# Patient Record
Sex: FEMALE | Race: BLACK OR AFRICAN AMERICAN | NOT HISPANIC OR LATINO | Employment: OTHER | URBAN - METROPOLITAN AREA
[De-identification: names, ages, dates, MRNs, and addresses within clinical notes are randomized per-mention and may not be internally consistent; named-entity substitution may affect disease eponyms.]

---

## 2017-01-27 ENCOUNTER — OFFICE VISIT (OUTPATIENT)
Dept: PODIATRY | Facility: CLINIC | Age: 82
End: 2017-01-27
Payer: MEDICARE

## 2017-01-27 VITALS
WEIGHT: 160 LBS | DIASTOLIC BLOOD PRESSURE: 58 MMHG | SYSTOLIC BLOOD PRESSURE: 108 MMHG | HEIGHT: 65 IN | BODY MASS INDEX: 26.66 KG/M2 | HEART RATE: 61 BPM

## 2017-01-27 DIAGNOSIS — I73.9 PAD (PERIPHERAL ARTERY DISEASE): ICD-10-CM

## 2017-01-27 DIAGNOSIS — E11.9 TYPE II OR UNSPECIFIED TYPE DIABETES MELLITUS WITHOUT MENTION OF COMPLICATION, NOT STATED AS UNCONTROLLED: Primary | ICD-10-CM

## 2017-01-27 DIAGNOSIS — B35.1 ONYCHOMYCOSIS: ICD-10-CM

## 2017-01-27 PROCEDURE — 99499 UNLISTED E&M SERVICE: CPT | Mod: S$PBB,,, | Performed by: PODIATRIST

## 2017-01-27 PROCEDURE — 99499 UNLISTED E&M SERVICE: CPT | Mod: S$GLB,,, | Performed by: PODIATRIST

## 2017-01-27 PROCEDURE — 99999 PR PBB SHADOW E&M-EST. PATIENT-LVL II: CPT | Mod: PBBFAC,,, | Performed by: PODIATRIST

## 2017-01-27 PROCEDURE — 11721 DEBRIDE NAIL 6 OR MORE: CPT | Mod: Q8,S$GLB,, | Performed by: PODIATRIST

## 2017-01-27 NOTE — MR AVS SNAPSHOT
[facility]   [address] [place]  [place] [address]  Phone: [phone]                  Melody Armstrong   2017 9:45 AM   Office Visit    Description:  Female : 1934   Provider:  Doug Laguerre DPM   Department:  [facility]           Diagnoses this Visit        Comments    Type II or unspecified type diabetes mellitus without mention of complication, not stated as uncontrolled    -  Primary     PAD (peripheral artery disease)         Onychomycosis                To Do List           Future Appointments        Provider Department Dept Phone    2/3/2017 7:45 AM LAB, [facility] [place] - Laboratory [phone]    2017 10:40 AM MD [facility] [place] - Internal Medicine [phone]    2017 9:30 AM Doug Laguerre DPM [facility] [phone]      Goals (5 Years of Data)     None      Follow-Up and Disposition     Return in about 3 months (around 2017).      [facility]     [facility] -  Assistance  Registered nurses in the [facility] provide clinical advisement, health education, appointment booking, and other advisory services.  Call for this free service at [phone].             Medications           Message regarding Medications     Verify the changes and/or additions to your medication regime listed below are the same as discussed with your clinician today.  If any of these changes or additions are incorrect, please notify your healthcare provider.             Verify that the below list of medications is an accurate representation of the medications you are currently taking.  If none reported, the list may be blank. If incorrect, please contact your healthcare provider. Carry this list with you in case of emergency.           Current Medications     aspirin 325 MG tablet Take 1 tablet by mouth.    atorvastatin (LIPITOR) 20 MG tablet TAKE 1 TABLET BY MOUTH ONCE DAILY FOR CHOLESTEROL.     "azelastine (ASTELIN) 137 mcg (0.1 %) nasal spray 1 spray (137 mcg total) by Nasal route 2 (two) times daily.    blood sugar diagnostic (GLUCOSE BLOOD) Strp Check blood glucose level once daily.    cyanocobalamin (VITAMIN B-12) 1000 MCG tablet Take 1 tablet by mouth.    diclofenac sodium (PENNSAID) 20 mg/gram /actuation(2 %) sopm Apply 40 mg topically 2 (two) times daily.    diclofenac sodium (VOLTAREN) 1 % Gel Apply 2 g topically 4 (four) times daily.    esomeprazole (NEXIUM) 40 MG capsule Take 1 capsule (40 mg total) by mouth before breakfast.    gabapentin (NEURONTIN) 100 MG capsule TAKE ONE CAPSULE BY MOUTH 3 TIMES A DAY.    hydrochlorothiazide (HYDRODIURIL) 25 MG tablet TAKE ONE TABLET BY MOUTH DAILY FOR FLUID.    levocetirizine (XYZAL) 5 MG tablet Take 1 tablet (5 mg total) by mouth every evening.    losartan (COZAAR) 50 MG tablet TAKE (1) TABLET BY MOUTH DAILY HIGH BLOOD PRESSURE.    meclizine (ANTIVERT) 12.5 mg tablet Take 1 tablet (12.5 mg total) by mouth 3 (three) times daily as needed for Dizziness.    metoprolol succinate (TOPROL-XL) 25 MG 24 hr tablet TAKE (1) TABLET BY MOUTH DAILY HIGH BLOOD PRESSURE.    potassium chloride (KLOR-CON 10) 10 MEQ TbSR Take 1 tablet (10 mEq total) by mouth once daily.    tramadol (ULTRAM) 50 mg tablet Take 1 tablet (50 mg total) by mouth 2 (two) times daily as needed for Pain.           Clinical Reference Information           Vital Signs - Last Recorded  Most recent update: 1/27/2017  9:50 AM by Cecilia Fitzpatrick MA    BP Pulse Ht Wt BMI    (!) 108/58 61 5' 5" (1.651 m) 72.6 kg (160 lb) 26.63 kg/m2      Blood Pressure          Most Recent Value    BP  (!)  108/58      Allergies as of 1/27/2017     No Known Allergies      Immunizations Administered on Date of Encounter - 1/27/2017     None      [facility] Sign-Up     Activating your [facility] account is as easy as 1-2-3!     1) Visit [email or web address], select Sign Up Now, enter this activation code and your date of birth, then " select Next.  [email or web address]  Expires: 3/13/2017 10:26 AM      2) Create a username and password to use when you visit [facility] in the future and select a security question in case you lose your password and select Next.    3) Enter your e-mail address and click Sign Up!    Additional Information  If you have questions, please e-mail [email or web address] or call [phone] to talk to our [facility] staff. Remember, [facility] is NOT to be used for urgent needs. For medical emergencies, dial 911.

## 2017-01-30 NOTE — PROGRESS NOTES
Subjective:      Patient ID: Melody Armstrong is a 82 y.o. female.    Chief Complaint: No chief complaint on file.    Melody is a 82 y.o. female who presents to the clinic for evaluation and treatment of diabetic feet. Melody has a past medical history of Anemia; Asymptomatic cholelithiasis; Coronary artery disease; Diabetes mellitus type II; GERD (gastroesophageal reflux disease); Hyperlipidemia; and Hypertension. Patient relates no major problem with feet. Complains of thickened toenails that she is unable to cut herself.    PCP: Zac Andres MD    Date Last Seen by PCP: 10/14/16    Current shoe gear: Slip-on shoes    Hemoglobin A1C   Date Value Ref Range Status   10/07/2016 5.6 4.5 - 6.2 % Final     Comment:     According to ADA guidelines, hemoglobin A1C <7.0% represents  optimal control in non-pregnant diabetic patients.  Different  metrics may apply to specific populations.   Standards of Medical Care in Diabetes - 2016.  For the purpose of screening for the presence of diabetes:  <5.7%     Consistent with the absence of diabetes  5.7-6.4%  Consistent with increasing risk for diabetes   (prediabetes)  >or=6.5%  Consistent with diabetes  Currently no consensus exists for use of hemoglobin A1C  for diagnosis of diabetes for children.     06/10/2016 5.1 4.5 - 6.2 % Final   02/12/2016 5.3 4.5 - 6.2 % Final     Vitals:    01/27/17 0941   BP: (!) 108/58   Pulse: 61     Past Medical History   Diagnosis Date    Anemia     Asymptomatic cholelithiasis     Coronary artery disease     Diabetes mellitus type II     GERD (gastroesophageal reflux disease)     Hyperlipidemia     Hypertension        Past Surgical History   Procedure Laterality Date    Joint replacement       right    Coronary artery bypass graft      Shoulder arthroscopy w/ rotator cuff repair       right shoulder    Knee surgery         Family History   Problem Relation Age of Onset    Diabetes Mother     Heart disease Mother     Diabetes  Maternal Grandmother        Social History     Social History    Marital status:      Spouse name: N/A    Number of children: N/A    Years of education: N/A     Social History Main Topics    Smoking status: Never Smoker    Smokeless tobacco: Never Used    Alcohol use No    Drug use: No    Sexual activity: Not Asked     Other Topics Concern    None     Social History Narrative       Current Outpatient Prescriptions   Medication Sig Dispense Refill    aspirin 325 MG tablet Take 1 tablet by mouth.      atorvastatin (LIPITOR) 20 MG tablet TAKE 1 TABLET BY MOUTH ONCE DAILY FOR CHOLESTEROL. 90 tablet 3    azelastine (ASTELIN) 137 mcg (0.1 %) nasal spray 1 spray (137 mcg total) by Nasal route 2 (two) times daily. 30 mL 1    blood sugar diagnostic (GLUCOSE BLOOD) Strp Check blood glucose level once daily. 1 Bottle 8    cyanocobalamin (VITAMIN B-12) 1000 MCG tablet Take 1 tablet by mouth.      diclofenac sodium (PENNSAID) 20 mg/gram /actuation(2 %) sopm Apply 40 mg topically 2 (two) times daily. 1 Bottle 2    diclofenac sodium (VOLTAREN) 1 % Gel Apply 2 g topically 4 (four) times daily. 1 Tube 2    esomeprazole (NEXIUM) 40 MG capsule Take 1 capsule (40 mg total) by mouth before breakfast. 90 capsule 3    gabapentin (NEURONTIN) 100 MG capsule TAKE ONE CAPSULE BY MOUTH 3 TIMES A DAY. 270 capsule 3    hydrochlorothiazide (HYDRODIURIL) 25 MG tablet TAKE ONE TABLET BY MOUTH DAILY FOR FLUID. 90 tablet 3    levocetirizine (XYZAL) 5 MG tablet Take 1 tablet (5 mg total) by mouth every evening. 30 tablet 1    losartan (COZAAR) 50 MG tablet TAKE (1) TABLET BY MOUTH DAILY HIGH BLOOD PRESSURE. 90 tablet 3    meclizine (ANTIVERT) 12.5 mg tablet Take 1 tablet (12.5 mg total) by mouth 3 (three) times daily as needed for Dizziness. 40 tablet 2    metoprolol succinate (TOPROL-XL) 25 MG 24 hr tablet TAKE (1) TABLET BY MOUTH DAILY HIGH BLOOD PRESSURE. 90 tablet 3    potassium chloride (KLOR-CON 10) 10 MEQ TbSR  Take 1 tablet (10 mEq total) by mouth once daily. 90 tablet 3    tramadol (ULTRAM) 50 mg tablet Take 1 tablet (50 mg total) by mouth 2 (two) times daily as needed for Pain. 60 tablet 3     No current facility-administered medications for this visit.        Review of patient's allergies indicates:  No Known Allergies        Review of Systems   Constitution: Negative for chills, fever, weakness and malaise/fatigue.   Cardiovascular: Negative for claudication.   Skin: Positive for nail changes. Negative for color change, dry skin, flushing, itching and poor wound healing.   Musculoskeletal: Positive for arthritis. Negative for back pain, falls, gout, joint pain and joint swelling.   Neurological: Negative for numbness and paresthesias.   Psychiatric/Behavioral: Negative for altered mental status.           Objective:      Physical Exam   Constitutional: She is oriented to person, place, and time. She appears well-nourished. No distress.   Cardiovascular:   Pulses:       Dorsalis pedis pulses are 2+ on the right side, and 2+ on the left side.        Posterior tibial pulses are 2+ on the right side, and 2+ on the left side.   CFT< 3 secs all toes bilateral foot, skin temp warm bilateral foot, diminished digital hair growth bilateral foot, no lower extremity edema bilateral. + varicosities bilateral.       Musculoskeletal:   Muscle strength 5/5 in all muscle groups bilateral.  No tenderness nor crepitation with ROM of foot/ankle joints bilateral.  No tenderness with palpation of bilateral foot and ankle.  Bilateral pes planus foot type.  Bilateral gastrocnemius equinus.  Bilateral hallux abducto valgus.  Bilateral semi-reducible contracture of toes 2-5 with adductovarus rotation of the 5th.     Neurological: She is alert and oriented to person, place, and time. She has normal strength. No sensory deficit.   Light touch intact bilateral.  Protective sensation intact bilateral per [place] monofilament.  Vibratory  sensation intact bilateral.   Skin: Skin is warm, dry and intact. No rash noted. She is not diaphoretic. No cyanosis or erythema. No pallor. Nails show no clubbing.   Skin turgor, temperature, and texture appear age appropriate bilateral.      Nails 1-5 bilateral are thickened, discolored, and elongated with subungual debris.  Superior growth and dystrophy of right second and fifth toenails bilateral.     No open wounds, interdigital maceration, or hyperkeratoses noted bilateral.             Assessment:       Encounter Diagnoses   Name Primary?    Type II or unspecified type diabetes mellitus without mention of complication, not stated as uncontrolled Yes    PAD (peripheral artery disease)     Onychomycosis          Plan:       Diagnoses and all orders for this visit:    Type II or unspecified type diabetes mellitus without mention of complication, not stated as uncontrolled    PAD (peripheral artery disease)    Onychomycosis      I counseled the patient on her conditions, their implications and medical management.    Shoe inspection. Diabetic Foot Education. Patient reminded of the importance of good nutrition and blood sugar control to help prevent podiatric complications of diabetes. Patient instructed on proper foot hygeine. We discussed wearing proper shoe gear, daily foot inspections, never walking without protective shoe gear, never putting sharp instruments to feet, routine podiatric nail visits every 2-3 months.      With patient's permission, nails were aggressively reduced and debrided x 10 to their soft tissue attachment mechanically and with electric , removing all offending nail and debris. Patient relates relief following the procedure. She will continue to monitor the areas daily, inspect her feet, wear protective shoe gear when ambulatory, moisturizer to maintain skin integrity and follow in this office in approximately 2-3 months, sooner p.r.n.

## 2017-02-01 DIAGNOSIS — J31.0 CHRONIC RHINITIS: ICD-10-CM

## 2017-02-01 NOTE — TELEPHONE ENCOUNTER
----- Message from Carito Lamar sent at 2/1/2017 11:25 AM CST -----  Contact: Ailin Daughter [phone]  Pt daughter would like to speak with Earline regarding the pt has a Sinus Cold and she would like to know what can the patient take,Please call

## 2017-02-01 NOTE — TELEPHONE ENCOUNTER
Spoke with daughter, she was encouraged to have mom take her xyzal as ordered refill was called into pharmacy.

## 2017-02-02 RX ORDER — LEVOCETIRIZINE DIHYDROCHLORIDE 5 MG/1
5 TABLET, FILM COATED ORAL NIGHTLY
Qty: 30 TABLET | Refills: 1 | Status: SHIPPED | OUTPATIENT
Start: 2017-02-02 | End: 2017-02-09 | Stop reason: SDUPTHER

## 2017-02-03 ENCOUNTER — LAB VISIT (OUTPATIENT)
Dept: LAB | Facility: HOSPITAL | Age: 82
End: 2017-02-03
Attending: INTERNAL MEDICINE
Payer: MEDICARE

## 2017-02-03 DIAGNOSIS — E78.5 HYPERLIPIDEMIA, UNSPECIFIED HYPERLIPIDEMIA TYPE: ICD-10-CM

## 2017-02-03 DIAGNOSIS — E11.22 TYPE 2 DIABETES MELLITUS WITH CHRONIC KIDNEY DISEASE, WITHOUT LONG-TERM CURRENT USE OF INSULIN, UNSPECIFIED CKD STAGE: Chronic | ICD-10-CM

## 2017-02-03 DIAGNOSIS — I10 ESSENTIAL HYPERTENSION: Chronic | ICD-10-CM

## 2017-02-03 LAB
ALBUMIN SERPL BCP-MCNC: 3.8 G/DL
ALP SERPL-CCNC: 91 U/L
ALT SERPL W/O P-5'-P-CCNC: 15 U/L
ANION GAP SERPL CALC-SCNC: 6 MMOL/L
AST SERPL-CCNC: 22 U/L
BASOPHILS # BLD AUTO: 0.03 K/UL
BASOPHILS NFR BLD: 0.4 %
BILIRUB SERPL-MCNC: 0.5 MG/DL
BUN SERPL-MCNC: 28 MG/DL
CALCIUM SERPL-MCNC: 9.8 MG/DL
CHLORIDE SERPL-SCNC: 106 MMOL/L
CO2 SERPL-SCNC: 27 MMOL/L
CREAT SERPL-MCNC: 1.8 MG/DL
DIFFERENTIAL METHOD: ABNORMAL
EOSINOPHIL # BLD AUTO: 1 K/UL
EOSINOPHIL NFR BLD: 14.2 %
ERYTHROCYTE [DISTWIDTH] IN BLOOD BY AUTOMATED COUNT: 13.1 %
EST. GFR  (AFRICAN AMERICAN): 29.8 ML/MIN/1.73 M^2
EST. GFR  (NON AFRICAN AMERICAN): 25.8 ML/MIN/1.73 M^2
GLUCOSE SERPL-MCNC: 89 MG/DL
HCT VFR BLD AUTO: 29.9 %
HGB BLD-MCNC: 9.6 G/DL
LYMPHOCYTES # BLD AUTO: 2.1 K/UL
LYMPHOCYTES NFR BLD: 28.5 %
MCH RBC QN AUTO: 32.2 PG
MCHC RBC AUTO-ENTMCNC: 32.1 %
MCV RBC AUTO: 100 FL
MONOCYTES # BLD AUTO: 0.6 K/UL
MONOCYTES NFR BLD: 8.3 %
NEUTROPHILS # BLD AUTO: 3.6 K/UL
NEUTROPHILS NFR BLD: 48.5 %
PLATELET # BLD AUTO: 282 K/UL
PMV BLD AUTO: 10.2 FL
POTASSIUM SERPL-SCNC: 4.7 MMOL/L
PROT SERPL-MCNC: 7.7 G/DL
RBC # BLD AUTO: 2.98 M/UL
SODIUM SERPL-SCNC: 139 MMOL/L
WBC # BLD AUTO: 7.34 K/UL

## 2017-02-03 PROCEDURE — 36415 COLL VENOUS BLD VENIPUNCTURE: CPT | Mod: PO

## 2017-02-03 PROCEDURE — 83036 HEMOGLOBIN GLYCOSYLATED A1C: CPT

## 2017-02-03 PROCEDURE — 80053 COMPREHEN METABOLIC PANEL: CPT

## 2017-02-03 PROCEDURE — 85025 COMPLETE CBC W/AUTO DIFF WBC: CPT

## 2017-02-04 LAB
ESTIMATED AVG GLUCOSE: 111 MG/DL
HBA1C MFR BLD HPLC: 5.5 %

## 2017-02-09 ENCOUNTER — OFFICE VISIT (OUTPATIENT)
Dept: INTERNAL MEDICINE | Facility: CLINIC | Age: 82
End: 2017-02-09
Payer: MEDICARE

## 2017-02-09 VITALS
BODY MASS INDEX: 26.6 KG/M2 | HEART RATE: 87 BPM | TEMPERATURE: 99 F | WEIGHT: 159.63 LBS | HEIGHT: 65 IN | DIASTOLIC BLOOD PRESSURE: 55 MMHG | SYSTOLIC BLOOD PRESSURE: 115 MMHG

## 2017-02-09 DIAGNOSIS — D63.1 ANEMIA IN CHRONIC KIDNEY DISEASE(285.21): ICD-10-CM

## 2017-02-09 DIAGNOSIS — E78.5 HYPERLIPIDEMIA, UNSPECIFIED HYPERLIPIDEMIA TYPE: ICD-10-CM

## 2017-02-09 DIAGNOSIS — I10 ESSENTIAL HYPERTENSION: Chronic | ICD-10-CM

## 2017-02-09 DIAGNOSIS — N18.9 ANEMIA IN CHRONIC KIDNEY DISEASE(285.21): ICD-10-CM

## 2017-02-09 DIAGNOSIS — J31.0 CHRONIC RHINITIS: ICD-10-CM

## 2017-02-09 DIAGNOSIS — G89.29 CHRONIC PAIN OF LEFT KNEE: ICD-10-CM

## 2017-02-09 DIAGNOSIS — E11.22 TYPE 2 DIABETES MELLITUS WITH CHRONIC KIDNEY DISEASE, WITHOUT LONG-TERM CURRENT USE OF INSULIN, UNSPECIFIED CKD STAGE: Primary | Chronic | ICD-10-CM

## 2017-02-09 DIAGNOSIS — N18.30 CKD (CHRONIC KIDNEY DISEASE), STAGE III: Chronic | ICD-10-CM

## 2017-02-09 DIAGNOSIS — M25.562 CHRONIC PAIN OF LEFT KNEE: ICD-10-CM

## 2017-02-09 DIAGNOSIS — M17.10 PRIMARY LOCALIZED OSTEOARTHROSIS, LOWER LEG, UNSPECIFIED LATERALITY: ICD-10-CM

## 2017-02-09 DIAGNOSIS — M17.12 ARTHRITIS OF LEFT KNEE: ICD-10-CM

## 2017-02-09 PROCEDURE — 99214 OFFICE O/P EST MOD 30 MIN: CPT | Mod: S$GLB,,, | Performed by: INTERNAL MEDICINE

## 2017-02-09 PROCEDURE — 99999 PR PBB SHADOW E&M-EST. PATIENT-LVL III: CPT | Mod: PBBFAC,,, | Performed by: INTERNAL MEDICINE

## 2017-02-09 PROCEDURE — 90732 PPSV23 VACC 2 YRS+ SUBQ/IM: CPT | Mod: S$GLB,,, | Performed by: INTERNAL MEDICINE

## 2017-02-09 PROCEDURE — 1160F RVW MEDS BY RX/DR IN RCRD: CPT | Mod: S$GLB,,, | Performed by: INTERNAL MEDICINE

## 2017-02-09 PROCEDURE — 99499 UNLISTED E&M SERVICE: CPT | Mod: S$PBB,,, | Performed by: INTERNAL MEDICINE

## 2017-02-09 PROCEDURE — 1159F MED LIST DOCD IN RCRD: CPT | Mod: S$GLB,,, | Performed by: INTERNAL MEDICINE

## 2017-02-09 PROCEDURE — G0009 ADMIN PNEUMOCOCCAL VACCINE: HCPCS | Mod: S$GLB,,, | Performed by: INTERNAL MEDICINE

## 2017-02-09 PROCEDURE — 3078F DIAST BP <80 MM HG: CPT | Mod: S$GLB,,, | Performed by: INTERNAL MEDICINE

## 2017-02-09 PROCEDURE — 3074F SYST BP LT 130 MM HG: CPT | Mod: S$GLB,,, | Performed by: INTERNAL MEDICINE

## 2017-02-09 PROCEDURE — 1126F AMNT PAIN NOTED NONE PRSNT: CPT | Mod: S$GLB,,, | Performed by: INTERNAL MEDICINE

## 2017-02-09 PROCEDURE — 1157F ADVNC CARE PLAN IN RCRD: CPT | Mod: S$GLB,,, | Performed by: INTERNAL MEDICINE

## 2017-02-09 RX ORDER — HYDROCHLOROTHIAZIDE 25 MG/1
TABLET ORAL
Qty: 90 TABLET | Refills: 3 | Status: SHIPPED | OUTPATIENT
Start: 2017-02-09 | End: 2018-09-04 | Stop reason: SDUPTHER

## 2017-02-09 RX ORDER — TRAMADOL HYDROCHLORIDE 50 MG/1
100 TABLET ORAL EVERY 8 HOURS PRN
Qty: 180 TABLET | Refills: 1 | Status: SHIPPED | OUTPATIENT
Start: 2017-02-09 | End: 2017-08-23 | Stop reason: SDUPTHER

## 2017-02-09 RX ORDER — POTASSIUM CHLORIDE 750 MG/1
10 TABLET, EXTENDED RELEASE ORAL DAILY
Qty: 90 TABLET | Refills: 3 | Status: SHIPPED | OUTPATIENT
Start: 2017-02-09 | End: 2017-07-06 | Stop reason: SDUPTHER

## 2017-02-09 RX ORDER — LEVOCETIRIZINE DIHYDROCHLORIDE 5 MG/1
5 TABLET, FILM COATED ORAL NIGHTLY
Qty: 30 TABLET | Refills: 5 | Status: SHIPPED | OUTPATIENT
Start: 2017-02-09 | End: 2018-05-02

## 2017-02-09 RX ORDER — GABAPENTIN 100 MG/1
CAPSULE ORAL
Qty: 270 CAPSULE | Refills: 3 | Status: SHIPPED | OUTPATIENT
Start: 2017-02-09 | End: 2018-04-09 | Stop reason: SDUPTHER

## 2017-02-09 RX ORDER — METOPROLOL SUCCINATE 25 MG/1
TABLET, EXTENDED RELEASE ORAL
Qty: 90 TABLET | Refills: 3 | Status: SHIPPED | OUTPATIENT
Start: 2017-02-09 | End: 2017-07-06 | Stop reason: SDUPTHER

## 2017-02-09 RX ORDER — ESOMEPRAZOLE MAGNESIUM 40 MG/1
40 CAPSULE, DELAYED RELEASE ORAL
Qty: 90 CAPSULE | Refills: 3 | Status: SHIPPED | OUTPATIENT
Start: 2017-02-09 | End: 2017-06-15

## 2017-02-09 RX ORDER — LOSARTAN POTASSIUM 50 MG/1
TABLET ORAL
Qty: 90 TABLET | Refills: 3 | Status: SHIPPED | OUTPATIENT
Start: 2017-02-09 | End: 2017-11-17 | Stop reason: SDUPTHER

## 2017-02-09 RX ORDER — ATORVASTATIN CALCIUM 20 MG/1
TABLET, FILM COATED ORAL
Qty: 90 TABLET | Refills: 3 | Status: SHIPPED | OUTPATIENT
Start: 2017-02-09 | End: 2018-02-01 | Stop reason: SDUPTHER

## 2017-02-13 ENCOUNTER — TELEPHONE (OUTPATIENT)
Dept: PAIN MEDICINE | Facility: CLINIC | Age: 82
End: 2017-02-13

## 2017-02-13 NOTE — TELEPHONE ENCOUNTER
----- Message from Yue Sands sent at 2/13/2017  9:36 AM CST -----  Contact: siria(daughter)/[phone]  Patient would like to schedule an appt for an injection in her knee.  Please advise

## 2017-02-13 NOTE — TELEPHONE ENCOUNTER
Contacted and spoke to pt's daughter regarding message to schedule left knee injection.     The pt's daughter was informed by staff that the pt need to discuss knee treatment plan with Mushtaq Crow . She was then offered an appointment on 2/14/2017 . Pt's daughter agreed , and pt was scheduled.     Pt's daughter verbalized understanding

## 2017-02-15 NOTE — PROGRESS NOTES
Subjective:       Patient ID: Melody Armstrong is a 82 y.o. female.    Chief Complaint: Follow-up    HPI   The patient presents for follow-up of diabetes and other medical conditions.  She is accompanied today by her daughter.  Blood glucose levels have been good.  Her vision is good.  She denies having any lower extremity numbness or tingling.  She has hypertension and has been tolerating her blood pressure medication well.  She has not experienced any hypoglycemic symptoms.  She has osteoarthritis primarily involving the knee joints.  She has been using Tylenol and occasionally tramadol.  We discussed use of opioid containing medications however the patient is not interested in this.  We discussed obtaining consultation with pain management regarding her left knee pain.  Injection therapy had been discussed with her previously.  Review of Systems   Constitutional: Negative for activity change, appetite change and unexpected weight change.   Eyes: Negative for visual disturbance.   Respiratory: Negative for shortness of breath.    Cardiovascular: Negative for chest pain, palpitations and leg swelling.   Gastrointestinal: Negative for abdominal pain, blood in stool and diarrhea.   Genitourinary: Negative for dysuria, frequency, hematuria and urgency.        She does experience 2-3 episodes of nocturia daily.   Musculoskeletal: Positive for arthralgias.   Neurological: Negative for weakness, numbness and headaches.   Psychiatric/Behavioral: Negative for sleep disturbance.       Objective:      Physical Exam   Constitutional: She is oriented to person, place, and time. She appears well-developed and well-nourished. No distress.   HENT:   Head: Normocephalic and atraumatic.   Eyes: Conjunctivae and EOM are normal. Pupils are equal, round, and reactive to light. No scleral icterus.   Neck: Normal range of motion. Neck supple. No JVD present. No thyromegaly present.   Cardiovascular: Normal rate, regular rhythm, normal  heart sounds and intact distal pulses.  Exam reveals no gallop and no friction rub.    No murmur heard.  Pulmonary/Chest: Effort normal and breath sounds normal. No respiratory distress. She has no wheezes. She has no rales.   Abdominal: Soft. Bowel sounds are normal. She exhibits no mass. There is no tenderness.   Musculoskeletal:   Right knee range of motion is intact.  Left knee exhibits decreased flexion and extension.  Tenderness is present on range of motion testing.  An effusion was not appreciated.   Lymphadenopathy:     She has no cervical adenopathy.   Neurological: She is alert and oriented to person, place, and time. No cranial nerve deficit.   Sensory exam is intact in both feet on monofilament and vibration testing.   Skin: Skin is warm and dry. No rash noted.   No foot lesions are present.   Psychiatric: She has a normal mood and affect. Her behavior is normal.   Nursing note and vitals reviewed.      Results for orders placed or performed in visit on 02/03/17   CBC auto differential   Result Value Ref Range    WBC 7.34 3.90 - 12.70 K/uL    RBC 2.98 (L) 4.00 - 5.40 M/uL    Hemoglobin 9.6 (L) 12.0 - 16.0 g/dL    Hematocrit 29.9 (L) 37.0 - 48.5 %     (H) 82 - 98 fL    MCH 32.2 (H) 27.0 - 31.0 pg    MCHC 32.1 32.0 - 36.0 %    RDW 13.1 11.5 - 14.5 %    Platelets 282 150 - 350 K/uL    MPV 10.2 9.2 - 12.9 fL    Gran # 3.6 1.8 - 7.7 K/uL    Lymph # 2.1 1.0 - 4.8 K/uL    Mono # 0.6 0.3 - 1.0 K/uL    Eos # 1.0 (H) 0.0 - 0.5 K/uL    Baso # 0.03 0.00 - 0.20 K/uL    Gran% 48.5 38.0 - 73.0 %    Lymph% 28.5 18.0 - 48.0 %    Mono% 8.3 4.0 - 15.0 %    Eosinophil% 14.2 (H) 0.0 - 8.0 %    Basophil% 0.4 0.0 - 1.9 %    Differential Method Automated    Comprehensive metabolic panel   Result Value Ref Range    Sodium 139 136 - 145 mmol/L    Potassium 4.7 3.5 - 5.1 mmol/L    Chloride 106 95 - 110 mmol/L    CO2 27 23 - 29 mmol/L    Glucose 89 70 - 110 mg/dL    BUN, Bld 28 (H) 8 - 23 mg/dL    Creatinine 1.8 (H) 0.5 -  1.4 mg/dL    Calcium 9.8 8.7 - 10.5 mg/dL    Total Protein 7.7 6.0 - 8.4 g/dL    Albumin 3.8 3.5 - 5.2 g/dL    Total Bilirubin 0.5 0.1 - 1.0 mg/dL    Alkaline Phosphatase 91 55 - 135 U/L    AST 22 10 - 40 U/L    ALT 15 10 - 44 U/L    Anion Gap 6 (L) 8 - 16 mmol/L    eGFR if African American 29.8 (A) >60 mL/min/1.73 m^2    eGFR if non  25.8 (A) >60 mL/min/1.73 m^2   Hemoglobin A1c   Result Value Ref Range    Hemoglobin A1C 5.5 4.5 - 6.2 %    Estimated Avg Glucose 111 68 - 131 mg/dL       Assessment:       1. Type 2 diabetes mellitus with chronic kidney disease, without long-term current use of insulin, unspecified CKD stage    2. Essential hypertension    3. CKD (chronic kidney disease), stage III    4. Hyperlipidemia, unspecified hyperlipidemia type    5. Anemia in chronic kidney disease    6. Arthritis of left knee    7. Primary localized osteoarthrosis, lower leg, unspecified laterality    8. Chronic pain of left knee    9. Chronic rhinitis        Plan:           Melody was seen today for follow-up.  Pain management will be consulted regarding chronic left knee pain.  Current therapy will be continued.  Tramadol will be renewed for pain control.  Pneumovax will be administered today.  Blood tests and a follow-up visit will be obtained in 4 months.    Diagnoses and all orders for this visit:    Type 2 diabetes mellitus with chronic kidney disease, without long-term current use of insulin, unspecified CKD stage  -     Hemoglobin A1c; Future    Essential hypertension  -     CBC auto differential; Future  -     TSH; Future    CKD (chronic kidney disease), stage III    Hyperlipidemia, unspecified hyperlipidemia type  -     Comprehensive metabolic panel; Future  -     Lipid panel; Future    Anemia in chronic kidney disease    Arthritis of left knee    Primary localized osteoarthrosis, lower leg, unspecified laterality  -     Ambulatory consult to Pain Clinic    Chronic pain of left knee  -      Ambulatory consult to Pain Clinic    Chronic rhinitis  -     levocetirizine (XYZAL) 5 MG tablet; Take 1 tablet (5 mg total) by mouth every evening. For sinus allergy.    Other orders  -     Pneumococcal Polysaccharide Vaccine (23 Valent) (SQ/IM)  -     atorvastatin (LIPITOR) 20 MG tablet; TAKE 1 TABLET BY MOUTH ONCE DAILY FOR CHOLESTEROL.  -     esomeprazole (NEXIUM) 40 MG capsule; Take 1 capsule (40 mg total) by mouth before breakfast.  -     gabapentin (NEURONTIN) 100 MG capsule; TAKE ONE CAPSULE BY MOUTH 3 TIMES A DAY.  -     hydrochlorothiazide (HYDRODIURIL) 25 MG tablet; TAKE ONE TABLET BY MOUTH DAILY FOR FLUID.  -     losartan (COZAAR) 50 MG tablet; TAKE (1) TABLET BY MOUTH DAILY HIGH BLOOD PRESSURE.  -     metoprolol succinate (TOPROL-XL) 25 MG 24 hr tablet; TAKE (1) TABLET BY MOUTH DAILY HIGH BLOOD PRESSURE.  -     potassium chloride (KLOR-CON 10) 10 MEQ TbSR; Take 1 tablet (10 mEq total) by mouth once daily.  -     tramadol (ULTRAM) 50 mg tablet; Take 2 tablets (100 mg total) by mouth every 8 (eight) hours as needed for Pain.

## 2017-03-21 ENCOUNTER — TELEPHONE (OUTPATIENT)
Dept: PAIN MEDICINE | Facility: CLINIC | Age: 82
End: 2017-03-21

## 2017-03-21 NOTE — TELEPHONE ENCOUNTER
Contacted and spoke with Ms. Gabriel regarding appointment on 3/23. Ms Harvey was informed that the appointment was to discuss treatment plan. Dr. Mercer will discuss treatment plan after evaluating the pt's condition.    Pt's daughter verbalized understanding

## 2017-03-21 NOTE — TELEPHONE ENCOUNTER
----- Message from Coty Hoang sent at 3/21/2017  2:37 PM CDT -----  Contact: pt daughter  Pt daughter would like a call back in regards to pt upcoming appt. Pt daughter would like to know if pt will receive her injection tomorrow     Pt daughter (Ms Gabriel) can be contacted at [phone]

## 2017-03-22 ENCOUNTER — OFFICE VISIT (OUTPATIENT)
Dept: PAIN MEDICINE | Facility: CLINIC | Age: 82
End: 2017-03-22
Payer: MEDICARE

## 2017-03-22 VITALS
HEART RATE: 70 BPM | WEIGHT: 158 LBS | DIASTOLIC BLOOD PRESSURE: 62 MMHG | BODY MASS INDEX: 26.29 KG/M2 | SYSTOLIC BLOOD PRESSURE: 110 MMHG

## 2017-03-22 DIAGNOSIS — M25.562 CHRONIC PAIN OF LEFT KNEE: Primary | ICD-10-CM

## 2017-03-22 DIAGNOSIS — G89.29 CHRONIC KNEE PAIN, UNSPECIFIED LATERALITY: Primary | ICD-10-CM

## 2017-03-22 DIAGNOSIS — G89.29 CHRONIC PAIN OF LEFT KNEE: Primary | ICD-10-CM

## 2017-03-22 DIAGNOSIS — M25.569 CHRONIC KNEE PAIN, UNSPECIFIED LATERALITY: Primary | ICD-10-CM

## 2017-03-22 DIAGNOSIS — M17.12 PRIMARY OSTEOARTHRITIS OF LEFT KNEE: ICD-10-CM

## 2017-03-22 PROCEDURE — 1125F AMNT PAIN NOTED PAIN PRSNT: CPT | Mod: S$GLB,,, | Performed by: ANESTHESIOLOGY

## 2017-03-22 PROCEDURE — 1159F MED LIST DOCD IN RCRD: CPT | Mod: S$GLB,,, | Performed by: ANESTHESIOLOGY

## 2017-03-22 PROCEDURE — 99214 OFFICE O/P EST MOD 30 MIN: CPT | Mod: S$GLB,,, | Performed by: ANESTHESIOLOGY

## 2017-03-22 PROCEDURE — 99999 PR PBB SHADOW E&M-EST. PATIENT-LVL II: CPT | Mod: PBBFAC,,, | Performed by: ANESTHESIOLOGY

## 2017-03-22 PROCEDURE — 1160F RVW MEDS BY RX/DR IN RCRD: CPT | Mod: S$GLB,,, | Performed by: ANESTHESIOLOGY

## 2017-03-22 PROCEDURE — 3074F SYST BP LT 130 MM HG: CPT | Mod: S$GLB,,, | Performed by: ANESTHESIOLOGY

## 2017-03-22 PROCEDURE — 1157F ADVNC CARE PLAN IN RCRD: CPT | Mod: S$GLB,,, | Performed by: ANESTHESIOLOGY

## 2017-03-22 PROCEDURE — 3078F DIAST BP <80 MM HG: CPT | Mod: S$GLB,,, | Performed by: ANESTHESIOLOGY

## 2017-03-22 NOTE — LETTER
March 24, 2017      Zac Andres MD  [address] [address]           [place] [facility]  [address]  [place] [facility] [place] [facility] [place] [address]  Phone: [phone]          Patient: Melody Armstrong   MR Number: [id number]   YOB: 1934   Date of Visit: 3/22/2017       Dear Dr. Zac Andres:    Thank you for referring Melody Armstrong to me for evaluation. Attached you will find relevant portions of my assessment and plan of care.    If you have questions, please do not hesitate to call me. I look forward to following Melody Armstrong along with you.    Sincerely,    Jez Mercer MD    Enclosure  CC:  No Recipients    If you would like to receive this communication electronically, please contact [email or web address] or [phone] to request more information on [facility] access.    For providers and/or their staff who would like to refer a patient to [facility], please contact us through our one-stop-shop provider referral line, [facility] , at [phone].    If you feel you have received this communication in error or would no longer like to receive these types of communications, please e-mail [email or web address]

## 2017-03-22 NOTE — PROGRESS NOTES
Chronic patient Established Note (Follow up visit)      SUBJECTIVE:    Melody Armstrong presents to the clinic for a follow-up appointment for left knee pain with osteoarthritis. Since the last visit, Melody Armstrong states the pain has been worsening. Current pain intensity is 10/10.  States since her bilateral sacroiliac joint her back pain has disappeared she is here today complaining of her left knee.  Pain Disability Index Review:  Last 3 PDI Scores 3/22/2017 12/8/2016 10/26/2016   Pain Disability Index (PDI) 21 0 54       Pain Medications:  - Opioids: Ultram (Tramadol HCL)  - Adjuvant Medications: Neurontin (Gabapentin)  - Anti-Coagulants: Aspirin  - Others: see medication list    Opioid Contract: no     report:  Reviewed and consistent with medication use as prescribed.    Pain Procedures: 11/1/16 bilateral SACROILIAC JOINT INJECTION  right knee replacement x 5yrs ago    Physical Therapy/Home Exercise: no    Imaging: X-Ray Sacrum And Coccyx 10/14/16  Narrative   2 views: There is DJD.  DJD of the lumbar spine, SI joints, hips, and symphysis pubis.  No fracture dislocation bone destruction or erosion seen.      Electronically signed by: MARY SORIA  Date: 10/14/16  Time: 12:11            Allergies: Review of patient's allergies indicates:  No Known Allergies    Current Medications:   Current Outpatient Prescriptions   Medication Sig Dispense Refill    aspirin 325 MG tablet Take 1 tablet by mouth.      atorvastatin (LIPITOR) 20 MG tablet TAKE 1 TABLET BY MOUTH ONCE DAILY FOR CHOLESTEROL. 90 tablet 3    azelastine (ASTELIN) 137 mcg (0.1 %) nasal spray 1 spray (137 mcg total) by Nasal route 2 (two) times daily. 30 mL 1    blood sugar diagnostic (GLUCOSE BLOOD) Strp Check blood glucose level once daily. 1 Bottle 8    cyanocobalamin (VITAMIN B-12) 1000 MCG tablet Take 1 tablet by mouth.      diclofenac sodium (PENNSAID) 20 mg/gram /actuation(2 %) sopm Apply 40 mg topically 2 (two) times daily. 1  Bottle 2    diclofenac sodium (VOLTAREN) 1 % Gel Apply 2 g topically 4 (four) times daily. 1 Tube 2    esomeprazole (NEXIUM) 40 MG capsule Take 1 capsule (40 mg total) by mouth before breakfast. 90 capsule 3    gabapentin (NEURONTIN) 100 MG capsule TAKE ONE CAPSULE BY MOUTH 3 TIMES A DAY. 270 capsule 3    hydrochlorothiazide (HYDRODIURIL) 25 MG tablet TAKE ONE TABLET BY MOUTH DAILY FOR FLUID. 90 tablet 3    levocetirizine (XYZAL) 5 MG tablet Take 1 tablet (5 mg total) by mouth every evening. For sinus allergy. 30 tablet 5    losartan (COZAAR) 50 MG tablet TAKE (1) TABLET BY MOUTH DAILY HIGH BLOOD PRESSURE. 90 tablet 3    meclizine (ANTIVERT) 12.5 mg tablet Take 1 tablet (12.5 mg total) by mouth 3 (three) times daily as needed for Dizziness. 40 tablet 2    metoprolol succinate (TOPROL-XL) 25 MG 24 hr tablet TAKE (1) TABLET BY MOUTH DAILY HIGH BLOOD PRESSURE. 90 tablet 3    potassium chloride (KLOR-CON 10) 10 MEQ TbSR Take 1 tablet (10 mEq total) by mouth once daily. 90 tablet 3    tramadol (ULTRAM) 50 mg tablet Take 2 tablets (100 mg total) by mouth every 8 (eight) hours as needed for Pain. 180 tablet 1     No current facility-administered medications for this visit.        REVIEW OF SYSTEMS:    GENERAL:  No weight loss, malaise or fevers.  HEENT:  Negative for frequent or significant headaches.  NECK:  Negative for lumps, goiter, pain and significant neck swelling.  RESPIRATORY:  Negative for cough, wheezing or shortness of breath.  CARDIOVASCULAR:  Negative for chest pain, leg swelling or palpitations.  GI:  Negative for abdominal discomfort, blood in stools or black stools or change in bowel habits.  MUSCULOSKELETAL:  See HPI.  SKIN:  Negative for lesions, rash, and itching.  PSYCH:  Positive for sleep disturbance, mood disorder and recent psychosocial stressors.  HEMATOLOGY/LYMPHOLOGY:  Negative for prolonged bleeding, bruising easily or swollen nodes.  NEURO:   No history of headaches, syncope,  paralysis, seizures or tremors.  All other reviewed and negative other than HPI.    Past Medical History:  Past Medical History:   Diagnosis Date    Anemia     Asymptomatic cholelithiasis     Coronary artery disease     Diabetes mellitus type II     GERD (gastroesophageal reflux disease)     Hyperlipidemia     Hypertension        Past Surgical History:  Past Surgical History:   Procedure Laterality Date    CORONARY ARTERY BYPASS GRAFT      JOINT REPLACEMENT      right    KNEE SURGERY      SHOULDER ARTHROSCOPY W/ ROTATOR CUFF REPAIR      right shoulder       Family History:  Family History   Problem Relation Age of Onset    Diabetes Mother     Heart disease Mother     Diabetes Maternal Grandmother        Social History:  Social History     Social History    Marital status:      Spouse name: N/A    Number of children: N/A    Years of education: N/A     Social History Main Topics    Smoking status: Never Smoker    Smokeless tobacco: Never Used    Alcohol use No    Drug use: No    Sexual activity: Not on file     Other Topics Concern    Not on file     Social History Narrative       OBJECTIVE:    /62  Pulse 70  Wt 71.7 kg (158 lb)  BMI 26.29 kg/m2    PHYSICAL EXAMINATION:    General appearance: Well appearing, in no acute distress, alert and oriented x3.  Psych:  Mood and affect appropriate.  Skin: Skin color, texture, turgor normal, no rashes or lesions, in both upper and lower body.  Head/face:  Atraumatic, normocephalic. No palpable lymph nodes  Neck: No pain to palpation over the cervical paraspinous muscles. Spurling Negative. No pain with neck flexion, extension, or lateral flexion. .  Cor: RRR  Pulm: CTA  GI: Abdomen soft and non-tender.  Back: Straight leg raising in the sitting and supine positions is negative to radicular pain.  Mild to moderate pain to palpation over the spine or costovertebral angles. Normal range of motion without pain reproduction.  Extremities:  positive for left knee limited ROM with tenderness on palpation and crepitus on movement, negative ant and post drawer sign. No deformities, edema, or skin discoloration. Good capillary refill.  Musculoskeletal: Shoulder, hip  provocative maneuvers are negative. Bilateral upper and lower extremity strength is normal and symmetric.  No atrophy or tone abnormalities are noted.  Neuro: Bilateral upper and lower extremity coordination and muscle stretch reflexes are physiologic and symmetric.  Plantar response are downgoing. No loss of sensation is noted.  Gait: Antalgic uses a walker    ASSESSMENT: 82 y.o. year old female with left knee pain, consistent with      1. Chronic knee pain, unspecified laterality     2. Primary osteoarthritis of left knee           PLAN:     - I have stressed the importance of physical activity and a home exercise plan to help with pain and improve health.  - Patient can continue with medications for now since they are providing benefits, using them appropriately, and without side effects.  - Schedule for a Left knee genicular nerves injection to help with his pain and progress with a home exercise plan.  - RTC 4 weeks  - Counseled patient regarding the importance of activity modification, constant sleeping habits and physical therapy.    The above plan and management options were discussed at length with patient. Patient is in agreement with the above and verbalized understanding.    Jez Mercer MD  03/22/2017

## 2017-03-24 ENCOUNTER — TELEPHONE (OUTPATIENT)
Dept: PAIN MEDICINE | Facility: CLINIC | Age: 82
End: 2017-03-24

## 2017-03-24 NOTE — TELEPHONE ENCOUNTER
Spoke with patient's daughter regarding time of arrival for procedure that is scheduled on 3/28/17. Patient's daughter verbalized instructions and confirmed procedure date and time of arrival on 3/28/17 at 730 AM.

## 2017-03-27 NOTE — DISCHARGE INSTRUCTIONS
Home Care Instructions Pain Management:    1.  DIET:    You may resume your normal diet today.    2.  BATHING:    You may shower with luke warm water.    3.  DRESSING:    You may remove your bandage today.    4.  ACTIVITY LEVEL:      You may resume your normal activities 24 hours after your procedure.    5.  MEDICATIONS:    You may resume your normal medications today.    6.  SPECIAL INSTRUCTIONS:    No heat to the injection site for 24 hours including bath or shower, heating pad, moist heat or hot tubs.    Use an ice pack to the injection site for any pain or discomfort.  Apply ice packs for 20 minute intervals as needed.    If you have received any sedatives by mouth today, you can not drive for 12 hours.    If you have received sedation through an IV, you can not drive for 24 hours.    PLEASE CALL YOUR DOCTOR FOR THE FOLLOWIN.  Redness or swelling around the injection site.  2.  Fever of 101 degrees.  3.  Drainage (pus) from the injection site.  4.  For any continuous bleeding (some dried blood over the incision is normal.)    FOR EMERGENCIES:    If any unusual problems or difficulties occur during clinic hours, call [phone] or dial [phone].    Follow up with with your physician in 2-3 weeks.

## 2017-03-28 ENCOUNTER — SURGERY (OUTPATIENT)
Age: 82
End: 2017-03-28

## 2017-03-28 ENCOUNTER — HOSPITAL ENCOUNTER (OUTPATIENT)
Facility: HOSPITAL | Age: 82
Discharge: HOME | End: 2017-03-28
Attending: ANESTHESIOLOGY | Admitting: ANESTHESIOLOGY
Payer: MEDICARE

## 2017-03-28 VITALS
OXYGEN SATURATION: 99 % | HEART RATE: 84 BPM | DIASTOLIC BLOOD PRESSURE: 76 MMHG | RESPIRATION RATE: 18 BRPM | SYSTOLIC BLOOD PRESSURE: 163 MMHG | HEIGHT: 65 IN | WEIGHT: 156 LBS | BODY MASS INDEX: 25.99 KG/M2 | TEMPERATURE: 98 F

## 2017-03-28 DIAGNOSIS — G89.29 CHRONIC PAIN: ICD-10-CM

## 2017-03-28 DIAGNOSIS — M17.9 OSTEOARTHRITIS OF KNEE, UNSPECIFIED LATERALITY, UNSPECIFIED OSTEOARTHRITIS TYPE: ICD-10-CM

## 2017-03-28 DIAGNOSIS — G89.29 CHRONIC KNEE PAIN, UNSPECIFIED LATERALITY: Primary | ICD-10-CM

## 2017-03-28 DIAGNOSIS — M25.569 CHRONIC KNEE PAIN, UNSPECIFIED LATERALITY: Primary | ICD-10-CM

## 2017-03-28 LAB — POCT GLUCOSE: 73 MG/DL (ref 70–110)

## 2017-03-28 PROCEDURE — 99152 MOD SED SAME PHYS/QHP 5/>YRS: CPT | Mod: ,,, | Performed by: ANESTHESIOLOGY

## 2017-03-28 PROCEDURE — 25000003 PHARM REV CODE 250: Performed by: ANESTHESIOLOGY

## 2017-03-28 PROCEDURE — 64450 NJX AA&/STRD OTHER PN/BRANCH: CPT | Mod: LT,,, | Performed by: ANESTHESIOLOGY

## 2017-03-28 PROCEDURE — 64450 NJX AA&/STRD OTHER PN/BRANCH: CPT | Performed by: ANESTHESIOLOGY

## 2017-03-28 PROCEDURE — 63600175 PHARM REV CODE 636 W HCPCS: Performed by: ANESTHESIOLOGY

## 2017-03-28 RX ORDER — SODIUM CHLORIDE 9 MG/ML
500 INJECTION, SOLUTION INTRAVENOUS CONTINUOUS
Status: DISCONTINUED | OUTPATIENT
Start: 2017-03-28 | End: 2017-03-28 | Stop reason: HOSPADM

## 2017-03-28 RX ORDER — TRIAMCINOLONE ACETONIDE 40 MG/ML
INJECTION, SUSPENSION INTRA-ARTICULAR; INTRAMUSCULAR
Status: DISCONTINUED | OUTPATIENT
Start: 2017-03-28 | End: 2017-03-28 | Stop reason: HOSPADM

## 2017-03-28 RX ORDER — MIDAZOLAM HYDROCHLORIDE 1 MG/ML
INJECTION, SOLUTION INTRAMUSCULAR; INTRAVENOUS
Status: DISCONTINUED | OUTPATIENT
Start: 2017-03-28 | End: 2017-03-28 | Stop reason: HOSPADM

## 2017-03-28 RX ORDER — LIDOCAINE HYDROCHLORIDE 10 MG/ML
INJECTION INFILTRATION; PERINEURAL
Status: DISCONTINUED | OUTPATIENT
Start: 2017-03-28 | End: 2017-03-28 | Stop reason: HOSPADM

## 2017-03-28 RX ORDER — BUPIVACAINE HYDROCHLORIDE 2.5 MG/ML
INJECTION, SOLUTION EPIDURAL; INFILTRATION; INTRACAUDAL
Status: DISCONTINUED | OUTPATIENT
Start: 2017-03-28 | End: 2017-03-28 | Stop reason: HOSPADM

## 2017-03-28 RX ADMIN — MIDAZOLAM 2 MG: 1 INJECTION INTRAMUSCULAR; INTRAVENOUS at 08:03

## 2017-03-28 RX ADMIN — LIDOCAINE HYDROCHLORIDE 5 ML: 10 INJECTION, SOLUTION INFILTRATION; PERINEURAL at 08:03

## 2017-03-28 RX ADMIN — TRIAMCINOLONE ACETONIDE 40 MG: 40 INJECTION, SUSPENSION INTRA-ARTICULAR; INTRAMUSCULAR at 08:03

## 2017-03-28 RX ADMIN — BUPIVACAINE HYDROCHLORIDE 5 ML: 2.5 INJECTION, SOLUTION EPIDURAL; INFILTRATION; INTRACAUDAL; PERINEURAL at 08:03

## 2017-03-28 NOTE — OP NOTE
Patient Name: Melody Armstrong  MRN: [id number]    INFORMED CONSENT: The procedure, risks, benefits and options were discussed with patient. There are no contraindications to the procedure. The patient expressed understanding and agreed to proceed. The personnel performing the procedure was discussed. I verify that I personally obtained Melody's consent prior to the start of the procedure and the signed consent can be found on the patient's chart.    Procedure Date: 03/28/2017    Anesthesia: Topical    Pre Procedure diagnosis:   1. Chronic knee pain, unspecified laterality    2. Osteoarthritis of knee, unspecified laterality, unspecified osteoarthritis type    3. Chronic pain      Post-Procedure diagnosis: same      Moderate Sedation: Yes - Midazolam 2 mg single dose      PROCEDURE: left GENICULAR NERVE BLOCK    DESCRIPTION OF PROCEDURE: The patient was brought to the fluoroscopy suite. IV access was obtained prior to the procedure. The patient was positioned supine on the fluoroscopy table. Continuous hemodynamic monitoring was initiated including blood pressure, EKG, and pulse oximetry. The area of the around the left knee joint was prepped with chlorhexidine three times and draped into a sterile field. Skin anesthesia was achieved using Lidocaine 1% over the respective injection sites. A 22 gauge 3.5 inch spinal needle was slowly inserted and advanced to the junction of the lateral femoral and the epicondyle while contacting periosteum to block the superior lateral genicular nerve using the AP and lateral fluoroscopic imaging. Same process was repeated at the junction of the medial femoral shaft and the epicondyle to block the superior medial genicular nerve. The same process was repeated again using a 3rd needle which was advanced at the junction of the medial tibial plateau and the epicondyle to block the inferior medial genicular nerve. Using AP fluoroscopy the position of all 3 needles was confirmed.  Afterwards lateral fluoroscopic images were obtained and the needles were adjusted to lay in the middle of the diaphysis.Negative aspiration for blood. . A combination of 3 cc of Bupivacaine 0.25% and 40 mg of kenalog was injected at all needle location to block all targeted nerves.. There was no pain on injection. The needle was removed and bleeding was nil. A sterile dressing was applied. No specimens collected. PATIENT was taken to the Post-block Recovery Area for further observation.       Blood Loss: Nill  Specimen: None        Discharge Diagnosis: Same as Pre and Post Procedure  Condition on Discharge: Stable.  Diet on Discharge: Same as before.  Activity: as per instruction sheet.  Discharge to: Home with a responsible adult.  Follow up: as per Discharge instructions    Jez Mercer MD

## 2017-03-28 NOTE — IP AVS SNAPSHOT
[facility]  [address]  [place] [address]  Phone: [phone]           Patient Discharge Instructions     Our goal is to set you up for success. This packet includes information on your condition, medications, and your home care. It will help you to care for yourself so you don't get sicker and need to go back to the hospital.     Please ask your nurse if you have any questions.        There are many details to remember when preparing to leave the hospital. Here is what you will need to do:    1. Take your medicine. If you are prescribed medications, review your Medication List in the following pages. You may have new medications to  at the pharmacy and others that you'll need to stop taking. Review the instructions for how and when to take your medications. Talk with your doctor or nurses if you are unsure of what to do.     2. Go to your follow-up appointments. Specific follow-up information is listed in the following pages. Your may be contacted by a transition nurse or clinical provider about future appointments. Be sure we have all of the phone numbers to reach you, if needed. Please contact your provider's office if you are unable to make an appointment.     3. Watch for warning signs. Your doctor or nurse will give you detailed warning signs to watch for and when to call for assistance. These instructions may also include educational information about your condition. If you experience any of warning signs to your health, call your doctor.               [facility]  Unless otherwise directed by your provider, please contact [facility], our nurse care line that is available for 24/7 assistance.     [phone] (toll-free)    Registered nurses in the [facility] provide clinical advisement, health education, appointment booking, and other advisory services.                    ** Verify the list of medication(s) below is accurate and up to date. Carry this  with you in case of emergency. If your medications have changed, please notify your healthcare provider.             Medication List      Notice     Cannot display discharge medications because the patient has not yet been admitted.               Please bring to all follow up appointments:    1. A copy of your discharge instructions.  2. All medicines you are currently taking in their original bottles.  3. Identification and insurance card.    Please arrive 15 minutes ahead of scheduled appointment time.    Please call 24 hours in advance if you must reschedule your appointment and/or time.        Your Scheduled Appointments     Apr 25, 2017 10:15 AM CDT   Established Patient Visit with J CARLOS Milian   [facility] ([place]    [address]  [facility] [id number] [phone]            Apr 27, 2017  9:30 AM CDT   Established Patient Visit with Doug Laguerre DPM   [place] [facility] ([facility]    [address] [place] [id number]   [phone]            Jun 02, 2017  8:00 AM CDT   Fasting Lab with LAB, [place]   [place] [facility] [place]    [address]  [place] [id number] [phone]            Jun 08, 2017 10:40 AM CDT   Established Patient Visit with MD [facility] ([place])    [address]  [place] [id number] [phone]              Your Future Surgeries/Procedures     Mar 28, 2017   Surgery with Jez Mercer MD   [facility] ([facility])    [address] [place] [id number] [phone]                  Discharge Instructions       Home Care Instructions Pain Management:    1.  DIET:    You may resume your normal diet today.    2.  BATHING:    You may shower with luke warm water.    3.  DRESSING:    You may remove your bandage today.    4.  ACTIVITY LEVEL:      You may resume your normal activities 24 hours after your procedure.    5.   MEDICATIONS:    You may resume your normal medications today.    6.  SPECIAL INSTRUCTIONS:    No heat to the injection site for 24 hours including bath or shower, heating pad, moist heat or hot tubs.    Use an ice pack to the injection site for any pain or discomfort.  Apply ice packs for 20 minute intervals as needed.    If you have received any sedatives by mouth today, you can not drive for 12 hours.    If you have received sedation through an IV, you can not drive for 24 hours.    PLEASE CALL YOUR DOCTOR FOR THE FOLLOWIN.  Redness or swelling around the injection site.  2.  Fever of 101 degrees.  3.  Drainage (pus) from the injection site.  4.  For any continuous bleeding (some dried blood over the incision is normal.)    FOR EMERGENCIES:    If any unusual problems or difficulties occur during clinic hours, call [phone] or dial [phone].    Follow up with with your physician in 2-3 weeks.         Admission Information     Date & Time Provider Department [facility]    3/28/2017  8:30 AM Jez Mercer MD [facility] [id number]      Care Providers     Provider Role Specialty Primary office phone    Jez Mercer MD Attending Provider Pain Medicine [phone]      Recent Lab Values        2014 2014 2015 10/23/2015 2016 6/10/2016 10/7/2016 2/3/2017     10:05 AM  9:54 AM 10:08 AM 10:02 AM  9:58 AM  9:55 AM  9:40 AM  8:52 AM    A1C 5.6 5.5 5.5 5.5 5.3 5.1 5.6 5.5    Comment for A1C at  9:40 AM on 10/7/2016:  According to ADA guidelines, hemoglobin A1C <7.0% represents  optimal control in non-pregnant diabetic patients.  Different  metrics may apply to specific populations.   Standards of Medical Care in Diabetes - 2016.  For the purpose of screening for the presence of diabetes:  <5.7%     Consistent with the absence of diabetes  5.7-6.4%  Consistent with increasing risk for diabetes   (prediabetes)  >or=6.5%  Consistent with diabetes  Currently no consensus exists for  use of hemoglobin A1C  for diagnosis of diabetes for children.      Comment for A1C at  8:52 AM on 2/3/2017:  According to ADA guidelines, hemoglobin A1C <7.0% represents  optimal control in non-pregnant diabetic patients.  Different  metrics may apply to specific populations.   Standards of Medical Care in Diabetes - 2016.  For the purpose of screening for the presence of diabetes:  <5.7%     Consistent with the absence of diabetes  5.7-6.4%  Consistent with increasing risk for diabetes   (prediabetes)  >or=6.5%  Consistent with diabetes  Currently no consensus exists for use of hemoglobin A1C  for diagnosis of diabetes for children.        Allergies as of 3/27/2017     No Known Allergies      Advance Directives     An advance directive is a document which, in the event you are no longer able to make decisions for yourself, tells your healthcare team what kind of treatment you do or do not want to receive, or who you would like to make those decisions for you.  If you do not currently have an advance directive, [facility] encourages you to create one.  For more information call:  [phone]), [phone] ([phone]),  or log on to [email or web address].        Language Assistance Services     ATTENTION: Language assistance services are available, free of charge. Please call [phone].      ATENCIÓN: Si habla español, tiene a bernal disposición servicios gratuitos de asistencia lingüística. Llame al [phone].     CHÚ Ý: N?u b?n nói Ti?ng Vi?t, có các d?ch v? h? tr? ngôn ng? mi?n phí dành cho b?n. G?i s? [phone].        Chronic Kindey Disease Education             Diabetes Discharge Instructions                                   [facility] Sign-Up     Activating your [facility] account is as easy as 1-2-3!     1) Visit [email or web address], select Sign Up Now, enter this activation code and your date of birth, then select Next.  [email or web address]  Expires: 5/11/2017  1:57 PM      2) Create a  username and password to use when you visit [facility] in the future and select a security question in case you lose your password and select Next.    3) Enter your e-mail address and click Sign Up!    Additional Information  If you have questions, please e-mail [email or web address] or call [phone] to talk to our [facility] staff. Remember, [facility] is NOT to be used for urgent needs. For medical emergencies, dial 911.          [facility] complies with applicable Federal civil rights laws and does not discriminate on the basis of race, color, national origin, age, disability, or sex.

## 2017-03-29 ENCOUNTER — TELEPHONE (OUTPATIENT)
Dept: PAIN MEDICINE | Facility: CLINIC | Age: 82
End: 2017-03-29

## 2017-03-29 NOTE — TELEPHONE ENCOUNTER
----- Message from Paty Stevens sent at 3/29/2017  8:49 AM CDT -----  X_  1st Request  _  2nd Request  _  3rd Request        Who: Ailin Gabriel(Pt's daughter)    Why: Pt's daughter stating her mother wore a brace prior to her procedure with Dr. Mercer. She wants to know if she needs to put the brace back on post procedure or leave it off. Please call to discuss.    What Number to Call Back: [phone]    When to Expect a call back: (Before the end of the day)   -- if the call is after 12:00, the call back will be tomorrow.

## 2017-03-29 NOTE — TELEPHONE ENCOUNTER
Contacted and spoke with Pt's daughter Harvey regarding knee brace. Ms. Gabriel was informed the brace is PRN ,so she can wear brace as needed as she does not need it now.    Pt verbalized understanding

## 2017-03-31 ENCOUNTER — TELEPHONE (OUTPATIENT)
Dept: PAIN MEDICINE | Facility: HOSPITAL | Age: 82
End: 2017-03-31

## 2017-04-26 ENCOUNTER — HOSPITAL ENCOUNTER (EMERGENCY)
Facility: HOSPITAL | Age: 82
Discharge: HOME | End: 2017-04-26
Attending: EMERGENCY MEDICINE
Payer: MEDICARE

## 2017-04-26 ENCOUNTER — TELEPHONE (OUTPATIENT)
Dept: INTERNAL MEDICINE | Facility: CLINIC | Age: 82
End: 2017-04-26

## 2017-04-26 VITALS
WEIGHT: 156 LBS | DIASTOLIC BLOOD PRESSURE: 66 MMHG | BODY MASS INDEX: 25.99 KG/M2 | RESPIRATION RATE: 18 BRPM | HEIGHT: 65 IN | OXYGEN SATURATION: 99 % | SYSTOLIC BLOOD PRESSURE: 138 MMHG | TEMPERATURE: 98 F | HEART RATE: 88 BPM

## 2017-04-26 DIAGNOSIS — N30.01 ACUTE CYSTITIS WITH HEMATURIA: Primary | ICD-10-CM

## 2017-04-26 DIAGNOSIS — N30.90 CYSTITIS: Primary | ICD-10-CM

## 2017-04-26 LAB
ALBUMIN SERPL BCP-MCNC: 3.9 G/DL
ALP SERPL-CCNC: 86 U/L
ALT SERPL W/O P-5'-P-CCNC: 35 U/L
ANION GAP SERPL CALC-SCNC: 12 MMOL/L
AST SERPL-CCNC: 32 U/L
BACTERIA #/AREA URNS HPF: ABNORMAL /HPF
BASOPHILS # BLD AUTO: 0.01 K/UL
BASOPHILS NFR BLD: 0.1 %
BILIRUB SERPL-MCNC: 0.6 MG/DL
BILIRUB UR QL STRIP: NEGATIVE
BUN SERPL-MCNC: 25 MG/DL
CALCIUM SERPL-MCNC: 9.6 MG/DL
CHLORIDE SERPL-SCNC: 106 MMOL/L
CLARITY UR: ABNORMAL
CO2 SERPL-SCNC: 20 MMOL/L
COLOR UR: ABNORMAL
CREAT SERPL-MCNC: 1.6 MG/DL
DIFFERENTIAL METHOD: ABNORMAL
EOSINOPHIL # BLD AUTO: 0.4 K/UL
EOSINOPHIL NFR BLD: 5.7 %
ERYTHROCYTE [DISTWIDTH] IN BLOOD BY AUTOMATED COUNT: 13.7 %
EST. GFR  (AFRICAN AMERICAN): 34 ML/MIN/1.73 M^2
EST. GFR  (NON AFRICAN AMERICAN): 30 ML/MIN/1.73 M^2
GLUCOSE SERPL-MCNC: 81 MG/DL
GLUCOSE UR QL STRIP: ABNORMAL
HCT VFR BLD AUTO: 29.5 %
HGB BLD-MCNC: 9.8 G/DL
HGB UR QL STRIP: ABNORMAL
HYALINE CASTS #/AREA URNS LPF: ABNORMAL /LPF
KETONES UR QL STRIP: NEGATIVE
LEUKOCYTE ESTERASE UR QL STRIP: NEGATIVE
LYMPHOCYTES # BLD AUTO: 2.3 K/UL
LYMPHOCYTES NFR BLD: 30.5 %
MCH RBC QN AUTO: 32.5 PG
MCHC RBC AUTO-ENTMCNC: 33.2 %
MCV RBC AUTO: 98 FL
MICROSCOPIC COMMENT: ABNORMAL
MONOCYTES # BLD AUTO: 0.7 K/UL
MONOCYTES NFR BLD: 8.9 %
NEUTROPHILS # BLD AUTO: 4.1 K/UL
NEUTROPHILS NFR BLD: 54.5 %
NITRITE UR QL STRIP: POSITIVE
PH UR STRIP: 7 [PH] (ref 5–8)
PLATELET # BLD AUTO: 250 K/UL
PMV BLD AUTO: 9.5 FL
POTASSIUM SERPL-SCNC: 4.9 MMOL/L
PROT SERPL-MCNC: 8.2 G/DL
PROT UR QL STRIP: ABNORMAL
RBC # BLD AUTO: 3.02 M/UL
RBC #/AREA URNS HPF: >100 /HPF (ref 0–4)
SODIUM SERPL-SCNC: 138 MMOL/L
SP GR UR STRIP: 1.01 (ref 1–1.03)
URN SPEC COLLECT METH UR: ABNORMAL
UROBILINOGEN UR STRIP-ACNC: NEGATIVE EU/DL
WBC # BLD AUTO: 7.53 K/UL
WBC #/AREA URNS HPF: ABNORMAL /HPF (ref 0–5)

## 2017-04-26 PROCEDURE — 99284 EMERGENCY DEPT VISIT MOD MDM: CPT

## 2017-04-26 PROCEDURE — 80053 COMPREHEN METABOLIC PANEL: CPT

## 2017-04-26 PROCEDURE — 81000 URINALYSIS NONAUTO W/SCOPE: CPT

## 2017-04-26 PROCEDURE — 85025 COMPLETE CBC W/AUTO DIFF WBC: CPT

## 2017-04-26 RX ORDER — SULFAMETHOXAZOLE AND TRIMETHOPRIM 200; 40 MG/5ML; MG/5ML
8 SUSPENSION ORAL EVERY 12 HOURS
Qty: 354 ML | Refills: 0 | Status: SHIPPED | OUTPATIENT
Start: 2017-04-26 | End: 2017-05-01

## 2017-04-26 RX ORDER — SODIUM CHLORIDE 9 MG/ML
125 INJECTION, SOLUTION INTRAVENOUS CONTINUOUS
Status: DISCONTINUED | OUTPATIENT
Start: 2017-04-26 | End: 2017-04-26

## 2017-04-26 NOTE — ED PROVIDER NOTES
Encounter Date: 4/26/2017       History     Chief Complaint   Patient presents with    Vaginal Bleeding     c/o vaginal bleeding since this morning.     Review of patient's allergies indicates:  No Known Allergies  HPI Comments: 82 -year-old female with a history of diabetes, hypertension, hyperlipidemia takes an aspirin daily comes into the emergency department after noticing there is blood on the toilet paper after urinating.  She denies any pain.  She has no nausea vomiting she has no back pain no abdominal pain no diarrhea.  She does not feel lightheaded or shortness of breath.  She has had urinary tract infections a long time ago.  She denies dysuria or painful urination.  She denies weight loss or night sweats, or appetite change.    Patient is a 82 y.o. female presenting with the following complaint: general illness. The history is provided by the patient and a caregiver.   General Illness    The current episode started today. The problem occurs continuously. The problem has been unchanged. The pain is at a severity of 0/10. Nothing relieves the symptoms. Nothing aggravates the symptoms. Pertinent negatives include no abdominal pain, no diarrhea, no nausea and no vomiting.     Past Medical History:   Diagnosis Date    Anemia     Asymptomatic cholelithiasis     Coronary artery disease     Diabetes mellitus type II     GERD (gastroesophageal reflux disease)     Hyperlipidemia     Hypertension      Past Surgical History:   Procedure Laterality Date    CORONARY ARTERY BYPASS GRAFT      JOINT REPLACEMENT      right    KNEE SURGERY      PARTIAL HYSTERECTOMY      SHOULDER ARTHROSCOPY W/ ROTATOR CUFF REPAIR      right shoulder     Family History   Problem Relation Age of Onset    Diabetes Mother     Heart disease Mother     Diabetes Maternal Grandmother      Social History   Substance Use Topics    Smoking status: Never Smoker    Smokeless tobacco: Never Used    Alcohol use No     Review of Systems    HENT: Negative.    Eyes: Negative.    Respiratory: Negative.    Gastrointestinal: Negative for abdominal pain, diarrhea, nausea and vomiting.   All other systems reviewed and are negative.      Physical Exam   Initial Vitals   BP Pulse Resp Temp SpO2   04/26/17 0848 04/26/17 0848 04/26/17 0848 04/26/17 0848 04/26/17 0848   181/72 83 18 98.4 °F (36.9 °C) 99 %     Physical Exam    Nursing note and vitals reviewed.  Constitutional: She appears well-developed and well-nourished. No distress.   HENT:   Head: Normocephalic and atraumatic.   Eyes: EOM are normal. Pupils are equal, round, and reactive to light.   Neck: Normal range of motion. Neck supple.   Cardiovascular: Normal rate and normal heart sounds.   Pulmonary/Chest: Breath sounds normal.   Abdominal: Soft.   He has no suprapubic tenderness no CVA tenderness   Musculoskeletal: Normal range of motion.   Neurological: She is alert and oriented to person, place, and time. She has normal strength. No cranial nerve deficit.   Skin: Skin is warm and dry.   Psychiatric: She has a normal mood and affect. Her behavior is normal. Thought content normal.         ED Course   Procedures  Labs Reviewed   CBC W/ AUTO DIFFERENTIAL - Abnormal; Notable for the following:        Result Value    RBC 3.02 (*)     Hemoglobin 9.8 (*)     Hematocrit 29.5 (*)     MCH 32.5 (*)     All other components within normal limits   COMPREHENSIVE METABOLIC PANEL - Abnormal; Notable for the following:     CO2 20 (*)     BUN, Bld 25 (*)     Creatinine 1.6 (*)     eGFR if  34 (*)     eGFR if non  30 (*)     All other components within normal limits   URINALYSIS - Abnormal; Notable for the following:     Color, UA Red (*)     Appearance, UA Cloudy (*)     Protein, UA 3+ (*)     Glucose, UA Trace (*)     Occult Blood UA 3+ (*)     Nitrite, UA Positive (*)     All other components within normal limits   URINALYSIS MICROSCOPIC - Abnormal; Notable for the following:      RBC, UA >100 (*)     Hyaline Casts, UA SEE COMMENT (*)     All other components within normal limits             Medical Decision Making:   Initial Assessment:   82-year-old female presents with blood on her toilet paper without any pain or complaints  Differential Diagnosis:   Most likely cystitis (infectious versus malignant)   I discussed with Melody and her daughter treatment for presumption infectious eitiology- if not improved on antibiotics- needs urology follow up for scope  Clinical Tests:   Lab Tests: Ordered and Reviewed  ED Management:  Patient was given TMP/SMX for UTI (renal insufficiency precludes macrobid)      Given recommendations to f/u with PMD if hematuria persists beyong course of antibiotics                   ED Course     Clinical Impression:   The encounter diagnosis was Cystitis.          Marilyn Abrams MD  04/26/17 9953

## 2017-04-26 NOTE — ED AVS SNAPSHOT
[facility] [address]  [place] [address]               Melody Armstrong   2017  8:50 AM   ED    Description:  Female : 1934   Department:  [facility]           Your Care was Coordinated By:     Provider Role From To    Marilyn Abrams MD Attending Provider 17 0853 --      Reason for Visit     Vaginal Bleeding           Diagnoses this Visit        Comments    Cystitis    -  Primary       ED Disposition     None           To Do List           Follow-up Information     Follow up with Zac Andres MD. Schedule an appointment as soon as possible for a visit in 3 days.    Specialty:  Internal Medicine    Contact information:     [facility] [place] [address]  [phone]         These Medications        Disp Refills Start End    sulfamethoxazole-trimethoprim 200-40 mg/5 ml (BACTRIM,SEPTRA) 200-40 mg/5 mL Susp 354 mL 0 2017    Take 35.4 mLs by mouth every 12 (twelve) hours. - Oral    Pharmacy: [place] [address] Ph #: [phone]         [facility]     [facility] Nurse Care Line -  Assistance  Unless otherwise directed by your provider, please contact [facility], our nurse care line that is available for  assistance.     Registered nurses in the [facility] provide: appointment scheduling, clinical advisement, health education, and other advisory services.  Call: [phone] (toll free)               Medications           Message regarding Medications     Verify the changes and/or additions to your medication regime listed below are the same as discussed with your clinician today.  If any of these changes or additions are incorrect, please notify your healthcare provider.        START taking these NEW medications        Refills    sulfamethoxazole-trimethoprim 200-40 mg/5 ml (BACTRIM,SEPTRA) 200-40 mg/5 mL Susp 0    Sig:  Take 35.4 mLs by mouth every 12 (twelve) hours.    Class: Print    Route: Oral           Verify that the below list of medications is an accurate representation of the medications you are currently taking.  If none reported, the list may be blank. If incorrect, please contact your healthcare provider. Carry this list with you in case of emergency.           Current Medications     aspirin 325 MG tablet Take 1 tablet by mouth.    atorvastatin (LIPITOR) 20 MG tablet TAKE 1 TABLET BY MOUTH ONCE DAILY FOR CHOLESTEROL.    azelastine (ASTELIN) 137 mcg (0.1 %) nasal spray 1 spray (137 mcg total) by Nasal route 2 (two) times daily.    blood sugar diagnostic (GLUCOSE BLOOD) Strp Check blood glucose level once daily.    cyanocobalamin (VITAMIN B-12) 1000 MCG tablet Take 1 tablet by mouth.    diclofenac sodium (PENNSAID) 20 mg/gram /actuation(2 %) sopm Apply 40 mg topically 2 (two) times daily.    diclofenac sodium (VOLTAREN) 1 % Gel Apply 2 g topically 4 (four) times daily.    esomeprazole (NEXIUM) 40 MG capsule Take 1 capsule (40 mg total) by mouth before breakfast.    gabapentin (NEURONTIN) 100 MG capsule TAKE ONE CAPSULE BY MOUTH 3 TIMES A DAY.    hydrochlorothiazide (HYDRODIURIL) 25 MG tablet TAKE ONE TABLET BY MOUTH DAILY FOR FLUID.    levocetirizine (XYZAL) 5 MG tablet Take 1 tablet (5 mg total) by mouth every evening. For sinus allergy.    losartan (COZAAR) 50 MG tablet TAKE (1) TABLET BY MOUTH DAILY HIGH BLOOD PRESSURE.    meclizine (ANTIVERT) 12.5 mg tablet Take 1 tablet (12.5 mg total) by mouth 3 (three) times daily as needed for Dizziness.    metoprolol succinate (TOPROL-XL) 25 MG 24 hr tablet TAKE (1) TABLET BY MOUTH DAILY HIGH BLOOD PRESSURE.    potassium chloride (KLOR-CON 10) 10 MEQ TbSR Take 1 tablet (10 mEq total) by mouth once daily.    sulfamethoxazole-trimethoprim 200-40 mg/5 ml (BACTRIM,SEPTRA) 200-40 mg/5 mL Susp Take 35.4 mLs by mouth every 12 (twelve) hours.    tramadol (ULTRAM) 50 mg tablet Take 2  "tablets (100 mg total) by mouth every 8 (eight) hours as needed for Pain.           Clinical Reference Information           Your Vitals Were     BP Pulse Temp Resp Height Weight    129/60 86 98.4 °F (36.9 °C) (Oral) 18 5' 5" (1.651 m) 70.8 kg (156 lb)    SpO2 BMI             100% 25.96 kg/m2         Allergies as of 4/26/2017     No Known Allergies      Immunizations Administered on Date of Encounter - 4/26/2017     None      ED Micro, Lab, POCT     Start Ordered       Status Ordering Provider    04/26/17 0901 04/26/17 0900  Urinalysis  STAT      Final result     04/26/17 0900 04/26/17 0900  CBC W/ AUTO DIFFERENTIAL  STAT      Final result     04/26/17 0900 04/26/17 0900  Comp. Metabolic Panel  STAT      Final result     04/26/17 0900 04/26/17 0900    STAT,   Status:  Canceled      Canceled     04/26/17 0900 04/26/17 0900    Once,   Status:  Canceled      Canceled     04/26/17 0900 04/26/17 0900  Urinalysis Microscopic  Once      Final result       ED Imaging Orders     None        Discharge Instructions         1) PLEASE FOLLOW UP WITH YOUR PRIMARY CARE PROVIDER IN 3 DAYS IF YOU ARE NOT SIGNIFICANTLY IMPROVED  2) PLEASE TAKE YOUR MEDICATIONS AS PRESCRIBED  3) RETURN TO THE ED FOR WORSENING SYMPTOMS    Understanding Urinary Tract Infections (UTIs)  Most UTIs are caused by bacteria, although they may also be caused by viruses or fungi. Bacteria from the bowel are the most common source of infection. The infection may begin because of any of the following:  · Sexual activity. During sex, germs can travel from the penis, vagina, or rectum into the urethra.   · Germs on the skin outside the rectum may travel into the urethra. This is more common in women since the rectum and urethra are closer to each other than in men. Wiping from front to back after using the toilet and keeping the area clean can help prevent germs from getting to the urethra.  · Blockage of urine flow through the urinary tract. If urine sits too " long, germs may begin to grow out of control.      Parts of the urinary tract  The infection can occur in any part of the urinary tract.  · The kidneys collect and store urine.  · The ureters carry urine from the kidneys to the bladder.  · The bladder holds urine until you are ready to let it out.  · The urethra carries urine from the bladder out of the body. It is shorter in women, so bacteria can move through it more easily. The urethra is longer in men, so a UTI is less likely to reach the bladder or kidneys in men.  Date Last Reviewed: 9/8/2014  © 8066-9511 [employer]. [address] [place] [address]. All rights reserved. This information is not intended as a substitute for professional medical care. Always follow your healthcare professional's instructions.          Your Scheduled Appointments     Apr 27, 2017  9:30 AM CDT   Established Patient Visit with Doug Laguerre DPM   [facility] ([facility])    [address] [place] [address]   [phone]            Jun 02, 2017  8:00 AM CDT   Fasting Lab with LAB, [facility] [place] [facility] ([facility] [place])    [address] [facility] [place] [address]   [phone]            Jun 08, 2017 10:40 AM CDT   Established Patient Visit with MD [facility] ([facility] [place])    [address] [facility]  [place] [address]   [phone]              [facility] Sign-Up     Activating your [facility] account is as easy as 1-2-3!     1) Visit [email or web address], select Sign Up Now, enter this activation code and your date of birth, then select Next.  [email or web address]  Expires: 5/11/2017  1:57 PM      2) Create a username and password to use when you visit [facility] in the future and select a security question in case you lose your password and select Next.    3) Enter your e-mail address and click Sign Up!    Additional Information  If you have questions, please e-mail  [email or web address] or call [phone] to talk to our [facility] staff. Remember, [facility] is NOT to be used for urgent needs. For medical emergencies, dial 911.          [facility] complies with applicable Federal civil rights laws and does not discriminate on the basis of race, color, national origin, age, disability, or sex.        Language Assistance Services     ATTENTION: Language assistance services are available, free of charge. Please call [phone].      ATENCIÓN: Si habla español, tiene a bernal disposición servicios gratuitos de asistencia lingüística. Llame al [phone].     CHÚ Ý: N?u b?n nói Ti?ng Vi?t, có các d?ch v? h? tr? ngôn ng? mi?n phí dành cho b?n. G?i s? [phone].

## 2017-04-26 NOTE — DISCHARGE INSTRUCTIONS
1) PLEASE FOLLOW UP WITH YOUR PRIMARY CARE PROVIDER IN 3 DAYS IF YOU ARE NOT SIGNIFICANTLY IMPROVED  2) PLEASE TAKE YOUR MEDICATIONS AS PRESCRIBED  3) RETURN TO THE ED FOR WORSENING SYMPTOMS    Understanding Urinary Tract Infections (UTIs)  Most UTIs are caused by bacteria, although they may also be caused by viruses or fungi. Bacteria from the bowel are the most common source of infection. The infection may begin because of any of the following:  · Sexual activity. During sex, germs can travel from the penis, vagina, or rectum into the urethra.   · Germs on the skin outside the rectum may travel into the urethra. This is more common in women since the rectum and urethra are closer to each other than in men. Wiping from front to back after using the toilet and keeping the area clean can help prevent germs from getting to the urethra.  · Blockage of urine flow through the urinary tract. If urine sits too long, germs may begin to grow out of control.      Parts of the urinary tract  The infection can occur in any part of the urinary tract.  · The kidneys collect and store urine.  · The ureters carry urine from the kidneys to the bladder.  · The bladder holds urine until you are ready to let it out.  · The urethra carries urine from the bladder out of the body. It is shorter in women, so bacteria can move through it more easily. The urethra is longer in men, so a UTI is less likely to reach the bladder or kidneys in men.  Date Last Reviewed: 9/8/2014  © 8576-7215 [employer]. [address] [place] [address], [place] [address]. All rights reserved. This information is not intended as a substitute for professional medical care. Always follow your healthcare professional's instructions.

## 2017-04-26 NOTE — ED NOTES
Patient has verified the spelling of their name and  on armband  LOC: The patient is awake, alert, and aware of environment with an appropriate affect, the patient is oriented x 3 and speaking appropriately.   APPEARANCE: Patient resting comfortably and in no acute distress, patient is clean and well groomed, patient's clothing is properly fastened.   SKIN: The skin is warm and dry, color consistent with ethnicity, patient has normal skin turgor and moist mucus membranes, skin intact, no breakdown or bruising noted.   :   Voids without difficulty, hematuria  MUSCULOSKELETAL: Patient moving all extremities spontaneously, no obvious swelling or deformities noted.   RESPIRATORY: Airway is open and patent, respirations are spontaneous, patient has a normal effort and rate, no accessory muscle use noted, bilateral breath sounds clear, denies SOB   ABDOMEN: Soft and non tender to palpation, no distention noted, normoactive bowel sounds present in all four quadrants.   CARDIAC:  Normal rate and rhythm, no peripheral edema noted, less then 3 second capillary refill, denies chest pain  COMPLAINT:  Vaginal bleeding since this morning with urine given for UA to have teresa hematuria, patient states no blood on under wear before she joleen to urinate then as she wiped with toilet paper after urinating saw blood on the toiled paper, denies pain at this time 0 out of 10

## 2017-04-26 NOTE — TELEPHONE ENCOUNTER
----- Message from Mallorie Dixon sent at 4/26/2017 11:16 AM CDT -----  Contact: pt daughter @[phone]  The pt went to the ER this morning she has a UTI,she will like a call from the nurse.

## 2017-05-02 ENCOUNTER — LAB VISIT (OUTPATIENT)
Dept: LAB | Facility: HOSPITAL | Age: 82
End: 2017-05-02
Attending: INTERNAL MEDICINE
Payer: MEDICARE

## 2017-05-02 DIAGNOSIS — N30.01 ACUTE CYSTITIS WITH HEMATURIA: ICD-10-CM

## 2017-05-02 LAB
BACTERIA #/AREA URNS AUTO: NORMAL /HPF
BILIRUB UR QL STRIP: NEGATIVE
CLARITY UR REFRACT.AUTO: CLEAR
COLOR UR AUTO: YELLOW
GLUCOSE UR QL STRIP: NEGATIVE
HGB UR QL STRIP: NEGATIVE
KETONES UR QL STRIP: NEGATIVE
LEUKOCYTE ESTERASE UR QL STRIP: ABNORMAL
MICROSCOPIC COMMENT: NORMAL
NITRITE UR QL STRIP: NEGATIVE
PH UR STRIP: 6 [PH] (ref 5–8)
PROT UR QL STRIP: NEGATIVE
RBC #/AREA URNS AUTO: 1 /HPF (ref 0–4)
SP GR UR STRIP: 1.01 (ref 1–1.03)
SQUAMOUS #/AREA URNS AUTO: 1 /HPF
URN SPEC COLLECT METH UR: ABNORMAL
UROBILINOGEN UR STRIP-ACNC: NEGATIVE EU/DL
WBC #/AREA URNS AUTO: 1 /HPF (ref 0–5)

## 2017-05-02 PROCEDURE — 81001 URINALYSIS AUTO W/SCOPE: CPT

## 2017-05-02 PROCEDURE — 87086 URINE CULTURE/COLONY COUNT: CPT

## 2017-05-03 LAB — BACTERIA UR CULT: NORMAL

## 2017-05-09 ENCOUNTER — TELEPHONE (OUTPATIENT)
Dept: INTERNAL MEDICINE | Facility: CLINIC | Age: 82
End: 2017-05-09

## 2017-06-02 ENCOUNTER — LAB VISIT (OUTPATIENT)
Dept: LAB | Facility: HOSPITAL | Age: 82
End: 2017-06-02
Attending: INTERNAL MEDICINE
Payer: MEDICARE

## 2017-06-02 DIAGNOSIS — I10 ESSENTIAL HYPERTENSION: Chronic | ICD-10-CM

## 2017-06-02 DIAGNOSIS — E78.5 HYPERLIPIDEMIA, UNSPECIFIED HYPERLIPIDEMIA TYPE: ICD-10-CM

## 2017-06-02 DIAGNOSIS — E11.22 TYPE 2 DIABETES MELLITUS WITH CHRONIC KIDNEY DISEASE, WITHOUT LONG-TERM CURRENT USE OF INSULIN, UNSPECIFIED CKD STAGE: Chronic | ICD-10-CM

## 2017-06-02 LAB
ALBUMIN SERPL BCP-MCNC: 4 G/DL
ALP SERPL-CCNC: 89 U/L
ALT SERPL W/O P-5'-P-CCNC: 24 U/L
ANION GAP SERPL CALC-SCNC: 10 MMOL/L
AST SERPL-CCNC: 24 U/L
BASOPHILS # BLD AUTO: 0.01 K/UL
BASOPHILS NFR BLD: 0.1 %
BILIRUB SERPL-MCNC: 0.5 MG/DL
BUN SERPL-MCNC: 32 MG/DL
CALCIUM SERPL-MCNC: 10 MG/DL
CHLORIDE SERPL-SCNC: 104 MMOL/L
CHOLEST/HDLC SERPL: 2.4 {RATIO}
CO2 SERPL-SCNC: 25 MMOL/L
CREAT SERPL-MCNC: 1.6 MG/DL
DIFFERENTIAL METHOD: ABNORMAL
EOSINOPHIL # BLD AUTO: 0.4 K/UL
EOSINOPHIL NFR BLD: 5.6 %
ERYTHROCYTE [DISTWIDTH] IN BLOOD BY AUTOMATED COUNT: 13.8 %
EST. GFR  (AFRICAN AMERICAN): 34.3 ML/MIN/1.73 M^2
EST. GFR  (NON AFRICAN AMERICAN): 29.8 ML/MIN/1.73 M^2
GLUCOSE SERPL-MCNC: 89 MG/DL
HCT VFR BLD AUTO: 30.9 %
HDL/CHOLESTEROL RATIO: 41.9 %
HDLC SERPL-MCNC: 155 MG/DL
HDLC SERPL-MCNC: 65 MG/DL
HGB BLD-MCNC: 10.2 G/DL
LDLC SERPL CALC-MCNC: 67.6 MG/DL
LYMPHOCYTES # BLD AUTO: 2.4 K/UL
LYMPHOCYTES NFR BLD: 34.1 %
MCH RBC QN AUTO: 33.1 PG
MCHC RBC AUTO-ENTMCNC: 33 %
MCV RBC AUTO: 100 FL
MONOCYTES # BLD AUTO: 0.8 K/UL
MONOCYTES NFR BLD: 11.7 %
NEUTROPHILS # BLD AUTO: 3.4 K/UL
NEUTROPHILS NFR BLD: 48.2 %
NONHDLC SERPL-MCNC: 90 MG/DL
PLATELET # BLD AUTO: 290 K/UL
PMV BLD AUTO: 9.7 FL
POTASSIUM SERPL-SCNC: 5.1 MMOL/L
PROT SERPL-MCNC: 8.3 G/DL
RBC # BLD AUTO: 3.08 M/UL
SODIUM SERPL-SCNC: 139 MMOL/L
TRIGL SERPL-MCNC: 112 MG/DL
TSH SERPL DL<=0.005 MIU/L-ACNC: 1.05 UIU/ML
WBC # BLD AUTO: 7.1 K/UL

## 2017-06-02 PROCEDURE — 36415 COLL VENOUS BLD VENIPUNCTURE: CPT | Mod: PO

## 2017-06-02 PROCEDURE — 80053 COMPREHEN METABOLIC PANEL: CPT

## 2017-06-02 PROCEDURE — 83036 HEMOGLOBIN GLYCOSYLATED A1C: CPT

## 2017-06-02 PROCEDURE — 84443 ASSAY THYROID STIM HORMONE: CPT

## 2017-06-02 PROCEDURE — 85025 COMPLETE CBC W/AUTO DIFF WBC: CPT

## 2017-06-02 PROCEDURE — 80061 LIPID PANEL: CPT

## 2017-06-03 LAB
ESTIMATED AVG GLUCOSE: 100 MG/DL
HBA1C MFR BLD HPLC: 5.1 %

## 2017-06-15 ENCOUNTER — OFFICE VISIT (OUTPATIENT)
Dept: INTERNAL MEDICINE | Facility: CLINIC | Age: 82
End: 2017-06-15
Payer: MEDICARE

## 2017-06-15 VITALS
RESPIRATION RATE: 16 BRPM | BODY MASS INDEX: 27.18 KG/M2 | SYSTOLIC BLOOD PRESSURE: 124 MMHG | DIASTOLIC BLOOD PRESSURE: 70 MMHG | HEART RATE: 74 BPM | HEIGHT: 65 IN | TEMPERATURE: 98 F | WEIGHT: 163.13 LBS

## 2017-06-15 DIAGNOSIS — N18.9 ANEMIA IN CHRONIC KIDNEY DISEASE(285.21): ICD-10-CM

## 2017-06-15 DIAGNOSIS — D63.1 ANEMIA IN CHRONIC KIDNEY DISEASE(285.21): ICD-10-CM

## 2017-06-15 DIAGNOSIS — M15.9 PRIMARY OSTEOARTHRITIS INVOLVING MULTIPLE JOINTS: Chronic | ICD-10-CM

## 2017-06-15 DIAGNOSIS — E11.22 TYPE 2 DIABETES MELLITUS WITH CHRONIC KIDNEY DISEASE, WITHOUT LONG-TERM CURRENT USE OF INSULIN, UNSPECIFIED CKD STAGE: Primary | Chronic | ICD-10-CM

## 2017-06-15 DIAGNOSIS — E78.5 HYPERLIPIDEMIA, UNSPECIFIED HYPERLIPIDEMIA TYPE: ICD-10-CM

## 2017-06-15 DIAGNOSIS — I10 ESSENTIAL HYPERTENSION: Chronic | ICD-10-CM

## 2017-06-15 DIAGNOSIS — N18.30 CKD (CHRONIC KIDNEY DISEASE), STAGE III: Chronic | ICD-10-CM

## 2017-06-15 PROCEDURE — 99214 OFFICE O/P EST MOD 30 MIN: CPT | Mod: S$GLB,,, | Performed by: INTERNAL MEDICINE

## 2017-06-15 PROCEDURE — 1126F AMNT PAIN NOTED NONE PRSNT: CPT | Mod: S$GLB,,, | Performed by: INTERNAL MEDICINE

## 2017-06-15 PROCEDURE — 99999 PR PBB SHADOW E&M-EST. PATIENT-LVL III: CPT | Mod: PBBFAC,,, | Performed by: INTERNAL MEDICINE

## 2017-06-15 PROCEDURE — 99499 UNLISTED E&M SERVICE: CPT | Mod: S$GLB,,, | Performed by: INTERNAL MEDICINE

## 2017-06-15 PROCEDURE — 1159F MED LIST DOCD IN RCRD: CPT | Mod: S$GLB,,, | Performed by: INTERNAL MEDICINE

## 2017-06-19 NOTE — PROGRESS NOTES
Subjective:       Patient ID: Melody Armstrong is a 82 y.o. female.    Chief Complaint: Follow-up (4 month follow up)    HPI   The patient presents for follow-up of medical conditions.  She has type 2 diabetes mellitus, hypertension, hyperlipidemia, osteoarthritis, coronary artery disease, and chronic kidney disease.  She has generally been feeling well.  She uses tramadol in a dosage of 1-2 tablets per day for treatment of her knee pain symptoms.  She still tries to walk for exercise 4-5 days a week.  Her vision is stable.  No lower extremity paresthesias are noted.  She denies having any exertional chest pain or dyspnea.  Review of Systems   Constitutional: Negative for activity change, appetite change and unexpected weight change.   Eyes: Negative for visual disturbance.   Respiratory: Negative for shortness of breath.    Cardiovascular: Negative for chest pain, palpitations and leg swelling.   Gastrointestinal: Negative for abdominal pain, blood in stool and diarrhea.   Genitourinary: Negative for dysuria, frequency, hematuria and urgency.   Neurological: Negative for weakness, numbness and headaches.   Psychiatric/Behavioral: Negative for sleep disturbance.       Objective:      Physical Exam   Constitutional: She is oriented to person, place, and time. She appears well-developed and well-nourished. No distress.   The patient has gained 4 pounds since 2/9/17.   HENT:   Head: Normocephalic and atraumatic.   Eyes: Conjunctivae and EOM are normal. Pupils are equal, round, and reactive to light. No scleral icterus.   Neck: Normal range of motion. Neck supple. No JVD present. No thyromegaly present.   Cardiovascular: Normal rate, regular rhythm, normal heart sounds and intact distal pulses.  Exam reveals no gallop and no friction rub.    No murmur heard.  Pulmonary/Chest: Effort normal and breath sounds normal. No respiratory distress. She has no wheezes. She has no rales.   Abdominal: Soft. Bowel sounds are  normal. She exhibits no mass. There is no tenderness.   Musculoskeletal: Normal range of motion. She exhibits no edema or tenderness.   Bilateral bunion deformities are noted of the feet.  Lateral deviation of the toes is present.   Lymphadenopathy:     She has no cervical adenopathy.   Neurological: She is alert and oriented to person, place, and time. No cranial nerve deficit.   Sensory exam is intact in both feet on monofilament and vibration testing.   Skin: Skin is warm and dry. No rash noted.   No foot lesions are present.   Psychiatric: She has a normal mood and affect. Her behavior is normal.   Nursing note and vitals reviewed.      Results for orders placed or performed in visit on 06/02/17   CBC auto differential   Result Value Ref Range    WBC 7.10 3.90 - 12.70 K/uL    RBC 3.08 (L) 4.00 - 5.40 M/uL    Hemoglobin 10.2 (L) 12.0 - 16.0 g/dL    Hematocrit 30.9 (L) 37.0 - 48.5 %     (H) 82 - 98 fL    MCH 33.1 (H) 27.0 - 31.0 pg    MCHC 33.0 32.0 - 36.0 %    RDW 13.8 11.5 - 14.5 %    Platelets 290 150 - 350 K/uL    MPV 9.7 9.2 - 12.9 fL    Gran # 3.4 1.8 - 7.7 K/uL    Lymph # 2.4 1.0 - 4.8 K/uL    Mono # 0.8 0.3 - 1.0 K/uL    Eos # 0.4 0.0 - 0.5 K/uL    Baso # 0.01 0.00 - 0.20 K/uL    Gran% 48.2 38.0 - 73.0 %    Lymph% 34.1 18.0 - 48.0 %    Mono% 11.7 4.0 - 15.0 %    Eosinophil% 5.6 0.0 - 8.0 %    Basophil% 0.1 0.0 - 1.9 %    Differential Method Automated    Comprehensive metabolic panel   Result Value Ref Range    Sodium 139 136 - 145 mmol/L    Potassium 5.1 3.5 - 5.1 mmol/L    Chloride 104 95 - 110 mmol/L    CO2 25 23 - 29 mmol/L    Glucose 89 70 - 110 mg/dL    BUN, Bld 32 (H) 8 - 23 mg/dL    Creatinine 1.6 (H) 0.5 - 1.4 mg/dL    Calcium 10.0 8.7 - 10.5 mg/dL    Total Protein 8.3 6.0 - 8.4 g/dL    Albumin 4.0 3.5 - 5.2 g/dL    Total Bilirubin 0.5 0.1 - 1.0 mg/dL    Alkaline Phosphatase 89 55 - 135 U/L    AST 24 10 - 40 U/L    ALT 24 10 - 44 U/L    Anion Gap 10 8 - 16 mmol/L    eGFR if African American  34.3 (A) >60 mL/min/1.73 m^2    eGFR if non  29.8 (A) >60 mL/min/1.73 m^2   Lipid panel   Result Value Ref Range    Cholesterol 155 120 - 199 mg/dL    Triglycerides 112 30 - 150 mg/dL    HDL 65 40 - 75 mg/dL    LDL Cholesterol 67.6 63.0 - 159.0 mg/dL    HDL/Chol Ratio 41.9 20.0 - 50.0 %    Total Cholesterol/HDL Ratio 2.4 2.0 - 5.0    Non-HDL Cholesterol 90 mg/dL   TSH   Result Value Ref Range    TSH 1.048 0.400 - 4.000 uIU/mL   Hemoglobin A1c   Result Value Ref Range    Hemoglobin A1C 5.1 4.5 - 6.2 %    Estimated Avg Glucose 100 68 - 131 mg/dL       Assessment:       1. Type 2 diabetes mellitus with chronic kidney disease, without long-term current use of insulin, unspecified CKD stage    2. Essential hypertension    3. Primary osteoarthritis involving multiple joints    4. CKD (chronic kidney disease), stage III    5. Hyperlipidemia, unspecified hyperlipidemia type    6. Anemia in chronic kidney disease        Plan:           Melody was seen today for follow-up.  Current therapy will be continued.  The patient will follow-up with podiatry for diabetic foot care.  Blood tests and a follow-up visit in 4 months will be obtained.    Diagnoses and all orders for this visit:    Type 2 diabetes mellitus with chronic kidney disease, without long-term current use of insulin, unspecified CKD stage  -     CBC auto differential; Future  -     Comprehensive metabolic panel; Future  -     Lipid panel; Future  -     Hemoglobin A1c; Future    Essential hypertension  -     CBC auto differential; Future  -     Comprehensive metabolic panel; Future  -     Lipid panel; Future  -     Hemoglobin A1c; Future    Primary osteoarthritis involving multiple joints    CKD (chronic kidney disease), stage III  -     CBC auto differential; Future  -     Comprehensive metabolic panel; Future  -     Lipid panel; Future  -     Hemoglobin A1c; Future    Hyperlipidemia, unspecified hyperlipidemia type    Anemia in chronic kidney  disease

## 2017-07-06 RX ORDER — METOPROLOL SUCCINATE 25 MG/1
TABLET, EXTENDED RELEASE ORAL
Qty: 90 TABLET | Refills: 3 | Status: SHIPPED | OUTPATIENT
Start: 2017-07-06 | End: 2018-02-20 | Stop reason: SDUPTHER

## 2017-07-06 RX ORDER — POTASSIUM CHLORIDE 750 MG/1
TABLET, FILM COATED, EXTENDED RELEASE ORAL
Qty: 90 TABLET | Refills: 3 | Status: SHIPPED | OUTPATIENT
Start: 2017-07-06 | End: 2018-04-09 | Stop reason: SDUPTHER

## 2017-08-24 RX ORDER — TRAMADOL HYDROCHLORIDE 50 MG/1
100 TABLET ORAL EVERY 8 HOURS PRN
Qty: 180 TABLET | Refills: 1 | Status: SHIPPED | OUTPATIENT
Start: 2017-08-24 | End: 2017-11-17 | Stop reason: SDUPTHER

## 2017-09-08 ENCOUNTER — OFFICE VISIT (OUTPATIENT)
Dept: PODIATRY | Facility: CLINIC | Age: 82
End: 2017-09-08
Payer: MEDICARE

## 2017-09-08 VITALS
HEIGHT: 65 IN | BODY MASS INDEX: 27.16 KG/M2 | WEIGHT: 163 LBS | DIASTOLIC BLOOD PRESSURE: 74 MMHG | HEART RATE: 99 BPM | SYSTOLIC BLOOD PRESSURE: 130 MMHG

## 2017-09-08 DIAGNOSIS — B35.1 ONYCHOMYCOSIS: ICD-10-CM

## 2017-09-08 DIAGNOSIS — E11.51 TYPE 2 DIABETES MELLITUS WITH PERIPHERAL CIRCULATORY DISORDER: Primary | ICD-10-CM

## 2017-09-08 PROCEDURE — 3075F SYST BP GE 130 - 139MM HG: CPT | Mod: S$GLB,,, | Performed by: PODIATRIST

## 2017-09-08 PROCEDURE — 99999 PR PBB SHADOW E&M-EST. PATIENT-LVL III: CPT | Mod: PBBFAC,,, | Performed by: PODIATRIST

## 2017-09-08 PROCEDURE — 1159F MED LIST DOCD IN RCRD: CPT | Mod: S$GLB,,, | Performed by: PODIATRIST

## 2017-09-08 PROCEDURE — 1126F AMNT PAIN NOTED NONE PRSNT: CPT | Mod: S$GLB,,, | Performed by: PODIATRIST

## 2017-09-08 PROCEDURE — 99212 OFFICE O/P EST SF 10 MIN: CPT | Mod: 25,S$GLB,, | Performed by: PODIATRIST

## 2017-09-08 PROCEDURE — 3008F BODY MASS INDEX DOCD: CPT | Mod: S$GLB,,, | Performed by: PODIATRIST

## 2017-09-08 PROCEDURE — 3078F DIAST BP <80 MM HG: CPT | Mod: S$GLB,,, | Performed by: PODIATRIST

## 2017-09-08 PROCEDURE — 11721 DEBRIDE NAIL 6 OR MORE: CPT | Mod: Q8,S$GLB,, | Performed by: PODIATRIST

## 2017-09-08 PROCEDURE — 99499 UNLISTED E&M SERVICE: CPT | Mod: S$PBB,,, | Performed by: PODIATRIST

## 2017-09-08 NOTE — PATIENT INSTRUCTIONS
Diabetic Foot Care  Diabetes can lead to a number of foot complications. Fortunately, you can prevent most of these with a little extra foot care. If diabetes is not well controlled, it can cause damage to blood vessels and result in poor circulation to the foot. When the skin does not get enough blood flow, it becomes prone to pressure sores and ulcers, which heal slowly.  Diabetes can also damage nerves, interfering with the ability to feel pain and pressure. When you cant feel your foot normally, it is easy to injure your skin, bones, and joints without knowing it. For these reasons diabetes increases the risk of fungal infections, bunions, and ulcers. An ulcer is a sore or break in the skin. With ulcers, often the skin seems to have worn away. Deep ulcers can lead to bone infection.  Gangrene is the most serious foot complication of diabetes. It usually occurs on the tips of the toes as blackened areas of skin. The black area is dead tissue. In severe cases, gangrene spreads to involve the entire toe, other toes, and the entire foot. Foot or toe amputation may be required. Good foot care and blood sugar control can prevent this.  Home care  · Wear comfortable, well-fitting shoes.  · Wash your feet daily with warm water and mild soap.  · After drying, apply a moisturizing cream or lotion to the top and bottom of your feet. Don't put lotion between toes.  · Check your feet daily for skin breaks, blisters, swelling, or redness. Look between your toes as well. If you cannot see the bottoms of your feet, ask someone to look or use a mirror.  · Wear cotton socks and change them every day.  · Trim toenails carefully, and do not cut your cuticles.  · Strive to keep your blood sugar under control with a combination of medicines, diet, and activity.  · If you smoke and have diabetes, it is very important that you stop. Smoking reduces blood flow to your foot.  · Schedule foot exams at least every year, or more often if  you have foot problems.  · Put your feet up when sitting, wiggle toes, and move ankles to help improve blood flow.  Avoid activities that increase your risk of foot injury:  · Do not walk barefoot.  · Do not use heating pads or hot water bottles on your feet.  · Do not put your foot in a hot tub without first checking the temperature with your hand.  Follow-up care  Follow up with your healthcare provider, or as advised. Be sure to take off your shoes and socks before your appointment starts so your healthcare provider will be sure to check your feet. Report any cut, puncture, scrape, blister, or other injury to your foot. Also report if you have a bunion, hammertoes, ingrown toenail, or ulcer on your foot.  When to seek medical advice  Call your healthcare provider right away if any of these occur:  · Black skin color anywhere on the foot  · Open ulcer with pus draining from the wound  · Increasing foot or leg pain  · New areas of redness or swelling or tender areas of the foot  · Fever of 100.4°F (38°C) or greater  Date Last Reviewed: 5/25/2016  © 6562-9302 [employer]. [address] [place] [address], [place] [address]. All rights reserved. This information is not intended as a substitute for professional medical care. Always follow your healthcare professional's instructions.

## 2017-09-09 NOTE — PROGRESS NOTES
Subjective:      Patient ID: Melody Armstrong is a 83 y.o. female.    Chief Complaint: Follow-up    Melody is a 83 y.o. female who presents to the clinic for evaluation and treatment of diabetic feet. Melody has a past medical history of Anemia; Asymptomatic cholelithiasis; Coronary artery disease; Diabetes mellitus type II; GERD (gastroesophageal reflux disease); Hyperlipidemia; and Hypertension. Patient relates no major problem with feet. Complains of thickened toenails that she is unable to cut herself.    PCP: Zac Andres MD    Date Last Seen by PCP: 2/9/17    Current shoe gear: Slip-on shoes    Hemoglobin A1C   Date Value Ref Range Status   06/02/2017 5.1 4.5 - 6.2 % Final     Comment:     According to ADA guidelines, hemoglobin A1C <7.0% represents  optimal control in non-pregnant diabetic patients.  Different  metrics may apply to specific populations.   Standards of Medical Care in Diabetes - 2016.  For the purpose of screening for the presence of diabetes:  <5.7%     Consistent with the absence of diabetes  5.7-6.4%  Consistent with increasing risk for diabetes   (prediabetes)  >or=6.5%  Consistent with diabetes  Currently no consensus exists for use of hemoglobin A1C  for diagnosis of diabetes for children.     02/03/2017 5.5 4.5 - 6.2 % Final     Comment:     According to ADA guidelines, hemoglobin A1C <7.0% represents  optimal control in non-pregnant diabetic patients.  Different  metrics may apply to specific populations.   Standards of Medical Care in Diabetes - 2016.  For the purpose of screening for the presence of diabetes:  <5.7%     Consistent with the absence of diabetes  5.7-6.4%  Consistent with increasing risk for diabetes   (prediabetes)  >or=6.5%  Consistent with diabetes  Currently no consensus exists for use of hemoglobin A1C  for diagnosis of diabetes for children.     10/07/2016 5.6 4.5 - 6.2 % Final     Comment:     According to ADA guidelines, hemoglobin A1C <7.0%  represents  optimal control in non-pregnant diabetic patients.  Different  metrics may apply to specific populations.   Standards of Medical Care in Diabetes - 2016.  For the purpose of screening for the presence of diabetes:  <5.7%     Consistent with the absence of diabetes  5.7-6.4%  Consistent with increasing risk for diabetes   (prediabetes)  >or=6.5%  Consistent with diabetes  Currently no consensus exists for use of hemoglobin A1C  for diagnosis of diabetes for children.       Vitals:    09/08/17 1058   BP: 130/74   Pulse: 99     Past Medical History:   Diagnosis Date    Anemia     Asymptomatic cholelithiasis     Coronary artery disease     Diabetes mellitus type II     GERD (gastroesophageal reflux disease)     Hyperlipidemia     Hypertension        Past Surgical History:   Procedure Laterality Date    CORONARY ARTERY BYPASS GRAFT      JOINT REPLACEMENT      right    KNEE SURGERY      PARTIAL HYSTERECTOMY      SHOULDER ARTHROSCOPY W/ ROTATOR CUFF REPAIR      right shoulder       Family History   Problem Relation Age of Onset    Diabetes Mother     Heart disease Mother     Diabetes Maternal Grandmother        Social History     Social History    Marital status:      Spouse name: N/A    Number of children: N/A    Years of education: N/A     Social History Main Topics    Smoking status: Never Smoker    Smokeless tobacco: Never Used    Alcohol use No    Drug use: No    Sexual activity: Not Asked     Other Topics Concern    None     Social History Narrative    None       Current Outpatient Prescriptions   Medication Sig Dispense Refill    aspirin 325 MG tablet Take 1 tablet by mouth.      atorvastatin (LIPITOR) 20 MG tablet TAKE 1 TABLET BY MOUTH ONCE DAILY FOR CHOLESTEROL. 90 tablet 3    blood sugar diagnostic (GLUCOSE BLOOD) Strp Check blood glucose level once daily. 1 Bottle 8    cyanocobalamin (VITAMIN B-12) 1000 MCG tablet Take 1 tablet by mouth.      diclofenac sodium  (PENNSAID) 20 mg/gram /actuation(2 %) sopm Apply 40 mg topically 2 (two) times daily. 1 Bottle 2    diclofenac sodium (VOLTAREN) 1 % Gel Apply 2 g topically 4 (four) times daily. 1 Tube 2    gabapentin (NEURONTIN) 100 MG capsule TAKE ONE CAPSULE BY MOUTH 3 TIMES A DAY. 270 capsule 3    hydrochlorothiazide (HYDRODIURIL) 25 MG tablet TAKE ONE TABLET BY MOUTH DAILY FOR FLUID. 90 tablet 3    KLOR-CON 10 10 mEq TbSR TAKE ONE TABLET BY MOUTH DAILY. 90 tablet 3    levocetirizine (XYZAL) 5 MG tablet Take 1 tablet (5 mg total) by mouth every evening. For sinus allergy. 30 tablet 5    losartan (COZAAR) 50 MG tablet TAKE (1) TABLET BY MOUTH DAILY HIGH BLOOD PRESSURE. 90 tablet 3    meclizine (ANTIVERT) 12.5 mg tablet Take 1 tablet (12.5 mg total) by mouth 3 (three) times daily as needed for Dizziness. 40 tablet 2    metoprolol succinate (TOPROL-XL) 25 MG 24 hr tablet TAKE (1) TABLET BY MOUTH DAILY HIGH BLOOD PRESSURE. 90 tablet 3    tramadol (ULTRAM) 50 mg tablet Take 2 tablets (100 mg total) by mouth every 8 (eight) hours as needed for Pain. 180 tablet 1     No current facility-administered medications for this visit.        Review of patient's allergies indicates:  No Known Allergies        Review of Systems   Constitution: Negative for chills, fever, weakness and malaise/fatigue.   Cardiovascular: Negative for claudication.   Skin: Positive for nail changes. Negative for color change, dry skin, flushing, itching and poor wound healing.   Musculoskeletal: Positive for arthritis. Negative for back pain, falls, gout, joint pain and joint swelling.   Neurological: Negative for numbness and paresthesias.   Psychiatric/Behavioral: Negative for altered mental status.           Objective:      Physical Exam   Constitutional: She is oriented to person, place, and time. She appears well-nourished. No distress.   Cardiovascular:   Pulses:       Dorsalis pedis pulses are 2+ on the right side, and 2+ on the left side.         Posterior tibial pulses are 2+ on the right side, and 2+ on the left side.   CFT< 3 secs all toes bilateral foot, skin temp warm bilateral foot, diminished digital hair growth bilateral foot, no lower extremity edema bilateral. + varicosities bilateral.       Musculoskeletal:   Muscle strength 5/5 in all muscle groups bilateral.  No tenderness nor crepitation with ROM of foot/ankle joints bilateral.  No tenderness with palpation of bilateral foot and ankle.  Bilateral pes planus foot type.  Bilateral gastrocnemius equinus.  Bilateral hallux abducto valgus.  Bilateral semi-reducible contracture of toes 2-5 with adductovarus rotation of the 5th.     Neurological: She is alert and oriented to person, place, and time. She has normal strength. No sensory deficit.   Light touch intact bilateral.  Protective sensation intact bilateral per [place] monofilament.  Vibratory sensation intact bilateral.   Skin: Skin is warm, dry and intact. No rash noted. She is not diaphoretic. No cyanosis or erythema. No pallor. Nails show no clubbing.   Skin turgor, temperature, and texture appear age appropriate bilateral.      Nails 1-5 bilateral are thickened, discolored, and elongated with subungual debris.  Superior growth and dystrophy of right second and fifth toenails bilateral.     No open wounds, interdigital maceration, or hyperkeratoses noted bilateral.             Assessment:       Encounter Diagnoses   Name Primary?    Type 2 diabetes mellitus with peripheral circulatory disorder Yes    Onychomycosis          Plan:       Melody was seen today for follow-up.    Diagnoses and all orders for this visit:    Type 2 diabetes mellitus with peripheral circulatory disorder    Onychomycosis      I counseled the patient on her conditions, their implications and medical management.    Shoe inspection. Diabetic Foot Education. Patient reminded of the importance of good nutrition and blood sugar control to help prevent podiatric  complications of diabetes. Patient instructed on proper foot hygeine. We discussed wearing proper shoe gear, daily foot inspections, never walking without protective shoe gear, never putting sharp instruments to feet, routine podiatric nail visits every 2-3 months.      With patient's permission, nails were aggressively reduced and debrided x 10 to their soft tissue attachment mechanically and with electric , removing all offending nail and debris. Patient relates relief following the procedure. She will continue to monitor the areas daily, inspect her feet, wear protective shoe gear when ambulatory, moisturizer to maintain skin integrity and follow in this office in approximately 2-3 months, sooner p.r.n.        Laurie Stark DPM PGY-3    I have personally taken the history and examined this patient and agree with the resident's note as stated as above.   Doug Laguerre DPM, FACFAS

## 2017-09-11 RX ORDER — MECLIZINE HCL 12.5 MG 12.5 MG/1
12.5 TABLET ORAL 3 TIMES DAILY PRN
Qty: 40 TABLET | Refills: 2 | Status: SHIPPED | OUTPATIENT
Start: 2017-09-11 | End: 2017-10-17 | Stop reason: SDUPTHER

## 2017-09-29 ENCOUNTER — TELEPHONE (OUTPATIENT)
Dept: INTERNAL MEDICINE | Facility: CLINIC | Age: 82
End: 2017-09-29

## 2017-09-29 NOTE — TELEPHONE ENCOUNTER
----- Message from Heron Christian sent at 9/29/2017  9:41 AM CDT -----  Contact: Ailin Daughter [phone]  Requesting a call back, stated that her Mother is not feeling well, please call and advice    Thanks

## 2017-10-13 ENCOUNTER — LAB VISIT (OUTPATIENT)
Dept: LAB | Facility: HOSPITAL | Age: 82
End: 2017-10-13
Attending: INTERNAL MEDICINE
Payer: MEDICARE

## 2017-10-13 DIAGNOSIS — I10 ESSENTIAL HYPERTENSION: Chronic | ICD-10-CM

## 2017-10-13 DIAGNOSIS — E11.22 TYPE 2 DIABETES MELLITUS WITH CHRONIC KIDNEY DISEASE, WITHOUT LONG-TERM CURRENT USE OF INSULIN, UNSPECIFIED CKD STAGE: Chronic | ICD-10-CM

## 2017-10-13 DIAGNOSIS — N18.30 CKD (CHRONIC KIDNEY DISEASE), STAGE III: Chronic | ICD-10-CM

## 2017-10-13 LAB
ALBUMIN SERPL BCP-MCNC: 3.7 G/DL
ALP SERPL-CCNC: 89 U/L
ALT SERPL W/O P-5'-P-CCNC: 17 U/L
ANION GAP SERPL CALC-SCNC: 6 MMOL/L
AST SERPL-CCNC: 21 U/L
BASOPHILS # BLD AUTO: 0.02 K/UL
BASOPHILS NFR BLD: 0.2 %
BILIRUB SERPL-MCNC: 0.6 MG/DL
BUN SERPL-MCNC: 22 MG/DL
CALCIUM SERPL-MCNC: 9.8 MG/DL
CHLORIDE SERPL-SCNC: 109 MMOL/L
CHOLEST SERPL-MCNC: 145 MG/DL
CHOLEST/HDLC SERPL: 2.6 {RATIO}
CO2 SERPL-SCNC: 27 MMOL/L
CREAT SERPL-MCNC: 1.4 MG/DL
DIFFERENTIAL METHOD: ABNORMAL
EOSINOPHIL # BLD AUTO: 0.4 K/UL
EOSINOPHIL NFR BLD: 5.3 %
ERYTHROCYTE [DISTWIDTH] IN BLOOD BY AUTOMATED COUNT: 13.4 %
EST. GFR  (AFRICAN AMERICAN): 40.1 ML/MIN/1.73 M^2
EST. GFR  (NON AFRICAN AMERICAN): 34.8 ML/MIN/1.73 M^2
ESTIMATED AVG GLUCOSE: 103 MG/DL
GLUCOSE SERPL-MCNC: 91 MG/DL
HBA1C MFR BLD HPLC: 5.2 %
HCT VFR BLD AUTO: 30.3 %
HDLC SERPL-MCNC: 56 MG/DL
HDLC SERPL: 38.6 %
HGB BLD-MCNC: 9.7 G/DL
LDLC SERPL CALC-MCNC: 62.4 MG/DL
LYMPHOCYTES # BLD AUTO: 2.2 K/UL
LYMPHOCYTES NFR BLD: 27.2 %
MCH RBC QN AUTO: 32.2 PG
MCHC RBC AUTO-ENTMCNC: 32 G/DL
MCV RBC AUTO: 101 FL
MONOCYTES # BLD AUTO: 0.8 K/UL
MONOCYTES NFR BLD: 10.4 %
NEUTROPHILS # BLD AUTO: 4.6 K/UL
NEUTROPHILS NFR BLD: 56.7 %
NONHDLC SERPL-MCNC: 89 MG/DL
PLATELET # BLD AUTO: 287 K/UL
PMV BLD AUTO: 9.7 FL
POTASSIUM SERPL-SCNC: 4.7 MMOL/L
PROT SERPL-MCNC: 7.9 G/DL
RBC # BLD AUTO: 3.01 M/UL
SODIUM SERPL-SCNC: 142 MMOL/L
TRIGL SERPL-MCNC: 133 MG/DL
WBC # BLD AUTO: 8.06 K/UL

## 2017-10-13 PROCEDURE — 80061 LIPID PANEL: CPT

## 2017-10-13 PROCEDURE — 85025 COMPLETE CBC W/AUTO DIFF WBC: CPT

## 2017-10-13 PROCEDURE — 83036 HEMOGLOBIN GLYCOSYLATED A1C: CPT

## 2017-10-13 PROCEDURE — 36415 COLL VENOUS BLD VENIPUNCTURE: CPT | Mod: PO

## 2017-10-13 PROCEDURE — 80053 COMPREHEN METABOLIC PANEL: CPT

## 2017-10-17 ENCOUNTER — OFFICE VISIT (OUTPATIENT)
Dept: INTERNAL MEDICINE | Facility: CLINIC | Age: 82
End: 2017-10-17
Payer: MEDICARE

## 2017-10-17 VITALS
HEIGHT: 65 IN | HEART RATE: 79 BPM | DIASTOLIC BLOOD PRESSURE: 76 MMHG | WEIGHT: 163.38 LBS | TEMPERATURE: 99 F | BODY MASS INDEX: 27.22 KG/M2 | SYSTOLIC BLOOD PRESSURE: 149 MMHG

## 2017-10-17 DIAGNOSIS — E78.5 HYPERLIPIDEMIA, UNSPECIFIED HYPERLIPIDEMIA TYPE: ICD-10-CM

## 2017-10-17 DIAGNOSIS — N18.30 CKD (CHRONIC KIDNEY DISEASE), STAGE III: Chronic | ICD-10-CM

## 2017-10-17 DIAGNOSIS — M15.9 PRIMARY OSTEOARTHRITIS INVOLVING MULTIPLE JOINTS: Chronic | ICD-10-CM

## 2017-10-17 DIAGNOSIS — I10 ESSENTIAL HYPERTENSION: Chronic | ICD-10-CM

## 2017-10-17 DIAGNOSIS — K59.09 CHRONIC CONSTIPATION: Chronic | ICD-10-CM

## 2017-10-17 DIAGNOSIS — E11.22 TYPE 2 DIABETES MELLITUS WITH CHRONIC KIDNEY DISEASE, WITHOUT LONG-TERM CURRENT USE OF INSULIN, UNSPECIFIED CKD STAGE: Primary | Chronic | ICD-10-CM

## 2017-10-17 PROCEDURE — 99999 PR PBB SHADOW E&M-EST. PATIENT-LVL III: CPT | Mod: PBBFAC,,, | Performed by: INTERNAL MEDICINE

## 2017-10-17 PROCEDURE — 99214 OFFICE O/P EST MOD 30 MIN: CPT | Mod: S$GLB,,, | Performed by: INTERNAL MEDICINE

## 2017-10-17 PROCEDURE — 99499 UNLISTED E&M SERVICE: CPT | Mod: S$PBB,,, | Performed by: INTERNAL MEDICINE

## 2017-10-17 RX ORDER — FLUTICASONE PROPIONATE 50 MCG
2 SPRAY, SUSPENSION (ML) NASAL DAILY
Qty: 16 G | Refills: 10 | Status: SHIPPED | OUTPATIENT
Start: 2017-10-17 | End: 2017-11-16

## 2017-10-17 RX ORDER — MECLIZINE HCL 12.5 MG 12.5 MG/1
12.5 TABLET ORAL 3 TIMES DAILY PRN
Qty: 40 TABLET | Refills: 4 | Status: SHIPPED | OUTPATIENT
Start: 2017-10-17 | End: 2019-05-13 | Stop reason: SDUPTHER

## 2017-10-17 NOTE — PROGRESS NOTES
Subjective:       Patient ID: Melody Armstrong is a 83 y.o. female.    Chief Complaint: Follow-up    HPI   She presents for follow-up of diabetes and other medical conditions.  She has been feeling well.  Her blood sugar levels have been good.  No hypoglycemia has been noted.  She does complain of chronic constipation.  OTC laxatives have not been helpful.  Review of Systems   Constitutional: Negative for activity change, appetite change and unexpected weight change.   HENT: Positive for congestion and postnasal drip.    Eyes: Negative for visual disturbance.   Respiratory: Negative for shortness of breath.    Cardiovascular: Negative for chest pain, palpitations and leg swelling.   Gastrointestinal: Positive for constipation. Negative for abdominal pain, blood in stool and diarrhea.   Genitourinary: Negative for dysuria, frequency, hematuria and urgency.   Musculoskeletal: Positive for arthralgias and joint swelling.   Neurological: Positive for dizziness. Negative for weakness, numbness and headaches.   Psychiatric/Behavioral: Negative for sleep disturbance.       Objective:      Physical Exam   Constitutional: She is oriented to person, place, and time. She appears well-developed and well-nourished. No distress.   The patient's weight has remained stable since 6/15/17.   HENT:   Head: Normocephalic and atraumatic.   Eyes: Conjunctivae and EOM are normal. Pupils are equal, round, and reactive to light. No scleral icterus.   Neck: Normal range of motion. Neck supple. No JVD present. No thyromegaly present.   Cardiovascular: Normal rate, regular rhythm, normal heart sounds and intact distal pulses.  Exam reveals no gallop and no friction rub.    No murmur heard.  Trace left ankle edema is present.  No right ankle edema is noted.   Pulmonary/Chest: Effort normal and breath sounds normal. No respiratory distress. She has no wheezes. She has no rales.   Abdominal: Soft. Bowel sounds are normal. She exhibits no mass.  There is no tenderness.   Musculoskeletal: She exhibits no edema.   Left knee exhibits decreased flexion with tenderness noted on palpation.  Right knee exhibits decreased flexion; no local tenderness is noted.  Hip range of motion is intact.  Negative straight leg raising test bilaterally.   Lymphadenopathy:     She has no cervical adenopathy.   Neurological: She is alert and oriented to person, place, and time. No cranial nerve deficit.   Sensory exam is intact in both feet on monofilament and vibration testing.   Skin: Skin is warm and dry. No rash noted.   No foot lesions are present.   Psychiatric: She has a normal mood and affect. Her behavior is normal.   Nursing note and vitals reviewed.      Results for orders placed or performed in visit on 10/13/17   CBC auto differential   Result Value Ref Range    WBC 8.06 3.90 - 12.70 K/uL    RBC 3.01 (L) 4.00 - 5.40 M/uL    Hemoglobin 9.7 (L) 12.0 - 16.0 g/dL    Hematocrit 30.3 (L) 37.0 - 48.5 %     (H) 82 - 98 fL    MCH 32.2 (H) 27.0 - 31.0 pg    MCHC 32.0 32.0 - 36.0 g/dL    RDW 13.4 11.5 - 14.5 %    Platelets 287 150 - 350 K/uL    MPV 9.7 9.2 - 12.9 fL    Gran # 4.6 1.8 - 7.7 K/uL    Lymph # 2.2 1.0 - 4.8 K/uL    Mono # 0.8 0.3 - 1.0 K/uL    Eos # 0.4 0.0 - 0.5 K/uL    Baso # 0.02 0.00 - 0.20 K/uL    Gran% 56.7 38.0 - 73.0 %    Lymph% 27.2 18.0 - 48.0 %    Mono% 10.4 4.0 - 15.0 %    Eosinophil% 5.3 0.0 - 8.0 %    Basophil% 0.2 0.0 - 1.9 %    Differential Method Automated    Comprehensive metabolic panel   Result Value Ref Range    Sodium 142 136 - 145 mmol/L    Potassium 4.7 3.5 - 5.1 mmol/L    Chloride 109 95 - 110 mmol/L    CO2 27 23 - 29 mmol/L    Glucose 91 70 - 110 mg/dL    BUN, Bld 22 8 - 23 mg/dL    Creatinine 1.4 0.5 - 1.4 mg/dL    Calcium 9.8 8.7 - 10.5 mg/dL    Total Protein 7.9 6.0 - 8.4 g/dL    Albumin 3.7 3.5 - 5.2 g/dL    Total Bilirubin 0.6 0.1 - 1.0 mg/dL    Alkaline Phosphatase 89 55 - 135 U/L    AST 21 10 - 40 U/L    ALT 17 10 - 44 U/L     Anion Gap 6 (L) 8 - 16 mmol/L    eGFR if African American 40.1 (A) >60 mL/min/1.73 m^2    eGFR if non  34.8 (A) >60 mL/min/1.73 m^2   Lipid panel   Result Value Ref Range    Cholesterol 145 120 - 199 mg/dL    Triglycerides 133 30 - 150 mg/dL    HDL 56 40 - 75 mg/dL    LDL Cholesterol 62.4 (L) 63.0 - 159.0 mg/dL    HDL/Chol Ratio 38.6 20.0 - 50.0 %    Total Cholesterol/HDL Ratio 2.6 2.0 - 5.0    Non-HDL Cholesterol 89 mg/dL   Hemoglobin A1c   Result Value Ref Range    Hemoglobin A1C 5.2 4.0 - 5.6 %    Estimated Avg Glucose 103 68 - 131 mg/dL       Assessment:       1. Type 2 diabetes mellitus with chronic kidney disease, without long-term current use of insulin, unspecified CKD stage    2. Essential hypertension    3. Hyperlipidemia, unspecified hyperlipidemia type    4. CKD (chronic kidney disease), stage III    5. Primary osteoarthritis involving multiple joints    6. Chronic constipation        Plan:           Melody was seen today for follow-up.  Flonase and Xyzal renewed for rhinitis symptoms.  The patient will be having her annual examination.  Linzess was ordered for chronic constipation.  Fasting blood tests and a follow-up visit in 4 months will be obtained.  The patient declines taking the flu vaccine.    Diagnoses and all orders for this visit:    Type 2 diabetes mellitus with chronic kidney disease, without long-term current use of insulin, unspecified CKD stage  -     Hemoglobin A1c; Future    Essential hypertension  -     CBC auto differential; Future    Hyperlipidemia, unspecified hyperlipidemia type  -     Comprehensive metabolic panel; Future    CKD (chronic kidney disease), stage III    Primary osteoarthritis involving multiple joints    Chronic constipation    Other orders  -     meclizine (ANTIVERT) 12.5 mg tablet; Take 1 tablet (12.5 mg total) by mouth 3 (three) times daily as needed for Dizziness.  -     fluticasone (FLONASE) 50 mcg/actuation nasal spray; 2 sprays by Each Nare  route once daily. For sinus allergy  -     linaclotide (LINZESS) 145 mcg Cap capsule; Take 1 capsule (145 mcg total) by mouth once daily. For chronic constipation.

## 2017-11-17 RX ORDER — LOSARTAN POTASSIUM 50 MG/1
TABLET ORAL
Qty: 90 TABLET | Refills: 3 | Status: SHIPPED | OUTPATIENT
Start: 2017-11-17 | End: 2018-02-20 | Stop reason: SDUPTHER

## 2017-11-17 RX ORDER — TRAMADOL HYDROCHLORIDE 50 MG/1
TABLET ORAL
Qty: 180 TABLET | Refills: 1 | Status: SHIPPED | OUTPATIENT
Start: 2017-11-17 | End: 2018-02-20 | Stop reason: SDUPTHER

## 2018-02-02 RX ORDER — ATORVASTATIN CALCIUM 20 MG/1
TABLET, FILM COATED ORAL
Qty: 90 TABLET | Refills: 3 | Status: SHIPPED | OUTPATIENT
Start: 2018-02-02 | End: 2018-09-04 | Stop reason: SDUPTHER

## 2018-02-16 ENCOUNTER — LAB VISIT (OUTPATIENT)
Dept: LAB | Facility: HOSPITAL | Age: 83
End: 2018-02-16
Attending: INTERNAL MEDICINE
Payer: MEDICARE

## 2018-02-16 DIAGNOSIS — I10 ESSENTIAL HYPERTENSION: Chronic | ICD-10-CM

## 2018-02-16 DIAGNOSIS — E78.5 HYPERLIPIDEMIA, UNSPECIFIED HYPERLIPIDEMIA TYPE: ICD-10-CM

## 2018-02-16 DIAGNOSIS — E11.22 TYPE 2 DIABETES MELLITUS WITH CHRONIC KIDNEY DISEASE, WITHOUT LONG-TERM CURRENT USE OF INSULIN, UNSPECIFIED CKD STAGE: Chronic | ICD-10-CM

## 2018-02-16 LAB
ALBUMIN SERPL BCP-MCNC: 3.7 G/DL
ALP SERPL-CCNC: 105 U/L
ALT SERPL W/O P-5'-P-CCNC: 15 U/L
ANION GAP SERPL CALC-SCNC: 9 MMOL/L
AST SERPL-CCNC: 20 U/L
BASOPHILS # BLD AUTO: 0.02 K/UL
BASOPHILS NFR BLD: 0.3 %
BILIRUB SERPL-MCNC: 0.5 MG/DL
BUN SERPL-MCNC: 34 MG/DL
CALCIUM SERPL-MCNC: 9.8 MG/DL
CHLORIDE SERPL-SCNC: 108 MMOL/L
CO2 SERPL-SCNC: 24 MMOL/L
CREAT SERPL-MCNC: 1.9 MG/DL
DIFFERENTIAL METHOD: ABNORMAL
EOSINOPHIL # BLD AUTO: 0.5 K/UL
EOSINOPHIL NFR BLD: 7 %
ERYTHROCYTE [DISTWIDTH] IN BLOOD BY AUTOMATED COUNT: 13 %
EST. GFR  (AFRICAN AMERICAN): 27.7 ML/MIN/1.73 M^2
EST. GFR  (NON AFRICAN AMERICAN): 24 ML/MIN/1.73 M^2
ESTIMATED AVG GLUCOSE: 103 MG/DL
GLUCOSE SERPL-MCNC: 95 MG/DL
HBA1C MFR BLD HPLC: 5.2 %
HCT VFR BLD AUTO: 29.4 %
HGB BLD-MCNC: 9.4 G/DL
IMM GRANULOCYTES # BLD AUTO: 0.03 K/UL
IMM GRANULOCYTES NFR BLD AUTO: 0.4 %
LYMPHOCYTES # BLD AUTO: 2.1 K/UL
LYMPHOCYTES NFR BLD: 27.3 %
MCH RBC QN AUTO: 31.9 PG
MCHC RBC AUTO-ENTMCNC: 32 G/DL
MCV RBC AUTO: 100 FL
MONOCYTES # BLD AUTO: 0.8 K/UL
MONOCYTES NFR BLD: 10.4 %
NEUTROPHILS # BLD AUTO: 4.1 K/UL
NEUTROPHILS NFR BLD: 54.6 %
NRBC BLD-RTO: 0 /100 WBC
PLATELET # BLD AUTO: 258 K/UL
PMV BLD AUTO: 10.1 FL
POTASSIUM SERPL-SCNC: 5 MMOL/L
PROT SERPL-MCNC: 8.1 G/DL
RBC # BLD AUTO: 2.95 M/UL
SODIUM SERPL-SCNC: 141 MMOL/L
WBC # BLD AUTO: 7.57 K/UL

## 2018-02-16 PROCEDURE — 83036 HEMOGLOBIN GLYCOSYLATED A1C: CPT

## 2018-02-16 PROCEDURE — 85025 COMPLETE CBC W/AUTO DIFF WBC: CPT

## 2018-02-16 PROCEDURE — 36415 COLL VENOUS BLD VENIPUNCTURE: CPT | Mod: PO

## 2018-02-16 PROCEDURE — 80053 COMPREHEN METABOLIC PANEL: CPT

## 2018-02-20 ENCOUNTER — OFFICE VISIT (OUTPATIENT)
Dept: INTERNAL MEDICINE | Facility: CLINIC | Age: 83
End: 2018-02-20
Payer: MEDICARE

## 2018-02-20 VITALS
WEIGHT: 159.38 LBS | SYSTOLIC BLOOD PRESSURE: 146 MMHG | BODY MASS INDEX: 26.55 KG/M2 | HEIGHT: 65 IN | TEMPERATURE: 99 F | DIASTOLIC BLOOD PRESSURE: 69 MMHG | HEART RATE: 80 BPM

## 2018-02-20 DIAGNOSIS — E11.22 TYPE 2 DIABETES MELLITUS WITH CHRONIC KIDNEY DISEASE, WITHOUT LONG-TERM CURRENT USE OF INSULIN, UNSPECIFIED CKD STAGE: Primary | Chronic | ICD-10-CM

## 2018-02-20 DIAGNOSIS — M17.12 ARTHRITIS OF LEFT KNEE: ICD-10-CM

## 2018-02-20 DIAGNOSIS — D64.89 ANEMIA DUE TO OTHER CAUSE, NOT CLASSIFIED: ICD-10-CM

## 2018-02-20 DIAGNOSIS — M25.562 CHRONIC PAIN OF LEFT KNEE: ICD-10-CM

## 2018-02-20 DIAGNOSIS — G89.29 CHRONIC PAIN OF LEFT KNEE: ICD-10-CM

## 2018-02-20 DIAGNOSIS — M15.9 PRIMARY OSTEOARTHRITIS INVOLVING MULTIPLE JOINTS: Chronic | ICD-10-CM

## 2018-02-20 DIAGNOSIS — N18.30 CKD (CHRONIC KIDNEY DISEASE), STAGE III: Chronic | ICD-10-CM

## 2018-02-20 DIAGNOSIS — I10 ESSENTIAL HYPERTENSION: Chronic | ICD-10-CM

## 2018-02-20 PROCEDURE — 1159F MED LIST DOCD IN RCRD: CPT | Mod: S$GLB,,, | Performed by: INTERNAL MEDICINE

## 2018-02-20 PROCEDURE — 1125F AMNT PAIN NOTED PAIN PRSNT: CPT | Mod: S$GLB,,, | Performed by: INTERNAL MEDICINE

## 2018-02-20 PROCEDURE — 99999 PR PBB SHADOW E&M-EST. PATIENT-LVL IV: CPT | Mod: PBBFAC,,, | Performed by: INTERNAL MEDICINE

## 2018-02-20 PROCEDURE — 99499 UNLISTED E&M SERVICE: CPT | Mod: S$GLB,,, | Performed by: INTERNAL MEDICINE

## 2018-02-20 PROCEDURE — 3008F BODY MASS INDEX DOCD: CPT | Mod: S$GLB,,, | Performed by: INTERNAL MEDICINE

## 2018-02-20 PROCEDURE — 99214 OFFICE O/P EST MOD 30 MIN: CPT | Mod: S$GLB,,, | Performed by: INTERNAL MEDICINE

## 2018-02-20 RX ORDER — LOSARTAN POTASSIUM 50 MG/1
TABLET ORAL
Qty: 90 TABLET | Refills: 3 | Status: SHIPPED | OUTPATIENT
Start: 2018-02-20 | End: 2018-09-04 | Stop reason: SDUPTHER

## 2018-02-20 RX ORDER — METOPROLOL SUCCINATE 25 MG/1
TABLET, EXTENDED RELEASE ORAL
Qty: 90 TABLET | Refills: 3 | Status: SHIPPED | OUTPATIENT
Start: 2018-02-20 | End: 2018-09-04 | Stop reason: SDUPTHER

## 2018-02-20 RX ORDER — TRAMADOL HYDROCHLORIDE 50 MG/1
TABLET ORAL
Qty: 180 TABLET | Refills: 1 | Status: SHIPPED | OUTPATIENT
Start: 2018-02-20 | End: 2018-08-29 | Stop reason: SDUPTHER

## 2018-02-25 NOTE — PROGRESS NOTES
Subjective:       Patient ID: Melody Armstrong is a 83 y.o. female.    Chief Complaint: Follow-up    HPI   The patient presents for follow-up of medical conditions.  She has type 2 diabetes mellitus, hypertension, chronic kidney disease, and anemia.  He states she has increased her water intake.  She has been avoiding NSAIDs.  She uses tramadol for severe arthritis pain.  Left knee pain has been worsening.    Review of Systems   Constitutional: Negative for activity change, appetite change and unexpected weight change.   Eyes: Negative for visual disturbance.   Respiratory: Negative for shortness of breath.    Cardiovascular: Negative for chest pain, palpitations and leg swelling.   Gastrointestinal: Negative for abdominal pain, blood in stool and diarrhea.   Genitourinary: Negative for dysuria, frequency, hematuria and urgency.   Musculoskeletal: Positive for arthralgias.   Neurological: Negative for weakness, numbness and headaches.   Psychiatric/Behavioral: Negative for sleep disturbance.       Objective:      Physical Exam   Constitutional: She is oriented to person, place, and time. She appears well-developed and well-nourished. No distress.   HENT:   Head: Normocephalic and atraumatic.   Eyes: Conjunctivae and EOM are normal. Pupils are equal, round, and reactive to light. No scleral icterus.   Neck: Normal range of motion. Neck supple. No JVD present. No thyromegaly present.   Cardiovascular: Normal rate, regular rhythm, normal heart sounds and intact distal pulses.  Exam reveals no gallop and no friction rub.    No murmur heard.  Pulmonary/Chest: Effort normal and breath sounds normal. No respiratory distress. She has no wheezes. She has no rales.   Abdominal: Soft. Bowel sounds are normal. She exhibits no mass. There is no tenderness.   Musculoskeletal: She exhibits tenderness. She exhibits no edema.   The left knee is tender on flexion and extension.  Diminished range of motion is noted.    Lymphadenopathy:     She has no cervical adenopathy.   Neurological: She is alert and oriented to person, place, and time. No cranial nerve deficit.   Sensory exam is intact in both feet on monofilament and vibration testing.   Skin: Skin is warm and dry. No rash noted.   No foot lesions are present.   Psychiatric: She has a normal mood and affect. Her behavior is normal.   Nursing note and vitals reviewed.      Results for orders placed or performed in visit on 02/16/18   CBC auto differential   Result Value Ref Range    WBC 7.57 3.90 - 12.70 K/uL    RBC 2.95 (L) 4.00 - 5.40 M/uL    Hemoglobin 9.4 (L) 12.0 - 16.0 g/dL    Hematocrit 29.4 (L) 37.0 - 48.5 %     (H) 82 - 98 fL    MCH 31.9 (H) 27.0 - 31.0 pg    MCHC 32.0 32.0 - 36.0 g/dL    RDW 13.0 11.5 - 14.5 %    Platelets 258 150 - 350 K/uL    MPV 10.1 9.2 - 12.9 fL    Immature Granulocytes 0.4 0.0 - 0.5 %    Gran # (ANC) 4.1 1.8 - 7.7 K/uL    Immature Grans (Abs) 0.03 0.00 - 0.04 K/uL    Lymph # 2.1 1.0 - 4.8 K/uL    Mono # 0.8 0.3 - 1.0 K/uL    Eos # 0.5 0.0 - 0.5 K/uL    Baso # 0.02 0.00 - 0.20 K/uL    nRBC 0 0 /100 WBC    Gran% 54.6 38.0 - 73.0 %    Lymph% 27.3 18.0 - 48.0 %    Mono% 10.4 4.0 - 15.0 %    Eosinophil% 7.0 0.0 - 8.0 %    Basophil% 0.3 0.0 - 1.9 %    Differential Method Automated    Comprehensive metabolic panel   Result Value Ref Range    Sodium 141 136 - 145 mmol/L    Potassium 5.0 3.5 - 5.1 mmol/L    Chloride 108 95 - 110 mmol/L    CO2 24 23 - 29 mmol/L    Glucose 95 70 - 110 mg/dL    BUN, Bld 34 (H) 8 - 23 mg/dL    Creatinine 1.9 (H) 0.5 - 1.4 mg/dL    Calcium 9.8 8.7 - 10.5 mg/dL    Total Protein 8.1 6.0 - 8.4 g/dL    Albumin 3.7 3.5 - 5.2 g/dL    Total Bilirubin 0.5 0.1 - 1.0 mg/dL    Alkaline Phosphatase 105 55 - 135 U/L    AST 20 10 - 40 U/L    ALT 15 10 - 44 U/L    Anion Gap 9 8 - 16 mmol/L    eGFR if African American 27.7 (A) >60 mL/min/1.73 m^2    eGFR if non  24.0 (A) >60 mL/min/1.73 m^2   Hemoglobin A1c   Result  Value Ref Range    Hemoglobin A1C 5.2 4.0 - 5.6 %    Estimated Avg Glucose 103 68 - 131 mg/dL       Assessment:       1. Type 2 diabetes mellitus with chronic kidney disease, without long-term current use of insulin, unspecified CKD stage    2. Essential hypertension    3. CKD (chronic kidney disease), stage III    4. Primary osteoarthritis involving multiple joints    5. Anemia due to other cause, not classified    6. Arthritis of left knee    7. Chronic pain of left knee        Plan:           Melody was seen today for follow-up.  Nephrology consultation will be obtained regarding chronic kidney disease.  Blood tests and a follow-up visit in 4 months will be obtained.  Pain management consultation will be obtained regarding intractable left knee pain.  Tramadol will be renewed in the meantime.  The patient is to return to clinic in 4 months.    Diagnoses and all orders for this visit:    Type 2 diabetes mellitus with chronic kidney disease, without long-term current use of insulin, unspecified CKD stage  -     Hemoglobin A1c; Future    Essential hypertension  -     Comprehensive metabolic panel; Future  -     CBC auto differential; Future  -     Lipid panel; Future    CKD (chronic kidney disease), stage III  -     Ambulatory consult to Nephrology    Primary osteoarthritis involving multiple joints    Anemia due to other cause, not classified  -     CBC auto differential; Future    Arthritis of left knee  -     Ambulatory consult to Pain Clinic    Chronic pain of left knee  -     Ambulatory consult to Pain Clinic    Other orders  -     losartan (COZAAR) 50 MG tablet; TAKE (1) TABLET BY MOUTH DAILY HIGH BLOOD PRESSURE.  -     metoprolol succinate (TOPROL-XL) 25 MG 24 hr tablet; TAKE (1) TABLET BY MOUTH DAILY HIGH BLOOD PRESSURE.  -     traMADol (ULTRAM) 50 mg tablet; TAKE TWO TABLETS BY MOUTH EVERY 8 HOURS AS NEEDED FOR PAIN

## 2018-02-26 NOTE — PROGRESS NOTES
Patient, Melody Armstrong (MRN #[id number]), presented with a recent Estimated Glumerular Filtration Rate (EGFR) between 15 and 29 consistent with the definition of chronic kidney disease stage 4 (ICD10 - N18.4).    eGFR if    Date Value Ref Range Status   02/16/2018 27.7 (A) >60 mL/min/1.73 m^2 Final       The patient's chronic kidney disease stage 4 was monitored, evaluated, addressed and/or treated. This addendum to the medical record is made on 02/26/2018.

## 2018-02-27 ENCOUNTER — OFFICE VISIT (OUTPATIENT)
Dept: PODIATRY | Facility: CLINIC | Age: 83
End: 2018-02-27
Payer: MEDICARE

## 2018-02-27 VITALS
WEIGHT: 159 LBS | HEART RATE: 73 BPM | SYSTOLIC BLOOD PRESSURE: 137 MMHG | BODY MASS INDEX: 26.49 KG/M2 | HEIGHT: 65 IN | DIASTOLIC BLOOD PRESSURE: 70 MMHG

## 2018-02-27 DIAGNOSIS — E11.51 TYPE 2 DIABETES MELLITUS WITH PERIPHERAL CIRCULATORY DISORDER: Primary | ICD-10-CM

## 2018-02-27 DIAGNOSIS — B35.1 ONYCHOMYCOSIS: ICD-10-CM

## 2018-02-27 DIAGNOSIS — I73.9 PAD (PERIPHERAL ARTERY DISEASE): ICD-10-CM

## 2018-02-27 PROCEDURE — 1159F MED LIST DOCD IN RCRD: CPT | Mod: S$GLB,,, | Performed by: PODIATRIST

## 2018-02-27 PROCEDURE — 99499 UNLISTED E&M SERVICE: CPT | Mod: S$GLB,,, | Performed by: PODIATRIST

## 2018-02-27 PROCEDURE — 99999 PR PBB SHADOW E&M-EST. PATIENT-LVL III: CPT | Mod: PBBFAC,,, | Performed by: PODIATRIST

## 2018-02-27 PROCEDURE — 3008F BODY MASS INDEX DOCD: CPT | Mod: S$GLB,,, | Performed by: PODIATRIST

## 2018-02-27 PROCEDURE — 99212 OFFICE O/P EST SF 10 MIN: CPT | Mod: 25,S$GLB,, | Performed by: PODIATRIST

## 2018-02-27 PROCEDURE — 1126F AMNT PAIN NOTED NONE PRSNT: CPT | Mod: S$GLB,,, | Performed by: PODIATRIST

## 2018-02-27 PROCEDURE — 11721 DEBRIDE NAIL 6 OR MORE: CPT | Mod: Q8,S$GLB,, | Performed by: PODIATRIST

## 2018-02-27 NOTE — PROCEDURES
Routine Foot Care  Date/Time: 2/27/2018 10:08 AM  Performed by: MILAGRO FERGUSON  Authorized by: MILAGRO FERGUSON     Consent Done?:  Yes (Verbal)  Hyperkeratotic Skin Lesions?: No      Nail Care Type:  [place]br[place]  Location(s): All  (Left 1st Toe, Left 3rd Toe, Left 2nd Toe, Left 4th Toe, Left 5th Toe, Right 1st Toe, Right 2nd Toe, Right 3rd Toe, Right 4th Toe and Right 5th Toe)  Patient tolerance:  Patient tolerated the procedure well with no immediate complications

## 2018-02-27 NOTE — PATIENT INSTRUCTIONS
Diabetic Foot Care  Diabetes can lead to a number of foot complications. Fortunately, you can prevent most of these with a little extra foot care. If diabetes is not well controlled, it can cause damage to blood vessels and result in poor circulation to the foot. When the skin does not get enough blood flow, it becomes prone to pressure sores and ulcers, which heal slowly.  Diabetes can also damage nerves, interfering with the ability to feel pain and pressure. When you cant feel your foot normally, it is easy to injure your skin, bones, and joints without knowing it. For these reasons diabetes increases the risk of fungal infections, bunions, and ulcers. An ulcer is a sore or break in the skin. With ulcers, often the skin seems to have worn away. Deep ulcers can lead to bone infection.  Gangrene is the most serious foot complication of diabetes. It usually occurs on the tips of the toes as blackened areas of skin. The black area is dead tissue. In severe cases, gangrene spreads to involve the entire toe, other toes, and the entire foot. Foot or toe amputation may be required. Good foot care and blood sugar control can prevent this.  Home care  · Wear comfortable, well-fitting shoes.  · Wash your feet daily with warm water and mild soap.  · After drying, apply a moisturizing cream or lotion to the top and bottom of your feet. Don't put lotion between toes.  · Check your feet daily for skin breaks, blisters, swelling, or redness. Look between your toes as well. If you cannot see the bottoms of your feet, ask someone to look or use a mirror.  · Wear cotton socks and change them every day.  · Trim toenails carefully, and do not cut your cuticles.  · Strive to keep your blood sugar under control with a combination of medicines, diet, and activity.  · If you smoke and have diabetes, it is very important that you stop. Smoking reduces blood flow to your foot.  · Schedule foot exams at least every year, or more often if  you have foot problems.  · Put your feet up when sitting, wiggle toes, and move ankles to help improve blood flow.  Avoid activities that increase your risk of foot injury:  · Do not walk barefoot.  · Do not use heating pads or hot water bottles on your feet.  · Do not put your foot in a hot tub without first checking the temperature with your hand.  Follow-up care  Follow up with your healthcare provider, or as advised. Be sure to take off your shoes and socks before your appointment starts so your healthcare provider will be sure to check your feet. Report any cut, puncture, scrape, blister, or other injury to your foot. Also report if you have a bunion, hammertoes, ingrown toenail, or ulcer on your foot.  When to seek medical advice  Call your healthcare provider right away if any of these occur:  · Black skin color anywhere on the foot  · Open ulcer with pus draining from the wound  · Increasing foot or leg pain  · New areas of redness or swelling or tender areas of the foot  · Fever of 100.4°F (38°C) or greater  Date Last Reviewed: 5/25/2016  © 6845-7887 [employer]. [address] [place] [address], [place] [address]. All rights reserved. This information is not intended as a substitute for professional medical care. Always follow your healthcare professional's instructions.

## 2018-02-27 NOTE — PROGRESS NOTES
Subjective:      Patient ID: Melody Armstrong is a 83 y.o. female.    Chief Complaint: Nail Care (2/20/2018 last seen PCP)    Melody is a 83 y.o. female who presents to the clinic for evaluation and treatment of diabetic feet. Melody has a past medical history of Anemia; Asymptomatic cholelithiasis; Coronary artery disease; Diabetes mellitus type II; GERD (gastroesophageal reflux disease); Hyperlipidemia; and Hypertension. Patient relates no major problem with feet. Complains of thickened toenails that she is unable to cut herself.    PCP: Zac Andres MD    Date Last Seen by PCP: 2/20/18    Current shoe gear: Slip-on shoes    Hemoglobin A1C   Date Value Ref Range Status   02/16/2018 5.2 4.0 - 5.6 % Final     Comment:     According to ADA guidelines, hemoglobin A1c <7.0% represents  optimal control in non-pregnant diabetic patients. Different  metrics may apply to specific patient populations.   Standards of Medical Care in Diabetes-2016.  For the purpose of screening for the presence of diabetes:  <5.7%     Consistent with the absence of diabetes  5.7-6.4%  Consistent with increasing risk for diabetes   (prediabetes)  >or=6.5%  Consistent with diabetes  Currently, no consensus exists for use of hemoglobin A1c  for diagnosis of diabetes for children.  This Hemoglobin A1c assay has significant interference with fetal   hemoglobin   (HbF). The results are invalid for patients with abnormal amounts of   HbF,   including those with known Hereditary Persistence   of Fetal Hemoglobin. Heterozygous hemoglobin variants (HbAS, HbAC,   HbAD, HbAE, HbA2) do not significantly interfere with this assay;   however, presence of multiple variants in a sample may impact the %   interference.     10/13/2017 5.2 4.0 - 5.6 % Final     Comment:     According to ADA guidelines, hemoglobin A1c <7.0% represents  optimal control in non-pregnant diabetic patients. Different  metrics may apply to specific patient populations.    Standards of Medical Care in Diabetes-2016.  For the purpose of screening for the presence of diabetes:  <5.7%     Consistent with the absence of diabetes  5.7-6.4%  Consistent with increasing risk for diabetes   (prediabetes)  >or=6.5%  Consistent with diabetes  Currently, no consensus exists for use of hemoglobin A1c  for diagnosis of diabetes for children.  This Hemoglobin A1c assay has significant interference with fetal   hemoglobin   (HbF). The results are invalid for patients with abnormal amounts of   HbF,   including those with known Hereditary Persistence   of Fetal Hemoglobin. Heterozygous hemoglobin variants (HbAS, HbAC,   HbAD, HbAE, HbA2) do not significantly interfere with this assay;   however, presence of multiple variants in a sample may impact the %   interference.     06/02/2017 5.1 4.5 - 6.2 % Final     Comment:     According to ADA guidelines, hemoglobin A1C <7.0% represents  optimal control in non-pregnant diabetic patients.  Different  metrics may apply to specific populations.   Standards of Medical Care in Diabetes - 2016.  For the purpose of screening for the presence of diabetes:  <5.7%     Consistent with the absence of diabetes  5.7-6.4%  Consistent with increasing risk for diabetes   (prediabetes)  >or=6.5%  Consistent with diabetes  Currently no consensus exists for use of hemoglobin A1C  for diagnosis of diabetes for children.       Vitals:    02/27/18 0946   BP: 137/70   Pulse: 73     Past Medical History:   Diagnosis Date    Anemia     Asymptomatic cholelithiasis     Coronary artery disease     Diabetes mellitus type II     GERD (gastroesophageal reflux disease)     Hyperlipidemia     Hypertension        Past Surgical History:   Procedure Laterality Date    CORONARY ARTERY BYPASS GRAFT      JOINT REPLACEMENT      right    KNEE SURGERY      PARTIAL HYSTERECTOMY      SHOULDER ARTHROSCOPY W/ ROTATOR CUFF REPAIR      right shoulder       Family History   Problem Relation  Age of Onset    Diabetes Mother     Heart disease Mother     Diabetes Maternal Grandmother        Social History     Social History    Marital status:      Spouse name: N/A    Number of children: N/A    Years of education: N/A     Social History Main Topics    Smoking status: Never Smoker    Smokeless tobacco: Never Used    Alcohol use No    Drug use: No    Sexual activity: Not on file     Other Topics Concern    Not on file     Social History Narrative    No narrative on file       Current Outpatient Prescriptions   Medication Sig Dispense Refill    aspirin 325 MG tablet Take 1 tablet by mouth.      atorvastatin (LIPITOR) 20 MG tablet TAKE ONE TABLET BY MOUTH DAILY. 90 tablet 3    blood sugar diagnostic (GLUCOSE BLOOD) Strp Check blood glucose level once daily. 1 Bottle 8    cyanocobalamin (VITAMIN B-12) 1000 MCG tablet Take 1 tablet by mouth.      diclofenac sodium (PENNSAID) 20 mg/gram /actuation(2 %) sopm Apply 40 mg topically 2 (two) times daily. 1 Bottle 2    diclofenac sodium (VOLTAREN) 1 % Gel Apply 2 g topically 4 (four) times daily. 1 Tube 2    gabapentin (NEURONTIN) 100 MG capsule TAKE ONE CAPSULE BY MOUTH 3 TIMES A DAY. 270 capsule 3    hydrochlorothiazide (HYDRODIURIL) 25 MG tablet TAKE ONE TABLET BY MOUTH DAILY FOR FLUID. 90 tablet 3    KLOR-CON 10 10 mEq TbSR TAKE ONE TABLET BY MOUTH DAILY. 90 tablet 3    linaclotide (LINZESS) 145 mcg Cap capsule Take 1 capsule (145 mcg total) by mouth once daily. For chronic constipation. 30 capsule 6    losartan (COZAAR) 50 MG tablet TAKE (1) TABLET BY MOUTH DAILY HIGH BLOOD PRESSURE. 90 tablet 3    meclizine (ANTIVERT) 12.5 mg tablet Take 1 tablet (12.5 mg total) by mouth 3 (three) times daily as needed for Dizziness. 40 tablet 4    metoprolol succinate (TOPROL-XL) 25 MG 24 hr tablet TAKE (1) TABLET BY MOUTH DAILY HIGH BLOOD PRESSURE. 90 tablet 3    traMADol (ULTRAM) 50 mg tablet TAKE TWO TABLETS BY MOUTH EVERY 8 HOURS AS NEEDED  FOR PAIN 180 tablet 1    levocetirizine (XYZAL) 5 MG tablet Take 1 tablet (5 mg total) by mouth every evening. For sinus allergy. 30 tablet 5     No current facility-administered medications for this visit.        Review of patient's allergies indicates:  No Known Allergies        Review of Systems   Constitution: Negative for chills, fever, weakness and malaise/fatigue.   Cardiovascular: Negative for claudication.   Skin: Positive for nail changes. Negative for color change, dry skin, flushing, itching and poor wound healing.   Musculoskeletal: Positive for arthritis. Negative for back pain, falls, gout, joint pain and joint swelling.   Neurological: Negative for numbness and paresthesias.   Psychiatric/Behavioral: Negative for altered mental status.           Objective:      Physical Exam   Constitutional: She is oriented to person, place, and time. She appears well-nourished. No distress.   Cardiovascular:   Pulses:       Dorsalis pedis pulses are 2+ on the right side, and 2+ on the left side.        Posterior tibial pulses are 2+ on the right side, and 2+ on the left side.   CFT< 3 secs all toes bilateral foot, skin temp warm bilateral foot, diminished digital hair growth bilateral foot, no lower extremity edema bilateral. + varicosities bilateral.       Musculoskeletal:   Muscle strength 5/5 in all muscle groups bilateral.  No tenderness nor crepitation with ROM of foot/ankle joints bilateral.  No tenderness with palpation of bilateral foot and ankle.  Bilateral pes planus foot type.  Bilateral gastrocnemius equinus.  Bilateral hallux abducto valgus.  Bilateral semi-reducible contracture of toes 2-5 with adductovarus rotation of the 5th.     Neurological: She is alert and oriented to person, place, and time. She has normal strength. No sensory deficit.   Light touch intact bilateral.  Protective sensation intact bilateral per [place] monofilament.  Vibratory sensation intact bilateral.   Skin: Skin is  warm, dry and intact. No rash noted. She is not diaphoretic. No cyanosis or erythema. No pallor. Nails show no clubbing.   Skin turgor, temperature, and texture appear age appropriate bilateral.      Nails 1-5 bilateral are thickened 2-3 mm, discolored, and elongated 4-5 mm with subungual debris.  Superior growth and dystrophy of right second and fifth toenails bilateral.     No open wounds, interdigital maceration, or hyperkeratoses noted bilateral.             Assessment:       Encounter Diagnoses   Name Primary?    Type 2 diabetes mellitus with peripheral circulatory disorder Yes    PAD (peripheral artery disease)     Onychomycosis          Plan:       Melody was seen today for nail care.    Diagnoses and all orders for this visit:    Type 2 diabetes mellitus with peripheral circulatory disorder  -     Foot Care    PAD (peripheral artery disease)  -     Foot Care    Onychomycosis  -     Foot Care      I counseled the patient on her conditions, their implications and medical management.    Shoe inspection. Diabetic Foot Education. Patient reminded of the importance of good nutrition and blood sugar control to help prevent podiatric complications of diabetes. Patient instructed on proper foot hygeine. We discussed wearing proper shoe gear, daily foot inspections, never walking without protective shoe gear, never putting sharp instruments to feet, routine podiatric nail visits every 2-3 months.      Routine foot care per attached note. Patient relates relief following the procedure. She will continue to monitor the areas daily, inspect her feet, wear protective shoe gear when ambulatory, moisturizer to maintain skin integrity and follow in this office in approximately 2-3 months, sooner p.r.n.

## 2018-04-03 ENCOUNTER — OFFICE VISIT (OUTPATIENT)
Dept: PAIN MEDICINE | Facility: CLINIC | Age: 83
End: 2018-04-03
Payer: MEDICARE

## 2018-04-03 VITALS
WEIGHT: 157.5 LBS | DIASTOLIC BLOOD PRESSURE: 81 MMHG | BODY MASS INDEX: 26.24 KG/M2 | HEIGHT: 65 IN | HEART RATE: 81 BPM | SYSTOLIC BLOOD PRESSURE: 156 MMHG

## 2018-04-03 DIAGNOSIS — M17.12 PRIMARY OSTEOARTHRITIS OF LEFT KNEE: ICD-10-CM

## 2018-04-03 DIAGNOSIS — G89.29 CHRONIC KNEE PAIN, UNSPECIFIED LATERALITY: Primary | ICD-10-CM

## 2018-04-03 DIAGNOSIS — M25.569 CHRONIC KNEE PAIN, UNSPECIFIED LATERALITY: Primary | ICD-10-CM

## 2018-04-03 DIAGNOSIS — M47.819 SPONDYLOSIS WITHOUT MYELOPATHY: ICD-10-CM

## 2018-04-03 DIAGNOSIS — M47.9 OSTEOARTHRITIS OF SPINE, UNSPECIFIED SPINAL OSTEOARTHRITIS COMPLICATION STATUS, UNSPECIFIED SPINAL REGION: ICD-10-CM

## 2018-04-03 DIAGNOSIS — M46.1 SACROILIITIS: ICD-10-CM

## 2018-04-03 PROCEDURE — 99499 UNLISTED E&M SERVICE: CPT | Mod: S$GLB,,, | Performed by: ANESTHESIOLOGY

## 2018-04-03 PROCEDURE — 3077F SYST BP >= 140 MM HG: CPT | Mod: CPTII,S$GLB,, | Performed by: ANESTHESIOLOGY

## 2018-04-03 PROCEDURE — 3079F DIAST BP 80-89 MM HG: CPT | Mod: CPTII,S$GLB,, | Performed by: ANESTHESIOLOGY

## 2018-04-03 PROCEDURE — 99999 PR PBB SHADOW E&M-EST. PATIENT-LVL III: CPT | Mod: PBBFAC,,, | Performed by: ANESTHESIOLOGY

## 2018-04-03 PROCEDURE — 99214 OFFICE O/P EST MOD 30 MIN: CPT | Mod: S$GLB,,, | Performed by: ANESTHESIOLOGY

## 2018-04-03 NOTE — PROGRESS NOTES
Chronic patient Established Note (Follow up visit)      SUBJECTIVE:    Melody Armstrong presents to the clinic for a follow-up appointment for knee pain. Since the last visit, Melody Armstrong states the pain has been worsening. Current pain intensity is 7/10.  States had more than 50% relief of her left knee pain for a week after last injection, unfortunatly did not f/u since 2017  Her daughter recalls very well that for a month she was able to do much more activities after the block      Pain Disability Index Review:  Last 3 PDI Scores 4/3/2018 3/22/2017 12/8/2016   Pain Disability Index (PDI) 17 21 0       Pain Medications:    - Opioids: Ultram (Tramadol HCL)  - Adjuvant Medications: Neurontin (Gabapentin)  - Anti-Coagulants: Aspirin  - Others: See med list    Opioid Contract: no     report:  Reviewed and consistent with medication use as prescribed.    Pain Procedures:  3/28/17 left GENICULAR NERVE BLOCK  11/1/16 bilateral SACROILIAC JOINT INJECTION  right knee replacement x 5yrs ago    Physical Therapy/Home Exercise: no    Imaging: X-ray Knee Ortho Bilateral with Flexion  Narrative     Four views    Right hip arthroplasty identified in the satisfactory and unchanged position as compared to the previous study.    Severe DJD and joint space narrowing identified involving the left knee.  No fracture or dislocation.  No bone destruction identified   Impression      See above      Electronically signed by: Seun Perez MD  Date: 10/28/15     X-Ray Lumbar Spine Ap And Lateral    Narrative     Three views.  Grade 1 L4/L5 spondylolysthesis identified.  DJD.  Multiple narrowed disk spaces.  No acute fracture or dislocation.  No bone destruction identified   Impression      See above      Electronically signed by: Seun Perez MD  Date: 11/14/14       X-Ray Sacrum And Coccyx 10/14/16  Narrative   2 views: There is DJD.  DJD of the lumbar spine, SI joints, hips, and symphysis pubis.  No fracture dislocation bone  destruction or erosion seen.      Electronically signed by: MARY SORIA  Date: 10/14/16  Time: 12:11             Allergies: Review of patient's allergies indicates:  No Known Allergies    Current Medications:   Current Outpatient Prescriptions   Medication Sig Dispense Refill    aspirin 325 MG tablet Take 1 tablet by mouth.      atorvastatin (LIPITOR) 20 MG tablet TAKE ONE TABLET BY MOUTH DAILY. 90 tablet 3    blood sugar diagnostic (GLUCOSE BLOOD) Strp Check blood glucose level once daily. 1 Bottle 8    cyanocobalamin (VITAMIN B-12) 1000 MCG tablet Take 1 tablet by mouth.      diclofenac sodium (PENNSAID) 20 mg/gram /actuation(2 %) sopm Apply 40 mg topically 2 (two) times daily. 1 Bottle 2    diclofenac sodium (VOLTAREN) 1 % Gel Apply 2 g topically 4 (four) times daily. 1 Tube 2    gabapentin (NEURONTIN) 100 MG capsule TAKE ONE CAPSULE BY MOUTH 3 TIMES A DAY. 270 capsule 3    hydrochlorothiazide (HYDRODIURIL) 25 MG tablet TAKE ONE TABLET BY MOUTH DAILY FOR FLUID. 90 tablet 3    KLOR-CON 10 10 mEq TbSR TAKE ONE TABLET BY MOUTH DAILY. 90 tablet 3    levocetirizine (XYZAL) 5 MG tablet Take 1 tablet (5 mg total) by mouth every evening. For sinus allergy. 30 tablet 5    linaclotide (LINZESS) 145 mcg Cap capsule Take 1 capsule (145 mcg total) by mouth once daily. For chronic constipation. 30 capsule 6    losartan (COZAAR) 50 MG tablet TAKE (1) TABLET BY MOUTH DAILY HIGH BLOOD PRESSURE. 90 tablet 3    meclizine (ANTIVERT) 12.5 mg tablet Take 1 tablet (12.5 mg total) by mouth 3 (three) times daily as needed for Dizziness. 40 tablet 4    metoprolol succinate (TOPROL-XL) 25 MG 24 hr tablet TAKE (1) TABLET BY MOUTH DAILY HIGH BLOOD PRESSURE. 90 tablet 3    traMADol (ULTRAM) 50 mg tablet TAKE TWO TABLETS BY MOUTH EVERY 8 HOURS AS NEEDED FOR PAIN 180 tablet 1     No current facility-administered medications for this visit.        REVIEW OF SYSTEMS:    GENERAL:  No weight loss, malaise or fevers.  HEENT:   Negative for frequent or significant headaches.  NECK:  Negative for lumps, goiter, pain and significant neck swelling.  RESPIRATORY:  Negative for cough, wheezing or shortness of breath.  CARDIOVASCULAR:  Negative for chest pain, leg swelling or palpitations.  GI:  Negative for abdominal discomfort, blood in stools or black stools or change in bowel habits.  MUSCULOSKELETAL:  See HPI.  SKIN:  Negative for lesions, rash, and itching.  PSYCH:  +ve for sleep disturbance, mood disorder and recent psychosocial stressors.  HEMATOLOGY/LYMPHOLOGY:  Negative for prolonged bleeding, bruising easily or swollen nodes.  NEURO:   No history of headaches, syncope, paralysis, seizures or tremors.  All other reviewed and negative other than HPI.    Past Medical History:  Past Medical History:   Diagnosis Date    Anemia     Asymptomatic cholelithiasis     Coronary artery disease     Diabetes mellitus type II     GERD (gastroesophageal reflux disease)     Hyperlipidemia     Hypertension        Past Surgical History:  Past Surgical History:   Procedure Laterality Date    CORONARY ARTERY BYPASS GRAFT      JOINT REPLACEMENT      right    KNEE SURGERY      PARTIAL HYSTERECTOMY      SHOULDER ARTHROSCOPY W/ ROTATOR CUFF REPAIR      right shoulder       Family History:  Family History   Problem Relation Age of Onset    Diabetes Mother     Heart disease Mother     Diabetes Maternal Grandmother        Social History:  Social History     Social History    Marital status:      Spouse name: N/A    Number of children: N/A    Years of education: N/A     Social History Main Topics    Smoking status: Never Smoker    Smokeless tobacco: Never Used    Alcohol use No    Drug use: No    Sexual activity: Not on file     Other Topics Concern    Not on file     Social History Narrative    No narrative on file       OBJECTIVE:    There were no vitals taken for this visit.    PHYSICAL EXAMINATION:    General appearance: Well  appearing, in no acute distress, alert and oriented x3.  Psych:  Mood and affect appropriate.  Skin: Skin color, texture, turgor normal, no rashes or lesions, in both upper and lower body.  Head/face:  Atraumatic, normocephalic. No palpable lymph nodes  Neck: No pain to palpation over the cervical paraspinous muscles. Spurling Negative. No pain with neck flexion, extension, or lateral flexion. .  Cor: RRR  Pulm: CTA  GI: Abdomen soft and non-tender.  Back: Straight leg raising in the sitting and supine positions is negative to radicular pain. moderate pain to palpation over the spine or costovertebral angles. Normal range of motion without pain reproduction.Positive axial loading test bilateral. Positive tenderness over both SIJ, Positive FABERE,Ganselin and Yeoman's test on the both side.negative FADIR  Extremities: positive for left knee limited ROM with tenderness on palpation and crepitus on movement, negative ant and post drawer sign. No deformities, edema, or skin discoloration. Good capillary refill.  Musculoskeletal: Shoulder and hip  provocative maneuvers are negative. Bilateral upper and lower extremity strength is normal and symmetric.  No atrophy or tone abnormalities are noted.  Neuro: Bilateral upper and lower extremity coordination and muscle stretch reflexes are physiologic and symmetric.  Plantar response are downgoing. No loss of sensation is noted.  Gait: antalgic uses a walker    ASSESSMENT: 83 y.o. year old female with left knee and back pain, consistent with      1. Chronic knee pain, unspecified laterality     2. Primary osteoarthritis of left knee     3. Sacroiliitis     4. Spondylosis without myelopathy     5. Osteoarthritis of spine, unspecified spinal osteoarthritis complication status, unspecified spinal region           PLAN:     - I have stressed the importance of physical activity and a home exercise plan to help with pain and improve health.  - Patient can continue with medications  for now since they are providing benefits, using them appropriately, and without side effects.  - Schedule for a Left genicular nerves RFA to help with her pain and progress with a home exercise plan.  - RTC 4 weeks  - Counseled patient regarding the importance of activity modification, constant sleeping habits and physical therapy.    The above plan and management options were discussed at length with patient. Patient is in agreement with the above and verbalized understanding.    Jez Mercer MD  04/03/2018

## 2018-04-03 NOTE — LETTER
April 6, 2018      Zac Andres MD  [address] [address]           [place] [facility]  [address]  [place] [address]  Phone: [phone]          Patient: Melody Armstrong   MR Number: [id number]   YOB: 1934   Date of Visit: 4/3/2018       Dear Dr. Zac Andres:    Thank you for referring Melody Armstrong to me for evaluation. Attached you will find relevant portions of my assessment and plan of care.    If you have questions, please do not hesitate to call me. I look forward to following Melody Armstrong along with you.    Sincerely,    Dipesh Branham    Enclosure  CC:  No Recipients    If you would like to receive this communication electronically, please contact [email or web address] or [phone] to request more information on [employer] [facility] access.    For providers and/or their staff who would like to refer a patient to [facility], please contact us through our one-stop-shop provider referral line, Greg Garcia, at [phone].    If you feel you have received this communication in error or would no longer like to receive these types of communications, please e-mail [email or web address]

## 2018-04-09 NOTE — DISCHARGE INSTRUCTIONS
Home Care Instructions Pain Management:    1.  DIET:    You may resume your normal diet today.    2.  BATHING:    You may shower with luke warm water.    3.  DRESSING:    You may remove your bandage today.    4.  ACTIVITY LEVEL:      You may resume your normal activities 24 hours after your procedure.    5.  MEDICATIONS:    You may resume your normal medications today.    6.  SPECIAL INSTRUCTIONS:    No heat to the injection site for 24 hours including bath or shower, heating pad, moist heat or hot tubs.    Use an ice pack to the injection site for any pain or discomfort.  Apply ice packs for 20 minute intervals as needed.    If you have received any sedatives by mouth today, you can not drive for 12 hours.    If you have received sedation through an IV, you can not drive for 24 hours.    PLEASE CALL YOUR DOCTOR FOR THE FOLLOWIN.  Redness or swelling around the injection site.  2.  Fever of 101 degrees.  3.  Drainage (pus) from the injection site.  4.  For any continuous bleeding (some dried blood over the incision is normal.)    FOR EMERGENCIES:    If any unusual problems or difficulties occur during clinic hours, call [phone] or dial [phone].    Follow up with with your physician in 2-3 weeks.       Procedural Sedation  Procedural sedation is medicine to ease discomfort, pain, and anxiety during a procedure. The medicine is often given through an IV (intravenous) line in your arm or hand. In some cases, the medicine may be taken by mouth or inhaled. While you are under sedation, you will likely be awake. But you may not remember anything afterward.  Why procedural sedation is used  Sedation is used for many types of procedures. The goal is to reduce pain, anxiety, and stressful memories of a procedure. It can also help your healthcare provider treat you. For example, having a broken bone fixed may be easier if you feel relaxed.  Procedural sedation is used only for short, basic procedures. It is not used  for complex surgeries. Some procedures that use this type of sedation include:  · Dental surgery  · Breast biopsy, to take a sample of breast tissue  · Endoscopy, to look at gastrointestinal problems  · Bronchoscopy, to check for lung problems  · Bone or joint realignment, to fix a broken bone or dislocated joint  · Minor foot or skin surgery  · Electrical cardioversion, to restore a normal heart rhythm  · Lumbar puncture, to assess neurological disease  Risks of procedural sedation  Procedural sedation has some risks and possible side effects, such as:  · Headache  · Nausea and vomiting  · Unpleasant memory of the procedure  · Lowered rate of breathing  · Changes in heart rate and blood pressure (rare)  · Inhalation of stomach contents into your lungs (rare)  Side effects will likely go away shortly after the procedure. Your healthcare team will watch your heart rate and breathing during your sedation. This is to help prevent problems.  Your own risks may vary based on your age and your overall health. They also depend on the type of sedation you are given. Talk with your healthcare provider about the risks that apply most to you.  Getting ready for procedural sedation  Talk with your healthcare provider about how to get ready for your procedure. Tell him or her about all the medicines you take. This includes over-the-counter medicines such as ibuprofen. It also includes vitamins, herbs, and other supplements. You may need to stop taking some medicines before the procedure, such as blood thinners and aspirin. If you smoke, you should stop to lessen the chance of a lung issue. Talk with your healthcare provider if you need help to stop smoking.  Tell your healthcare provider if you:  · Have had any problems in the past with sedation or anesthesia  · Have had any recent changes in your health, such as an infection or fever  · Are pregnant or think you may be pregnant  Also be sure to:  · Ask a family member or friend  to take you home after the procedure. You cant drive on the day you receive sedation.  · Not eat or drink after midnight the night before your procedure, if advised.  · Not plan on making any important decisions, such as financial or legal, for the day after you receive the sedation.  · Follow all other instructions from your healthcare provider.  During your procedural sedation  You may have your procedure in a hospital or a medical clinic. Sedation is done by a trained healthcare provider. In general, you can expect the following:  · You will be given medicine through an IV line in your arm or hand. Or you may receive a shot. The medicine may also be given by mouth. Or you may inhale it through a mask.  · If you receive medicine through an IV, you may feel the effects very quickly. You will start to feel relaxed and drowsy.  · During the procedure, your heart rate, breathing, and blood pressure will be closely watched. Your breathing and blood pressure may decrease a little. But you will likely not need help with your breathing. You may receive a little extra oxygen through a mask or through some soft plastic prongs underneath your nose.  · You will probably be awake the entire time. If you do fall asleep, you should be easy to wake up, if needed. You should feel little or no pain.  · When your procedure is over, the sedative medicine will be stopped.  After your procedural sedation  You will begin to feel more awake and aware. But you will likely be drowsy for a while afterward. You will be closely watched as you become more alert. You may have a faint memory of the procedure. Or you may not remember it at all.  You should be able to return home within an hour or two after your procedure. Plan to have someone stay with you for a few hours. Side effects such as headache and nausea may go away quickly. Tell your healthcare provider if they continue.  Dont drive or make any important decisions for at least 24  hours. Be sure to follow all after-care instructions.     When to call your healthcare provider  Have someone call your healthcare provider right away if you have any of these:  · Drowsiness that gets worse  · Weakness or dizziness that gets worse  · Repeated vomiting  · You cant be awakened  · Severe or ongoing pain from the procedure, not relieved by the pain medicine   Date Last Reviewed: 2/1/2017  © 6563-9315 [employer]. [address] [place] [address], [place] [address]. All rights reserved. This information is not intended as a substitute for professional medical care. Always follow your healthcare professional's instructions.

## 2018-04-10 ENCOUNTER — SURGERY (OUTPATIENT)
Age: 83
End: 2018-04-10

## 2018-04-10 ENCOUNTER — HOSPITAL ENCOUNTER (OUTPATIENT)
Facility: HOSPITAL | Age: 83
Discharge: HOME | End: 2018-04-10
Attending: ANESTHESIOLOGY | Admitting: ANESTHESIOLOGY
Payer: MEDICARE

## 2018-04-10 VITALS
RESPIRATION RATE: 15 BRPM | TEMPERATURE: 99 F | WEIGHT: 159 LBS | HEIGHT: 65 IN | OXYGEN SATURATION: 100 % | BODY MASS INDEX: 26.49 KG/M2 | DIASTOLIC BLOOD PRESSURE: 82 MMHG | SYSTOLIC BLOOD PRESSURE: 178 MMHG | HEART RATE: 98 BPM

## 2018-04-10 DIAGNOSIS — G89.4 CHRONIC PAIN SYNDROME: ICD-10-CM

## 2018-04-10 DIAGNOSIS — G89.29 CHRONIC PAIN OF LEFT KNEE: Primary | ICD-10-CM

## 2018-04-10 DIAGNOSIS — M25.562 CHRONIC PAIN OF LEFT KNEE: Primary | ICD-10-CM

## 2018-04-10 DIAGNOSIS — M17.12 ARTHRITIS OF LEFT KNEE: ICD-10-CM

## 2018-04-10 DIAGNOSIS — G89.29 CHRONIC PAIN: ICD-10-CM

## 2018-04-10 LAB — POCT GLUCOSE: 95 MG/DL (ref 70–110)

## 2018-04-10 PROCEDURE — 99152 MOD SED SAME PHYS/QHP 5/>YRS: CPT | Mod: ,,, | Performed by: ANESTHESIOLOGY

## 2018-04-10 PROCEDURE — 25000003 PHARM REV CODE 250: Performed by: ANESTHESIOLOGY

## 2018-04-10 PROCEDURE — 64640 INJECTION TREATMENT OF NERVE: CPT | Performed by: ANESTHESIOLOGY

## 2018-04-10 PROCEDURE — 63600175 PHARM REV CODE 636 W HCPCS: Performed by: ANESTHESIOLOGY

## 2018-04-10 PROCEDURE — 64640 INJECTION TREATMENT OF NERVE: CPT | Mod: LT,,, | Performed by: ANESTHESIOLOGY

## 2018-04-10 RX ORDER — POTASSIUM CHLORIDE 750 MG/1
TABLET, FILM COATED, EXTENDED RELEASE ORAL
Qty: 90 TABLET | Refills: 3 | Status: SHIPPED | OUTPATIENT
Start: 2018-04-10 | End: 2018-09-04 | Stop reason: SDUPTHER

## 2018-04-10 RX ORDER — MIDAZOLAM HYDROCHLORIDE 1 MG/ML
INJECTION INTRAMUSCULAR; INTRAVENOUS
Status: DISCONTINUED | OUTPATIENT
Start: 2018-04-10 | End: 2018-04-10 | Stop reason: HOSPADM

## 2018-04-10 RX ORDER — BUPIVACAINE HYDROCHLORIDE 2.5 MG/ML
INJECTION, SOLUTION EPIDURAL; INFILTRATION; INTRACAUDAL
Status: DISCONTINUED | OUTPATIENT
Start: 2018-04-10 | End: 2018-04-10 | Stop reason: HOSPADM

## 2018-04-10 RX ORDER — FENTANYL CITRATE 50 UG/ML
INJECTION, SOLUTION INTRAMUSCULAR; INTRAVENOUS
Status: DISCONTINUED | OUTPATIENT
Start: 2018-04-10 | End: 2018-04-10 | Stop reason: HOSPADM

## 2018-04-10 RX ORDER — METOPROLOL SUCCINATE 25 MG/1
TABLET, EXTENDED RELEASE ORAL
Qty: 90 TABLET | Refills: 3 | Status: SHIPPED | OUTPATIENT
Start: 2018-04-10 | End: 2018-05-02 | Stop reason: SDUPTHER

## 2018-04-10 RX ORDER — TRIAMCINOLONE ACETONIDE 40 MG/ML
INJECTION, SUSPENSION INTRA-ARTICULAR; INTRAMUSCULAR
Status: DISCONTINUED | OUTPATIENT
Start: 2018-04-10 | End: 2018-04-10 | Stop reason: HOSPADM

## 2018-04-10 RX ORDER — GABAPENTIN 100 MG/1
CAPSULE ORAL
Qty: 270 CAPSULE | Refills: 3 | Status: SHIPPED | OUTPATIENT
Start: 2018-04-10 | End: 2018-09-04 | Stop reason: SDUPTHER

## 2018-04-10 RX ORDER — SODIUM CHLORIDE 9 MG/ML
500 INJECTION, SOLUTION INTRAVENOUS CONTINUOUS
Status: DISCONTINUED | OUTPATIENT
Start: 2018-04-10 | End: 2018-04-10 | Stop reason: HOSPADM

## 2018-04-10 RX ORDER — LIDOCAINE HYDROCHLORIDE 10 MG/ML
INJECTION, SOLUTION EPIDURAL; INFILTRATION; INTRACAUDAL; PERINEURAL
Status: DISCONTINUED | OUTPATIENT
Start: 2018-04-10 | End: 2018-04-10 | Stop reason: HOSPADM

## 2018-04-10 RX ADMIN — FENTANYL CITRATE 50 MCG: 50 INJECTION, SOLUTION INTRAMUSCULAR; INTRAVENOUS at 11:04

## 2018-04-10 RX ADMIN — MIDAZOLAM HYDROCHLORIDE 1 MG: 1 INJECTION INTRAMUSCULAR; INTRAVENOUS at 11:04

## 2018-04-10 RX ADMIN — LIDOCAINE HYDROCHLORIDE 5 ML: 10 INJECTION, SOLUTION EPIDURAL; INFILTRATION; INTRACAUDAL; PERINEURAL at 11:04

## 2018-04-10 RX ADMIN — SODIUM CHLORIDE 500 ML: 9 INJECTION, SOLUTION INTRAVENOUS at 09:04

## 2018-04-10 RX ADMIN — BUPIVACAINE HYDROCHLORIDE 5 ML: 2.5 INJECTION, SOLUTION EPIDURAL; INFILTRATION; INTRACAUDAL; PERINEURAL at 11:04

## 2018-04-10 RX ADMIN — TRIAMCINOLONE ACETONIDE 40 MG: 40 INJECTION, SUSPENSION INTRA-ARTICULAR; INTRAMUSCULAR at 11:04

## 2018-04-10 NOTE — OP NOTE
Patient Name: Melody Armstrong  MRN: [id number]    INFORMED CONSENT: The procedure, risks, benefits and options were discussed with patient. There are no contraindications to the procedure. The patient expressed understanding and agreed to proceed. The personnel performing the procedure was discussed. I verify that I personally obtained Melody's consent prior to the start of the procedure and the signed consent can be found on the patient's chart.    Procedure Date: 04/10/2018    Anesthesia: Topical    Pre Procedure diagnosis: Chronic pain of left knee [M25.562, G89.29]  1. Chronic pain of left knee    2. Arthritis of left knee    3. Chronic pain syndrome    4. Chronic pain      Post-Procedure diagnosis: SAME      Moderate Sedation: Yes - Fentanyl 50 mcg and Midazolam 1 mg      PROCEDURE: left GENICULAR NERVE RADIOFREQUENCY ABLATION    DESCRIPTION OF PROCEDURE: The patient was brought to the fluoroscopy suite. IV access was obtained prior to the procedure. The patient was positioned supine on the fluoroscopy table. Continuous hemodynamic monitoring was initiated including blood pressure, EKG, and pulse oximetry. The area of the around the left knee joint was prepped with chlorhexidine three times and draped into a sterile field. Skin anesthesia was achieved using Lidocaine 1% over the respective injection sites. A A 22 gauge 100 (10mm active tip) tip RF needle  was slowly inserted and advanced to the junction of the lateral femoral and the epicondyle while contacting periosteum to block the superior lateral genicular nerve using the AP and lateral fluoroscopic imaging. Same process was repeated at the junction of the medial femoral shaft and the epicondyle to block the superior medial genicular nerve. The same process was repeated again using a 3rd needle which was advanced at the junction of the medial tibial plateau and the epicondyle to block the inferior medial genicular nerve. Using AP fluoroscopy the position  of all 3 needles was confirmed. Afterwards lateral fluoroscopic images were obtained and the needles were adjusted to lay in the middle of the diaphysis.Negative aspiration for blood. Motor stimulation at 2Hz up to 1.5V did not cause any radicular symptoms at any level. Each level was anesthetized with 1.5 cc of lidocaine 1%. Radiofrequency lesioning was performed for 210 seconds at 60 degrees . A combination of 3 cc of Bupivacaine 0.25% and 40 mg of kenalog was injected at all needle location to block all targeted nerves.. There was no pain on injection. The needle was removed and bleeding was nil. A sterile dressing was applied. No specimens collected. PATIENT was taken to the Post-block Recovery Area for further observation.       Blood Loss: Nill  Specimen: None    Discharge Diagnosis: Same as Pre and Post Procedure  Condition on Discharge: Stable.  Diet on Discharge: Same as before.  Activity: as per instruction sheet.  Discharge to: Home with a responsible adult.  Follow up: as per Discharge instructions    Jez Mercer MD

## 2018-04-11 ENCOUNTER — TELEPHONE (OUTPATIENT)
Dept: PAIN MEDICINE | Facility: CLINIC | Age: 83
End: 2018-04-11

## 2018-05-01 ENCOUNTER — OFFICE VISIT (OUTPATIENT)
Dept: PAIN MEDICINE | Facility: CLINIC | Age: 83
End: 2018-05-01
Payer: MEDICARE

## 2018-05-01 VITALS
HEART RATE: 67 BPM | WEIGHT: 157.63 LBS | DIASTOLIC BLOOD PRESSURE: 79 MMHG | SYSTOLIC BLOOD PRESSURE: 149 MMHG | BODY MASS INDEX: 26.23 KG/M2

## 2018-05-01 DIAGNOSIS — G89.4 CHRONIC PAIN SYNDROME: ICD-10-CM

## 2018-05-01 DIAGNOSIS — M17.12 ARTHRITIS OF LEFT KNEE: ICD-10-CM

## 2018-05-01 DIAGNOSIS — M25.562 CHRONIC PAIN OF LEFT KNEE: Primary | ICD-10-CM

## 2018-05-01 DIAGNOSIS — G89.29 CHRONIC PAIN OF LEFT KNEE: Primary | ICD-10-CM

## 2018-05-01 PROCEDURE — 99213 OFFICE O/P EST LOW 20 MIN: CPT | Mod: S$GLB,,, | Performed by: NURSE PRACTITIONER

## 2018-05-01 PROCEDURE — 3078F DIAST BP <80 MM HG: CPT | Mod: CPTII,S$GLB,, | Performed by: NURSE PRACTITIONER

## 2018-05-01 PROCEDURE — 99999 PR PBB SHADOW E&M-EST. PATIENT-LVL III: CPT | Mod: PBBFAC,,, | Performed by: NURSE PRACTITIONER

## 2018-05-01 PROCEDURE — 3077F SYST BP >= 140 MM HG: CPT | Mod: CPTII,S$GLB,, | Performed by: NURSE PRACTITIONER

## 2018-05-01 NOTE — PROGRESS NOTES
Chronic patient Established Note (Follow up visit)      SUBJECTIVE:    Melody Armstrong presents to the clinic for a 3 wk follow-up appointment for left knee pain. She is S/P left GENICULAR NERVE RADIOFREQUENCY ABLATION on 4/10/18 with 100% continued relief. Since the last visit, Melody Armstrong states the pain has been improving. Current pain intensity is 0/10.    Pain Disability Index Review:  Last 3 PDI Scores 5/1/2018 4/3/2018 3/22/2017   Pain Disability Index (PDI) 5 17 21       Pain Medications:     - Opioids: Ultram (Tramadol HCL)  - Adjuvant Medications: Neurontin (Gabapentin)  - Anti-Coagulants: Aspirin  - Others: See med list     Opioid Contract: no      report:  Reviewed and consistent with medication use as prescribed.     Pain Procedures:  3/28/17 left GENICULAR NERVE BLOCK  11/1/16 bilateral SACROILIAC JOINT INJECTION  right knee replacement x 5yrs ago     Physical Therapy/Home Exercise: no     Imaging: X-ray Knee Ortho Bilateral with Flexion  Narrative      Four views    Right hip arthroplasty identified in the satisfactory and unchanged position as compared to the previous study.    Severe DJD and joint space narrowing identified involving the left knee.  No fracture or dislocation.  No bone destruction identified   Impression       See above      Electronically signed by: Seun Perez MD  Date: 10/28/15      X-Ray Lumbar Spine Ap And Lateral     Narrative      Three views.  Grade 1 L4/L5 spondylolysthesis identified.  DJD.  Multiple narrowed disk spaces.  No acute fracture or dislocation.  No bone destruction identified   Impression       See above      Electronically signed by: Seun Perez MD  Date: 11/14/14         X-Ray Sacrum And Coccyx 10/14/16  Narrative   2 views: There is DJD.  DJD of the lumbar spine, SI joints, hips, and symphysis pubis.  No fracture dislocation bone destruction or erosion seen.      Electronically signed by: MARY SORIA  Date: 10/14/16  Time: 12:11         Allergies: Review of patient's allergies indicates:  No Known Allergies    Current Medications:   Current Outpatient Prescriptions   Medication Sig Dispense Refill    aspirin 325 MG tablet Take 1 tablet by mouth.      atorvastatin (LIPITOR) 20 MG tablet TAKE ONE TABLET BY MOUTH DAILY. 90 tablet 3    blood sugar diagnostic (GLUCOSE BLOOD) Strp Check blood glucose level once daily. 1 Bottle 8    cyanocobalamin (VITAMIN B-12) 1000 MCG tablet Take 1 tablet by mouth.      diclofenac sodium (VOLTAREN) 1 % Gel Apply 2 g topically 4 (four) times daily. 1 Tube 2    gabapentin (NEURONTIN) 100 MG capsule TAKE ONE CAPSULE BY MOUTH 3 TIMES A DAY. 270 capsule 3    hydrochlorothiazide (HYDRODIURIL) 25 MG tablet TAKE ONE TABLET BY MOUTH DAILY FOR FLUID. 90 tablet 3    KLOR-CON 10 10 mEq TbSR TAKE ONE TABLET BY MOUTH DAILY. 90 tablet 3    linaclotide (LINZESS) 145 mcg Cap capsule Take 1 capsule (145 mcg total) by mouth once daily. For chronic constipation. 30 capsule 6    losartan (COZAAR) 50 MG tablet TAKE (1) TABLET BY MOUTH DAILY HIGH BLOOD PRESSURE. 90 tablet 3    meclizine (ANTIVERT) 12.5 mg tablet Take 1 tablet (12.5 mg total) by mouth 3 (three) times daily as needed for Dizziness. 40 tablet 4    metoprolol succinate (TOPROL-XL) 25 MG 24 hr tablet TAKE (1) TABLET BY MOUTH DAILY HIGH BLOOD PRESSURE. 90 tablet 3    metoprolol succinate (TOPROL-XL) 25 MG 24 hr tablet TAKE (1) TABLET BY MOUTH DAILY HIGH BLOOD PRESSURE. 90 tablet 3    traMADol (ULTRAM) 50 mg tablet TAKE TWO TABLETS BY MOUTH EVERY 8 HOURS AS NEEDED FOR PAIN 180 tablet 1    levocetirizine (XYZAL) 5 MG tablet Take 1 tablet (5 mg total) by mouth every evening. For sinus allergy. 30 tablet 5     No current facility-administered medications for this visit.        REVIEW OF SYSTEMS:    GENERAL:  No weight loss, malaise or fevers.  HEENT:  Negative for frequent or significant headaches.  NECK:  Negative for lumps, goiter, pain and significant neck  swelling.  RESPIRATORY:  Negative for cough, wheezing or shortness of breath.  CARDIOVASCULAR:  Negative for chest pain, leg swelling or palpitations.  GI:  Negative for abdominal discomfort, blood in stools or black stools or change in bowel habits.  MUSCULOSKELETAL:  See HPI.  SKIN:  Negative for lesions, rash, and itching.  PSYCH:  +ve for sleep disturbance, mood disorder and recent psychosocial stressors.  HEMATOLOGY/LYMPHOLOGY:  Negative for prolonged bleeding, bruising easily or swollen nodes.  NEURO:   No history of headaches, syncope, paralysis, seizures or tremors.  All other reviewed and negative other than HPI.    Past Medical History:  Past Medical History:   Diagnosis Date    Anemia     Asymptomatic cholelithiasis     Coronary artery disease     Diabetes mellitus type II     GERD (gastroesophageal reflux disease)     Hyperlipidemia     Hypertension        Past Surgical History:  Past Surgical History:   Procedure Laterality Date    CORONARY ARTERY BYPASS GRAFT      JOINT REPLACEMENT      right    KNEE SURGERY      PARTIAL HYSTERECTOMY      SHOULDER ARTHROSCOPY W/ ROTATOR CUFF REPAIR      right shoulder       Family History:  Family History   Problem Relation Age of Onset    Diabetes Mother     Heart disease Mother     Diabetes Maternal Grandmother        Social History:  Social History     Social History    Marital status:      Spouse name: N/A    Number of children: N/A    Years of education: N/A     Social History Main Topics    Smoking status: Never Smoker    Smokeless tobacco: Never Used    Alcohol use No    Drug use: No    Sexual activity: Not Asked     Other Topics Concern    None     Social History Narrative    None       OBJECTIVE:    Wt 71.5 kg (157 lb 10.1 oz)   BMI 26.23 kg/m²     PHYSICAL EXAMINATION:    General appearance: Well appearing, in no acute distress, alert and oriented x3.  Psych:  Mood and affect appropriate.  Skin: Skin color, texture, turgor  normal, no rashes or lesions, in both upper and lower body.  Head/face:  Atraumatic, normocephalic. No palpable lymph nodes  Neck: No pain to palpation over the cervical paraspinous muscles. Spurling Negative. No pain with neck flexion, extension, or lateral flexion. .  Cor: RRR  Pulm: CTA  GI: Abdomen soft and non-tender.  Back: Straight leg raising in the sitting and supine positions is negative to radicular pain. No pain to palpation over the spine or costovertebral angles. Normal range of motion without pain reproduction.  Extremities: Peripheral joint ROM is full and pain free without obvious instability or laxity in all four extremities. No deformities, edema, or skin discoloration. Good capillary refill.  Musculoskeletal: Shoulder, hip, sacroiliac and knee provocative maneuvers are negative. Bilateral upper and lower extremity strength is normal and symmetric.  No atrophy or tone abnormalities are noted.  Neuro: Bilateral upper and lower extremity coordination and muscle stretch reflexes are physiologic and symmetric.  Plantar response are downgoing. No loss of sensation is noted.  Gait: Normal.    ASSESSMENT: 83 y.o. year old female with  left knee and back pain, consistent with      Diagnosis;    1. Chronic pain of left knee     2. Arthritis of left knee     3. Chronic pain syndrome           PLAN:     - I have stressed the importance of physical activity and a home exercise plan to help with pain and improve health.  - Patient can continue with medications for now since they are providing benefits, using them appropriately, and without side effects.  - Counseled patient regarding the importance of activity modification, constant sleeping habits and physical therapy.  -RTC as needed for returning or new pain  -The above plan and management options were discussed at length with patient. Patient is in agreement with the above and verbalized understanding. Dr. Mercer   was consulted on this patient  and  agrees with this plan.       KHUSHBOO Conner  Interventional Pain Management      05/01/2018

## 2018-05-02 ENCOUNTER — LAB VISIT (OUTPATIENT)
Dept: LAB | Facility: HOSPITAL | Age: 83
End: 2018-05-02
Payer: MEDICARE

## 2018-05-02 ENCOUNTER — OFFICE VISIT (OUTPATIENT)
Dept: NEPHROLOGY | Facility: CLINIC | Age: 83
End: 2018-05-02
Payer: MEDICARE

## 2018-05-02 VITALS
WEIGHT: 159.19 LBS | HEIGHT: 65 IN | SYSTOLIC BLOOD PRESSURE: 124 MMHG | BODY MASS INDEX: 26.52 KG/M2 | DIASTOLIC BLOOD PRESSURE: 64 MMHG

## 2018-05-02 DIAGNOSIS — N18.30 CHRONIC KIDNEY DISEASE, STAGE III (MODERATE): Primary | ICD-10-CM

## 2018-05-02 DIAGNOSIS — N18.30 CHRONIC KIDNEY DISEASE, STAGE III (MODERATE): ICD-10-CM

## 2018-05-02 DIAGNOSIS — D50.9 IRON DEFICIENCY ANEMIA, UNSPECIFIED IRON DEFICIENCY ANEMIA TYPE: ICD-10-CM

## 2018-05-02 DIAGNOSIS — N18.30 CKD (CHRONIC KIDNEY DISEASE), STAGE III: Chronic | ICD-10-CM

## 2018-05-02 LAB
BILIRUB UR QL STRIP: NEGATIVE
CLARITY UR REFRACT.AUTO: CLEAR
COLOR UR AUTO: YELLOW
CREAT UR-MCNC: 42 MG/DL
GLUCOSE UR QL STRIP: NEGATIVE
HGB UR QL STRIP: NEGATIVE
KETONES UR QL STRIP: NEGATIVE
LEUKOCYTE ESTERASE UR QL STRIP: ABNORMAL
MICROSCOPIC COMMENT: NORMAL
NITRITE UR QL STRIP: NEGATIVE
PH UR STRIP: 5 [PH] (ref 5–8)
PROT UR QL STRIP: NEGATIVE
PROT UR-MCNC: <7 MG/DL
PROT/CREAT RATIO, UR: NORMAL
RBC #/AREA URNS AUTO: 0 /HPF (ref 0–4)
SP GR UR STRIP: 1 (ref 1–1.03)
SQUAMOUS #/AREA URNS AUTO: 0 /HPF
URN SPEC COLLECT METH UR: ABNORMAL
UROBILINOGEN UR STRIP-ACNC: NEGATIVE EU/DL
WBC #/AREA URNS AUTO: 2 /HPF (ref 0–5)

## 2018-05-02 PROCEDURE — 81001 URINALYSIS AUTO W/SCOPE: CPT

## 2018-05-02 PROCEDURE — 3074F SYST BP LT 130 MM HG: CPT | Mod: CPTII,GC,S$GLB, | Performed by: INTERNAL MEDICINE

## 2018-05-02 PROCEDURE — 99204 OFFICE O/P NEW MOD 45 MIN: CPT | Mod: GC,S$GLB,, | Performed by: INTERNAL MEDICINE

## 2018-05-02 PROCEDURE — 3078F DIAST BP <80 MM HG: CPT | Mod: CPTII,GC,S$GLB, | Performed by: INTERNAL MEDICINE

## 2018-05-02 PROCEDURE — 99999 PR PBB SHADOW E&M-EST. PATIENT-LVL III: CPT | Mod: PBBFAC,GC,, | Performed by: INTERNAL MEDICINE

## 2018-05-02 PROCEDURE — 82570 ASSAY OF URINE CREATININE: CPT

## 2018-05-02 NOTE — ASSESSMENT & PLAN NOTE
1. CKD: uncertain etiology, possibly HTN in AAF ?APOL1, although denies any Fhx     Lab Results   Component Value Date    CREATININE 1.9 (H) 02/16/2018     Protein Creatinine Ratios: negative per UA in May of last year, will repeat    No results found for: UTPCR  ·   ·   Acid-Base: following, no need for bicarb at this time  Lab Results   Component Value Date     02/16/2018    K 5.0 02/16/2018    CO2 24 02/16/2018     2. HTN: Blood pressures controlled, regiment reviewed    3. Renal osteodystrophy: will check PTH, phos, vitamin D  Lab Results   Component Value Date    CALCIUM 9.8 02/16/2018       4. Anemia: ?etiology, denies bleed, check iron studies, in regards to colon CA screening, deferring to PCP  Lab Results   Component Value Date    HGB 9.4 (L) 02/16/2018        5. DM:  Last HbA1C   Lab Results   Component Value Date    HGBA1C 5.2 02/16/2018       6. Lipid management:   Lab Results   Component Value Date    LDLCALC 62.4 (L) 10/13/2017       7. ESRD planing: not anticipating a need at this time    Follow up in 2-3 months    Patient seen with Dr Lyons

## 2018-05-02 NOTE — PROGRESS NOTES
Subjective:       Patient ID: Melody Armstrong is a 83 y.o. Black or  female who presents for new evaluation of Chronic Kidney Disease    Patient is her to be evaluated for an elevated sCr, most recently 1.9, she denies any urinary symptoms or changes, reviewing old labs, it appears her levels have been elevated since 2012 at least.      Review of Systems   Constitutional: Negative for chills, fatigue and fever.   Respiratory: Negative for chest tightness and shortness of breath.    Cardiovascular: Negative for chest pain and leg swelling.   Gastrointestinal: Negative for abdominal distention, abdominal pain, diarrhea and nausea.   Genitourinary: Negative for decreased urine volume, difficulty urinating, flank pain, frequency, hematuria and urgency.   Skin: Negative for rash.   Neurological: Negative for tremors, speech difficulty and weakness.       Objective:      Physical Exam   HENT:   Head: Atraumatic.   Neck: No JVD present.   Cardiovascular: Normal rate and regular rhythm.    Pulmonary/Chest: Effort normal and breath sounds normal.   Abdominal: Soft. She exhibits no distension.   Musculoskeletal: She exhibits no edema or tenderness.   Neurological: She is alert.   Psychiatric: She has a normal mood and affect.       Assessment & Plan:       CKD (chronic kidney disease), stage III  1. CKD: uncertain etiology, possibly HTN in AAF ?APOL1, although denies any Fhx     Lab Results   Component Value Date    CREATININE 1.9 (H) 02/16/2018     Protein Creatinine Ratios: negative per UA in May of last year, will repeat    No results found for: UTPCR  ·   ·   Acid-Base: following, no need for bicarb at this time  Lab Results   Component Value Date     02/16/2018    K 5.0 02/16/2018    CO2 24 02/16/2018     2. HTN: Blood pressures controlled, regiment reviewed    3. Renal osteodystrophy: will check PTH, phos, vitamin D  Lab Results   Component Value Date    CALCIUM 9.8 02/16/2018       4. Anemia:  ?etiology, denies bleed, check iron studies, in regards to colon CA screening, deferring to PCP  Lab Results   Component Value Date    HGB 9.4 (L) 02/16/2018        5. DM:  Last HbA1C   Lab Results   Component Value Date    HGBA1C 5.2 02/16/2018       6. Lipid management:   Lab Results   Component Value Date    LDLCALC 62.4 (L) 10/13/2017       7. ESRD planing: not anticipating a need at this time    Follow up in 2-3 months    Patient seen with Dr Lyons

## 2018-05-12 NOTE — PROGRESS NOTES
ATTENDING PHYSICIAN ATTESTATION  I have personally interviewed and examined the patient. I thoroughly reviewed the demographic, clinical, laboratorial and imaging information available in medical records. I agree with the assessment and recommendations provided by the subspecialty resident. Dr. Amador was under my supervision.

## 2018-05-29 ENCOUNTER — TELEPHONE (OUTPATIENT)
Dept: PODIATRY | Facility: CLINIC | Age: 83
End: 2018-05-29

## 2018-05-29 NOTE — TELEPHONE ENCOUNTER
----- Message from Yesenia Flores sent at 5/29/2018  8:21 AM CDT -----  Contact: Isabella (daughter)/ [phone]  Patients daughter has a appointment today at 9:45 am and she would like to know if patient can be seen at the same time as well.    Please call and advise.

## 2018-05-29 NOTE — TELEPHONE ENCOUNTER
----- Message from Tyler Greene sent at 5/29/2018  8:33 AM CDT -----  PLEASE DISREGARD ANY PREVIOUS MESSAGES REGARDING GETTING ANY APPOINTMENT TODAY.

## 2018-07-30 ENCOUNTER — TELEPHONE (OUTPATIENT)
Dept: NEPHROLOGY | Facility: CLINIC | Age: 83
End: 2018-07-30

## 2018-07-30 NOTE — TELEPHONE ENCOUNTER
----- Message from Roxi Jensen sent at 7/30/2018 12:57 PM CDT -----  Contact: Pt daughter Ailin Parisi is requesting a sooner appt says she start a new job and need to have the appt between August 3 - August 9, 2018    Ailin can be reached at [phone] thanks

## 2018-08-05 ENCOUNTER — OFFICE VISIT (OUTPATIENT)
Dept: URGENT CARE | Facility: CLINIC | Age: 83
End: 2018-08-05
Payer: MEDICARE

## 2018-08-05 VITALS
HEART RATE: 89 BPM | TEMPERATURE: 98 F | DIASTOLIC BLOOD PRESSURE: 75 MMHG | BODY MASS INDEX: 26.49 KG/M2 | SYSTOLIC BLOOD PRESSURE: 150 MMHG | OXYGEN SATURATION: 98 % | HEIGHT: 65 IN | RESPIRATION RATE: 14 BRPM | WEIGHT: 159 LBS

## 2018-08-05 DIAGNOSIS — M54.41 ACUTE RIGHT-SIDED LOW BACK PAIN WITH RIGHT-SIDED SCIATICA: Primary | ICD-10-CM

## 2018-08-05 DIAGNOSIS — M54.50 LOW BACK PAIN, NON-SPECIFIC: ICD-10-CM

## 2018-08-05 DIAGNOSIS — M51.36 DDD (DEGENERATIVE DISC DISEASE), LUMBAR: ICD-10-CM

## 2018-08-05 DIAGNOSIS — M43.16 SPONDYLOLISTHESIS AT L4-L5 LEVEL: ICD-10-CM

## 2018-08-05 PROCEDURE — 3078F DIAST BP <80 MM HG: CPT | Mod: CPTII,S$GLB,, | Performed by: PHYSICIAN ASSISTANT

## 2018-08-05 PROCEDURE — 3077F SYST BP >= 140 MM HG: CPT | Mod: CPTII,S$GLB,, | Performed by: PHYSICIAN ASSISTANT

## 2018-08-05 PROCEDURE — 99214 OFFICE O/P EST MOD 30 MIN: CPT | Mod: S$GLB,,, | Performed by: PHYSICIAN ASSISTANT

## 2018-08-05 RX ORDER — METHYLPREDNISOLONE 4 MG/1
TABLET ORAL
Qty: 1 PACKAGE | Refills: 0 | Status: SHIPPED | OUTPATIENT
Start: 2018-08-05 | End: 2018-09-04 | Stop reason: ALTCHOICE

## 2018-08-05 NOTE — PATIENT INSTRUCTIONS
Back Care Tips    Caring for your back  These are things you can do to prevent a recurrence of acute back pain and to reduce symptoms from chronic back pain:  · Maintain a healthy weight. If you are overweight, losing weight will help most types of back pain.  · Exercise is an important part of recovery from most types of back pain. The muscles behind and in front of the spine support the back. This means strengthening both the back muscles and the abdominal muscles will provide better support for your spine.   · Swimming and brisk walking are good overall exercises to improve your fitness level.  · Practice safe lifting methods (below).  · Practice good posture when sitting, standing and walking. Avoid prolonged sitting. This puts more stress on the lower back than standing or walking.  · Wear quality shoes with sufficient arch support. Foot and ankle alignment can affect back symptoms. Women should avoid wearing high heels.  · Therapeutic massage can help relax the back muscles without stretching them.  · During the first 24 to 72 hours after an acute injury or flare-up of chronic back pain, apply an ice pack to the painful area for 20 minutes and then remove it for 20 minutes, over a period of 60 to 90 minutes, or several times a day. As a safety precaution, do not use a heating pad at bedtime. Sleeping on a heating pad can lead to skin burns or tissue damage.  · You can alternate ice and heat therapies.  Medications  Talk to your healthcare provider before using medicines, especially if you have other medical problems or are taking other medicines.  · You may use acetaminophen or ibuprofen to control pain, unless your healthcare provider prescribed other pain medicine. If you have chronic conditions like diabetes, liver or kidney disease, stomach ulcers, or gastrointestinal bleeding, or are taking blood thinners, talk with your healthcare provider before taking any medicines.  · Be careful if you are given  prescription pain medicines, narcotics, or medicine for muscle spasm. They can cause drowsiness, affect your coordination, reflexes, and judgment. Do not drive or operate heavy machinery while taking these types of medicines. Take prescription pain medicine only as prescribed by your healthcare provider.  Lumbar stretch  Here is a simple stretching exercise that will help relax muscle spasm and keep your back more limber. If exercise makes your back pain worse, dont do it.  · Lie on your back with your knees bent and both feet on the ground.  · Slowly raise your left knee to your chest as you flatten your lower back against the floor. Hold for 5 seconds.  · Relax and repeat the exercise with your right knee.  · Do 10 of these exercises for each leg.  Safe lifting method  · Dont bend over at the waist to lift an object off the floor.  Instead, bend your knees and hips in a squat.   · Keep your back and head upright  · Hold the object close to your body, directly in front of you.  · Straighten your legs to lift the object.   · Lower the object to the floor in the reverse fashion.  · If you must slide something across the floor, push it.  Posture tips  Sitting  Sit in chairs with straight backs or low-back support. Keep your knees lower than your hips, with your feet flat on the floor.  When driving, sit up straight. Adjust the seat forward so you are not leaning toward the steering wheel.  A small pillow or rolled towel behind your lower back may help if you are driving long distances.   Standing  When standing for long periods, shift most of your weight to one leg at a time. Alternate legs every few minutes.   Sleeping  The best way to sleep is on your side with your knees bent. Put a low pillow under your head to support your neck in a neutral spine position. Avoid thick pillows that bend your neck to one side. Put a pillow between your legs to further relax your lower back. If you sleep on your back, put pillows  under your knees to support your legs in a slightly flexed position. Use a firm mattress. If your mattress sags, replace it, or use a 1/2-inch plywood board under the mattress to add support.  Follow-up care  Follow up with your healthcare provider, or as advised.  If X-rays, a CT scan or an MRI scan were taken, they will be reviewed by a radiologist. You will be notified of any new findings that may affect your care.  Call 911  Seek emergency medical care if any of the following occur:  · Trouble breathing  · Confusion  · Very drowsy  · Fainting or loss of consciousness  · Rapid or very slow heart rate  · Loss of  bowel or bladder control  When to seek medical care  Call your healthcare provider if any of the following occur:  · Pain becomes worse or spreads to your arms or legs  · Weakness or numbness in one or both arms or legs  · Numbness in the groin area  Date Last Reviewed: 6/1/2016  © 9798-1921 [employer]. [address] [place] [address]. All rights reserved. This information is not intended as a substitute for professional medical care. Always follow your healthcare professional's instructions.        Understanding Lumbar Radiculopathy    Lumbar radiculopathy is irritation or inflammation of a nerve root in the low back. It causes symptoms that spread out from the back down one or both legs. To understand this condition, it helps to understand the parts of the spine:  · Vertebrae. These are bones that stack to form the spine. The lumbar spine contains the 5 bottom vertebrae.  · Disks. These are soft pads of tissue between the vertebrae. They act as shock absorbers for the spine.  · Spinal canal. This is a tunnel formed within the stacked vertebrae. In the lumbar spine, nerves run through this canal.  · Nerves. These branch off and leave the spinal canal, traveling out to parts of the body. As they leave the spinal canal, nerves pass through openings between the vertebrae. The nerve  root is the part of the nerve that is closest to the spinal canal.  · Sciatic nerve. This is a large nerve formed from several nerve roots in the low back. This nerve extends down the back of the leg to the foot.  With lumbar radiculopathy, nerve roots in the low back become irritated. This leads to pain and symptoms. The sciatic nerve is commonly involved, so the condition is often called sciatica.  What causes lumbar radiculopathy?  Aging, injury, poor posture, extra body weight, and other issues can lead to problems in the low back. These problems may then irritate nerve roots. They include:  · Damage to a disk in the lumbar spine. The damaged disk may then press on nearby nerve roots.  · Degeneration from wear and tear, and aging. This can lead to narrowing (stenosis) of the openings between the vertebrae. The narrowed openings press on nerve roots as they leave the spinal canal.  · Unstable spine. This is when a vertebra slips forward. It can then press on a nerve root.  Other, less common things can put pressure on nerves in the low back. These include diabetes, infection, or a tumor.  Symptoms of lumbar radiculopathy  These include:  · Pain in the low back  · Pain, numbness, tingling, or weakness that travels into the buttocks, hip, groin, or leg  · Muscle spasms  Treatment for lumbar radiculopathy  In most cases, your healthcare provider will first try treatments that help relieve symptoms. These may include:  · Prescription and over-the-counter pain medicines. These help relieve pain, swelling, and irritation.  · Limits on positions and activities that increase pain. But lying in bed or avoiding all movement is only recommended for a short period of time.  · Physical therapy, including exercises and stretches. This helps decrease pain and increase movement and function.  · Steroid shots into the lower back. This may help relieve symptoms for a time.  · Weight-loss program. If you are overweight, losing  extra pounds may help relieve symptoms.  In some cases, you may need surgery to fix the underlying problem. This depends on the cause, the symptoms, and how long the pain has lasted.  Possible complications  Over time, an irritated and inflamed nerve may become damaged. This may lead to long-lasting (permanent) numbness or weakness in your legs and feet. If symptoms change suddenly or get worse, be sure to let your healthcare provider know.  When to call your healthcare provider  Call your healthcare provider right away if you have any of these:  · New pain or pain that gets worse  · New or increasing weakness, tingling, or numbness in your leg or foot  · Problems controlling your bladder or bowel   Date Last Reviewed: 3/10/2016  © 1337-3228 [employer]. [address] [place] [address], [place] [address]. All rights reserved. This information is not intended as a substitute for professional medical care. Always follow your healthcare professional's instructions.

## 2018-08-05 NOTE — PROGRESS NOTES
"Subjective:       Patient ID: Melody Armstrong is a 84 y.o. female.    Vitals:  height is 5' 5" (1.651 m) and weight is 72.1 kg (159 lb). Her temperature is 98.3 °F (36.8 °C). Her blood pressure is 150/75 (abnormal) and her pulse is 89. Her respiration is 14 and oxygen saturation is 98%.     Chief Complaint: Back Pain (pt said she has pain in her lower back and leg that got bad yesterday)    PT PRESENTS WITH LBP RADIATING TO RLE X 2 WEEKS. SHE REPORTS PAIN GOT WORSE YESTERDAY. SHE DENIES TRAUMA OR LIFTING ACTIVITY. SHE DENIES BOWEL, BLADDER INCONTINENCE OR SADDLE PARESTHESIAS. MB      Back Pain   This is a new problem. The current episode started yesterday. The problem occurs constantly. The problem has been gradually worsening since onset. The pain is present in the gluteal. The quality of the pain is described as stabbing and shooting. The pain radiates to the right thigh. The pain is at a severity of 10/10. The pain is severe. The pain is worse during the day. The symptoms are aggravated by position and standing. Stiffness is present in the morning. Pertinent negatives include no abdominal pain, bladder incontinence, bowel incontinence, chest pain, dysuria, fever, numbness or paresthesias. She has tried NSAIDs for the symptoms. The treatment provided no relief.     Review of Systems   Constitution: Negative for chills, fever and malaise/fatigue.   HENT: Negative for hearing loss and nosebleeds.    Eyes: Negative for blurred vision and redness.   Cardiovascular: Negative for chest pain and syncope.   Respiratory: Negative for cough and shortness of breath.    Skin: Negative for itching and rash.   Musculoskeletal: Positive for back pain. Negative for joint pain, muscle cramps, muscle weakness and stiffness.   Gastrointestinal: Negative for abdominal pain and bowel incontinence.   Genitourinary: Negative for bladder incontinence, dysuria, hematuria and urgency.   Neurological: Negative for disturbances in " coordination, numbness and paresthesias.   Psychiatric/Behavioral: The patient is not nervous/anxious.        Objective:      Physical Exam   Constitutional: She appears well-developed and well-nourished. She is active. No distress.   HENT:   Head: Normocephalic and atraumatic.   Right Ear: Hearing and external ear normal.   Left Ear: Hearing and external ear normal.   Nose: Nose normal. No nasal deformity. No epistaxis.   Mouth/Throat: Oropharynx is clear and moist and mucous membranes are normal.   Eyes: Conjunctivae and lids are normal. Right conjunctiva is not injected. Left conjunctiva is not injected.   Neck: Trachea normal and normal range of motion. No spinous process tenderness and no muscular tenderness present.   Cardiovascular: Intact distal pulses and normal pulses.    Pulses:       Dorsalis pedis pulses are 2+ on the right side, and 2+ on the left side.        Posterior tibial pulses are 2+ on the right side, and 2+ on the left side.   Pulmonary/Chest: Effort normal. No stridor. No respiratory distress.   Abdominal: Normal appearance.   Musculoskeletal:        Cervical back: Normal.        Thoracic back: Normal.        Lumbar back: She exhibits decreased range of motion and tenderness. She exhibits no deformity and normal pulse.        Back:    Neurological: She is alert. She has normal reflexes. No sensory deficit. GCS eye subscore is 4. GCS verbal subscore is 5. GCS motor subscore is 6.   Reflex Scores:       Patellar reflexes are 2+ on the right side and 2+ on the left side.       Achilles reflexes are 2+ on the right side and 2+ on the left side.  SLR POSITIVE RLE AT 30 DEGREES.   NEGATIVE LLE.    Skin: Skin is warm, dry and intact. No abrasion and no bruising noted.   Psychiatric: She has a normal mood and affect. Her speech is normal and behavior is normal. Thought content normal. Cognition and memory are normal. She is attentive.   Nursing note and vitals reviewed.        X-ray Lumbar Spine 2 Or  3 Views    Result Date: 8/5/2018  EXAMINATION: XR LUMBAR SPINE 2 OR 3 VIEWS CLINICAL HISTORY: Low back pain, risk factors (osteoporosis or chronic steroid use or elderly);Lumbago with sciatica, right side TECHNIQUE: AP and lateral radiographs were performed of the lumbar spine. COMPARISON: 11/14/2014 FINDINGS: Vertebral bodies are normal in height without evidence of fracture. Grade 1 retrolisthesis at L1-2, grade 1 anterolisthesis L4-5.. Moderate degenerative change.  Loss of disc height and endplate sclerosis worst at the L1-2 and L4-5 levels.  Loss of disc height posteriorly L3-4 and L5-S1.  Moderate facet arthropathy.     Moderate degenerative change similar to prior exam.  No compelling evidence of fracture. Electronically signed by: Tommy Wyman MD Date:    08/05/2018 Time:    13:59  Assessment:       1. Acute right-sided low back pain with right-sided sciatica    2. Low back pain, non-specific    3. Spondylolisthesis at L4-L5 level    4. DDD (degenerative disc disease), lumbar        Plan:         Acute right-sided low back pain with right-sided sciatica  -     X-Ray Lumbar Spine 2 Or 3 Views; Future; Expected date: 08/05/2018  -     Ambulatory referral to Orthopedics  -     methylPREDNISolone (MEDROL DOSEPACK) 4 mg tablet; use as directed  Dispense: 1 Package; Refill: 0    Low back pain, non-specific    Spondylolisthesis at L4-L5 level  -     Ambulatory referral to Orthopedics    DDD (degenerative disc disease), lumbar  -     Ambulatory referral to Orthopedics      Avoid NSAIDS due to CAD.   Dx T2 DM, Hgb A1C normal 2/18. I will give medrol pack.         Patient Instructions       Back Care Tips    Caring for your back  These are things you can do to prevent a recurrence of acute back pain and to reduce symptoms from chronic back pain:  · Maintain a healthy weight. If you are overweight, losing weight will help most types of back pain.  · Exercise is an important part of recovery from most types of back  pain. The muscles behind and in front of the spine support the back. This means strengthening both the back muscles and the abdominal muscles will provide better support for your spine.   · Swimming and brisk walking are good overall exercises to improve your fitness level.  · Practice safe lifting methods (below).  · Practice good posture when sitting, standing and walking. Avoid prolonged sitting. This puts more stress on the lower back than standing or walking.  · Wear quality shoes with sufficient arch support. Foot and ankle alignment can affect back symptoms. Women should avoid wearing high heels.  · Therapeutic massage can help relax the back muscles without stretching them.  · During the first 24 to 72 hours after an acute injury or flare-up of chronic back pain, apply an ice pack to the painful area for 20 minutes and then remove it for 20 minutes, over a period of 60 to 90 minutes, or several times a day. As a safety precaution, do not use a heating pad at bedtime. Sleeping on a heating pad can lead to skin burns or tissue damage.  · You can alternate ice and heat therapies.  Medications  Talk to your healthcare provider before using medicines, especially if you have other medical problems or are taking other medicines.  · You may use acetaminophen or ibuprofen to control pain, unless your healthcare provider prescribed other pain medicine. If you have chronic conditions like diabetes, liver or kidney disease, stomach ulcers, or gastrointestinal bleeding, or are taking blood thinners, talk with your healthcare provider before taking any medicines.  · Be careful if you are given prescription pain medicines, narcotics, or medicine for muscle spasm. They can cause drowsiness, affect your coordination, reflexes, and judgment. Do not drive or operate heavy machinery while taking these types of medicines. Take prescription pain medicine only as prescribed by your healthcare provider.  Lumbar stretch  Here is a  simple stretching exercise that will help relax muscle spasm and keep your back more limber. If exercise makes your back pain worse, dont do it.  · Lie on your back with your knees bent and both feet on the ground.  · Slowly raise your left knee to your chest as you flatten your lower back against the floor. Hold for 5 seconds.  · Relax and repeat the exercise with your right knee.  · Do 10 of these exercises for each leg.  Safe lifting method  · Dont bend over at the waist to lift an object off the floor.  Instead, bend your knees and hips in a squat.   · Keep your back and head upright  · Hold the object close to your body, directly in front of you.  · Straighten your legs to lift the object.   · Lower the object to the floor in the reverse fashion.  · If you must slide something across the floor, push it.  Posture tips  Sitting  Sit in chairs with straight backs or low-back support. Keep your knees lower than your hips, with your feet flat on the floor.  When driving, sit up straight. Adjust the seat forward so you are not leaning toward the steering wheel.  A small pillow or rolled towel behind your lower back may help if you are driving long distances.   Standing  When standing for long periods, shift most of your weight to one leg at a time. Alternate legs every few minutes.   Sleeping  The best way to sleep is on your side with your knees bent. Put a low pillow under your head to support your neck in a neutral spine position. Avoid thick pillows that bend your neck to one side. Put a pillow between your legs to further relax your lower back. If you sleep on your back, put pillows under your knees to support your legs in a slightly flexed position. Use a firm mattress. If your mattress sags, replace it, or use a 1/2-inch plywood board under the mattress to add support.  Follow-up care  Follow up with your healthcare provider, or as advised.  If X-rays, a CT scan or an MRI scan were taken, they will be  reviewed by a radiologist. You will be notified of any new findings that may affect your care.  Call 911  Seek emergency medical care if any of the following occur:  · Trouble breathing  · Confusion  · Very drowsy  · Fainting or loss of consciousness  · Rapid or very slow heart rate  · Loss of  bowel or bladder control  When to seek medical care  Call your healthcare provider if any of the following occur:  · Pain becomes worse or spreads to your arms or legs  · Weakness or numbness in one or both arms or legs  · Numbness in the groin area  Date Last Reviewed: 6/1/2016 © 2000-2017 [employer]. [address] [place] [id number]. All rights reserved. This information is not intended as a substitute for professional medical care. Always follow your healthcare professional's instructions.        Understanding Lumbar Radiculopathy    Lumbar radiculopathy is irritation or inflammation of a nerve root in the low back. It causes symptoms that spread out from the back down one or both legs. To understand this condition, it helps to understand the parts of the spine:  · Vertebrae. These are bones that stack to form the spine. The lumbar spine contains the 5 bottom vertebrae.  · Disks. These are soft pads of tissue between the vertebrae. They act as shock absorbers for the spine.  · Spinal canal. This is a tunnel formed within the stacked vertebrae. In the lumbar spine, nerves run through this canal.  · Nerves. These branch off and leave the spinal canal, traveling out to parts of the body. As they leave the spinal canal, nerves pass through openings between the vertebrae. The nerve root is the part of the nerve that is closest to the spinal canal.  · Sciatic nerve. This is a large nerve formed from several nerve roots in the low back. This nerve extends down the back of the leg to the foot.  With lumbar radiculopathy, nerve roots in the low back become irritated. This leads to pain and symptoms. The  sciatic nerve is commonly involved, so the condition is often called sciatica.  What causes lumbar radiculopathy?  Aging, injury, poor posture, extra body weight, and other issues can lead to problems in the low back. These problems may then irritate nerve roots. They include:  · Damage to a disk in the lumbar spine. The damaged disk may then press on nearby nerve roots.  · Degeneration from wear and tear, and aging. This can lead to narrowing (stenosis) of the openings between the vertebrae. The narrowed openings press on nerve roots as they leave the spinal canal.  · Unstable spine. This is when a vertebra slips forward. It can then press on a nerve root.  Other, less common things can put pressure on nerves in the low back. These include diabetes, infection, or a tumor.  Symptoms of lumbar radiculopathy  These include:  · Pain in the low back  · Pain, numbness, tingling, or weakness that travels into the buttocks, hip, groin, or leg  · Muscle spasms  Treatment for lumbar radiculopathy  In most cases, your healthcare provider will first try treatments that help relieve symptoms. These may include:  · Prescription and over-the-counter pain medicines. These help relieve pain, swelling, and irritation.  · Limits on positions and activities that increase pain. But lying in bed or avoiding all movement is only recommended for a short period of time.  · Physical therapy, including exercises and stretches. This helps decrease pain and increase movement and function.  · Steroid shots into the lower back. This may help relieve symptoms for a time.  · Weight-loss program. If you are overweight, losing extra pounds may help relieve symptoms.  In some cases, you may need surgery to fix the underlying problem. This depends on the cause, the symptoms, and how long the pain has lasted.  Possible complications  Over time, an irritated and inflamed nerve may become damaged. This may lead to long-lasting (permanent) numbness or  weakness in your legs and feet. If symptoms change suddenly or get worse, be sure to let your healthcare provider know.  When to call your healthcare provider  Call your healthcare provider right away if you have any of these:  · New pain or pain that gets worse  · New or increasing weakness, tingling, or numbness in your leg or foot  · Problems controlling your bladder or bowel   Date Last Reviewed: 3/10/2016  © 4994-5319 [employer]. [address] [place] [address], [place] [address]. All rights reserved. This information is not intended as a substitute for professional medical care. Always follow your healthcare professional's instructions.

## 2018-08-09 ENCOUNTER — HOSPITAL ENCOUNTER (OUTPATIENT)
Dept: RADIOLOGY | Facility: HOSPITAL | Age: 83
Discharge: HOME | End: 2018-08-09
Attending: INTERNAL MEDICINE
Payer: MEDICARE

## 2018-08-09 DIAGNOSIS — N18.30 CHRONIC KIDNEY DISEASE, STAGE III (MODERATE): ICD-10-CM

## 2018-08-09 DIAGNOSIS — D50.9 IRON DEFICIENCY ANEMIA, UNSPECIFIED IRON DEFICIENCY ANEMIA TYPE: ICD-10-CM

## 2018-08-09 PROCEDURE — 76770 US EXAM ABDO BACK WALL COMP: CPT | Mod: 26,,, | Performed by: RADIOLOGY

## 2018-08-09 PROCEDURE — 76770 US EXAM ABDO BACK WALL COMP: CPT | Mod: TC

## 2018-08-27 ENCOUNTER — TELEPHONE (OUTPATIENT)
Dept: INTERNAL MEDICINE | Facility: CLINIC | Age: 83
End: 2018-08-27

## 2018-08-27 RX ORDER — TRAMADOL HYDROCHLORIDE 50 MG/1
TABLET ORAL
Qty: 180 TABLET | Refills: 1 | OUTPATIENT
Start: 2018-08-27

## 2018-08-27 NOTE — TELEPHONE ENCOUNTER
Attempted to call several times to notify her that we had an appt. Available for tomorrow at 1040am ,Voicemail hasn't been set up.

## 2018-08-29 ENCOUNTER — LAB VISIT (OUTPATIENT)
Dept: LAB | Facility: HOSPITAL | Age: 83
End: 2018-08-29
Attending: INTERNAL MEDICINE
Payer: MEDICARE

## 2018-08-29 DIAGNOSIS — E11.22 TYPE 2 DIABETES MELLITUS WITH CHRONIC KIDNEY DISEASE, WITHOUT LONG-TERM CURRENT USE OF INSULIN, UNSPECIFIED CKD STAGE: Chronic | ICD-10-CM

## 2018-08-29 DIAGNOSIS — D64.89 ANEMIA DUE TO OTHER CAUSE, NOT CLASSIFIED: ICD-10-CM

## 2018-08-29 DIAGNOSIS — I10 ESSENTIAL HYPERTENSION: Chronic | ICD-10-CM

## 2018-08-29 LAB
BASOPHILS # BLD AUTO: 0.02 K/UL
BASOPHILS NFR BLD: 0.3 %
CHOLEST SERPL-MCNC: 152 MG/DL
CHOLEST/HDLC SERPL: 2.6 {RATIO}
DIFFERENTIAL METHOD: ABNORMAL
EOSINOPHIL # BLD AUTO: 0.2 K/UL
EOSINOPHIL NFR BLD: 2.5 %
ERYTHROCYTE [DISTWIDTH] IN BLOOD BY AUTOMATED COUNT: 13 %
ESTIMATED AVG GLUCOSE: 105 MG/DL
HBA1C MFR BLD HPLC: 5.3 %
HCT VFR BLD AUTO: 32.5 %
HDLC SERPL-MCNC: 59 MG/DL
HDLC SERPL: 38.8 %
HGB BLD-MCNC: 10.5 G/DL
IMM GRANULOCYTES # BLD AUTO: 0.02 K/UL
IMM GRANULOCYTES NFR BLD AUTO: 0.3 %
LDLC SERPL CALC-MCNC: 74.2 MG/DL
LYMPHOCYTES # BLD AUTO: 1.7 K/UL
LYMPHOCYTES NFR BLD: 27.2 %
MCH RBC QN AUTO: 32.8 PG
MCHC RBC AUTO-ENTMCNC: 32.3 G/DL
MCV RBC AUTO: 102 FL
MONOCYTES # BLD AUTO: 0.6 K/UL
MONOCYTES NFR BLD: 8.9 %
NEUTROPHILS # BLD AUTO: 3.9 K/UL
NEUTROPHILS NFR BLD: 60.8 %
NONHDLC SERPL-MCNC: 93 MG/DL
NRBC BLD-RTO: 0 /100 WBC
PLATELET # BLD AUTO: 251 K/UL
PMV BLD AUTO: 10.6 FL
RBC # BLD AUTO: 3.2 M/UL
TRIGL SERPL-MCNC: 94 MG/DL
WBC # BLD AUTO: 6.37 K/UL

## 2018-08-29 PROCEDURE — 80061 LIPID PANEL: CPT

## 2018-08-29 PROCEDURE — 36415 COLL VENOUS BLD VENIPUNCTURE: CPT | Mod: PO

## 2018-08-29 PROCEDURE — 83036 HEMOGLOBIN GLYCOSYLATED A1C: CPT

## 2018-08-29 PROCEDURE — 85025 COMPLETE CBC W/AUTO DIFF WBC: CPT

## 2018-08-29 RX ORDER — TRAMADOL HYDROCHLORIDE 50 MG/1
TABLET ORAL
Qty: 180 TABLET | Refills: 0 | Status: SHIPPED | OUTPATIENT
Start: 2018-08-29 | End: 2018-09-04 | Stop reason: SDUPTHER

## 2018-09-04 ENCOUNTER — OFFICE VISIT (OUTPATIENT)
Dept: INTERNAL MEDICINE | Facility: CLINIC | Age: 83
End: 2018-09-04
Payer: MEDICARE

## 2018-09-04 VITALS
BODY MASS INDEX: 26.04 KG/M2 | WEIGHT: 156.31 LBS | HEIGHT: 65 IN | SYSTOLIC BLOOD PRESSURE: 130 MMHG | TEMPERATURE: 98 F | RESPIRATION RATE: 16 BRPM | DIASTOLIC BLOOD PRESSURE: 68 MMHG | HEART RATE: 97 BPM

## 2018-09-04 DIAGNOSIS — D64.89 ANEMIA DUE TO OTHER CAUSE, NOT CLASSIFIED: ICD-10-CM

## 2018-09-04 DIAGNOSIS — I10 ESSENTIAL HYPERTENSION: Chronic | ICD-10-CM

## 2018-09-04 DIAGNOSIS — E11.22 TYPE 2 DIABETES MELLITUS WITH CHRONIC KIDNEY DISEASE, WITHOUT LONG-TERM CURRENT USE OF INSULIN, UNSPECIFIED CKD STAGE: Primary | Chronic | ICD-10-CM

## 2018-09-04 DIAGNOSIS — E78.5 HYPERLIPIDEMIA, UNSPECIFIED HYPERLIPIDEMIA TYPE: ICD-10-CM

## 2018-09-04 DIAGNOSIS — M15.9 PRIMARY OSTEOARTHRITIS INVOLVING MULTIPLE JOINTS: Chronic | ICD-10-CM

## 2018-09-04 DIAGNOSIS — N18.30 CKD (CHRONIC KIDNEY DISEASE), STAGE III: Chronic | ICD-10-CM

## 2018-09-04 PROCEDURE — 3075F SYST BP GE 130 - 139MM HG: CPT | Mod: CPTII,,, | Performed by: INTERNAL MEDICINE

## 2018-09-04 PROCEDURE — 99499 UNLISTED E&M SERVICE: CPT | Mod: S$GLB,,, | Performed by: INTERNAL MEDICINE

## 2018-09-04 PROCEDURE — 99214 OFFICE O/P EST MOD 30 MIN: CPT | Mod: S$PBB,,, | Performed by: INTERNAL MEDICINE

## 2018-09-04 PROCEDURE — 1101F PT FALLS ASSESS-DOCD LE1/YR: CPT | Mod: CPTII,,, | Performed by: INTERNAL MEDICINE

## 2018-09-04 PROCEDURE — 99213 OFFICE O/P EST LOW 20 MIN: CPT | Mod: PBBFAC,PO | Performed by: INTERNAL MEDICINE

## 2018-09-04 PROCEDURE — 99999 PR PBB SHADOW E&M-EST. PATIENT-LVL III: CPT | Mod: PBBFAC,,, | Performed by: INTERNAL MEDICINE

## 2018-09-04 PROCEDURE — 3078F DIAST BP <80 MM HG: CPT | Mod: CPTII,,, | Performed by: INTERNAL MEDICINE

## 2018-09-04 RX ORDER — HYDROCHLOROTHIAZIDE 25 MG/1
TABLET ORAL
Qty: 90 TABLET | Refills: 3 | Status: SHIPPED | OUTPATIENT
Start: 2018-09-04 | End: 2019-05-13 | Stop reason: SDUPTHER

## 2018-09-04 RX ORDER — POTASSIUM CHLORIDE 750 MG/1
10 TABLET, FILM COATED, EXTENDED RELEASE ORAL DAILY
Qty: 90 TABLET | Refills: 3 | Status: SHIPPED | OUTPATIENT
Start: 2018-09-04 | End: 2019-05-13 | Stop reason: SDUPTHER

## 2018-09-04 RX ORDER — TRAMADOL HYDROCHLORIDE 50 MG/1
TABLET ORAL
Qty: 180 TABLET | Refills: 0 | Status: SHIPPED | OUTPATIENT
Start: 2018-09-04 | End: 2019-01-07 | Stop reason: SDUPTHER

## 2018-09-04 RX ORDER — METOPROLOL SUCCINATE 25 MG/1
TABLET, EXTENDED RELEASE ORAL
Qty: 90 TABLET | Refills: 3 | Status: SHIPPED | OUTPATIENT
Start: 2018-09-04 | End: 2019-05-13 | Stop reason: SDUPTHER

## 2018-09-04 RX ORDER — ATORVASTATIN CALCIUM 20 MG/1
20 TABLET, FILM COATED ORAL DAILY
Qty: 90 TABLET | Refills: 3 | Status: SHIPPED | OUTPATIENT
Start: 2018-09-04 | End: 2019-05-13 | Stop reason: SDUPTHER

## 2018-09-04 RX ORDER — LOSARTAN POTASSIUM 50 MG/1
TABLET ORAL
Qty: 90 TABLET | Refills: 3 | Status: SHIPPED | OUTPATIENT
Start: 2018-09-04 | End: 2019-05-13 | Stop reason: SDUPTHER

## 2018-09-04 RX ORDER — GABAPENTIN 100 MG/1
100 CAPSULE ORAL 3 TIMES DAILY
Qty: 270 CAPSULE | Refills: 3 | Status: SHIPPED | OUTPATIENT
Start: 2018-09-04 | End: 2019-05-13 | Stop reason: SDUPTHER

## 2018-09-04 NOTE — PROGRESS NOTES
Subjective:       Patient ID: Melody Armstrong is a 84 y.o. female.    Chief Complaint: Follow-up    HPI     The patient presents for follow-up of diabetes, hypertension, and hyperlipidemia.  She also has coronary artery disease but has been asymptomatic with no complaints of chest pain or shortness of breath.  Her exercise tolerance is unchanged except for limitations related to her arthritis involving the right knee.  She uses a knee support which has been helpful.  She also takes tramadol as needed for pain.  She also has lower back pain which flared recently and require treatment at an urgent care clinic.  Back pain is less severe but he is alleviated somewhat with use of tramadol.  She states that she rests well most of the night.      Blood sugar levels have been good on intermittent testing.  No hypoglycemia has been noted.  Her vision is stable.      She has hypertension but does not monitor her blood pressures.  She is tolerating her medication well without side effects.    Review of Systems   Constitutional: Negative for activity change, appetite change and unexpected weight change.   Eyes: Negative for visual disturbance.   Respiratory: Negative for shortness of breath.    Cardiovascular: Negative for chest pain, palpitations and leg swelling.   Gastrointestinal: Negative for abdominal pain, blood in stool and diarrhea.   Genitourinary: Negative for dysuria, frequency, hematuria and urgency.   Musculoskeletal: Positive for arthralgias and back pain.   Neurological: Negative for weakness, numbness and headaches.   Psychiatric/Behavioral: Negative for sleep disturbance.       Objective:      Physical Exam   Constitutional: She is oriented to person, place, and time. She appears well-developed and well-nourished. No distress.   HENT:   Head: Normocephalic and atraumatic.   Eyes: Conjunctivae and EOM are normal. Pupils are equal, round, and reactive to light. No scleral icterus.   Neck: Normal range of  motion. Neck supple. No JVD present. No thyromegaly present.   Cardiovascular: Normal rate, regular rhythm, normal heart sounds and intact distal pulses. Exam reveals no gallop and no friction rub.   No murmur heard.  Pulmonary/Chest: Effort normal and breath sounds normal. No respiratory distress. She has no wheezes. She has no rales.   Abdominal: Soft. Bowel sounds are normal. She exhibits no mass. There is no tenderness.   Musculoskeletal: She exhibits no edema or tenderness.   Left knee support sleeve in place.  No effusion noted.Decreased flexion is noted in the right knee joint.  No left knee joint tenderness or swelling is appreciated.   Lymphadenopathy:     She has no cervical adenopathy.   Neurological: She is alert and oriented to person, place, and time. No cranial nerve deficit.   Sensory exam is intact in both feet on monofilament and vibration testing.   Skin: Skin is warm and dry. No rash noted.   No foot lesions are present.   Psychiatric: She has a normal mood and affect. Her behavior is normal.   Nursing note and vitals reviewed.      Results for orders placed or performed in visit on 08/29/18   CBC auto differential   Result Value Ref Range    WBC 6.37 3.90 - 12.70 K/uL    RBC 3.20 (L) 4.00 - 5.40 M/uL    Hemoglobin 10.5 (L) 12.0 - 16.0 g/dL    Hematocrit 32.5 (L) 37.0 - 48.5 %     (H) 82 - 98 fL    MCH 32.8 (H) 27.0 - 31.0 pg    MCHC 32.3 32.0 - 36.0 g/dL    RDW 13.0 11.5 - 14.5 %    Platelets 251 150 - 350 K/uL    MPV 10.6 9.2 - 12.9 fL    Immature Granulocytes 0.3 0.0 - 0.5 %    Gran # (ANC) 3.9 1.8 - 7.7 K/uL    Immature Grans (Abs) 0.02 0.00 - 0.04 K/uL    Lymph # 1.7 1.0 - 4.8 K/uL    Mono # 0.6 0.3 - 1.0 K/uL    Eos # 0.2 0.0 - 0.5 K/uL    Baso # 0.02 0.00 - 0.20 K/uL    nRBC 0 0 /100 WBC    Gran% 60.8 38.0 - 73.0 %    Lymph% 27.2 18.0 - 48.0 %    Mono% 8.9 4.0 - 15.0 %    Eosinophil% 2.5 0.0 - 8.0 %    Basophil% 0.3 0.0 - 1.9 %    Differential Method Automated    Hemoglobin A1c    Result Value Ref Range    Hemoglobin A1C 5.3 4.0 - 5.6 %    Estimated Avg Glucose 105 68 - 131 mg/dL   Lipid panel   Result Value Ref Range    Cholesterol 152 120 - 199 mg/dL    Triglycerides 94 30 - 150 mg/dL    HDL 59 40 - 75 mg/dL    LDL Cholesterol 74.2 63.0 - 159.0 mg/dL    HDL/Chol Ratio 38.8 20.0 - 50.0 %    Total Cholesterol/HDL Ratio 2.6 2.0 - 5.0    Non-HDL Cholesterol 93 mg/dL       Assessment:       1. Type 2 diabetes mellitus with chronic kidney disease, without long-term current use of insulin, unspecified CKD stage    2. Essential hypertension    3. Primary osteoarthritis involving multiple joints    4. CKD (chronic kidney disease), stage III    5. Hyperlipidemia, unspecified hyperlipidemia type    6. Anemia due to other cause, not classified        Plan:         Melody was seen today for follow-up.  Tramadol was renewed.  Current therapy will be continued.  Blood tests and a follow-up visit in 4 months will be obtained.    Diagnoses and all orders for this visit:    Type 2 diabetes mellitus with chronic kidney disease, without long-term current use of insulin, unspecified CKD stage    Essential hypertension    Primary osteoarthritis involving multiple joints    CKD (chronic kidney disease), stage III    Hyperlipidemia, unspecified hyperlipidemia type    Anemia due to other cause, not classified    Other orders  -     traMADol (ULTRAM) 50 mg tablet; TAKE TWO TABLETS BY MOUTH EVERY 8 HOURS AS NEEDED FOR PAIN  -     metoprolol succinate (TOPROL-XL) 25 MG 24 hr tablet; TAKE (1) TABLET BY MOUTH DAILY HIGH BLOOD PRESSURE.  -     losartan (COZAAR) 50 MG tablet; TAKE (1) TABLET BY MOUTH DAILY HIGH BLOOD PRESSURE.  -     KLOR-CON 10 10 mEq TbSR; Take 1 tablet (10 mEq total) by mouth once daily.  -     hydroCHLOROthiazide (HYDRODIURIL) 25 MG tablet; TAKE ONE TABLET BY MOUTH DAILY FOR FLUID.  -     atorvastatin (LIPITOR) 20 MG tablet; Take 1 tablet (20 mg total) by mouth once daily.  -     gabapentin  (NEURONTIN) 100 MG capsule; Take 1 capsule (100 mg total) by mouth 3 (three) times daily.  -     linaclotide (LINZESS) 145 mcg Cap capsule; Take 1 capsule (145 mcg total) by mouth once daily. For chronic constipation.

## 2018-11-19 ENCOUNTER — OFFICE VISIT (OUTPATIENT)
Dept: PODIATRY | Facility: CLINIC | Age: 83
End: 2018-11-19
Payer: MEDICARE

## 2018-11-19 VITALS
DIASTOLIC BLOOD PRESSURE: 69 MMHG | HEART RATE: 75 BPM | SYSTOLIC BLOOD PRESSURE: 136 MMHG | BODY MASS INDEX: 25.99 KG/M2 | WEIGHT: 156 LBS | HEIGHT: 65 IN

## 2018-11-19 DIAGNOSIS — B35.1 ONYCHOMYCOSIS: ICD-10-CM

## 2018-11-19 DIAGNOSIS — E11.51 TYPE 2 DIABETES MELLITUS WITH PERIPHERAL CIRCULATORY DISORDER: Primary | ICD-10-CM

## 2018-11-19 PROCEDURE — 11721 DEBRIDE NAIL 6 OR MORE: CPT | Mod: Q8,S$GLB,, | Performed by: PODIATRIST

## 2018-11-19 PROCEDURE — 99999 PR PBB SHADOW E&M-EST. PATIENT-LVL III: CPT | Mod: PBBFAC,,, | Performed by: PODIATRIST

## 2018-11-19 PROCEDURE — 99499 UNLISTED E&M SERVICE: CPT | Mod: S$GLB,,, | Performed by: PODIATRIST

## 2018-11-19 NOTE — PROCEDURES
"Routine Foot Care  Date/Time: 11/19/2018 11:10 AM  Performed by: Doug Laguerre DPM  Authorized by: Doug Laguerre DPM     Time out: Immediately prior to procedure a "time out" was called to verify the correct patient, procedure, equipment, support staff and site/side marked as required.    Consent Done?:  Yes (Verbal)  Hyperkeratotic Skin Lesions?: No      Nail Care Type:  Debride  Location(s): All  (Left 1st Toe, Left 3rd Toe, Left 2nd Toe, Left 4th Toe, Left 5th Toe, Right 1st Toe, Right 2nd Toe, Right 3rd Toe, Right 4th Toe and Right 5th Toe)  Patient tolerance:  Patient tolerated the procedure well with no immediate complications      "

## 2018-11-21 NOTE — PROGRESS NOTES
Subjective:      Patient ID: Melody Armstrong is a 84 y.o. female.    Chief Complaint: Nail Care    Melody is a 84 y.o. female who presents to the clinic for evaluation and treatment of diabetic feet. Melody has a past medical history of Anemia, Asymptomatic cholelithiasis, Coronary artery disease, Diabetes mellitus type II, GERD (gastroesophageal reflux disease), Hyperlipidemia, and Hypertension. Patient relates no major problem with feet. Complains of thickened toenails that she is unable to cut herself.    PCP: Zac Andres MD    Date Last Seen by PCP: 9/4/18/18    Current shoe gear: Casual shoes    Hemoglobin A1C   Date Value Ref Range Status   08/29/2018 5.3 4.0 - 5.6 % Final     Comment:     ADA Screening Guidelines:  5.7-6.4%  Consistent with prediabetes  >or=6.5%  Consistent with diabetes  High levels of fetal hemoglobin interfere with the HbA1C  assay. Heterozygous hemoglobin variants (HbS, HgC, etc)do  not significantly interfere with this assay.   However, presence of multiple variants may affect accuracy.     02/16/2018 5.2 4.0 - 5.6 % Final     Comment:     According to ADA guidelines, hemoglobin A1c <7.0% represents  optimal control in non-pregnant diabetic patients. Different  metrics may apply to specific patient populations.   Standards of Medical Care in Diabetes-2016.  For the purpose of screening for the presence of diabetes:  <5.7%     Consistent with the absence of diabetes  5.7-6.4%  Consistent with increasing risk for diabetes   (prediabetes)  >or=6.5%  Consistent with diabetes  Currently, no consensus exists for use of hemoglobin A1c  for diagnosis of diabetes for children.  This Hemoglobin A1c assay has significant interference with fetal   hemoglobin   (HbF). The results are invalid for patients with abnormal amounts of   HbF,   including those with known Hereditary Persistence   of Fetal Hemoglobin. Heterozygous hemoglobin variants (HbAS, HbAC,   HbAD, HbAE, HbA2) do not  significantly interfere with this assay;   however, presence of multiple variants in a sample may impact the %   interference.     10/13/2017 5.2 4.0 - 5.6 % Final     Comment:     According to ADA guidelines, hemoglobin A1c <7.0% represents  optimal control in non-pregnant diabetic patients. Different  metrics may apply to specific patient populations.   Standards of Medical Care in Diabetes-2016.  For the purpose of screening for the presence of diabetes:  <5.7%     Consistent with the absence of diabetes  5.7-6.4%  Consistent with increasing risk for diabetes   (prediabetes)  >or=6.5%  Consistent with diabetes  Currently, no consensus exists for use of hemoglobin A1c  for diagnosis of diabetes for children.  This Hemoglobin A1c assay has significant interference with fetal   hemoglobin   (HbF). The results are invalid for patients with abnormal amounts of   HbF,   including those with known Hereditary Persistence   of Fetal Hemoglobin. Heterozygous hemoglobin variants (HbAS, HbAC,   HbAD, HbAE, HbA2) do not significantly interfere with this assay;   however, presence of multiple variants in a sample may impact the %   interference.       Vitals:    11/19/18 1040   BP: 136/69   Pulse: 75     Past Medical History:   Diagnosis Date    Anemia     Asymptomatic cholelithiasis     Coronary artery disease     Diabetes mellitus type II     GERD (gastroesophageal reflux disease)     Hyperlipidemia     Hypertension        Past Surgical History:   Procedure Laterality Date    BLOCK-NERVE GENICULAR - Left Left 3/28/2017    [facility]    CORONARY ARTERY BYPASS GRAFT      INJECTION-JOINT-SACROILIAC- Bilateral Bilateral 11/1/2016    [facility]    JOINT REPLACEMENT      right    KNEE SURGERY      PARTIAL HYSTERECTOMY      RADIOFREQUENCY THERMOCOAGULATION- GENICULAR- Left  [place] Left 4/10/2018    Performed by Jez Mercer MD at  [facility]    SHOULDER ARTHROSCOPY W/ ROTATOR CUFF REPAIR      right shoulder       Family History   Problem Relation Age of Onset    Diabetes Mother     Heart disease Mother     Diabetes Maternal Grandmother        Social History     Socioeconomic History    Marital status:      Spouse name: None    Number of children: None    Years of education: None    Highest education level: None   Social Needs    Financial resource strain: None    Food insecurity - worry: None    Food insecurity - inability: None    Transportation needs - medical: None    Transportation needs - non-medical: None   Occupational History    None   Tobacco Use    Smoking status: Never Smoker    Smokeless tobacco: Never Used   Substance and Sexual Activity    Alcohol use: No    Drug use: No    Sexual activity: None   Other Topics Concern    None   Social History Narrative    None       Current Outpatient Medications   Medication Sig Dispense Refill    aspirin 325 MG tablet Take 1 tablet by mouth.      atorvastatin (LIPITOR) 20 MG tablet Take 1 tablet (20 mg total) by mouth once daily. 90 tablet 3    blood sugar diagnostic (GLUCOSE BLOOD) Strp Check blood glucose level once daily. 1 Bottle 8    cyanocobalamin (VITAMIN B-12) 1000 MCG tablet Take 1 tablet by mouth.      diclofenac sodium (VOLTAREN) 1 % Gel Apply 2 g topically 4 (four) times daily. (Patient taking differently: Apply 2 g topically as needed. ) 1 Tube 2    gabapentin (NEURONTIN) 100 MG capsule Take 1 capsule (100 mg total) by mouth 3 (three) times daily. 270 capsule 3    hydroCHLOROthiazide (HYDRODIURIL) 25 MG tablet TAKE ONE TABLET BY MOUTH DAILY FOR FLUID. 90 tablet 3    KLOR-CON 10 10 mEq TbSR Take 1 tablet (10 mEq total) by mouth once daily. 90 tablet 3    linaclotide (LINZESS) 145 mcg Cap capsule Take 1 capsule (145 mcg total) by mouth once daily. For chronic constipation. 30 capsule 6    losartan (COZAAR) 50 MG tablet TAKE (1) TABLET BY  MOUTH DAILY HIGH BLOOD PRESSURE. 90 tablet 3    meclizine (ANTIVERT) 12.5 mg tablet Take 1 tablet (12.5 mg total) by mouth 3 (three) times daily as needed for Dizziness. 40 tablet 4    metoprolol succinate (TOPROL-XL) 25 MG 24 hr tablet TAKE (1) TABLET BY MOUTH DAILY HIGH BLOOD PRESSURE. 90 tablet 3    traMADol (ULTRAM) 50 mg tablet TAKE TWO TABLETS BY MOUTH EVERY 8 HOURS AS NEEDED FOR PAIN 180 tablet 0     No current facility-administered medications for this visit.        Review of patient's allergies indicates:  No Known Allergies        Review of Systems   Constitution: Negative for chills, fever, weakness and malaise/fatigue.   Cardiovascular: Negative for claudication.   Skin: Positive for nail changes. Negative for color change, dry skin, flushing, itching and poor wound healing.   Musculoskeletal: Positive for arthritis. Negative for back pain, falls, gout, joint pain and joint swelling.   Neurological: Negative for numbness and paresthesias.   Psychiatric/Behavioral: Negative for altered mental status.           Objective:      Physical Exam   Constitutional: She is oriented to person, place, and time. She appears well-nourished. No distress.   Cardiovascular:   Pulses:       Dorsalis pedis pulses are 2+ on the right side, and 2+ on the left side.        Posterior tibial pulses are 2+ on the right side, and 2+ on the left side.   CFT< 3 secs all toes bilateral foot, skin temp warm bilateral foot, diminished digital hair growth bilateral foot, no lower extremity edema bilateral. + varicosities bilateral.       Musculoskeletal:   Muscle strength 5/5 in all muscle groups bilateral.  No tenderness nor crepitation with ROM of foot/ankle joints bilateral.  No tenderness with palpation of bilateral foot and ankle.  Bilateral pes planus foot type.  Bilateral gastrocnemius equinus.  Bilateral hallux abducto valgus.  Bilateral semi-reducible contracture of toes 2-5 with adductovarus rotation of the 5th.      Neurological: She is alert and oriented to person, place, and time. She has normal strength. No sensory deficit.   Light touch intact bilateral.  Protective sensation intact bilateral per Margaret-Ish monofilament.  Vibratory sensation intact bilateral.   Skin: Skin is warm, dry and intact. No rash noted. She is not diaphoretic. No cyanosis or erythema. No pallor. Nails show no clubbing.   Skin turgor, temperature, and texture appear age appropriate bilateral.      Nails 1-5 bilateral are thickened 2-3 mm, discolored, and elongated 4-5 mm with subungual debris.  Superior growth and dystrophy of right second and fifth toenails bilateral.     No open wounds, interdigital maceration, or hyperkeratoses noted bilateral.             Assessment:       Encounter Diagnoses   Name Primary?    Type 2 diabetes mellitus with peripheral circulatory disorder Yes    Onychomycosis          Plan:       Melody was seen today for nail care.    Diagnoses and all orders for this visit:    Type 2 diabetes mellitus with peripheral circulatory disorder  -     Routine Foot Care    Onychomycosis  -     Routine Foot Care      I counseled the patient on her conditions, their implications and medical management.    Shoe inspection. Diabetic Foot Education. Patient reminded of the importance of good nutrition and blood sugar control to help prevent podiatric complications of diabetes. Patient instructed on proper foot hygeine. We discussed wearing proper shoe gear, daily foot inspections, never walking without protective shoe gear, never putting sharp instruments to feet, routine podiatric nail visits every 2-3 months.      Routine foot care per attached note. Patient relates relief following the procedure. She will continue to monitor the areas daily, inspect her feet, wear protective shoe gear when ambulatory, moisturizer to maintain skin integrity and follow in this office in approximately 2-3 months, sooner p.r.n.

## 2019-01-04 ENCOUNTER — LAB VISIT (OUTPATIENT)
Dept: LAB | Facility: HOSPITAL | Age: 84
End: 2019-01-04
Attending: INTERNAL MEDICINE
Payer: MEDICARE

## 2019-01-04 DIAGNOSIS — E11.22 TYPE 2 DIABETES MELLITUS WITH CHRONIC KIDNEY DISEASE, WITHOUT LONG-TERM CURRENT USE OF INSULIN, UNSPECIFIED CKD STAGE: Chronic | ICD-10-CM

## 2019-01-04 DIAGNOSIS — E78.5 HYPERLIPIDEMIA, UNSPECIFIED HYPERLIPIDEMIA TYPE: ICD-10-CM

## 2019-01-04 DIAGNOSIS — I10 ESSENTIAL HYPERTENSION: Chronic | ICD-10-CM

## 2019-01-04 LAB
ALBUMIN SERPL BCP-MCNC: 4 G/DL
ALP SERPL-CCNC: 95 U/L
ALT SERPL W/O P-5'-P-CCNC: 17 U/L
ANION GAP SERPL CALC-SCNC: 9 MMOL/L
AST SERPL-CCNC: 20 U/L
BASOPHILS # BLD AUTO: 0.03 K/UL
BASOPHILS NFR BLD: 0.4 %
BILIRUB SERPL-MCNC: 0.5 MG/DL
BUN SERPL-MCNC: 21 MG/DL
CALCIUM SERPL-MCNC: 10.4 MG/DL
CHLORIDE SERPL-SCNC: 107 MMOL/L
CHOLEST SERPL-MCNC: 144 MG/DL
CHOLEST/HDLC SERPL: 2.5 {RATIO}
CO2 SERPL-SCNC: 23 MMOL/L
CREAT SERPL-MCNC: 1.3 MG/DL
DIFFERENTIAL METHOD: ABNORMAL
EOSINOPHIL # BLD AUTO: 0.3 K/UL
EOSINOPHIL NFR BLD: 4.7 %
ERYTHROCYTE [DISTWIDTH] IN BLOOD BY AUTOMATED COUNT: 12.8 %
EST. GFR  (AFRICAN AMERICAN): 43.5 ML/MIN/1.73 M^2
EST. GFR  (NON AFRICAN AMERICAN): 37.8 ML/MIN/1.73 M^2
ESTIMATED AVG GLUCOSE: 100 MG/DL
GLUCOSE SERPL-MCNC: 81 MG/DL
HBA1C MFR BLD HPLC: 5.1 %
HCT VFR BLD AUTO: 32.2 %
HDLC SERPL-MCNC: 57 MG/DL
HDLC SERPL: 39.6 %
HGB BLD-MCNC: 10 G/DL
IMM GRANULOCYTES # BLD AUTO: 0.02 K/UL
IMM GRANULOCYTES NFR BLD AUTO: 0.3 %
LDLC SERPL CALC-MCNC: 69.8 MG/DL
LYMPHOCYTES # BLD AUTO: 2 K/UL
LYMPHOCYTES NFR BLD: 29.4 %
MCH RBC QN AUTO: 32.2 PG
MCHC RBC AUTO-ENTMCNC: 31.1 G/DL
MCV RBC AUTO: 104 FL
MONOCYTES # BLD AUTO: 0.7 K/UL
MONOCYTES NFR BLD: 10 %
NEUTROPHILS # BLD AUTO: 3.8 K/UL
NEUTROPHILS NFR BLD: 55.2 %
NONHDLC SERPL-MCNC: 87 MG/DL
NRBC BLD-RTO: 0 /100 WBC
PLATELET # BLD AUTO: 306 K/UL
PMV BLD AUTO: 10.5 FL
POTASSIUM SERPL-SCNC: 4.6 MMOL/L
PROT SERPL-MCNC: 8.3 G/DL
RBC # BLD AUTO: 3.11 M/UL
SODIUM SERPL-SCNC: 139 MMOL/L
TRIGL SERPL-MCNC: 86 MG/DL
WBC # BLD AUTO: 6.87 K/UL

## 2019-01-04 PROCEDURE — 36415 COLL VENOUS BLD VENIPUNCTURE: CPT | Mod: PO

## 2019-01-04 PROCEDURE — 80061 LIPID PANEL: CPT

## 2019-01-04 PROCEDURE — 83036 HEMOGLOBIN GLYCOSYLATED A1C: CPT

## 2019-01-04 PROCEDURE — 80053 COMPREHEN METABOLIC PANEL: CPT

## 2019-01-04 PROCEDURE — 85025 COMPLETE CBC W/AUTO DIFF WBC: CPT

## 2019-01-07 ENCOUNTER — OFFICE VISIT (OUTPATIENT)
Dept: INTERNAL MEDICINE | Facility: CLINIC | Age: 84
End: 2019-01-07
Payer: MEDICARE

## 2019-01-07 VITALS
HEIGHT: 65 IN | HEART RATE: 86 BPM | WEIGHT: 154.13 LBS | TEMPERATURE: 98 F | DIASTOLIC BLOOD PRESSURE: 79 MMHG | SYSTOLIC BLOOD PRESSURE: 103 MMHG | BODY MASS INDEX: 25.68 KG/M2

## 2019-01-07 DIAGNOSIS — I10 ESSENTIAL HYPERTENSION: Primary | Chronic | ICD-10-CM

## 2019-01-07 DIAGNOSIS — N18.30 CKD (CHRONIC KIDNEY DISEASE), STAGE III: Chronic | ICD-10-CM

## 2019-01-07 DIAGNOSIS — E11.22 TYPE 2 DIABETES MELLITUS WITH CHRONIC KIDNEY DISEASE, WITHOUT LONG-TERM CURRENT USE OF INSULIN, UNSPECIFIED CKD STAGE: Chronic | ICD-10-CM

## 2019-01-07 DIAGNOSIS — M15.9 PRIMARY OSTEOARTHRITIS INVOLVING MULTIPLE JOINTS: Chronic | ICD-10-CM

## 2019-01-07 DIAGNOSIS — E78.5 HYPERLIPIDEMIA, UNSPECIFIED HYPERLIPIDEMIA TYPE: ICD-10-CM

## 2019-01-07 PROCEDURE — 1101F PT FALLS ASSESS-DOCD LE1/YR: CPT | Mod: CPTII,S$GLB,, | Performed by: INTERNAL MEDICINE

## 2019-01-07 PROCEDURE — 99499 RISK ADDL DX/OHS AUDIT: ICD-10-PCS | Mod: S$GLB,,, | Performed by: INTERNAL MEDICINE

## 2019-01-07 PROCEDURE — 99499 UNLISTED E&M SERVICE: CPT | Mod: S$GLB,,, | Performed by: INTERNAL MEDICINE

## 2019-01-07 PROCEDURE — 1101F PR PT FALLS ASSESS DOC 0-1 FALLS W/OUT INJ PAST YR: ICD-10-PCS | Mod: CPTII,S$GLB,, | Performed by: INTERNAL MEDICINE

## 2019-01-07 PROCEDURE — 99214 PR OFFICE/OUTPT VISIT, EST, LEVL IV, 30-39 MIN: ICD-10-PCS | Mod: S$GLB,,, | Performed by: INTERNAL MEDICINE

## 2019-01-07 PROCEDURE — 3074F PR MOST RECENT SYSTOLIC BLOOD PRESSURE < 130 MM HG: ICD-10-PCS | Mod: CPTII,S$GLB,, | Performed by: INTERNAL MEDICINE

## 2019-01-07 PROCEDURE — 99214 OFFICE O/P EST MOD 30 MIN: CPT | Mod: S$GLB,,, | Performed by: INTERNAL MEDICINE

## 2019-01-07 PROCEDURE — 3078F PR MOST RECENT DIASTOLIC BLOOD PRESSURE < 80 MM HG: ICD-10-PCS | Mod: CPTII,S$GLB,, | Performed by: INTERNAL MEDICINE

## 2019-01-07 PROCEDURE — 3074F SYST BP LT 130 MM HG: CPT | Mod: CPTII,S$GLB,, | Performed by: INTERNAL MEDICINE

## 2019-01-07 PROCEDURE — 3078F DIAST BP <80 MM HG: CPT | Mod: CPTII,S$GLB,, | Performed by: INTERNAL MEDICINE

## 2019-01-07 PROCEDURE — 99999 PR PBB SHADOW E&M-EST. PATIENT-LVL III: ICD-10-PCS | Mod: PBBFAC,,, | Performed by: INTERNAL MEDICINE

## 2019-01-07 PROCEDURE — 99999 PR PBB SHADOW E&M-EST. PATIENT-LVL III: CPT | Mod: PBBFAC,,, | Performed by: INTERNAL MEDICINE

## 2019-01-07 RX ORDER — TRAMADOL HYDROCHLORIDE 50 MG/1
TABLET ORAL
Qty: 180 TABLET | Refills: 1 | Status: SHIPPED | OUTPATIENT
Start: 2019-01-07 | End: 2019-05-13 | Stop reason: SDUPTHER

## 2019-01-07 NOTE — PROGRESS NOTES
Subjective:       Patient ID: Melody Armstrong is a 84 y.o. female.    Chief Complaint: Follow-up (4 month)    HPI   The patient presents for follow-up of medical conditions which include arthritis, hypertension, diabetes mellitus type 2, chronic kidney disease. The patient has chronic constipation.  We discussed use of Linzess which has been helpful in the past.  The patient reports her blood sugar levels have been good.  No hypoglycemia has been noted. She has arthritis pain involving the hands, left knee, and both shoulders.  She cannot take NSAIDs due to her chronic kidney disease. She is using tramadol twice daily for management of pain.    She is tolerating blood pressure medication well without side effects.    Review of Systems   Constitutional: Negative for activity change, appetite change and unexpected weight change.   Eyes: Negative for visual disturbance.   Respiratory: Negative for shortness of breath.    Cardiovascular: Negative for chest pain, palpitations and leg swelling.   Gastrointestinal: Negative for abdominal pain, blood in stool and diarrhea.   Genitourinary: Negative for dysuria, frequency, hematuria and urgency.   Musculoskeletal: Positive for arthralgias, back pain and joint swelling.   Neurological: Negative for weakness, numbness and headaches.   Psychiatric/Behavioral: Negative for sleep disturbance.       Objective:      Physical Exam   Constitutional: She is oriented to person, place, and time. She appears well-developed and well-nourished. No distress.   HENT:   Head: Normocephalic and atraumatic.   Eyes: Conjunctivae and EOM are normal. Pupils are equal, round, and reactive to light. No scleral icterus.   Neck: Normal range of motion. Neck supple. No JVD present. No thyromegaly present.   Cardiovascular: Normal rate, regular rhythm, normal heart sounds and intact distal pulses. Exam reveals no gallop and no friction rub.   No murmur heard.  Pulmonary/Chest: Effort normal and breath  sounds normal. No respiratory distress. She has no wheezes. She has no rales.   Abdominal: Soft. Bowel sounds are normal. She exhibits no mass. There is no tenderness.   Musculoskeletal: Normal range of motion. She exhibits no edema or tenderness.   Lymphadenopathy:     She has no cervical adenopathy.   Neurological: She is alert and oriented to person, place, and time. No cranial nerve deficit.   Sensory exam is intact in both feet on monofilament and vibration testing.   Skin: Skin is warm and dry. No rash noted.   No foot lesions are present.   Psychiatric: She has a normal mood and affect. Her behavior is normal.   Nursing note and vitals reviewed.      Results for orders placed or performed in visit on 01/04/19   CBC auto differential   Result Value Ref Range    WBC 6.87 3.90 - 12.70 K/uL    RBC 3.11 (L) 4.00 - 5.40 M/uL    Hemoglobin 10.0 (L) 12.0 - 16.0 g/dL    Hematocrit 32.2 (L) 37.0 - 48.5 %     (H) 82 - 98 fL    MCH 32.2 (H) 27.0 - 31.0 pg    MCHC 31.1 (L) 32.0 - 36.0 g/dL    RDW 12.8 11.5 - 14.5 %    Platelets 306 150 - 350 K/uL    MPV 10.5 9.2 - 12.9 fL    Immature Granulocytes 0.3 0.0 - 0.5 %    Gran # (ANC) 3.8 1.8 - 7.7 K/uL    Immature Grans (Abs) 0.02 0.00 - 0.04 K/uL    Lymph # 2.0 1.0 - 4.8 K/uL    Mono # 0.7 0.3 - 1.0 K/uL    Eos # 0.3 0.0 - 0.5 K/uL    Baso # 0.03 0.00 - 0.20 K/uL    nRBC 0 0 /100 WBC    Gran% 55.2 38.0 - 73.0 %    Lymph% 29.4 18.0 - 48.0 %    Mono% 10.0 4.0 - 15.0 %    Eosinophil% 4.7 0.0 - 8.0 %    Basophil% 0.4 0.0 - 1.9 %    Differential Method Automated    Comprehensive metabolic panel   Result Value Ref Range    Sodium 139 136 - 145 mmol/L    Potassium 4.6 3.5 - 5.1 mmol/L    Chloride 107 95 - 110 mmol/L    CO2 23 23 - 29 mmol/L    Glucose 81 70 - 110 mg/dL    BUN, Bld 21 8 - 23 mg/dL    Creatinine 1.3 0.5 - 1.4 mg/dL    Calcium 10.4 8.7 - 10.5 mg/dL    Total Protein 8.3 6.0 - 8.4 g/dL    Albumin 4.0 3.5 - 5.2 g/dL    Total Bilirubin 0.5 0.1 - 1.0 mg/dL    Alkaline  Phosphatase 95 55 - 135 U/L    AST 20 10 - 40 U/L    ALT 17 10 - 44 U/L    Anion Gap 9 8 - 16 mmol/L    eGFR if African American 43.5 (A) >60 mL/min/1.73 m^2    eGFR if non  37.8 (A) >60 mL/min/1.73 m^2   Lipid panel   Result Value Ref Range    Cholesterol 144 120 - 199 mg/dL    Triglycerides 86 30 - 150 mg/dL    HDL 57 40 - 75 mg/dL    LDL Cholesterol 69.8 63.0 - 159.0 mg/dL    HDL/Chol Ratio 39.6 20.0 - 50.0 %    Total Cholesterol/HDL Ratio 2.5 2.0 - 5.0    Non-HDL Cholesterol 87 mg/dL   Hemoglobin A1c   Result Value Ref Range    Hemoglobin A1C 5.1 4.0 - 5.6 %    Estimated Avg Glucose 100 68 - 131 mg/dL       Assessment:       1. Essential hypertension    2. Type 2 diabetes mellitus with chronic kidney disease, without long-term current use of insulin, unspecified CKD stage    3. Primary osteoarthritis involving multiple joints    4. CKD (chronic kidney disease), stage III    5. Hyperlipidemia, unspecified hyperlipidemia type        Plan:     Melody was seen today for follow-up.  Linzess will be reordered for treatment of chronic constipation.  The patient may increase the dose of tramadol to 2 tablets twice a day as needed for severe pain.  The patient may also use Tylenol arthritis preparation for additional pain relief.  Routine medications will be continued.  Fasting blood tests and a follow-up visit in 4 months will be obtained.    Diagnoses and all orders for this visit:    Essential hypertension  -     CBC auto differential; Future    Type 2 diabetes mellitus with chronic kidney disease, without long-term current use of insulin, unspecified CKD stage  -     Hemoglobin A1c; Future    Primary osteoarthritis involving multiple joints    CKD (chronic kidney disease), stage III    Hyperlipidemia, unspecified hyperlipidemia type  -     Comprehensive metabolic panel; Future    Other orders  -     linaclotide (LINZESS) 145 mcg Cap capsule; Take 1 capsule (145 mcg total) by mouth once daily. For  chronic constipation.  -     traMADol (ULTRAM) 50 mg tablet; TAKE TWO TABLETS BY MOUTH EVERY 8 HOURS AS NEEDED FOR PAIN

## 2019-03-18 ENCOUNTER — TELEPHONE (OUTPATIENT)
Dept: INTERNAL MEDICINE | Facility: CLINIC | Age: 84
End: 2019-03-18

## 2019-03-18 NOTE — TELEPHONE ENCOUNTER
----- Message from Magalie Harrell sent at 3/18/2019  1:19 PM CDT -----  Contact: Ailin/Daughter/[phone]  Pt's daughter is calling in regards to taking hair vitamins. Needs a call back to discuss. Please advise.        Thank You

## 2019-05-01 RX ORDER — POTASSIUM CHLORIDE 750 MG/1
TABLET, EXTENDED RELEASE ORAL
Qty: 90 TABLET | Refills: 3 | Status: SHIPPED | OUTPATIENT
Start: 2019-05-01 | End: 2019-05-13 | Stop reason: SDUPTHER

## 2019-05-09 ENCOUNTER — LAB VISIT (OUTPATIENT)
Dept: LAB | Facility: HOSPITAL | Age: 84
End: 2019-05-09
Attending: INTERNAL MEDICINE
Payer: MEDICARE

## 2019-05-09 DIAGNOSIS — E11.22 TYPE 2 DIABETES MELLITUS WITH CHRONIC KIDNEY DISEASE, WITHOUT LONG-TERM CURRENT USE OF INSULIN, UNSPECIFIED CKD STAGE: Chronic | ICD-10-CM

## 2019-05-09 DIAGNOSIS — E78.5 HYPERLIPIDEMIA, UNSPECIFIED HYPERLIPIDEMIA TYPE: ICD-10-CM

## 2019-05-09 DIAGNOSIS — I10 ESSENTIAL HYPERTENSION: Chronic | ICD-10-CM

## 2019-05-09 LAB
ALBUMIN SERPL BCP-MCNC: 3.8 G/DL (ref 3.5–5.2)
ALP SERPL-CCNC: 97 U/L (ref 55–135)
ALT SERPL W/O P-5'-P-CCNC: 13 U/L (ref 10–44)
ANION GAP SERPL CALC-SCNC: 7 MMOL/L (ref 8–16)
AST SERPL-CCNC: 19 U/L (ref 10–40)
BASOPHILS # BLD AUTO: 0.03 K/UL (ref 0–0.2)
BASOPHILS NFR BLD: 0.4 % (ref 0–1.9)
BILIRUB SERPL-MCNC: 0.5 MG/DL (ref 0.1–1)
BUN SERPL-MCNC: 21 MG/DL (ref 8–23)
CALCIUM SERPL-MCNC: 10.1 MG/DL (ref 8.7–10.5)
CHLORIDE SERPL-SCNC: 108 MMOL/L (ref 95–110)
CO2 SERPL-SCNC: 26 MMOL/L (ref 23–29)
CREAT SERPL-MCNC: 1.3 MG/DL (ref 0.5–1.4)
DIFFERENTIAL METHOD: ABNORMAL
EOSINOPHIL # BLD AUTO: 0.4 K/UL (ref 0–0.5)
EOSINOPHIL NFR BLD: 6.2 % (ref 0–8)
ERYTHROCYTE [DISTWIDTH] IN BLOOD BY AUTOMATED COUNT: 12.8 % (ref 11.5–14.5)
EST. GFR  (AFRICAN AMERICAN): 43.5 ML/MIN/1.73 M^2
EST. GFR  (NON AFRICAN AMERICAN): 37.8 ML/MIN/1.73 M^2
ESTIMATED AVG GLUCOSE: 103 MG/DL (ref 68–131)
GLUCOSE SERPL-MCNC: 87 MG/DL (ref 70–110)
HBA1C MFR BLD HPLC: 5.2 % (ref 4–5.6)
HCT VFR BLD AUTO: 30.9 % (ref 37–48.5)
HGB BLD-MCNC: 9.8 G/DL (ref 12–16)
IMM GRANULOCYTES # BLD AUTO: 0.03 K/UL (ref 0–0.04)
IMM GRANULOCYTES NFR BLD AUTO: 0.4 % (ref 0–0.5)
LYMPHOCYTES # BLD AUTO: 1.9 K/UL (ref 1–4.8)
LYMPHOCYTES NFR BLD: 27.7 % (ref 18–48)
MCH RBC QN AUTO: 31.9 PG (ref 27–31)
MCHC RBC AUTO-ENTMCNC: 31.7 G/DL (ref 32–36)
MCV RBC AUTO: 101 FL (ref 82–98)
MONOCYTES # BLD AUTO: 0.6 K/UL (ref 0.3–1)
MONOCYTES NFR BLD: 8.8 % (ref 4–15)
NEUTROPHILS # BLD AUTO: 3.9 K/UL (ref 1.8–7.7)
NEUTROPHILS NFR BLD: 56.5 % (ref 38–73)
NRBC BLD-RTO: 0 /100 WBC
PLATELET # BLD AUTO: 299 K/UL (ref 150–350)
PMV BLD AUTO: 10.4 FL (ref 9.2–12.9)
POTASSIUM SERPL-SCNC: 4.6 MMOL/L (ref 3.5–5.1)
PROT SERPL-MCNC: 7.9 G/DL (ref 6–8.4)
RBC # BLD AUTO: 3.07 M/UL (ref 4–5.4)
SODIUM SERPL-SCNC: 141 MMOL/L (ref 136–145)
WBC # BLD AUTO: 6.97 K/UL (ref 3.9–12.7)

## 2019-05-09 PROCEDURE — 80053 COMPREHEN METABOLIC PANEL: CPT

## 2019-05-09 PROCEDURE — 36415 COLL VENOUS BLD VENIPUNCTURE: CPT | Mod: PO

## 2019-05-09 PROCEDURE — 83036 HEMOGLOBIN GLYCOSYLATED A1C: CPT

## 2019-05-09 PROCEDURE — 85025 COMPLETE CBC W/AUTO DIFF WBC: CPT

## 2019-05-13 ENCOUNTER — OFFICE VISIT (OUTPATIENT)
Dept: INTERNAL MEDICINE | Facility: CLINIC | Age: 84
End: 2019-05-13
Payer: MEDICARE

## 2019-05-13 VITALS
BODY MASS INDEX: 24.83 KG/M2 | SYSTOLIC BLOOD PRESSURE: 136 MMHG | HEART RATE: 74 BPM | DIASTOLIC BLOOD PRESSURE: 60 MMHG | WEIGHT: 149.06 LBS | RESPIRATION RATE: 14 BRPM | HEIGHT: 65 IN

## 2019-05-13 DIAGNOSIS — I10 ESSENTIAL HYPERTENSION: Chronic | ICD-10-CM

## 2019-05-13 DIAGNOSIS — D64.89 ANEMIA DUE TO OTHER CAUSE, NOT CLASSIFIED: ICD-10-CM

## 2019-05-13 DIAGNOSIS — E11.22 TYPE 2 DIABETES MELLITUS WITH CHRONIC KIDNEY DISEASE, WITHOUT LONG-TERM CURRENT USE OF INSULIN, UNSPECIFIED CKD STAGE: Primary | Chronic | ICD-10-CM

## 2019-05-13 DIAGNOSIS — E78.5 HYPERLIPIDEMIA, UNSPECIFIED HYPERLIPIDEMIA TYPE: ICD-10-CM

## 2019-05-13 DIAGNOSIS — M17.10 PRIMARY LOCALIZED OSTEOARTHROSIS OF LOWER LEG, UNSPECIFIED LATERALITY: ICD-10-CM

## 2019-05-13 DIAGNOSIS — N18.30 CKD (CHRONIC KIDNEY DISEASE), STAGE III: Chronic | ICD-10-CM

## 2019-05-13 PROCEDURE — 99999 PR PBB SHADOW E&M-EST. PATIENT-LVL III: CPT | Mod: PBBFAC,,, | Performed by: INTERNAL MEDICINE

## 2019-05-13 PROCEDURE — 99499 RISK ADDL DX/OHS AUDIT: ICD-10-PCS | Mod: S$GLB,,, | Performed by: INTERNAL MEDICINE

## 2019-05-13 PROCEDURE — 1101F PT FALLS ASSESS-DOCD LE1/YR: CPT | Mod: CPTII,S$GLB,, | Performed by: INTERNAL MEDICINE

## 2019-05-13 PROCEDURE — 3078F DIAST BP <80 MM HG: CPT | Mod: CPTII,S$GLB,, | Performed by: INTERNAL MEDICINE

## 2019-05-13 PROCEDURE — 1101F PR PT FALLS ASSESS DOC 0-1 FALLS W/OUT INJ PAST YR: ICD-10-PCS | Mod: CPTII,S$GLB,, | Performed by: INTERNAL MEDICINE

## 2019-05-13 PROCEDURE — 99214 PR OFFICE/OUTPT VISIT, EST, LEVL IV, 30-39 MIN: ICD-10-PCS | Mod: S$GLB,,, | Performed by: INTERNAL MEDICINE

## 2019-05-13 PROCEDURE — 3078F PR MOST RECENT DIASTOLIC BLOOD PRESSURE < 80 MM HG: ICD-10-PCS | Mod: CPTII,S$GLB,, | Performed by: INTERNAL MEDICINE

## 2019-05-13 PROCEDURE — 99214 OFFICE O/P EST MOD 30 MIN: CPT | Mod: S$GLB,,, | Performed by: INTERNAL MEDICINE

## 2019-05-13 PROCEDURE — 99999 PR PBB SHADOW E&M-EST. PATIENT-LVL III: ICD-10-PCS | Mod: PBBFAC,,, | Performed by: INTERNAL MEDICINE

## 2019-05-13 PROCEDURE — 3075F SYST BP GE 130 - 139MM HG: CPT | Mod: CPTII,S$GLB,, | Performed by: INTERNAL MEDICINE

## 2019-05-13 PROCEDURE — 99499 UNLISTED E&M SERVICE: CPT | Mod: S$GLB,,, | Performed by: INTERNAL MEDICINE

## 2019-05-13 PROCEDURE — 3075F PR MOST RECENT SYSTOLIC BLOOD PRESS GE 130-139MM HG: ICD-10-PCS | Mod: CPTII,S$GLB,, | Performed by: INTERNAL MEDICINE

## 2019-05-13 RX ORDER — GABAPENTIN 100 MG/1
100 CAPSULE ORAL 3 TIMES DAILY
Qty: 270 CAPSULE | Refills: 3 | Status: SHIPPED | OUTPATIENT
Start: 2019-05-13 | End: 2020-08-11 | Stop reason: SDUPTHER

## 2019-05-13 RX ORDER — ATORVASTATIN CALCIUM 20 MG/1
20 TABLET, FILM COATED ORAL DAILY
Qty: 90 TABLET | Refills: 3 | Status: SHIPPED | OUTPATIENT
Start: 2019-05-13 | End: 2020-06-23

## 2019-05-13 RX ORDER — HYDROCHLOROTHIAZIDE 25 MG/1
TABLET ORAL
Qty: 90 TABLET | Refills: 3 | Status: SHIPPED | OUTPATIENT
Start: 2019-05-13 | End: 2020-09-21 | Stop reason: SDUPTHER

## 2019-05-13 RX ORDER — POTASSIUM CHLORIDE 750 MG/1
10 TABLET, EXTENDED RELEASE ORAL DAILY
Qty: 90 TABLET | Refills: 3 | Status: SHIPPED | OUTPATIENT
Start: 2019-05-13 | End: 2020-09-21

## 2019-05-13 RX ORDER — MECLIZINE HCL 12.5 MG 12.5 MG/1
12.5 TABLET ORAL 3 TIMES DAILY PRN
Qty: 40 TABLET | Refills: 4 | Status: SHIPPED | OUTPATIENT
Start: 2019-05-13 | End: 2020-01-20 | Stop reason: SDUPTHER

## 2019-05-13 RX ORDER — TRAMADOL HYDROCHLORIDE 50 MG/1
TABLET ORAL
Qty: 180 TABLET | Refills: 1 | Status: SHIPPED | OUTPATIENT
Start: 2019-05-13 | End: 2019-10-21 | Stop reason: SDUPTHER

## 2019-05-13 RX ORDER — POTASSIUM CHLORIDE 750 MG/1
10 TABLET, FILM COATED, EXTENDED RELEASE ORAL DAILY
Qty: 90 TABLET | Refills: 3 | Status: SHIPPED | OUTPATIENT
Start: 2019-05-13 | End: 2020-09-21

## 2019-05-13 RX ORDER — METOPROLOL SUCCINATE 25 MG/1
TABLET, EXTENDED RELEASE ORAL
Qty: 90 TABLET | Refills: 3 | Status: SHIPPED | OUTPATIENT
Start: 2019-05-13 | End: 2020-08-11 | Stop reason: SDUPTHER

## 2019-05-13 RX ORDER — LOSARTAN POTASSIUM 50 MG/1
TABLET ORAL
Qty: 90 TABLET | Refills: 3 | Status: SHIPPED | OUTPATIENT
Start: 2019-05-13 | End: 2020-05-19

## 2019-05-13 NOTE — PROGRESS NOTES
Subjective:       Patient ID: Melody Armstrong is a 84 y.o. female.    Chief Complaint: Follow-up (4 month)    HPI   Patient presents for follow-up of medical conditions which include type 2 diabetes mellitus, hypertension, osteoarthritis, chronic constipation, and chronic kidney disease. The patient states she is generally doing well she remains physically active.  She states she was intolerant of arthritis strength Tylenol stating that this upset her stomach.  However, she is able use tramadol and regular strength Tylenol for relief of her arthritis pain involving the shoulders and hips.    She has not been monitoring blood sugar levels.    Chronic constipation has improved with use of Linzess.  She is not experiencing any abdominal pain. She also denies having any reflux symptoms.  Her appetite has diminished however over the past year.  She is resting well at night.  She denies feeling anxious or depressed.  She has chronic kidney disease and has been avoiding use of NSAIDs because of this condition.    Review of Systems   Constitutional: Positive for appetite change. Negative for activity change and unexpected weight change.   Eyes: Negative for visual disturbance.   Respiratory: Negative for shortness of breath.    Cardiovascular: Negative for chest pain, palpitations and leg swelling.   Gastrointestinal: Positive for constipation. Negative for abdominal distention, abdominal pain, blood in stool, diarrhea, nausea and vomiting.   Genitourinary: Negative for dysuria, frequency, hematuria and urgency.   Musculoskeletal: Positive for arthralgias.   Neurological: Negative for weakness, numbness and headaches.   Psychiatric/Behavioral: Negative for sleep disturbance.       Objective:      Physical Exam   Constitutional: She is oriented to person, place, and time. She appears well-developed and well-nourished. No distress.   HENT:   Head: Normocephalic and atraumatic.   Eyes: Pupils are equal, round, and reactive  to light. Conjunctivae and EOM are normal. No scleral icterus.   Neck: Normal range of motion. Neck supple. No JVD present. No thyromegaly present.   Cardiovascular: Normal rate, regular rhythm, normal heart sounds and intact distal pulses. Exam reveals no gallop and no friction rub.   No murmur heard.  Pulmonary/Chest: Effort normal and breath sounds normal. No respiratory distress. She has no wheezes. She has no rales.   Abdominal: Soft. Bowel sounds are normal. She exhibits no mass. There is no tenderness.   Musculoskeletal: She exhibits no edema.   The patient is wearing pull-on knee support braces.  Bilateral knee crepitus is present.  No effusions are noted. Right hip is tender on external rotation.  Negative straight leg raising test bilaterally.   Lymphadenopathy:     She has no cervical adenopathy.   Neurological: She is alert and oriented to person, place, and time. No cranial nerve deficit.   An antalgic gait is present.  Sensory exam is intact in both feet on monofilament and vibration testing.   Skin: Skin is warm and dry. No rash noted.   No foot lesions are present.   Psychiatric: She has a normal mood and affect. Her behavior is normal.   Nursing note and vitals reviewed.      Results for orders placed or performed in visit on 05/09/19   CBC auto differential   Result Value Ref Range    WBC 6.97 3.90 - 12.70 K/uL    RBC 3.07 (L) 4.00 - 5.40 M/uL    Hemoglobin 9.8 (L) 12.0 - 16.0 g/dL    Hematocrit 30.9 (L) 37.0 - 48.5 %    Mean Corpuscular Volume 101 (H) 82 - 98 fL    Mean Corpuscular Hemoglobin 31.9 (H) 27.0 - 31.0 pg    Mean Corpuscular Hemoglobin Conc 31.7 (L) 32.0 - 36.0 g/dL    RDW 12.8 11.5 - 14.5 %    Platelets 299 150 - 350 K/uL    MPV 10.4 9.2 - 12.9 fL    Immature Granulocytes 0.4 0.0 - 0.5 %    Gran # (ANC) 3.9 1.8 - 7.7 K/uL    Immature Grans (Abs) 0.03 0.00 - 0.04 K/uL    Lymph # 1.9 1.0 - 4.8 K/uL    Mono # 0.6 0.3 - 1.0 K/uL    Eos # 0.4 0.0 - 0.5 K/uL    Baso # 0.03 0.00 - 0.20 K/uL     nRBC 0 0 /100 WBC    Gran% 56.5 38.0 - 73.0 %    Lymph% 27.7 18.0 - 48.0 %    Mono% 8.8 4.0 - 15.0 %    Eosinophil% 6.2 0.0 - 8.0 %    Basophil% 0.4 0.0 - 1.9 %    Differential Method Automated    Comprehensive metabolic panel   Result Value Ref Range    Sodium 141 136 - 145 mmol/L    Potassium 4.6 3.5 - 5.1 mmol/L    Chloride 108 95 - 110 mmol/L    CO2 26 23 - 29 mmol/L    Glucose 87 70 - 110 mg/dL    BUN, Bld 21 8 - 23 mg/dL    Creatinine 1.3 0.5 - 1.4 mg/dL    Calcium 10.1 8.7 - 10.5 mg/dL    Total Protein 7.9 6.0 - 8.4 g/dL    Albumin 3.8 3.5 - 5.2 g/dL    Total Bilirubin 0.5 0.1 - 1.0 mg/dL    Alkaline Phosphatase 97 55 - 135 U/L    AST 19 10 - 40 U/L    ALT 13 10 - 44 U/L    Anion Gap 7 (L) 8 - 16 mmol/L    eGFR if African American 43.5 (A) >60 mL/min/1.73 m^2    eGFR if non  37.8 (A) >60 mL/min/1.73 m^2   Hemoglobin A1c   Result Value Ref Range    Hemoglobin A1C 5.2 4.0 - 5.6 %    Estimated Avg Glucose 103 68 - 131 mg/dL       Assessment:       1. Type 2 diabetes mellitus with chronic kidney disease, without long-term current use of insulin, unspecified CKD stage    2. Essential hypertension    3. Hyperlipidemia, unspecified hyperlipidemia type    4. Anemia due to other cause, not classified    5. CKD (chronic kidney disease), stage III    6. Primary localized osteoarthrosis of lower leg, unspecified laterality        Plan:     Melody was seen today for follow-up.  A prescription for a Rollator walker will be given to the patient to for safe for ambulation.  Current medications will be continued.  Blood tests and follow-up visit in 4 months will be obtained.    Diagnoses and all orders for this visit:    Type 2 diabetes mellitus with chronic kidney disease, without long-term current use of insulin, unspecified CKD stage  -     Hemoglobin A1c; Future    Essential hypertension  -     CBC auto differential; Future    Hyperlipidemia, unspecified hyperlipidemia type  -     Comprehensive  metabolic panel; Future    Anemia due to other cause, not classified    CKD (chronic kidney disease), stage III    Primary localized osteoarthrosis of lower leg, unspecified laterality    Other orders  -     walker Misc; Use daily for assisted ambulation  -     atorvastatin (LIPITOR) 20 MG tablet; Take 1 tablet (20 mg total) by mouth once daily.  -     linaclotide (LINZESS) 145 mcg Cap capsule; Take 1 capsule (145 mcg total) by mouth once daily. For chronic constipation.  -     meclizine (ANTIVERT) 12.5 mg tablet; Take 1 tablet (12.5 mg total) by mouth 3 (three) times daily as needed for Dizziness.  -     gabapentin (NEURONTIN) 100 MG capsule; Take 1 capsule (100 mg total) by mouth 3 (three) times daily.  -     KLOR-CON 10 10 mEq TbSR; Take 1 tablet (10 mEq total) by mouth once daily.  -     losartan (COZAAR) 50 MG tablet; TAKE (1) TABLET BY MOUTH DAILY HIGH BLOOD PRESSURE.  -     metoprolol succinate (TOPROL-XL) 25 MG 24 hr tablet; TAKE (1) TABLET BY MOUTH DAILY HIGH BLOOD PRESSURE.  -     potassium chloride (KLOR-CON) 10 MEQ TbSR; Take 1 tablet (10 mEq total) by mouth once daily.  -     traMADol (ULTRAM) 50 mg tablet; TAKE TWO TABLETS BY MOUTH EVERY 8 HOURS AS NEEDED FOR PAIN  -     hydroCHLOROthiazide (HYDRODIURIL) 25 MG tablet; TAKE ONE TABLET BY MOUTH DAILY FOR FLUID.

## 2019-06-04 ENCOUNTER — TELEPHONE (OUTPATIENT)
Dept: ADMINISTRATIVE | Facility: HOSPITAL | Age: 84
End: 2019-06-04

## 2019-06-04 DIAGNOSIS — E11.22 TYPE 2 DIABETES MELLITUS WITH CHRONIC KIDNEY DISEASE, WITHOUT LONG-TERM CURRENT USE OF INSULIN, UNSPECIFIED CKD STAGE: Primary | Chronic | ICD-10-CM

## 2019-08-16 ENCOUNTER — TELEPHONE (OUTPATIENT)
Dept: PODIATRY | Facility: CLINIC | Age: 84
End: 2019-08-16

## 2019-08-16 NOTE — TELEPHONE ENCOUNTER
----- Message from Jocelyn Jones sent at 8/16/2019  2:01 PM CDT -----  Contact: Daughter [phone]  Patient would like to speak with you about coming in with her daughter on 8/26/19 at 2:15pm for toenails cut. Please advise

## 2019-09-07 RX ORDER — LOSARTAN POTASSIUM 50 MG/1
TABLET ORAL
Qty: 90 TABLET | Refills: 3 | Status: SHIPPED | OUTPATIENT
Start: 2019-09-07 | End: 2020-05-19

## 2019-09-07 RX ORDER — ATORVASTATIN CALCIUM 20 MG/1
20 TABLET, FILM COATED ORAL DAILY
Qty: 90 TABLET | Refills: 3 | Status: SHIPPED | OUTPATIENT
Start: 2019-09-07 | End: 2020-09-21

## 2019-09-12 ENCOUNTER — LAB VISIT (OUTPATIENT)
Dept: LAB | Facility: HOSPITAL | Age: 84
End: 2019-09-12
Attending: INTERNAL MEDICINE
Payer: MEDICARE

## 2019-09-12 DIAGNOSIS — E78.5 HYPERLIPIDEMIA, UNSPECIFIED HYPERLIPIDEMIA TYPE: ICD-10-CM

## 2019-09-12 DIAGNOSIS — I10 ESSENTIAL HYPERTENSION: Chronic | ICD-10-CM

## 2019-09-12 DIAGNOSIS — E11.22 TYPE 2 DIABETES MELLITUS WITH CHRONIC KIDNEY DISEASE, WITHOUT LONG-TERM CURRENT USE OF INSULIN, UNSPECIFIED CKD STAGE: Chronic | ICD-10-CM

## 2019-09-12 LAB
ALBUMIN SERPL BCP-MCNC: 4.2 G/DL (ref 3.5–5.2)
ALP SERPL-CCNC: 99 U/L (ref 55–135)
ALT SERPL W/O P-5'-P-CCNC: 16 U/L (ref 10–44)
ANION GAP SERPL CALC-SCNC: 9 MMOL/L (ref 8–16)
AST SERPL-CCNC: 23 U/L (ref 10–40)
BASOPHILS # BLD AUTO: 0.04 K/UL (ref 0–0.2)
BASOPHILS NFR BLD: 0.6 % (ref 0–1.9)
BILIRUB SERPL-MCNC: 0.4 MG/DL (ref 0.1–1)
BUN SERPL-MCNC: 20 MG/DL (ref 8–23)
CALCIUM SERPL-MCNC: 10 MG/DL (ref 8.7–10.5)
CHLORIDE SERPL-SCNC: 108 MMOL/L (ref 95–110)
CO2 SERPL-SCNC: 24 MMOL/L (ref 23–29)
CREAT SERPL-MCNC: 1.4 MG/DL (ref 0.5–1.4)
DIFFERENTIAL METHOD: ABNORMAL
EOSINOPHIL # BLD AUTO: 0.2 K/UL (ref 0–0.5)
EOSINOPHIL NFR BLD: 3.6 % (ref 0–8)
ERYTHROCYTE [DISTWIDTH] IN BLOOD BY AUTOMATED COUNT: 13.2 % (ref 11.5–14.5)
EST. GFR  (AFRICAN AMERICAN): 39.5 ML/MIN/1.73 M^2
EST. GFR  (NON AFRICAN AMERICAN): 34.3 ML/MIN/1.73 M^2
ESTIMATED AVG GLUCOSE: 97 MG/DL (ref 68–131)
GLUCOSE SERPL-MCNC: 93 MG/DL (ref 70–110)
HBA1C MFR BLD HPLC: 5 % (ref 4–5.6)
HCT VFR BLD AUTO: 30 % (ref 37–48.5)
HGB BLD-MCNC: 9.6 G/DL (ref 12–16)
IMM GRANULOCYTES # BLD AUTO: 0.02 K/UL (ref 0–0.04)
IMM GRANULOCYTES NFR BLD AUTO: 0.3 % (ref 0–0.5)
LYMPHOCYTES # BLD AUTO: 1.9 K/UL (ref 1–4.8)
LYMPHOCYTES NFR BLD: 28.8 % (ref 18–48)
MCH RBC QN AUTO: 32.4 PG (ref 27–31)
MCHC RBC AUTO-ENTMCNC: 32 G/DL (ref 32–36)
MCV RBC AUTO: 101 FL (ref 82–98)
MONOCYTES # BLD AUTO: 0.5 K/UL (ref 0.3–1)
MONOCYTES NFR BLD: 8.2 % (ref 4–15)
NEUTROPHILS # BLD AUTO: 3.9 K/UL (ref 1.8–7.7)
NEUTROPHILS NFR BLD: 58.5 % (ref 38–73)
NRBC BLD-RTO: 0 /100 WBC
PLATELET # BLD AUTO: 286 K/UL (ref 150–350)
PMV BLD AUTO: 10.1 FL (ref 9.2–12.9)
POTASSIUM SERPL-SCNC: 4.8 MMOL/L (ref 3.5–5.1)
PROT SERPL-MCNC: 8.3 G/DL (ref 6–8.4)
RBC # BLD AUTO: 2.96 M/UL (ref 4–5.4)
SODIUM SERPL-SCNC: 141 MMOL/L (ref 136–145)
WBC # BLD AUTO: 6.59 K/UL (ref 3.9–12.7)

## 2019-09-12 PROCEDURE — 36415 COLL VENOUS BLD VENIPUNCTURE: CPT | Mod: PO

## 2019-09-12 PROCEDURE — 80053 COMPREHEN METABOLIC PANEL: CPT

## 2019-09-12 PROCEDURE — 83036 HEMOGLOBIN GLYCOSYLATED A1C: CPT

## 2019-09-12 PROCEDURE — 85025 COMPLETE CBC W/AUTO DIFF WBC: CPT

## 2019-09-16 ENCOUNTER — OFFICE VISIT (OUTPATIENT)
Dept: INTERNAL MEDICINE | Facility: CLINIC | Age: 84
End: 2019-09-16
Payer: MEDICARE

## 2019-09-16 VITALS
DIASTOLIC BLOOD PRESSURE: 80 MMHG | RESPIRATION RATE: 18 BRPM | BODY MASS INDEX: 24.36 KG/M2 | HEIGHT: 65 IN | WEIGHT: 146.19 LBS | SYSTOLIC BLOOD PRESSURE: 142 MMHG | TEMPERATURE: 98 F | HEART RATE: 82 BPM

## 2019-09-16 DIAGNOSIS — D64.89 ANEMIA DUE TO OTHER CAUSE, NOT CLASSIFIED: ICD-10-CM

## 2019-09-16 DIAGNOSIS — I10 ESSENTIAL HYPERTENSION: Chronic | ICD-10-CM

## 2019-09-16 DIAGNOSIS — N18.30 CKD (CHRONIC KIDNEY DISEASE), STAGE III: Chronic | ICD-10-CM

## 2019-09-16 DIAGNOSIS — K59.09 CHRONIC CONSTIPATION: Chronic | ICD-10-CM

## 2019-09-16 DIAGNOSIS — G89.4 CHRONIC PAIN SYNDROME: ICD-10-CM

## 2019-09-16 DIAGNOSIS — M15.9 PRIMARY OSTEOARTHRITIS INVOLVING MULTIPLE JOINTS: Chronic | ICD-10-CM

## 2019-09-16 DIAGNOSIS — E78.5 HYPERLIPIDEMIA, UNSPECIFIED HYPERLIPIDEMIA TYPE: ICD-10-CM

## 2019-09-16 DIAGNOSIS — E11.22 TYPE 2 DIABETES MELLITUS WITH CHRONIC KIDNEY DISEASE, WITHOUT LONG-TERM CURRENT USE OF INSULIN, UNSPECIFIED CKD STAGE: Primary | Chronic | ICD-10-CM

## 2019-09-16 PROCEDURE — 99999 PR PBB SHADOW E&M-EST. PATIENT-LVL III: ICD-10-PCS | Mod: PBBFAC,,, | Performed by: INTERNAL MEDICINE

## 2019-09-16 PROCEDURE — 99499 UNLISTED E&M SERVICE: CPT | Mod: S$GLB,,, | Performed by: INTERNAL MEDICINE

## 2019-09-16 PROCEDURE — 99999 PR PBB SHADOW E&M-EST. PATIENT-LVL III: CPT | Mod: PBBFAC,,, | Performed by: INTERNAL MEDICINE

## 2019-09-16 PROCEDURE — 3079F DIAST BP 80-89 MM HG: CPT | Mod: CPTII,S$GLB,, | Performed by: INTERNAL MEDICINE

## 2019-09-16 PROCEDURE — 3077F PR MOST RECENT SYSTOLIC BLOOD PRESSURE >= 140 MM HG: ICD-10-PCS | Mod: CPTII,S$GLB,, | Performed by: INTERNAL MEDICINE

## 2019-09-16 PROCEDURE — 3077F SYST BP >= 140 MM HG: CPT | Mod: CPTII,S$GLB,, | Performed by: INTERNAL MEDICINE

## 2019-09-16 PROCEDURE — 99214 PR OFFICE/OUTPT VISIT, EST, LEVL IV, 30-39 MIN: ICD-10-PCS | Mod: S$GLB,,, | Performed by: INTERNAL MEDICINE

## 2019-09-16 PROCEDURE — 99499 RISK ADDL DX/OHS AUDIT: ICD-10-PCS | Mod: S$GLB,,, | Performed by: INTERNAL MEDICINE

## 2019-09-16 PROCEDURE — 1101F PT FALLS ASSESS-DOCD LE1/YR: CPT | Mod: CPTII,S$GLB,, | Performed by: INTERNAL MEDICINE

## 2019-09-16 PROCEDURE — 3079F PR MOST RECENT DIASTOLIC BLOOD PRESSURE 80-89 MM HG: ICD-10-PCS | Mod: CPTII,S$GLB,, | Performed by: INTERNAL MEDICINE

## 2019-09-16 PROCEDURE — 99214 OFFICE O/P EST MOD 30 MIN: CPT | Mod: S$GLB,,, | Performed by: INTERNAL MEDICINE

## 2019-09-16 PROCEDURE — 1101F PR PT FALLS ASSESS DOC 0-1 FALLS W/OUT INJ PAST YR: ICD-10-PCS | Mod: CPTII,S$GLB,, | Performed by: INTERNAL MEDICINE

## 2019-09-16 RX ORDER — LEVOCETIRIZINE DIHYDROCHLORIDE 5 MG/1
5 TABLET, FILM COATED ORAL DAILY PRN
Qty: 30 TABLET | Refills: 4 | Status: SHIPPED | OUTPATIENT
Start: 2019-09-16 | End: 2020-09-21 | Stop reason: SDUPTHER

## 2019-09-16 RX ORDER — FLUTICASONE PROPIONATE 50 MCG
2 SPRAY, SUSPENSION (ML) NASAL DAILY
Qty: 16 G | Refills: 4 | Status: SHIPPED | OUTPATIENT
Start: 2019-09-16 | End: 2019-10-16

## 2019-09-22 NOTE — PROGRESS NOTES
Subjective:       Patient ID: Melody Armstrong is a 85 y.o. female.    Chief Complaint: Follow-up (4 Month) and Shoulder Pain (Left shoulder; x2 weeks)    HPI   The patient presents for follow-up of medical conditions.  Active medical conditions include hypertension, type 2 diabetes mellitus, and chronic kidney disease. The patient also suffers with chronic constipation.  We discussed adjusting the dose of Linzess.    She does not monitor blood sugar levels.  She feels she has been fairly compliant with her diet however.    She does have chronic left shoulder pain due to arthritis.  She also has joint pain involving the left knee and both hands.  She uses tramadol and Tylenol for pain management.  She has not experienced any adverse side effects from the pain medication.  She cannot use NSAID therapy in view of her chronic kidney disease.    Review of Systems   Constitutional: Negative for activity change, appetite change and unexpected weight change.   HENT: Positive for congestion, postnasal drip and rhinorrhea.    Eyes: Negative for visual disturbance.   Respiratory: Negative for shortness of breath.    Cardiovascular: Negative for chest pain, palpitations and leg swelling.   Gastrointestinal: Positive for constipation. Negative for abdominal pain, blood in stool and diarrhea.   Genitourinary: Negative for dysuria, frequency, hematuria and urgency.   Musculoskeletal: Positive for arthralgias.   Neurological: Positive for numbness. Negative for weakness and headaches.   Psychiatric/Behavioral: Negative for sleep disturbance.       Objective:      Physical Exam   Constitutional: She is oriented to person, place, and time. She appears well-developed and well-nourished. No distress.   HENT:   Head: Normocephalic and atraumatic.   Eyes: Pupils are equal, round, and reactive to light. Conjunctivae and EOM are normal. No scleral icterus.   Neck: Normal range of motion. Neck supple. No JVD present. No thyromegaly  present.   Cardiovascular: Normal rate, regular rhythm, normal heart sounds and intact distal pulses. Exam reveals no gallop and no friction rub.   No murmur heard.  Pulmonary/Chest: Effort normal and breath sounds normal. No respiratory distress. She has no wheezes. She has no rales.   Abdominal: Soft. Bowel sounds are normal. She exhibits no mass. There is no tenderness.   Musculoskeletal: Normal range of motion. She exhibits no edema or tenderness.   Lymphadenopathy:     She has no cervical adenopathy.   Neurological: She is alert and oriented to person, place, and time. No cranial nerve deficit.   Sensory exam is intact in both feet on monofilament and vibration testing.   Skin: Skin is warm and dry. No rash noted.   No foot lesions are present.   Psychiatric: She has a normal mood and affect. Her behavior is normal.   Nursing note and vitals reviewed.      Results for orders placed or performed in visit on 09/12/19   CBC auto differential   Result Value Ref Range    WBC 6.59 3.90 - 12.70 K/uL    RBC 2.96 (L) 4.00 - 5.40 M/uL    Hemoglobin 9.6 (L) 12.0 - 16.0 g/dL    Hematocrit 30.0 (L) 37.0 - 48.5 %    Mean Corpuscular Volume 101 (H) 82 - 98 fL    Mean Corpuscular Hemoglobin 32.4 (H) 27.0 - 31.0 pg    Mean Corpuscular Hemoglobin Conc 32.0 32.0 - 36.0 g/dL    RDW 13.2 11.5 - 14.5 %    Platelets 286 150 - 350 K/uL    MPV 10.1 9.2 - 12.9 fL    Immature Granulocytes 0.3 0.0 - 0.5 %    Gran # (ANC) 3.9 1.8 - 7.7 K/uL    Immature Grans (Abs) 0.02 0.00 - 0.04 K/uL    Lymph # 1.9 1.0 - 4.8 K/uL    Mono # 0.5 0.3 - 1.0 K/uL    Eos # 0.2 0.0 - 0.5 K/uL    Baso # 0.04 0.00 - 0.20 K/uL    nRBC 0 0 /100 WBC    Gran% 58.5 38.0 - 73.0 %    Lymph% 28.8 18.0 - 48.0 %    Mono% 8.2 4.0 - 15.0 %    Eosinophil% 3.6 0.0 - 8.0 %    Basophil% 0.6 0.0 - 1.9 %    Differential Method Automated    Comprehensive metabolic panel   Result Value Ref Range    Sodium 141 136 - 145 mmol/L    Potassium 4.8 3.5 - 5.1 mmol/L    Chloride 108 95 -  110 mmol/L    CO2 24 23 - 29 mmol/L    Glucose 93 70 - 110 mg/dL    BUN, Bld 20 8 - 23 mg/dL    Creatinine 1.4 0.5 - 1.4 mg/dL    Calcium 10.0 8.7 - 10.5 mg/dL    Total Protein 8.3 6.0 - 8.4 g/dL    Albumin 4.2 3.5 - 5.2 g/dL    Total Bilirubin 0.4 0.1 - 1.0 mg/dL    Alkaline Phosphatase 99 55 - 135 U/L    AST 23 10 - 40 U/L    ALT 16 10 - 44 U/L    Anion Gap 9 8 - 16 mmol/L    eGFR if African American 39.5 (A) >60 mL/min/1.73 m^2    eGFR if non  34.3 (A) >60 mL/min/1.73 m^2   Hemoglobin A1c   Result Value Ref Range    Hemoglobin A1C 5.0 4.0 - 5.6 %    Estimated Avg Glucose 97 68 - 131 mg/dL       Assessment:       1. Type 2 diabetes mellitus with chronic kidney disease, without long-term current use of insulin, unspecified CKD stage    2. CKD (chronic kidney disease), stage III    3. Chronic constipation    4. Hyperlipidemia, unspecified hyperlipidemia type    5. Primary osteoarthritis involving multiple joints    6. Essential hypertension    7. Anemia due to other cause, not classified    8. Chronic pain syndrome        Plan:     Melody was seen today for follow-up and shoulder pain. The dose of Linzess will be increased to 290 mcg daily.  Flonase and Xyzal will be ordered for rhinitis symptoms.  The patient is    Diagnoses and all orders for this visit:    Type 2 diabetes mellitus with chronic kidney disease, without long-term current use of insulin, unspecified CKD stage  -     Hemoglobin A1c; Future    CKD (chronic kidney disease), stage III    Chronic constipation    Hyperlipidemia, unspecified hyperlipidemia type  -     Comprehensive metabolic panel; Future  -     Lipid panel; Future    Primary osteoarthritis involving multiple joints    Essential hypertension  -     CBC auto differential; Future    Anemia due to other cause, not classified    Chronic pain syndrome    Other orders  -     linaCLOtide (LINZESS) 290 mcg Cap capsule; Take 1 capsule (290 mcg total) by mouth once daily. For chronic  constipation.  -     fluticasone propionate (FLONASE) 50 mcg/actuation nasal spray; 2 sprays (100 mcg total) by Each Nostril route once daily.  -     levocetirizine (XYZAL) 5 MG tablet; Take 1 tablet (5 mg total) by mouth daily as needed for Allergies.

## 2019-10-22 RX ORDER — TRAMADOL HYDROCHLORIDE 50 MG/1
TABLET ORAL
Qty: 180 TABLET | Refills: 1 | Status: SHIPPED | OUTPATIENT
Start: 2019-10-22 | End: 2020-01-20 | Stop reason: SDUPTHER

## 2019-12-31 ENCOUNTER — PATIENT OUTREACH (OUTPATIENT)
Dept: ADMINISTRATIVE | Facility: OTHER | Age: 84
End: 2019-12-31

## 2020-01-03 ENCOUNTER — OFFICE VISIT (OUTPATIENT)
Dept: PODIATRY | Facility: CLINIC | Age: 85
End: 2020-01-03
Payer: MEDICARE

## 2020-01-03 VITALS
HEART RATE: 76 BPM | BODY MASS INDEX: 24.32 KG/M2 | SYSTOLIC BLOOD PRESSURE: 124 MMHG | WEIGHT: 146 LBS | DIASTOLIC BLOOD PRESSURE: 64 MMHG | HEIGHT: 65 IN

## 2020-01-03 DIAGNOSIS — I73.9 PAD (PERIPHERAL ARTERY DISEASE): Primary | ICD-10-CM

## 2020-01-03 DIAGNOSIS — B35.1 ONYCHOMYCOSIS: ICD-10-CM

## 2020-01-03 PROCEDURE — 99499 UNLISTED E&M SERVICE: CPT | Mod: S$GLB,,, | Performed by: PODIATRIST

## 2020-01-03 PROCEDURE — 99499 RISK ADDL DX/OHS AUDIT: ICD-10-PCS | Mod: S$GLB,,, | Performed by: PODIATRIST

## 2020-01-03 PROCEDURE — 99999 PR PBB SHADOW E&M-EST. PATIENT-LVL III: ICD-10-PCS | Mod: PBBFAC,,, | Performed by: PODIATRIST

## 2020-01-03 PROCEDURE — 99999 PR PBB SHADOW E&M-EST. PATIENT-LVL III: CPT | Mod: PBBFAC,,, | Performed by: PODIATRIST

## 2020-01-03 PROCEDURE — 11721 ROUTINE FOOT CARE: ICD-10-PCS | Mod: Q8,S$GLB,, | Performed by: PODIATRIST

## 2020-01-03 PROCEDURE — 11721 DEBRIDE NAIL 6 OR MORE: CPT | Mod: Q8,S$GLB,, | Performed by: PODIATRIST

## 2020-01-03 NOTE — PROCEDURES
Routine Foot Care  Date/Time: 1/3/2020 10:30 AM  Performed by: Doug Laguerre DPM  Authorized by: Doug Laguerre DPM     Consent Done?:  Yes (Verbal)  Hyperkeratotic Skin Lesions?: No      Nail Care Type:  Debride  Location(s): All  (Left 1st Toe, Left 3rd Toe, Left 2nd Toe, Left 4th Toe, Left 5th Toe, Right 1st Toe, Right 2nd Toe, Right 3rd Toe, Right 4th Toe and Right 5th Toe)  Patient tolerance:  Patient tolerated the procedure well with no immediate complications     Used sterile nail nipper.

## 2020-01-03 NOTE — PROGRESS NOTES
Subjective:      Patient ID: Melody Armstrong is a 85 y.o. female.    Chief Complaint: Diabetes Mellitus (Dr. Andres 9/16/19) and Diabetic Foot Exam    Melody is a 85 y.o. female who presents to the clinic for evaluation and treatment of diabetic feet. Melody has a past medical history of Anemia, Asymptomatic cholelithiasis, Coronary artery disease, Diabetes mellitus type II, GERD (gastroesophageal reflux disease), Hyperlipidemia, and Hypertension. Patient relates no major problem with feet. Complains of thickened toenails that she is unable to cut herself.  Relates she would like to be seen more often.     PCP: Zac Andres MD    Date Last Seen by PCP: 9/16/19    Current shoe gear: Casual shoes    Hemoglobin A1C   Date Value Ref Range Status   09/12/2019 5.0 4.0 - 5.6 % Final     Comment:     ADA Screening Guidelines:  5.7-6.4%  Consistent with prediabetes  >or=6.5%  Consistent with diabetes  High levels of fetal hemoglobin interfere with the HbA1C  assay. Heterozygous hemoglobin variants (HbS, HgC, etc)do  not significantly interfere with this assay.   However, presence of multiple variants may affect accuracy.     05/09/2019 5.2 4.0 - 5.6 % Final     Comment:     ADA Screening Guidelines:  5.7-6.4%  Consistent with prediabetes  >or=6.5%  Consistent with diabetes  High levels of fetal hemoglobin interfere with the HbA1C  assay. Heterozygous hemoglobin variants (HbS, HgC, etc)do  not significantly interfere with this assay.   However, presence of multiple variants may affect accuracy.     01/04/2019 5.1 4.0 - 5.6 % Final     Comment:     ADA Screening Guidelines:  5.7-6.4%  Consistent with prediabetes  >or=6.5%  Consistent with diabetes  High levels of fetal hemoglobin interfere with the HbA1C  assay. Heterozygous hemoglobin variants (HbS, HgC, etc)do  not significantly interfere with this assay.   However, presence of multiple variants may affect accuracy.       Vitals:    01/03/20 1102   BP: 124/64   Pulse:  76     Past Medical History:   Diagnosis Date    Anemia     Asymptomatic cholelithiasis     Coronary artery disease     Diabetes mellitus type II     GERD (gastroesophageal reflux disease)     Hyperlipidemia     Hypertension        Past Surgical History:   Procedure Laterality Date    CORONARY ARTERY BYPASS GRAFT      JOINT REPLACEMENT      right    KNEE SURGERY      PARTIAL HYSTERECTOMY      SHOULDER ARTHROSCOPY W/ ROTATOR CUFF REPAIR      right shoulder       Family History   Problem Relation Age of Onset    Diabetes Mother     Heart disease Mother     Diabetes Maternal Grandmother        Social History     Socioeconomic History    Marital status:      Spouse name: Not on file    Number of children: Not on file    Years of education: Not on file    Highest education level: Not on file   Occupational History    Not on file   Social Needs    Financial resource strain: Not on file    Food insecurity:     Worry: Not on file     Inability: Not on file    Transportation needs:     Medical: Not on file     Non-medical: Not on file   Tobacco Use    Smoking status: Never Smoker    Smokeless tobacco: Never Used   Substance and Sexual Activity    Alcohol use: No    Drug use: No    Sexual activity: Not on file   Lifestyle    Physical activity:     Days per week: Not on file     Minutes per session: Not on file    Stress: Not on file   Relationships    Social connections:     Talks on phone: Not on file     Gets together: Not on file     Attends [employer] service: Not on file     Active member of club or organization: Not on file     Attends meetings of clubs or organizations: Not on file     Relationship status: Not on file   Other Topics Concern    Not on file   Social History Narrative    Not on file       Current Outpatient Medications   Medication Sig Dispense Refill    aspirin 325 MG tablet Take 1 tablet by mouth.      atorvastatin (LIPITOR) 20 MG tablet Take 1 tablet (20 mg  total) by mouth once daily. 90 tablet 3    atorvastatin (LIPITOR) 20 MG tablet TAKE 1 TABLET (20 MG TOTAL) BY MOUTH ONCE DAILY. 90 tablet 3    blood sugar diagnostic (GLUCOSE BLOOD) Strp Check blood glucose level once daily. 1 Bottle 8    cyanocobalamin (VITAMIN B-12) 1000 MCG tablet Take 1 tablet by mouth.      diclofenac sodium (VOLTAREN) 1 % Gel Apply 2 g topically 4 (four) times daily. (Patient taking differently: Apply 2 g topically as needed. ) 1 Tube 2    gabapentin (NEURONTIN) 100 MG capsule Take 1 capsule (100 mg total) by mouth 3 (three) times daily. 270 capsule 3    hydroCHLOROthiazide (HYDRODIURIL) 25 MG tablet TAKE ONE TABLET BY MOUTH DAILY FOR FLUID. 90 tablet 3    KLOR-CON 10 10 mEq TbSR Take 1 tablet (10 mEq total) by mouth once daily. 90 tablet 3    levocetirizine (XYZAL) 5 MG tablet Take 1 tablet (5 mg total) by mouth daily as needed for Allergies. 30 tablet 4    linaCLOtide (LINZESS) 290 mcg Cap capsule Take 1 capsule (290 mcg total) by mouth once daily. For chronic constipation. 30 capsule 11    losartan (COZAAR) 50 MG tablet TAKE (1) TABLET BY MOUTH DAILY HIGH BLOOD PRESSURE. 90 tablet 3    losartan (COZAAR) 50 MG tablet TAKE (1) TABLET BY MOUTH DAILY HIGH BLOOD PRESSURE. 90 tablet 3    meclizine (ANTIVERT) 12.5 mg tablet Take 1 tablet (12.5 mg total) by mouth 3 (three) times daily as needed for Dizziness. 40 tablet 4    metoprolol succinate (TOPROL-XL) 25 MG 24 hr tablet TAKE (1) TABLET BY MOUTH DAILY HIGH BLOOD PRESSURE. 90 tablet 3    potassium chloride (KLOR-CON) 10 MEQ TbSR Take 1 tablet (10 mEq total) by mouth once daily. 90 tablet 3    traMADol (ULTRAM) 50 mg tablet TAKE TWO TABLETS BY MOUTH EVERY 8 HOURS AS NEEDED FOR PAIN 180 tablet 1    walker Misc Use daily for assisted ambulation 1 each 0     No current facility-administered medications for this visit.        Review of patient's allergies indicates:  No Known Allergies        Review of Systems   Constitution: Negative  for chills, fever and malaise/fatigue.   Cardiovascular: Negative for claudication.   Skin: Positive for nail changes. Negative for color change, dry skin, flushing, itching and poor wound healing.   Musculoskeletal: Positive for arthritis. Negative for back pain, falls, gout, joint pain and joint swelling.   Neurological: Negative for numbness, paresthesias and weakness.   Psychiatric/Behavioral: Negative for altered mental status.           Objective:      Physical Exam   Constitutional: She is oriented to person, place, and time. She appears well-nourished. No distress.   Cardiovascular:   Pulses:       Dorsalis pedis pulses are 1+ on the right side, and 1+ on the left side.        Posterior tibial pulses are 1+ on the right side, and 1+ on the left side.   CFT 2-3 secs all toes bilateral foot, skin temp warm bilateral foot, no hair growth bilateral lower extremity, mild nonpitting lower extremity edema bilateral. + varicosities bilateral.       Musculoskeletal:   Muscle strength 5/5 in all muscle groups bilateral.  No tenderness nor crepitation with ROM of foot/ankle joints bilateral.  No tenderness with palpation of bilateral foot and ankle.  Bilateral pes planus foot type.  Bilateral gastrocnemius equinus.  Bilateral hallux abducto valgus.  Bilateral semi-reducible contracture of toes 2-5 with adductovarus rotation of the 5th.     Neurological: She is alert and oriented to person, place, and time. She has normal strength. No sensory deficit.   Light touch intact bilateral.  Protective sensation intact bilateral per [place] monofilament.  Vibratory sensation intact bilateral.   Skin: Skin is warm, dry and intact. No rash noted. She is not diaphoretic. No cyanosis or erythema. No pallor. Nails show no clubbing.   Nails 1-5 bilateral are thickened 2-3 mm, discolored, and elongated 4-5 mm with subungual debris.  Superior growth and dystrophy of right second and fifth toenails bilateral.     No open  wounds, interdigital maceration, or hyperkeratoses noted bilateral.    Skin is dry to foot bilateral.    Skin is atrophy to lower extremity bilateral.             Assessment:       Encounter Diagnoses   Name Primary?    PAD (peripheral artery disease) Yes    Onychomycosis          Plan:       Melody was seen today for diabetes mellitus and diabetic foot exam.    Diagnoses and all orders for this visit:    PAD (peripheral artery disease)  -     Routine Foot Care    Onychomycosis  -     Routine Foot Care      I counseled the patient on her conditions, their implications and medical management.    Shoe inspection. Diabetic Foot Education. Patient reminded of the importance of good nutrition and blood sugar control to help prevent podiatric complications of diabetes. Patient instructed on proper foot hygeine. We discussed wearing proper shoe gear, daily foot inspections, never walking without protective shoe gear, never putting sharp instruments to feet, routine podiatric nail visits every 2-3 months.      Routine foot care per attached note. Patient relates relief following the procedure. She will continue to monitor the areas daily, inspect her feet, wear protective shoe gear when ambulatory, moisturizer to maintain skin integrity and follow in this office in approximately 4 months, sooner p.r.n.    No longer taking any diabetic medications.  Her last hemoglobin A1c was 5%.  Type 2 diabetes is and her current problem list however not sure this is entirely accurate.    A portion of this note was generated by voice recognition software and may contain topical graphical errors.

## 2020-01-17 ENCOUNTER — LAB VISIT (OUTPATIENT)
Dept: LAB | Facility: HOSPITAL | Age: 85
End: 2020-01-17
Attending: INTERNAL MEDICINE
Payer: MEDICARE

## 2020-01-17 DIAGNOSIS — E11.22 TYPE 2 DIABETES MELLITUS WITH CHRONIC KIDNEY DISEASE, WITHOUT LONG-TERM CURRENT USE OF INSULIN, UNSPECIFIED CKD STAGE: Chronic | ICD-10-CM

## 2020-01-17 DIAGNOSIS — I10 ESSENTIAL HYPERTENSION: Chronic | ICD-10-CM

## 2020-01-17 DIAGNOSIS — E78.5 HYPERLIPIDEMIA, UNSPECIFIED HYPERLIPIDEMIA TYPE: ICD-10-CM

## 2020-01-17 LAB
ALBUMIN SERPL BCP-MCNC: 4 G/DL (ref 3.5–5.2)
ALP SERPL-CCNC: 98 U/L (ref 55–135)
ALT SERPL W/O P-5'-P-CCNC: 23 U/L (ref 10–44)
ANION GAP SERPL CALC-SCNC: 9 MMOL/L (ref 8–16)
AST SERPL-CCNC: 25 U/L (ref 10–40)
BASOPHILS # BLD AUTO: 0.03 K/UL (ref 0–0.2)
BASOPHILS NFR BLD: 0.5 % (ref 0–1.9)
BILIRUB SERPL-MCNC: 0.5 MG/DL (ref 0.1–1)
BUN SERPL-MCNC: 26 MG/DL (ref 8–23)
CALCIUM SERPL-MCNC: 10.3 MG/DL (ref 8.7–10.5)
CHLORIDE SERPL-SCNC: 107 MMOL/L (ref 95–110)
CHOLEST SERPL-MCNC: 141 MG/DL (ref 120–199)
CHOLEST/HDLC SERPL: 2.3 {RATIO} (ref 2–5)
CO2 SERPL-SCNC: 25 MMOL/L (ref 23–29)
CREAT SERPL-MCNC: 1.6 MG/DL (ref 0.5–1.4)
DIFFERENTIAL METHOD: ABNORMAL
EOSINOPHIL # BLD AUTO: 0.3 K/UL (ref 0–0.5)
EOSINOPHIL NFR BLD: 4.9 % (ref 0–8)
ERYTHROCYTE [DISTWIDTH] IN BLOOD BY AUTOMATED COUNT: 13.1 % (ref 11.5–14.5)
EST. GFR  (AFRICAN AMERICAN): 33.6 ML/MIN/1.73 M^2
EST. GFR  (NON AFRICAN AMERICAN): 29.2 ML/MIN/1.73 M^2
ESTIMATED AVG GLUCOSE: 105 MG/DL (ref 68–131)
GLUCOSE SERPL-MCNC: 90 MG/DL (ref 70–110)
HBA1C MFR BLD HPLC: 5.3 % (ref 4–5.6)
HCT VFR BLD AUTO: 29.6 % (ref 37–48.5)
HDLC SERPL-MCNC: 62 MG/DL (ref 40–75)
HDLC SERPL: 44 % (ref 20–50)
HGB BLD-MCNC: 9.2 G/DL (ref 12–16)
IMM GRANULOCYTES # BLD AUTO: 0.02 K/UL (ref 0–0.04)
IMM GRANULOCYTES NFR BLD AUTO: 0.3 % (ref 0–0.5)
LDLC SERPL CALC-MCNC: 61.4 MG/DL (ref 63–159)
LYMPHOCYTES # BLD AUTO: 1.9 K/UL (ref 1–4.8)
LYMPHOCYTES NFR BLD: 30.4 % (ref 18–48)
MCH RBC QN AUTO: 32.3 PG (ref 27–31)
MCHC RBC AUTO-ENTMCNC: 31.1 G/DL (ref 32–36)
MCV RBC AUTO: 104 FL (ref 82–98)
MONOCYTES # BLD AUTO: 0.7 K/UL (ref 0.3–1)
MONOCYTES NFR BLD: 10.9 % (ref 4–15)
NEUTROPHILS # BLD AUTO: 3.4 K/UL (ref 1.8–7.7)
NEUTROPHILS NFR BLD: 53 % (ref 38–73)
NONHDLC SERPL-MCNC: 79 MG/DL
NRBC BLD-RTO: 0 /100 WBC
PLATELET # BLD AUTO: 265 K/UL (ref 150–350)
PMV BLD AUTO: 10 FL (ref 9.2–12.9)
POTASSIUM SERPL-SCNC: 5.2 MMOL/L (ref 3.5–5.1)
PROT SERPL-MCNC: 7.8 G/DL (ref 6–8.4)
RBC # BLD AUTO: 2.85 M/UL (ref 4–5.4)
SODIUM SERPL-SCNC: 141 MMOL/L (ref 136–145)
TRIGL SERPL-MCNC: 88 MG/DL (ref 30–150)
WBC # BLD AUTO: 6.32 K/UL (ref 3.9–12.7)

## 2020-01-17 PROCEDURE — 36415 COLL VENOUS BLD VENIPUNCTURE: CPT | Mod: PO

## 2020-01-17 PROCEDURE — 80061 LIPID PANEL: CPT

## 2020-01-17 PROCEDURE — 80053 COMPREHEN METABOLIC PANEL: CPT

## 2020-01-17 PROCEDURE — 83036 HEMOGLOBIN GLYCOSYLATED A1C: CPT

## 2020-01-17 PROCEDURE — 85025 COMPLETE CBC W/AUTO DIFF WBC: CPT

## 2020-01-20 ENCOUNTER — OFFICE VISIT (OUTPATIENT)
Dept: INTERNAL MEDICINE | Facility: CLINIC | Age: 85
End: 2020-01-20
Payer: MEDICARE

## 2020-01-20 VITALS
WEIGHT: 142.88 LBS | RESPIRATION RATE: 12 BRPM | HEART RATE: 86 BPM | SYSTOLIC BLOOD PRESSURE: 144 MMHG | DIASTOLIC BLOOD PRESSURE: 60 MMHG | BODY MASS INDEX: 23.81 KG/M2 | TEMPERATURE: 98 F | HEIGHT: 65 IN

## 2020-01-20 DIAGNOSIS — I10 ESSENTIAL HYPERTENSION: ICD-10-CM

## 2020-01-20 DIAGNOSIS — E11.22 TYPE 2 DIABETES MELLITUS WITH CHRONIC KIDNEY DISEASE, WITHOUT LONG-TERM CURRENT USE OF INSULIN, UNSPECIFIED CKD STAGE: ICD-10-CM

## 2020-01-20 DIAGNOSIS — E78.5 HYPERLIPIDEMIA, UNSPECIFIED HYPERLIPIDEMIA TYPE: Primary | ICD-10-CM

## 2020-01-20 DIAGNOSIS — M15.9 PRIMARY OSTEOARTHRITIS INVOLVING MULTIPLE JOINTS: ICD-10-CM

## 2020-01-20 DIAGNOSIS — N18.4 CHRONIC KIDNEY DISEASE, STAGE 4 (SEVERE): ICD-10-CM

## 2020-01-20 PROCEDURE — 1159F PR MEDICATION LIST DOCUMENTED IN MEDICAL RECORD: ICD-10-PCS | Mod: S$GLB,,, | Performed by: INTERNAL MEDICINE

## 2020-01-20 PROCEDURE — 3077F PR MOST RECENT SYSTOLIC BLOOD PRESSURE >= 140 MM HG: ICD-10-PCS | Mod: CPTII,S$GLB,, | Performed by: INTERNAL MEDICINE

## 2020-01-20 PROCEDURE — 99999 PR PBB SHADOW E&M-EST. PATIENT-LVL III: ICD-10-PCS | Mod: PBBFAC,,, | Performed by: INTERNAL MEDICINE

## 2020-01-20 PROCEDURE — 3078F DIAST BP <80 MM HG: CPT | Mod: CPTII,S$GLB,, | Performed by: INTERNAL MEDICINE

## 2020-01-20 PROCEDURE — 99214 OFFICE O/P EST MOD 30 MIN: CPT | Mod: S$GLB,,, | Performed by: INTERNAL MEDICINE

## 2020-01-20 PROCEDURE — 1101F PT FALLS ASSESS-DOCD LE1/YR: CPT | Mod: CPTII,S$GLB,, | Performed by: INTERNAL MEDICINE

## 2020-01-20 PROCEDURE — 99499 RISK ADDL DX/OHS AUDIT: ICD-10-PCS | Mod: S$GLB,,, | Performed by: INTERNAL MEDICINE

## 2020-01-20 PROCEDURE — 3077F SYST BP >= 140 MM HG: CPT | Mod: CPTII,S$GLB,, | Performed by: INTERNAL MEDICINE

## 2020-01-20 PROCEDURE — 1101F PR PT FALLS ASSESS DOC 0-1 FALLS W/OUT INJ PAST YR: ICD-10-PCS | Mod: CPTII,S$GLB,, | Performed by: INTERNAL MEDICINE

## 2020-01-20 PROCEDURE — 1126F PR PAIN SEVERITY QUANTIFIED, NO PAIN PRESENT: ICD-10-PCS | Mod: S$GLB,,, | Performed by: INTERNAL MEDICINE

## 2020-01-20 PROCEDURE — 1126F AMNT PAIN NOTED NONE PRSNT: CPT | Mod: S$GLB,,, | Performed by: INTERNAL MEDICINE

## 2020-01-20 PROCEDURE — 99499 UNLISTED E&M SERVICE: CPT | Mod: S$GLB,,, | Performed by: INTERNAL MEDICINE

## 2020-01-20 PROCEDURE — 99214 PR OFFICE/OUTPT VISIT, EST, LEVL IV, 30-39 MIN: ICD-10-PCS | Mod: S$GLB,,, | Performed by: INTERNAL MEDICINE

## 2020-01-20 PROCEDURE — 1159F MED LIST DOCD IN RCRD: CPT | Mod: S$GLB,,, | Performed by: INTERNAL MEDICINE

## 2020-01-20 PROCEDURE — 3078F PR MOST RECENT DIASTOLIC BLOOD PRESSURE < 80 MM HG: ICD-10-PCS | Mod: CPTII,S$GLB,, | Performed by: INTERNAL MEDICINE

## 2020-01-20 PROCEDURE — 99999 PR PBB SHADOW E&M-EST. PATIENT-LVL III: CPT | Mod: PBBFAC,,, | Performed by: INTERNAL MEDICINE

## 2020-01-20 RX ORDER — TRAMADOL HYDROCHLORIDE 50 MG/1
TABLET ORAL
Qty: 180 TABLET | Refills: 1 | Status: SHIPPED | OUTPATIENT
Start: 2020-01-20 | End: 2020-05-19 | Stop reason: SDUPTHER

## 2020-01-20 RX ORDER — MECLIZINE HCL 12.5 MG 12.5 MG/1
12.5 TABLET ORAL 3 TIMES DAILY PRN
Qty: 40 TABLET | Refills: 4 | Status: SHIPPED | OUTPATIENT
Start: 2020-01-20 | End: 2021-01-28 | Stop reason: SDUPTHER

## 2020-01-20 NOTE — PROGRESS NOTES
Patient, Melody Armstrong (MRN #[id number]), presented with a recent Estimated Glumerular Filtration Rate (EGFR) between 30 and 45 consistent with the definition of chronic kidney disease stage 3 - moderate (ICD10 - N18.3).    eGFR if    Date Value Ref Range Status   01/17/2020 33.6 (A) >60 mL/min/1.73 m^2 Final       The patient's chronic kidney disease stage 3 was monitored, evaluated, addressed and/or treated. This addendum to the medical record is made on 01/20/2020.

## 2020-01-20 NOTE — PROGRESS NOTES
Subjective:       Patient ID: Melody Armstrong is a 85 y.o. female.    Chief Complaint: Follow-up (4 Month)    HPI     The patient presents for follow-up of medical conditions which include type 2 diabetes mellitus, hypertension chronic kidney disease, hyperlipidemia and osteoarthritis.  She has been using brace to her left knee for support.  She has not experienced any falls.    She does not perform blood sugar monitoring at home.  She has not experien her vision is stable.  She reports she is getting new eyeglasses tomorrow.    She has hypertension but does monitor blood pressure readings home.  She has experienced any hypoglycemic episodes.  She has had a recent eye examination.  She feels her vision has been stable.  She reports fair water intake.  Review of Systems   Constitutional: Positive for activity change. Negative for appetite change and unexpected weight change.   Eyes: Negative for visual disturbance.   Respiratory: Negative for shortness of breath.    Cardiovascular: Negative for chest pain, palpitations and leg swelling.   Gastrointestinal: Negative for abdominal pain, blood in stool and diarrhea.   Genitourinary: Negative for dysuria, frequency, hematuria and urgency.   Musculoskeletal: Positive for arthralgias.   Neurological: Positive for dizziness (Occasional episodes of vertigo.  These respond well to meclizine therapy.). Negative for weakness, numbness and headaches.   Psychiatric/Behavioral: Negative for sleep disturbance.       Objective:      Physical Exam   Constitutional: She is oriented to person, place, and time. She appears well-developed and well-nourished. No distress.   Patient has lost 4 lb since office visit of 09/16/2019.   HENT:   Head: Normocephalic and atraumatic.   Eyes: Pupils are equal, round, and reactive to light. Conjunctivae and EOM are normal. No scleral icterus.   Neck: Normal range of motion. Neck supple. No JVD present. No thyromegaly present.   Cardiovascular:  Normal rate, regular rhythm, normal heart sounds and intact distal pulses. Exam reveals no gallop and no friction rub.   No murmur heard.  Pulmonary/Chest: Effort normal and breath sounds normal. No respiratory distress. She has no wheezes. She has no rales.   Abdominal: Soft. Bowel sounds are normal. She exhibits no mass. There is no tenderness.   Musculoskeletal: Normal range of motion. She exhibits no edema or tenderness.   Lymphadenopathy:     She has no cervical adenopathy.   Neurological: She is alert and oriented to person, place, and time. No cranial nerve deficit.   Sensory exam is intact in both feet on monofilament and vibration testing.   Skin: Skin is warm and dry. No rash noted.   No foot lesions are present.   Psychiatric: She has a normal mood and affect. Her behavior is normal.   Nursing note and vitals reviewed.      Results for orders placed or performed in visit on 01/17/20   CBC auto differential   Result Value Ref Range    WBC 6.32 3.90 - 12.70 K/uL    RBC 2.85 (L) 4.00 - 5.40 M/uL    Hemoglobin 9.2 (L) 12.0 - 16.0 g/dL    Hematocrit 29.6 (L) 37.0 - 48.5 %    Mean Corpuscular Volume 104 (H) 82 - 98 fL    Mean Corpuscular Hemoglobin 32.3 (H) 27.0 - 31.0 pg    Mean Corpuscular Hemoglobin Conc 31.1 (L) 32.0 - 36.0 g/dL    RDW 13.1 11.5 - 14.5 %    Platelets 265 150 - 350 K/uL    MPV 10.0 9.2 - 12.9 fL    Immature Granulocytes 0.3 0.0 - 0.5 %    Gran # (ANC) 3.4 1.8 - 7.7 K/uL    Immature Grans (Abs) 0.02 0.00 - 0.04 K/uL    Lymph # 1.9 1.0 - 4.8 K/uL    Mono # 0.7 0.3 - 1.0 K/uL    Eos # 0.3 0.0 - 0.5 K/uL    Baso # 0.03 0.00 - 0.20 K/uL    nRBC 0 0 /100 WBC    Gran% 53.0 38.0 - 73.0 %    Lymph% 30.4 18.0 - 48.0 %    Mono% 10.9 4.0 - 15.0 %    Eosinophil% 4.9 0.0 - 8.0 %    Basophil% 0.5 0.0 - 1.9 %    Differential Method Automated    Comprehensive metabolic panel   Result Value Ref Range    Sodium 141 136 - 145 mmol/L    Potassium 5.2 (H) 3.5 - 5.1 mmol/L    Chloride 107 95 - 110 mmol/L    CO2 25  23 - 29 mmol/L    Glucose 90 70 - 110 mg/dL    BUN, Bld 26 (H) 8 - 23 mg/dL    Creatinine 1.6 (H) 0.5 - 1.4 mg/dL    Calcium 10.3 8.7 - 10.5 mg/dL    Total Protein 7.8 6.0 - 8.4 g/dL    Albumin 4.0 3.5 - 5.2 g/dL    Total Bilirubin 0.5 0.1 - 1.0 mg/dL    Alkaline Phosphatase 98 55 - 135 U/L    AST 25 10 - 40 U/L    ALT 23 10 - 44 U/L    Anion Gap 9 8 - 16 mmol/L    eGFR if African American 33.6 (A) >60 mL/min/1.73 m^2    eGFR if non  29.2 (A) >60 mL/min/1.73 m^2   Lipid panel   Result Value Ref Range    Cholesterol 141 120 - 199 mg/dL    Triglycerides 88 30 - 150 mg/dL    HDL 62 40 - 75 mg/dL    LDL Cholesterol 61.4 (L) 63.0 - 159.0 mg/dL    Hdl/Cholesterol Ratio 44.0 20.0 - 50.0 %    Total Cholesterol/HDL Ratio 2.3 2.0 - 5.0    Non-HDL Cholesterol 79 mg/dL   Hemoglobin A1c   Result Value Ref Range    Hemoglobin A1C 5.3 4.0 - 5.6 %    Estimated Avg Glucose 105 68 - 131 mg/dL       Assessment:       1. Hyperlipidemia, unspecified hyperlipidemia type    2. Type 2 diabetes mellitus with chronic kidney disease, without long-term current use of insulin, unspecified CKD stage    3. Chronic kidney disease, stage 4 (severe)    4. Essential hypertension    5. Primary osteoarthritis involving multiple joints        Plan:       Melody was seen today for follow-up.  Tramadol will be renewed for joint pain. The patient was encouraged to use Glucerna or similar supplements to facilitate weight gain.  Blood tests and a follow-up visit in 4 months will be obtained.    Diagnoses and all orders for this visit:    Hyperlipidemia, unspecified hyperlipidemia type    Type 2 diabetes mellitus with chronic kidney disease, without long-term current use of insulin, unspecified CKD stage  -     Comprehensive metabolic panel; Future  -     Hemoglobin A1c; Future    Chronic kidney disease, stage 4 (severe)    Essential hypertension    Primary osteoarthritis involving multiple joints    Other orders  -     traMADol (ULTRAM) 50  mg tablet; TAKE TWO TABLETS BY MOUTH EVERY 8 HOURS AS NEEDED FOR PAIN  -     meclizine (ANTIVERT) 12.5 mg tablet; Take 1 tablet (12.5 mg total) by mouth 3 (three) times daily as needed for Dizziness.

## 2020-05-19 ENCOUNTER — OFFICE VISIT (OUTPATIENT)
Dept: INTERNAL MEDICINE | Facility: CLINIC | Age: 85
End: 2020-05-19
Payer: MEDICARE

## 2020-05-19 DIAGNOSIS — E78.5 HYPERLIPIDEMIA, UNSPECIFIED HYPERLIPIDEMIA TYPE: ICD-10-CM

## 2020-05-19 DIAGNOSIS — N18.4 CHRONIC KIDNEY DISEASE, STAGE 4 (SEVERE): ICD-10-CM

## 2020-05-19 DIAGNOSIS — E11.22 TYPE 2 DIABETES MELLITUS WITH CHRONIC KIDNEY DISEASE, WITHOUT LONG-TERM CURRENT USE OF INSULIN, UNSPECIFIED CKD STAGE: Primary | ICD-10-CM

## 2020-05-19 DIAGNOSIS — D64.89 ANEMIA DUE TO OTHER CAUSE, NOT CLASSIFIED: ICD-10-CM

## 2020-05-19 DIAGNOSIS — I10 ESSENTIAL HYPERTENSION: ICD-10-CM

## 2020-05-19 PROCEDURE — 99999 PR PBB SHADOW E&M-EST. PATIENT-LVL II: ICD-10-PCS | Mod: PBBFAC,,, | Performed by: INTERNAL MEDICINE

## 2020-05-19 PROCEDURE — 99442 PR PHYSICIAN TELEPHONE EVALUATION 11-20 MIN: CPT | Mod: 95,,, | Performed by: INTERNAL MEDICINE

## 2020-05-19 PROCEDURE — 99442 PR PHYSICIAN TELEPHONE EVALUATION 11-20 MIN: ICD-10-PCS | Mod: 95,,, | Performed by: INTERNAL MEDICINE

## 2020-05-19 PROCEDURE — 99999 PR PBB SHADOW E&M-EST. PATIENT-LVL II: CPT | Mod: PBBFAC,,, | Performed by: INTERNAL MEDICINE

## 2020-05-19 RX ORDER — INSULIN PUMP SYRINGE, 3 ML
EACH MISCELLANEOUS
Qty: 1 EACH | Refills: 0 | Status: SHIPPED | OUTPATIENT
Start: 2020-05-19 | End: 2021-05-19

## 2020-05-19 RX ORDER — LANCETS
EACH MISCELLANEOUS
Qty: 100 EACH | Refills: 3 | Status: SHIPPED | OUTPATIENT
Start: 2020-05-19

## 2020-05-19 RX ORDER — TRAMADOL HYDROCHLORIDE 50 MG/1
TABLET ORAL
Qty: 180 TABLET | Refills: 1 | Status: SHIPPED | OUTPATIENT
Start: 2020-05-19 | End: 2020-09-21 | Stop reason: SDUPTHER

## 2020-05-19 RX ORDER — LOSARTAN POTASSIUM 50 MG/1
TABLET ORAL
Qty: 90 TABLET | Refills: 3 | Status: SHIPPED | OUTPATIENT
Start: 2020-05-19 | End: 2021-02-05

## 2020-05-19 NOTE — PROGRESS NOTES
Subjective:       Patient ID: Melody Armstrong is a 85 y.o. female.    Chief Complaint: telemedicine Audio visit and Follow-up    HPI   This is a telemedicine audio visit follow-up of the patient's medical conditions which include type 2 diabetes mellitus, hypertension, chronic kidney disease, and anemia.  The patient also has chronic constipation and osteoarthritis of knees.  She is requesting a renewal of her tramadol for treatment of chronic pain.    She has not been monitoring her blood sugar levels.  She reports being compliant with her diet.  She does not monitor her blood pressure but is tolerating her blood pressure medication well without side effects.    Review of Systems   Constitutional: Negative for activity change, appetite change and unexpected weight change.   Eyes: Negative for visual disturbance.   Respiratory: Negative for shortness of breath.    Cardiovascular: Negative for chest pain, palpitations and leg swelling.   Gastrointestinal: Positive for constipation. Negative for abdominal pain, blood in stool and diarrhea.        Constipation symptoms have improved with use of Linzess once or twice a week.   Genitourinary: Negative for dysuria, frequency, hematuria and urgency.   Musculoskeletal: Positive for arthralgias.   Neurological: Negative for weakness, numbness and headaches.   Psychiatric/Behavioral: Negative for sleep disturbance.       Objective:      Physical Exam   Constitutional: She is oriented to person, place, and time.   Neurological: She is alert and oriented to person, place, and time.   Psychiatric: Her behavior is normal. Thought content normal.     No additional findings possible with this virtual audio visit.  Assessment:       1. Type 2 diabetes mellitus with chronic kidney disease, without long-term current use of insulin, unspecified CKD stage    2. Chronic kidney disease, stage 4 (severe)    3. Essential hypertension    4. Anemia due to other cause, not classified    5.  Hyperlipidemia, unspecified hyperlipidemia type        Plan:     Melody was seen today for telemedicine audio visit and follow-up.  Pain medication will be renewed.  Current therapy will be continued.  A prescription for diabetes monitoring supplies will be sent to the patient's health plan-[facility].  Blood tests and follow-up visit in 4 months will be obtained.    Total visit time: 15 minutes.    Diagnoses and all orders for this visit:    Type 2 diabetes mellitus with chronic kidney disease, without long-term current use of insulin, unspecified CKD stage    Chronic kidney disease, stage 4 (severe)    Essential hypertension    Anemia due to other cause, not classified    Hyperlipidemia, unspecified hyperlipidemia type    Other orders  -     losartan (COZAAR) 50 MG tablet; TAKE (1) TABLET BY MOUTH DAILY HIGH BLOOD PRESSURE.  -     traMADoL (ULTRAM) 50 mg tablet; TAKE TWO TABLETS BY MOUTH EVERY 8 HOURS AS NEEDED FOR PAIN

## 2020-05-22 ENCOUNTER — TELEPHONE (OUTPATIENT)
Dept: INTERNAL MEDICINE | Facility: CLINIC | Age: 85
End: 2020-05-22

## 2020-05-22 NOTE — TELEPHONE ENCOUNTER
----- Message from Lisa Srivastava sent at 5/22/2020 11:20 AM CDT -----  Contact: Carmencita [phone]   Carmencita states she needs the Supporting clinicals in regards to the diabetic supplies. Fax [phone] . Please advise

## 2020-08-11 RX ORDER — GABAPENTIN 100 MG/1
100 CAPSULE ORAL 3 TIMES DAILY
Qty: 270 CAPSULE | Refills: 3 | Status: SHIPPED | OUTPATIENT
Start: 2020-08-11 | End: 2022-03-03 | Stop reason: SDUPTHER

## 2020-08-11 RX ORDER — METOPROLOL SUCCINATE 25 MG/1
TABLET, EXTENDED RELEASE ORAL
Qty: 90 TABLET | Refills: 3 | Status: SHIPPED | OUTPATIENT
Start: 2020-08-11 | End: 2021-08-04

## 2020-08-11 NOTE — TELEPHONE ENCOUNTER
Both last refilled for year may 2019.  Last seen may 2020 and has appt in sept to return to see you.

## 2020-08-11 NOTE — TELEPHONE ENCOUNTER
----- Message from Stephanie Vick sent at 8/11/2020  8:34 AM CDT -----  Regarding: refill  Contact: siria(daughter) [phone]  Requesting an RX refill or new RX.  Is this a refill or new RX:  refill  RX name and strength: metoprolol succinate (TOPROL-XL) 25 MG 24 hr tablet  Directions (copy/paste from chart): Sig: TAKE (1) TABLET BY MOUTH DAILY HIGH BLOOD PRESSURE.   Is this a 30 day or 90 day RX:    Local pharmacy or mail order pharmacy:  local  Pharmacy name and phone # (copy/paste from chart):  [facility] #[id number] - [place] - [address] AT [address] OF [facility] [phone] (Phone)  [phone] (Fax)   Comments:          Requesting an RX refill or new RX.  Is this a refill or new RX:  refill  RX name and strength: gabapentin (NEURONTIN) 100 MG capsule  Directions (copy/paste from chart):  Sig - Route: Take 1 capsule (100 mg total) by mouth 3 (three) times daily. - Oral  Is this a 30 day or 90 day RX:    Local pharmacy or mail order pharmacy: local   Pharmacy name and phone # (copy/paste from chart):   [facility]/pharmacy #[id number] - [place] - [address] AT COR[address] OF [address] [phone] (Phone)  [phone] (Fax)  Comments:

## 2020-09-17 ENCOUNTER — LAB VISIT (OUTPATIENT)
Dept: LAB | Facility: HOSPITAL | Age: 85
End: 2020-09-17
Attending: INTERNAL MEDICINE
Payer: MEDICARE

## 2020-09-17 DIAGNOSIS — E11.22 TYPE 2 DIABETES MELLITUS WITH CHRONIC KIDNEY DISEASE, WITHOUT LONG-TERM CURRENT USE OF INSULIN, UNSPECIFIED CKD STAGE: ICD-10-CM

## 2020-09-17 LAB
ALBUMIN SERPL BCP-MCNC: 3.7 G/DL (ref 3.5–5.2)
ALP SERPL-CCNC: 92 U/L (ref 55–135)
ALT SERPL W/O P-5'-P-CCNC: 28 U/L (ref 10–44)
ANION GAP SERPL CALC-SCNC: 8 MMOL/L (ref 8–16)
AST SERPL-CCNC: 31 U/L (ref 10–40)
BILIRUB SERPL-MCNC: 0.3 MG/DL (ref 0.1–1)
BUN SERPL-MCNC: 19 MG/DL (ref 8–23)
CALCIUM SERPL-MCNC: 9.3 MG/DL (ref 8.7–10.5)
CHLORIDE SERPL-SCNC: 105 MMOL/L (ref 95–110)
CO2 SERPL-SCNC: 26 MMOL/L (ref 23–29)
CREAT SERPL-MCNC: 1.3 MG/DL (ref 0.5–1.4)
EST. GFR  (AFRICAN AMERICAN): 42.9 ML/MIN/1.73 M^2
EST. GFR  (NON AFRICAN AMERICAN): 37.2 ML/MIN/1.73 M^2
ESTIMATED AVG GLUCOSE: 105 MG/DL (ref 68–131)
GLUCOSE SERPL-MCNC: 86 MG/DL (ref 70–110)
HBA1C MFR BLD HPLC: 5.3 % (ref 4–5.6)
POTASSIUM SERPL-SCNC: 4.6 MMOL/L (ref 3.5–5.1)
PROT SERPL-MCNC: 7.8 G/DL (ref 6–8.4)
SODIUM SERPL-SCNC: 139 MMOL/L (ref 136–145)

## 2020-09-17 PROCEDURE — 83036 HEMOGLOBIN GLYCOSYLATED A1C: CPT

## 2020-09-17 PROCEDURE — 80053 COMPREHEN METABOLIC PANEL: CPT

## 2020-09-17 PROCEDURE — 36415 COLL VENOUS BLD VENIPUNCTURE: CPT | Mod: PO

## 2020-09-21 ENCOUNTER — OFFICE VISIT (OUTPATIENT)
Dept: INTERNAL MEDICINE | Facility: CLINIC | Age: 85
End: 2020-09-21
Payer: MEDICARE

## 2020-09-21 VITALS
HEART RATE: 84 BPM | DIASTOLIC BLOOD PRESSURE: 66 MMHG | TEMPERATURE: 98 F | BODY MASS INDEX: 23.69 KG/M2 | WEIGHT: 142.19 LBS | HEIGHT: 65 IN | SYSTOLIC BLOOD PRESSURE: 130 MMHG

## 2020-09-21 DIAGNOSIS — E11.22 TYPE 2 DIABETES MELLITUS WITH CHRONIC KIDNEY DISEASE, WITHOUT LONG-TERM CURRENT USE OF INSULIN, UNSPECIFIED CKD STAGE: Primary | ICD-10-CM

## 2020-09-21 DIAGNOSIS — D64.89 ANEMIA DUE TO OTHER CAUSE, NOT CLASSIFIED: ICD-10-CM

## 2020-09-21 DIAGNOSIS — I10 ESSENTIAL HYPERTENSION: ICD-10-CM

## 2020-09-21 DIAGNOSIS — G89.4 CHRONIC PAIN SYNDROME: ICD-10-CM

## 2020-09-21 DIAGNOSIS — K59.09 CHRONIC CONSTIPATION: ICD-10-CM

## 2020-09-21 DIAGNOSIS — M15.9 PRIMARY OSTEOARTHRITIS INVOLVING MULTIPLE JOINTS: ICD-10-CM

## 2020-09-21 DIAGNOSIS — N18.4 CHRONIC KIDNEY DISEASE, STAGE 4 (SEVERE): ICD-10-CM

## 2020-09-21 DIAGNOSIS — E78.5 HYPERLIPIDEMIA, UNSPECIFIED HYPERLIPIDEMIA TYPE: ICD-10-CM

## 2020-09-21 PROCEDURE — 99214 OFFICE O/P EST MOD 30 MIN: CPT | Mod: S$GLB,,, | Performed by: INTERNAL MEDICINE

## 2020-09-21 PROCEDURE — 1101F PR PT FALLS ASSESS DOC 0-1 FALLS W/OUT INJ PAST YR: ICD-10-PCS | Mod: CPTII,S$GLB,, | Performed by: INTERNAL MEDICINE

## 2020-09-21 PROCEDURE — 99999 PR PBB SHADOW E&M-EST. PATIENT-LVL III: CPT | Mod: PBBFAC,,, | Performed by: INTERNAL MEDICINE

## 2020-09-21 PROCEDURE — 1101F PT FALLS ASSESS-DOCD LE1/YR: CPT | Mod: CPTII,S$GLB,, | Performed by: INTERNAL MEDICINE

## 2020-09-21 PROCEDURE — 99499 UNLISTED E&M SERVICE: CPT | Mod: S$GLB,,, | Performed by: INTERNAL MEDICINE

## 2020-09-21 PROCEDURE — 1126F PR PAIN SEVERITY QUANTIFIED, NO PAIN PRESENT: ICD-10-PCS | Mod: S$GLB,,, | Performed by: INTERNAL MEDICINE

## 2020-09-21 PROCEDURE — 1159F MED LIST DOCD IN RCRD: CPT | Mod: S$GLB,,, | Performed by: INTERNAL MEDICINE

## 2020-09-21 PROCEDURE — 99999 PR PBB SHADOW E&M-EST. PATIENT-LVL III: ICD-10-PCS | Mod: PBBFAC,,, | Performed by: INTERNAL MEDICINE

## 2020-09-21 PROCEDURE — 99214 PR OFFICE/OUTPT VISIT, EST, LEVL IV, 30-39 MIN: ICD-10-PCS | Mod: S$GLB,,, | Performed by: INTERNAL MEDICINE

## 2020-09-21 PROCEDURE — 1126F AMNT PAIN NOTED NONE PRSNT: CPT | Mod: S$GLB,,, | Performed by: INTERNAL MEDICINE

## 2020-09-21 PROCEDURE — 99499 RISK ADDL DX/OHS AUDIT: ICD-10-PCS | Mod: S$GLB,,, | Performed by: INTERNAL MEDICINE

## 2020-09-21 PROCEDURE — 1159F PR MEDICATION LIST DOCUMENTED IN MEDICAL RECORD: ICD-10-PCS | Mod: S$GLB,,, | Performed by: INTERNAL MEDICINE

## 2020-09-21 RX ORDER — HYDROCHLOROTHIAZIDE 25 MG/1
TABLET ORAL
Qty: 90 TABLET | Refills: 3 | Status: SHIPPED | OUTPATIENT
Start: 2020-09-21 | End: 2022-03-03 | Stop reason: SDUPTHER

## 2020-09-21 RX ORDER — LEVOCETIRIZINE DIHYDROCHLORIDE 5 MG/1
5 TABLET, FILM COATED ORAL DAILY PRN
Qty: 30 TABLET | Refills: 4 | Status: SHIPPED | OUTPATIENT
Start: 2020-09-21 | End: 2020-12-01

## 2020-09-21 RX ORDER — TRAMADOL HYDROCHLORIDE 50 MG/1
TABLET ORAL
Qty: 180 TABLET | Refills: 1 | Status: SHIPPED | OUTPATIENT
Start: 2020-09-21 | End: 2021-02-05 | Stop reason: SDUPTHER

## 2020-09-21 RX ORDER — ATORVASTATIN CALCIUM 20 MG/1
20 TABLET, FILM COATED ORAL DAILY
Qty: 90 TABLET | Refills: 3 | Status: SHIPPED | OUTPATIENT
Start: 2020-09-21 | End: 2021-06-25 | Stop reason: SDUPTHER

## 2020-09-21 RX ORDER — POTASSIUM CHLORIDE 750 MG/1
10 TABLET, FILM COATED, EXTENDED RELEASE ORAL DAILY
Qty: 90 TABLET | Refills: 3 | Status: SHIPPED | OUTPATIENT
Start: 2020-09-21 | End: 2021-06-25

## 2020-09-21 RX ORDER — LACTULOSE 10 G/15ML
20 SOLUTION ORAL; RECTAL DAILY
Qty: 946 ML | Refills: 5 | Status: SHIPPED | OUTPATIENT
Start: 2020-09-21 | End: 2024-03-18 | Stop reason: SDUPTHER

## 2020-09-21 NOTE — PROGRESS NOTES
Subjective:       Patient ID: Melody Armstrong is a 86 y.o. female.    Chief Complaint: Follow-up (4 mo)    HPI   The patient presents for follow-up of medical conditions which include type 2 diabetes mellitus, hypertension, chronic kidney disease, osteoarthritis, chronic constipation, chronic pain syndrome.  The patient does not monitor her blood sugar levels.  She is walking for exercise at.  She is using Tylenol for her arthritis symptoms.    She continues to note chronic constipation.  She has been using Dulcolax fairly regularly for treatment.  Linzess was not helpful.    Review of Systems   Constitutional: Negative for activity change, appetite change and unexpected weight change.   HENT: Positive for postnasal drip.    Eyes: Negative for visual disturbance.   Respiratory: Negative for shortness of breath.    Cardiovascular: Negative for chest pain, palpitations and leg swelling.   Gastrointestinal: Positive for constipation. Negative for abdominal pain, blood in stool, change in bowel habit, diarrhea and change in bowel habit.   Genitourinary: Negative for dysuria, frequency, hematuria and urgency.   Musculoskeletal: Positive for arthralgias and back pain.   Neurological: Negative for weakness, numbness and headaches.   Psychiatric/Behavioral: Negative for sleep disturbance.         Objective:      Physical Exam  Vitals signs and nursing note reviewed.   Constitutional:       General: She is not in acute distress.     Appearance: She is well-developed.   HENT:      Head: Normocephalic and atraumatic.   Eyes:      General: No scleral icterus.     Conjunctiva/sclera: Conjunctivae normal.      Pupils: Pupils are equal, round, and reactive to light.   Neck:      Musculoskeletal: Normal range of motion and neck supple.      Thyroid: No thyromegaly.      Vascular: No JVD.   Cardiovascular:      Rate and Rhythm: Normal rate and regular rhythm.      Heart sounds: Normal heart sounds. No murmur. No friction rub. No  gallop.    Pulmonary:      Effort: Pulmonary effort is normal. No respiratory distress.      Breath sounds: Normal breath sounds. No wheezing or rales.   Abdominal:      General: Bowel sounds are normal.      Palpations: Abdomen is soft. There is no mass.      Tenderness: There is no abdominal tenderness.   Musculoskeletal: Normal range of motion.         General: No tenderness.   Lymphadenopathy:      Cervical: No cervical adenopathy.   Skin:     General: Skin is warm and dry.      Findings: No rash.      Comments: No foot lesions are present.   Neurological:      Mental Status: She is alert and oriented to person, place, and time.      Cranial Nerves: No cranial nerve deficit.      Comments: Sensory exam is intact in both feet on monofilament  testing.   Psychiatric:         Behavior: Behavior normal.         Results for orders placed or performed in visit on 09/17/20   Comprehensive metabolic panel   Result Value Ref Range    Sodium 139 136 - 145 mmol/L    Potassium 4.6 3.5 - 5.1 mmol/L    Chloride 105 95 - 110 mmol/L    CO2 26 23 - 29 mmol/L    Glucose 86 70 - 110 mg/dL    BUN, Bld 19 8 - 23 mg/dL    Creatinine 1.3 0.5 - 1.4 mg/dL    Calcium 9.3 8.7 - 10.5 mg/dL    Total Protein 7.8 6.0 - 8.4 g/dL    Albumin 3.7 3.5 - 5.2 g/dL    Total Bilirubin 0.3 0.1 - 1.0 mg/dL    Alkaline Phosphatase 92 55 - 135 U/L    AST 31 10 - 40 U/L    ALT 28 10 - 44 U/L    Anion Gap 8 8 - 16 mmol/L    eGFR if African American 42.9 (A) >60 mL/min/1.73 m^2    eGFR if non  37.2 (A) >60 mL/min/1.73 m^2   Hemoglobin A1c   Result Value Ref Range    Hemoglobin A1C 5.3 4.0 - 5.6 %    Estimated Avg Glucose 105 68 - 131 mg/dL       Assessment:       1. Type 2 diabetes mellitus with chronic kidney disease, without long-term current use of insulin, unspecified CKD stage    2. Essential hypertension    3. Chronic kidney disease, stage 4 (severe)    4. Anemia due to other cause, not classified    5. Hyperlipidemia, unspecified  hyperlipidemia type    6. Primary osteoarthritis involving multiple joints    7. Chronic constipation    8. Chronic pain syndrome        Plan:       Melody was seen today for follow-up.  Current medications will be continued except Linzess which has not been effective.  Lactulose will be ordered for daily use with constipation.  Blood tests and a follow-up visit in 4 months will be obtained.    Diagnoses and all orders for this visit:    Type 2 diabetes mellitus with chronic kidney disease, without long-term current use of insulin, unspecified CKD stage    Essential hypertension    Chronic kidney disease, stage 4 (severe)    Anemia due to other cause, not classified    Hyperlipidemia, unspecified hyperlipidemia type    Primary osteoarthritis involving multiple joints    Chronic constipation    Chronic pain syndrome    Other orders  -     atorvastatin (LIPITOR) 20 MG tablet; Take 1 tablet (20 mg total) by mouth once daily.  -     KLOR-CON 10 10 mEq TbSR; Take 1 tablet (10 mEq total) by mouth once daily.  -     hydroCHLOROthiazide (HYDRODIURIL) 25 MG tablet; TAKE ONE TABLET BY MOUTH DAILY FOR FLUID.  -     traMADoL (ULTRAM) 50 mg tablet; TAKE TWO TABLETS BY MOUTH EVERY 8 HOURS AS NEEDED FOR PAIN  -     levocetirizine (XYZAL) 5 MG tablet; Take 1 tablet (5 mg total) by mouth daily as needed for Allergies.  -     lactulose (CHRONULAC) 10 gram/15 mL solution; Take 30 mLs (20 g total) by mouth once daily. For chronic constipation.

## 2020-11-24 ENCOUNTER — PATIENT OUTREACH (OUTPATIENT)
Dept: ADMINISTRATIVE | Facility: OTHER | Age: 85
End: 2020-11-24

## 2020-11-24 DIAGNOSIS — E11.9 DIABETES MELLITUS WITHOUT COMPLICATION: Primary | ICD-10-CM

## 2020-11-24 NOTE — LETTER
November 24, 2020    Zuleika Ugalde MD             [facility]  [address]  [facility] [address]  Phone: [phone] November 24, 2020     Patient: Melody Armstrong    YOB: 1934   Date of Visit: 11/24/2020       Melody Armstrong is a patient of Dr. Andres (PCP) at [facility]. While reviewing his/her chart, it has come to our attention that there is an outstanding lab/procedure. Please help keep our Health Maintenance records as accurate and as up to date as possible by supplying the following:     Most recent eye exam                                                                             Please fax to [facility] @ [phone].    Thank you for your assistance in our patient's healthcare.     Sincerely,     Soni Robbins MA   [facility]  Panel Care Coordinator  [facility]

## 2020-11-25 NOTE — PROGRESS NOTES
Health Maintenance Due   Topic Date Due    TETANUS VACCINE  07/27/1952    Shingles Vaccine (1 of 2) 07/27/1984    Eye Exam  06/13/2014    Foot Exam  09/08/2018    Influenza Vaccine (1) 08/01/2020     Updates were requested from care everywhere.  Chart was reviewed for overdue [facility] ([facility]) topics (CRS, Breast Cancer Screening, Eye exam)  Health Maintenance has been updated.  LINKS immunization registry triggered.  Immunizations were reconciled.  Order placed for diabetic eye screening photo.  Request sent to Dr. Ugalde for most recent eye exam.

## 2021-01-19 ENCOUNTER — LAB VISIT (OUTPATIENT)
Dept: LAB | Facility: HOSPITAL | Age: 86
End: 2021-01-19
Attending: INTERNAL MEDICINE
Payer: MEDICARE

## 2021-01-19 DIAGNOSIS — I10 ESSENTIAL HYPERTENSION: ICD-10-CM

## 2021-01-19 DIAGNOSIS — N18.4 CHRONIC KIDNEY DISEASE, STAGE 4 (SEVERE): ICD-10-CM

## 2021-01-19 DIAGNOSIS — E11.22 TYPE 2 DIABETES MELLITUS WITH CHRONIC KIDNEY DISEASE, WITHOUT LONG-TERM CURRENT USE OF INSULIN, UNSPECIFIED CKD STAGE: ICD-10-CM

## 2021-01-19 LAB
ALBUMIN SERPL BCP-MCNC: 4 G/DL (ref 3.5–5.2)
ALP SERPL-CCNC: 91 U/L (ref 55–135)
ALT SERPL W/O P-5'-P-CCNC: 22 U/L (ref 10–44)
ANION GAP SERPL CALC-SCNC: 8 MMOL/L (ref 8–16)
AST SERPL-CCNC: 23 U/L (ref 10–40)
BASOPHILS # BLD AUTO: 0.03 K/UL (ref 0–0.2)
BASOPHILS NFR BLD: 0.4 % (ref 0–1.9)
BILIRUB SERPL-MCNC: 0.4 MG/DL (ref 0.1–1)
BUN SERPL-MCNC: 28 MG/DL (ref 8–23)
CALCIUM SERPL-MCNC: 9.6 MG/DL (ref 8.7–10.5)
CHLORIDE SERPL-SCNC: 106 MMOL/L (ref 95–110)
CHOLEST SERPL-MCNC: 154 MG/DL (ref 120–199)
CHOLEST/HDLC SERPL: 2.8 {RATIO} (ref 2–5)
CO2 SERPL-SCNC: 23 MMOL/L (ref 23–29)
CREAT SERPL-MCNC: 1.6 MG/DL (ref 0.5–1.4)
DIFFERENTIAL METHOD: ABNORMAL
EOSINOPHIL # BLD AUTO: 0.4 K/UL (ref 0–0.5)
EOSINOPHIL NFR BLD: 5.7 % (ref 0–8)
ERYTHROCYTE [DISTWIDTH] IN BLOOD BY AUTOMATED COUNT: 13.2 % (ref 11.5–14.5)
EST. GFR  (AFRICAN AMERICAN): 33.4 ML/MIN/1.73 M^2
EST. GFR  (NON AFRICAN AMERICAN): 29 ML/MIN/1.73 M^2
ESTIMATED AVG GLUCOSE: 94 MG/DL (ref 68–131)
GLUCOSE SERPL-MCNC: 82 MG/DL (ref 70–110)
HBA1C MFR BLD HPLC: 4.9 % (ref 4–5.6)
HCT VFR BLD AUTO: 29.9 % (ref 37–48.5)
HDLC SERPL-MCNC: 56 MG/DL (ref 40–75)
HDLC SERPL: 36.4 % (ref 20–50)
HGB BLD-MCNC: 8.8 G/DL (ref 12–16)
IMM GRANULOCYTES # BLD AUTO: 0.02 K/UL (ref 0–0.04)
IMM GRANULOCYTES NFR BLD AUTO: 0.3 % (ref 0–0.5)
LDLC SERPL CALC-MCNC: 75.2 MG/DL (ref 63–159)
LYMPHOCYTES # BLD AUTO: 2.1 K/UL (ref 1–4.8)
LYMPHOCYTES NFR BLD: 27.3 % (ref 18–48)
MCH RBC QN AUTO: 31.8 PG (ref 27–31)
MCHC RBC AUTO-ENTMCNC: 29.4 G/DL (ref 32–36)
MCV RBC AUTO: 108 FL (ref 82–98)
MONOCYTES # BLD AUTO: 0.6 K/UL (ref 0.3–1)
MONOCYTES NFR BLD: 8.4 % (ref 4–15)
NEUTROPHILS # BLD AUTO: 4.4 K/UL (ref 1.8–7.7)
NEUTROPHILS NFR BLD: 57.9 % (ref 38–73)
NONHDLC SERPL-MCNC: 98 MG/DL
NRBC BLD-RTO: 0 /100 WBC
PLATELET # BLD AUTO: 300 K/UL (ref 150–350)
PMV BLD AUTO: 10 FL (ref 9.2–12.9)
POTASSIUM SERPL-SCNC: 4.6 MMOL/L (ref 3.5–5.1)
PROT SERPL-MCNC: 8.2 G/DL (ref 6–8.4)
RBC # BLD AUTO: 2.77 M/UL (ref 4–5.4)
SODIUM SERPL-SCNC: 137 MMOL/L (ref 136–145)
TRIGL SERPL-MCNC: 114 MG/DL (ref 30–150)
WBC # BLD AUTO: 7.58 K/UL (ref 3.9–12.7)

## 2021-01-19 PROCEDURE — 36415 COLL VENOUS BLD VENIPUNCTURE: CPT | Mod: PO

## 2021-01-19 PROCEDURE — 80061 LIPID PANEL: CPT

## 2021-01-19 PROCEDURE — 80053 COMPREHEN METABOLIC PANEL: CPT

## 2021-01-19 PROCEDURE — 83036 HEMOGLOBIN GLYCOSYLATED A1C: CPT

## 2021-01-19 PROCEDURE — 85025 COMPLETE CBC W/AUTO DIFF WBC: CPT

## 2021-01-25 ENCOUNTER — TELEPHONE (OUTPATIENT)
Dept: INTERNAL MEDICINE | Facility: CLINIC | Age: 86
End: 2021-01-25

## 2021-01-28 RX ORDER — MECLIZINE HCL 12.5 MG 12.5 MG/1
12.5 TABLET ORAL 3 TIMES DAILY PRN
Qty: 40 TABLET | Refills: 4 | Status: SHIPPED | OUTPATIENT
Start: 2021-01-28 | End: 2022-08-29

## 2021-02-05 ENCOUNTER — OFFICE VISIT (OUTPATIENT)
Dept: INTERNAL MEDICINE | Facility: CLINIC | Age: 86
End: 2021-02-05
Payer: MEDICARE

## 2021-02-05 DIAGNOSIS — E11.22 TYPE 2 DIABETES MELLITUS WITH CHRONIC KIDNEY DISEASE, WITHOUT LONG-TERM CURRENT USE OF INSULIN, UNSPECIFIED CKD STAGE: Primary | ICD-10-CM

## 2021-02-05 DIAGNOSIS — I10 ESSENTIAL HYPERTENSION: ICD-10-CM

## 2021-02-05 PROCEDURE — 99442 PR PHYSICIAN TELEPHONE EVALUATION 11-20 MIN: CPT | Mod: 95,,, | Performed by: INTERNAL MEDICINE

## 2021-02-05 PROCEDURE — 99442 PR PHYSICIAN TELEPHONE EVALUATION 11-20 MIN: ICD-10-PCS | Mod: 95,,, | Performed by: INTERNAL MEDICINE

## 2021-02-05 RX ORDER — TRAMADOL HYDROCHLORIDE 50 MG/1
TABLET ORAL
Qty: 180 TABLET | Refills: 1 | Status: SHIPPED | OUTPATIENT
Start: 2021-02-05 | End: 2021-07-15

## 2021-04-27 ENCOUNTER — IMMUNIZATION (OUTPATIENT)
Dept: FAMILY MEDICINE | Facility: CLINIC | Age: 86
End: 2021-04-27
Payer: MEDICARE

## 2021-04-27 DIAGNOSIS — Z23 NEED FOR VACCINATION: Primary | ICD-10-CM

## 2021-04-27 PROCEDURE — 0001A COVID-19, MRNA, LNP-S, PF, 30 MCG/0.3 ML DOSE VACCINE: CPT | Mod: CV19,S$GLB,, | Performed by: FAMILY MEDICINE

## 2021-04-27 PROCEDURE — 0001A COVID-19, MRNA, LNP-S, PF, 30 MCG/0.3 ML DOSE VACCINE: ICD-10-PCS | Mod: CV19,S$GLB,, | Performed by: FAMILY MEDICINE

## 2021-04-27 PROCEDURE — 91300 COVID-19, MRNA, LNP-S, PF, 30 MCG/0.3 ML DOSE VACCINE: CPT | Mod: S$GLB,,, | Performed by: FAMILY MEDICINE

## 2021-04-27 PROCEDURE — 91300 COVID-19, MRNA, LNP-S, PF, 30 MCG/0.3 ML DOSE VACCINE: ICD-10-PCS | Mod: S$GLB,,, | Performed by: FAMILY MEDICINE

## 2021-05-18 ENCOUNTER — IMMUNIZATION (OUTPATIENT)
Dept: FAMILY MEDICINE | Facility: CLINIC | Age: 86
End: 2021-05-18
Payer: MEDICARE

## 2021-05-18 DIAGNOSIS — Z23 NEED FOR VACCINATION: Primary | ICD-10-CM

## 2021-05-18 PROCEDURE — 91300 COVID-19, MRNA, LNP-S, PF, 30 MCG/0.3 ML DOSE VACCINE: CPT | Mod: PBBFAC | Performed by: FAMILY MEDICINE

## 2021-05-18 PROCEDURE — 0002A COVID-19, MRNA, LNP-S, PF, 30 MCG/0.3 ML DOSE VACCINE: CPT | Mod: PBBFAC | Performed by: FAMILY MEDICINE

## 2021-06-18 ENCOUNTER — LAB VISIT (OUTPATIENT)
Dept: LAB | Facility: HOSPITAL | Age: 86
End: 2021-06-18
Payer: MEDICARE

## 2021-06-18 DIAGNOSIS — E11.22 TYPE 2 DIABETES MELLITUS WITH CHRONIC KIDNEY DISEASE, WITHOUT LONG-TERM CURRENT USE OF INSULIN, UNSPECIFIED CKD STAGE: ICD-10-CM

## 2021-06-18 DIAGNOSIS — I10 ESSENTIAL HYPERTENSION: ICD-10-CM

## 2021-06-18 LAB
ALBUMIN SERPL BCP-MCNC: 3.7 G/DL (ref 3.5–5.2)
ALP SERPL-CCNC: 97 U/L (ref 55–135)
ALT SERPL W/O P-5'-P-CCNC: 22 U/L (ref 10–44)
ANION GAP SERPL CALC-SCNC: 9 MMOL/L (ref 8–16)
AST SERPL-CCNC: 27 U/L (ref 10–40)
BILIRUB SERPL-MCNC: 0.4 MG/DL (ref 0.1–1)
BUN SERPL-MCNC: 27 MG/DL (ref 8–23)
CALCIUM SERPL-MCNC: 10 MG/DL (ref 8.7–10.5)
CHLORIDE SERPL-SCNC: 105 MMOL/L (ref 95–110)
CO2 SERPL-SCNC: 24 MMOL/L (ref 23–29)
CREAT SERPL-MCNC: 1.6 MG/DL (ref 0.5–1.4)
EST. GFR  (AFRICAN AMERICAN): 33.4 ML/MIN/1.73 M^2
EST. GFR  (NON AFRICAN AMERICAN): 29 ML/MIN/1.73 M^2
ESTIMATED AVG GLUCOSE: 97 MG/DL (ref 68–131)
GLUCOSE SERPL-MCNC: 89 MG/DL (ref 70–110)
HBA1C MFR BLD: 5 % (ref 4–5.6)
POTASSIUM SERPL-SCNC: 5 MMOL/L (ref 3.5–5.1)
PROT SERPL-MCNC: 8 G/DL (ref 6–8.4)
SODIUM SERPL-SCNC: 138 MMOL/L (ref 136–145)

## 2021-06-18 PROCEDURE — 36415 COLL VENOUS BLD VENIPUNCTURE: CPT | Mod: PO | Performed by: INTERNAL MEDICINE

## 2021-06-18 PROCEDURE — 80053 COMPREHEN METABOLIC PANEL: CPT | Performed by: INTERNAL MEDICINE

## 2021-06-18 PROCEDURE — 83036 HEMOGLOBIN GLYCOSYLATED A1C: CPT | Performed by: INTERNAL MEDICINE

## 2021-06-25 ENCOUNTER — OFFICE VISIT (OUTPATIENT)
Dept: INTERNAL MEDICINE | Facility: CLINIC | Age: 86
End: 2021-06-25
Payer: MEDICARE

## 2021-06-25 VITALS
DIASTOLIC BLOOD PRESSURE: 62 MMHG | BODY MASS INDEX: 23.52 KG/M2 | RESPIRATION RATE: 12 BRPM | OXYGEN SATURATION: 99 % | SYSTOLIC BLOOD PRESSURE: 128 MMHG | HEIGHT: 65 IN | TEMPERATURE: 97 F | HEART RATE: 82 BPM | WEIGHT: 141.13 LBS

## 2021-06-25 DIAGNOSIS — M15.9 PRIMARY OSTEOARTHRITIS INVOLVING MULTIPLE JOINTS: ICD-10-CM

## 2021-06-25 DIAGNOSIS — I10 ESSENTIAL HYPERTENSION: Primary | ICD-10-CM

## 2021-06-25 DIAGNOSIS — E78.5 HYPERLIPIDEMIA, UNSPECIFIED HYPERLIPIDEMIA TYPE: ICD-10-CM

## 2021-06-25 DIAGNOSIS — N18.4 CHRONIC KIDNEY DISEASE, STAGE 4 (SEVERE): ICD-10-CM

## 2021-06-25 DIAGNOSIS — E11.22 TYPE 2 DIABETES MELLITUS WITH CHRONIC KIDNEY DISEASE, WITHOUT LONG-TERM CURRENT USE OF INSULIN, UNSPECIFIED CKD STAGE: ICD-10-CM

## 2021-06-25 PROCEDURE — 99999 PR PBB SHADOW E&M-EST. PATIENT-LVL IV: ICD-10-PCS | Mod: PBBFAC,,, | Performed by: INTERNAL MEDICINE

## 2021-06-25 PROCEDURE — 1159F PR MEDICATION LIST DOCUMENTED IN MEDICAL RECORD: ICD-10-PCS | Mod: CPTII,S$GLB,, | Performed by: INTERNAL MEDICINE

## 2021-06-25 PROCEDURE — 3288F PR FALLS RISK ASSESSMENT DOCUMENTED: ICD-10-PCS | Mod: CPTII,S$GLB,, | Performed by: INTERNAL MEDICINE

## 2021-06-25 PROCEDURE — 1125F PR PAIN SEVERITY QUANTIFIED, PAIN PRESENT: ICD-10-PCS | Mod: CPTII,S$GLB,, | Performed by: INTERNAL MEDICINE

## 2021-06-25 PROCEDURE — 99214 PR OFFICE/OUTPT VISIT, EST, LEVL IV, 30-39 MIN: ICD-10-PCS | Mod: S$GLB,,, | Performed by: INTERNAL MEDICINE

## 2021-06-25 PROCEDURE — 99214 OFFICE O/P EST MOD 30 MIN: CPT | Mod: S$GLB,,, | Performed by: INTERNAL MEDICINE

## 2021-06-25 PROCEDURE — 1101F PT FALLS ASSESS-DOCD LE1/YR: CPT | Mod: CPTII,S$GLB,, | Performed by: INTERNAL MEDICINE

## 2021-06-25 PROCEDURE — 99499 UNLISTED E&M SERVICE: CPT | Mod: S$GLB,,, | Performed by: INTERNAL MEDICINE

## 2021-06-25 PROCEDURE — 1125F AMNT PAIN NOTED PAIN PRSNT: CPT | Mod: CPTII,S$GLB,, | Performed by: INTERNAL MEDICINE

## 2021-06-25 PROCEDURE — 1101F PR PT FALLS ASSESS DOC 0-1 FALLS W/OUT INJ PAST YR: ICD-10-PCS | Mod: CPTII,S$GLB,, | Performed by: INTERNAL MEDICINE

## 2021-06-25 PROCEDURE — 1159F MED LIST DOCD IN RCRD: CPT | Mod: CPTII,S$GLB,, | Performed by: INTERNAL MEDICINE

## 2021-06-25 PROCEDURE — 99499 RISK ADDL DX/OHS AUDIT: ICD-10-PCS | Mod: S$GLB,,, | Performed by: INTERNAL MEDICINE

## 2021-06-25 PROCEDURE — 3288F FALL RISK ASSESSMENT DOCD: CPT | Mod: CPTII,S$GLB,, | Performed by: INTERNAL MEDICINE

## 2021-06-25 PROCEDURE — 99999 PR PBB SHADOW E&M-EST. PATIENT-LVL IV: CPT | Mod: PBBFAC,,, | Performed by: INTERNAL MEDICINE

## 2021-06-25 RX ORDER — ATORVASTATIN CALCIUM 20 MG/1
20 TABLET, FILM COATED ORAL DAILY
Qty: 90 TABLET | Refills: 3 | Status: SHIPPED | OUTPATIENT
Start: 2021-06-25 | End: 2022-03-03

## 2021-06-25 RX ORDER — ACETAMINOPHEN 325 MG/1
325 TABLET ORAL EVERY 6 HOURS PRN
COMMUNITY

## 2021-06-25 RX ORDER — POTASSIUM CHLORIDE 750 MG/1
TABLET, FILM COATED, EXTENDED RELEASE ORAL
Qty: 45 TABLET | Refills: 3 | Status: SHIPPED | OUTPATIENT
Start: 2021-06-25 | End: 2021-12-28 | Stop reason: SDUPTHER

## 2021-10-15 ENCOUNTER — LAB VISIT (OUTPATIENT)
Dept: LAB | Facility: HOSPITAL | Age: 86
End: 2021-10-15
Attending: INTERNAL MEDICINE
Payer: MEDICARE

## 2021-10-15 DIAGNOSIS — E78.5 HYPERLIPIDEMIA, UNSPECIFIED HYPERLIPIDEMIA TYPE: ICD-10-CM

## 2021-10-15 DIAGNOSIS — N18.4 CHRONIC KIDNEY DISEASE, STAGE 4 (SEVERE): ICD-10-CM

## 2021-10-15 DIAGNOSIS — E11.22 TYPE 2 DIABETES MELLITUS WITH CHRONIC KIDNEY DISEASE, WITHOUT LONG-TERM CURRENT USE OF INSULIN, UNSPECIFIED CKD STAGE: ICD-10-CM

## 2021-10-15 DIAGNOSIS — I10 ESSENTIAL HYPERTENSION: ICD-10-CM

## 2021-10-15 LAB
ALBUMIN SERPL BCP-MCNC: 3.5 G/DL (ref 3.5–5.2)
ALP SERPL-CCNC: 94 U/L (ref 55–135)
ALT SERPL W/O P-5'-P-CCNC: 9 U/L (ref 10–44)
ANION GAP SERPL CALC-SCNC: 10 MMOL/L (ref 8–16)
AST SERPL-CCNC: 16 U/L (ref 10–40)
BASOPHILS # BLD AUTO: 0.05 K/UL (ref 0–0.2)
BASOPHILS NFR BLD: 0.7 % (ref 0–1.9)
BILIRUB SERPL-MCNC: 0.4 MG/DL (ref 0.1–1)
BUN SERPL-MCNC: 25 MG/DL (ref 8–23)
CALCIUM SERPL-MCNC: 9.5 MG/DL (ref 8.7–10.5)
CHLORIDE SERPL-SCNC: 107 MMOL/L (ref 95–110)
CHOLEST SERPL-MCNC: 132 MG/DL (ref 120–199)
CHOLEST/HDLC SERPL: 2.6 {RATIO} (ref 2–5)
CO2 SERPL-SCNC: 20 MMOL/L (ref 23–29)
CREAT SERPL-MCNC: 1.7 MG/DL (ref 0.5–1.4)
DIFFERENTIAL METHOD: ABNORMAL
EOSINOPHIL # BLD AUTO: 0.4 K/UL (ref 0–0.5)
EOSINOPHIL NFR BLD: 5.9 % (ref 0–8)
ERYTHROCYTE [DISTWIDTH] IN BLOOD BY AUTOMATED COUNT: 13.1 % (ref 11.5–14.5)
EST. GFR  (AFRICAN AMERICAN): 30.8 ML/MIN/1.73 M^2
EST. GFR  (NON AFRICAN AMERICAN): 26.7 ML/MIN/1.73 M^2
ESTIMATED AVG GLUCOSE: 97 MG/DL (ref 68–131)
GLUCOSE SERPL-MCNC: 84 MG/DL (ref 70–110)
HBA1C MFR BLD: 5 % (ref 4–5.6)
HCT VFR BLD AUTO: 26.7 % (ref 37–48.5)
HDLC SERPL-MCNC: 50 MG/DL (ref 40–75)
HDLC SERPL: 37.9 % (ref 20–50)
HGB BLD-MCNC: 8.3 G/DL (ref 12–16)
IMM GRANULOCYTES # BLD AUTO: 0.02 K/UL (ref 0–0.04)
IMM GRANULOCYTES NFR BLD AUTO: 0.3 % (ref 0–0.5)
LDLC SERPL CALC-MCNC: 61.6 MG/DL (ref 63–159)
LYMPHOCYTES # BLD AUTO: 2.4 K/UL (ref 1–4.8)
LYMPHOCYTES NFR BLD: 33.7 % (ref 18–48)
MCH RBC QN AUTO: 31.8 PG (ref 27–31)
MCHC RBC AUTO-ENTMCNC: 31.1 G/DL (ref 32–36)
MCV RBC AUTO: 102 FL (ref 82–98)
MONOCYTES # BLD AUTO: 0.7 K/UL (ref 0.3–1)
MONOCYTES NFR BLD: 9.9 % (ref 4–15)
NEUTROPHILS # BLD AUTO: 3.6 K/UL (ref 1.8–7.7)
NEUTROPHILS NFR BLD: 49.5 % (ref 38–73)
NONHDLC SERPL-MCNC: 82 MG/DL
NRBC BLD-RTO: 0 /100 WBC
PLATELET # BLD AUTO: 286 K/UL (ref 150–450)
PMV BLD AUTO: 10.1 FL (ref 9.2–12.9)
POTASSIUM SERPL-SCNC: 5 MMOL/L (ref 3.5–5.1)
PROT SERPL-MCNC: 7.5 G/DL (ref 6–8.4)
RBC # BLD AUTO: 2.61 M/UL (ref 4–5.4)
SODIUM SERPL-SCNC: 137 MMOL/L (ref 136–145)
TRIGL SERPL-MCNC: 102 MG/DL (ref 30–150)
WBC # BLD AUTO: 7.25 K/UL (ref 3.9–12.7)

## 2021-10-15 PROCEDURE — 36415 COLL VENOUS BLD VENIPUNCTURE: CPT | Mod: PO | Performed by: INTERNAL MEDICINE

## 2021-10-15 PROCEDURE — 80053 COMPREHEN METABOLIC PANEL: CPT | Performed by: INTERNAL MEDICINE

## 2021-10-15 PROCEDURE — 85025 COMPLETE CBC W/AUTO DIFF WBC: CPT | Performed by: INTERNAL MEDICINE

## 2021-10-15 PROCEDURE — 83036 HEMOGLOBIN GLYCOSYLATED A1C: CPT | Performed by: INTERNAL MEDICINE

## 2021-10-15 PROCEDURE — 80061 LIPID PANEL: CPT | Performed by: INTERNAL MEDICINE

## 2021-10-22 ENCOUNTER — LAB VISIT (OUTPATIENT)
Dept: LAB | Facility: HOSPITAL | Age: 86
End: 2021-10-22
Attending: INTERNAL MEDICINE
Payer: MEDICARE

## 2021-10-22 ENCOUNTER — OFFICE VISIT (OUTPATIENT)
Dept: INTERNAL MEDICINE | Facility: CLINIC | Age: 86
End: 2021-10-22
Payer: MEDICARE

## 2021-10-22 VITALS
BODY MASS INDEX: 23.03 KG/M2 | OXYGEN SATURATION: 98 % | DIASTOLIC BLOOD PRESSURE: 58 MMHG | SYSTOLIC BLOOD PRESSURE: 124 MMHG | RESPIRATION RATE: 16 BRPM | WEIGHT: 138.25 LBS | TEMPERATURE: 98 F | HEIGHT: 65 IN | HEART RATE: 74 BPM

## 2021-10-22 DIAGNOSIS — D53.9 MACROCYTIC ANEMIA: ICD-10-CM

## 2021-10-22 DIAGNOSIS — M15.9 PRIMARY OSTEOARTHRITIS INVOLVING MULTIPLE JOINTS: ICD-10-CM

## 2021-10-22 DIAGNOSIS — I10 ESSENTIAL HYPERTENSION: ICD-10-CM

## 2021-10-22 DIAGNOSIS — E11.22 TYPE 2 DIABETES MELLITUS WITH CHRONIC KIDNEY DISEASE, WITHOUT LONG-TERM CURRENT USE OF INSULIN, UNSPECIFIED CKD STAGE: Primary | ICD-10-CM

## 2021-10-22 DIAGNOSIS — E78.49 OTHER HYPERLIPIDEMIA: ICD-10-CM

## 2021-10-22 DIAGNOSIS — N18.4 CHRONIC KIDNEY DISEASE, STAGE 4 (SEVERE): ICD-10-CM

## 2021-10-22 PROCEDURE — 84466 ASSAY OF TRANSFERRIN: CPT | Performed by: INTERNAL MEDICINE

## 2021-10-22 PROCEDURE — 82728 ASSAY OF FERRITIN: CPT | Performed by: INTERNAL MEDICINE

## 2021-10-22 PROCEDURE — 99214 PR OFFICE/OUTPT VISIT, EST, LEVL IV, 30-39 MIN: ICD-10-PCS | Mod: S$GLB,,, | Performed by: INTERNAL MEDICINE

## 2021-10-22 PROCEDURE — 1101F PR PT FALLS ASSESS DOC 0-1 FALLS W/OUT INJ PAST YR: ICD-10-PCS | Mod: CPTII,S$GLB,, | Performed by: INTERNAL MEDICINE

## 2021-10-22 PROCEDURE — 1159F PR MEDICATION LIST DOCUMENTED IN MEDICAL RECORD: ICD-10-PCS | Mod: CPTII,S$GLB,, | Performed by: INTERNAL MEDICINE

## 2021-10-22 PROCEDURE — 1101F PT FALLS ASSESS-DOCD LE1/YR: CPT | Mod: CPTII,S$GLB,, | Performed by: INTERNAL MEDICINE

## 2021-10-22 PROCEDURE — 3288F PR FALLS RISK ASSESSMENT DOCUMENTED: ICD-10-PCS | Mod: CPTII,S$GLB,, | Performed by: INTERNAL MEDICINE

## 2021-10-22 PROCEDURE — 82607 VITAMIN B-12: CPT | Performed by: INTERNAL MEDICINE

## 2021-10-22 PROCEDURE — 99214 OFFICE O/P EST MOD 30 MIN: CPT | Mod: S$GLB,,, | Performed by: INTERNAL MEDICINE

## 2021-10-22 PROCEDURE — 36415 COLL VENOUS BLD VENIPUNCTURE: CPT | Mod: PO | Performed by: INTERNAL MEDICINE

## 2021-10-22 PROCEDURE — 1159F MED LIST DOCD IN RCRD: CPT | Mod: CPTII,S$GLB,, | Performed by: INTERNAL MEDICINE

## 2021-10-22 PROCEDURE — 1126F PR PAIN SEVERITY QUANTIFIED, NO PAIN PRESENT: ICD-10-PCS | Mod: CPTII,S$GLB,, | Performed by: INTERNAL MEDICINE

## 2021-10-22 PROCEDURE — 3288F FALL RISK ASSESSMENT DOCD: CPT | Mod: CPTII,S$GLB,, | Performed by: INTERNAL MEDICINE

## 2021-10-22 PROCEDURE — 99999 PR PBB SHADOW E&M-EST. PATIENT-LVL V: ICD-10-PCS | Mod: PBBFAC,,, | Performed by: INTERNAL MEDICINE

## 2021-10-22 PROCEDURE — 99499 RISK ADDL DX/OHS AUDIT: ICD-10-PCS | Mod: S$GLB,,, | Performed by: INTERNAL MEDICINE

## 2021-10-22 PROCEDURE — 1160F RVW MEDS BY RX/DR IN RCRD: CPT | Mod: CPTII,S$GLB,, | Performed by: INTERNAL MEDICINE

## 2021-10-22 PROCEDURE — 99999 PR PBB SHADOW E&M-EST. PATIENT-LVL V: CPT | Mod: PBBFAC,,, | Performed by: INTERNAL MEDICINE

## 2021-10-22 PROCEDURE — 99499 UNLISTED E&M SERVICE: CPT | Mod: S$GLB,,, | Performed by: INTERNAL MEDICINE

## 2021-10-22 PROCEDURE — 1126F AMNT PAIN NOTED NONE PRSNT: CPT | Mod: CPTII,S$GLB,, | Performed by: INTERNAL MEDICINE

## 2021-10-22 PROCEDURE — 1160F PR REVIEW ALL MEDS BY PRESCRIBER/CLIN PHARMACIST DOCUMENTED: ICD-10-PCS | Mod: CPTII,S$GLB,, | Performed by: INTERNAL MEDICINE

## 2021-10-22 RX ORDER — CLOSTRIDIUM TETANI TOXOID ANTIGEN (FORMALDEHYDE INACTIVATED), CORYNEBACTERIUM DIPHTHERIAE TOXOID ANTIGEN (FORMALDEHYDE INACTIVATED), BORDETELLA PERTUSSIS TOXOID ANTIGEN (GLUTARALDEHYDE INACTIVATED), BORDETELLA PERTUSSIS FILAMENTOUS HEMAGGLUTININ ANTIGEN (FORMALDEHYDE INACTIVATED), BORDETELLA PERTUSSIS PERTACTIN ANTIGEN, AND BORDETELLA PERTUSSIS FIMBRIAE 2/3 ANTIGEN 5; 2; 2.5; 5; 3; 5 [LF]/.5ML; [LF]/.5ML; UG/.5ML; UG/.5ML; UG/.5ML; UG/.5ML
0.5 INJECTION, SUSPENSION INTRAMUSCULAR ONCE
Qty: 0.5 ML | Refills: 0 | Status: SHIPPED | OUTPATIENT
Start: 2021-10-22 | End: 2021-10-22

## 2021-10-22 RX ORDER — ZOSTER VACCINE RECOMBINANT, ADJUVANTED 50 MCG/0.5
0.5 KIT INTRAMUSCULAR ONCE
Qty: 1 EACH | Refills: 1 | Status: SHIPPED | OUTPATIENT
Start: 2021-10-22 | End: 2021-10-22

## 2021-10-23 LAB
FERRITIN SERPL-MCNC: 187 NG/ML (ref 20–300)
IRON SERPL-MCNC: 48 UG/DL (ref 30–160)
SATURATED IRON: 16 % (ref 20–50)
TOTAL IRON BINDING CAPACITY: 300 UG/DL (ref 250–450)
TRANSFERRIN SERPL-MCNC: 203 MG/DL (ref 200–375)
VIT B12 SERPL-MCNC: >2000 PG/ML (ref 210–950)

## 2021-12-28 RX ORDER — POTASSIUM CHLORIDE 750 MG/1
TABLET, FILM COATED, EXTENDED RELEASE ORAL
Qty: 45 TABLET | Refills: 3 | Status: SHIPPED | OUTPATIENT
Start: 2021-12-28 | End: 2022-07-12 | Stop reason: ALTCHOICE

## 2021-12-28 RX ORDER — TRAMADOL HYDROCHLORIDE 50 MG/1
TABLET ORAL
Qty: 180 TABLET | Refills: 1 | Status: SHIPPED | OUTPATIENT
Start: 2021-12-28 | End: 2022-03-03 | Stop reason: SDUPTHER

## 2021-12-30 RX ORDER — LOSARTAN POTASSIUM 50 MG/1
TABLET ORAL
Qty: 90 TABLET | Refills: 3 | Status: SHIPPED | OUTPATIENT
Start: 2021-12-30 | End: 2023-02-14 | Stop reason: SDUPTHER

## 2022-02-14 ENCOUNTER — TELEPHONE (OUTPATIENT)
Dept: INTERNAL MEDICINE | Facility: CLINIC | Age: 87
End: 2022-02-14
Payer: MEDICARE

## 2022-02-14 ENCOUNTER — PES CALL (OUTPATIENT)
Dept: ADMINISTRATIVE | Facility: CLINIC | Age: 87
End: 2022-02-14
Payer: MEDICARE

## 2022-02-14 NOTE — TELEPHONE ENCOUNTER
----- Message from Alayna Rosario sent at 2/14/2022  3:06 PM CST -----  Regarding: [facility] Orders  Hello pt is coming in for lab work and needs [facility] orders please. Fax is [phone]

## 2022-02-14 NOTE — TELEPHONE ENCOUNTER
[facility] with a bar code on them.  We dont' have that kind of paperwork here.    Our orders are entered in epic for any [facility] location except [place],  no papework or barcodes available.    I have another patient had a similar problem.      Patient will need to to to [facility] location on [address] or [place]    daughter [phone] no voicemail. awai call back.    told patient can't go [facility] in am for labwork.

## 2022-02-15 ENCOUNTER — TELEPHONE (OUTPATIENT)
Dept: INTERNAL MEDICINE | Facility: CLINIC | Age: 87
End: 2022-02-15
Payer: MEDICARE

## 2022-02-15 DIAGNOSIS — E11.22 TYPE 2 DIABETES MELLITUS WITH CHRONIC KIDNEY DISEASE, WITHOUT LONG-TERM CURRENT USE OF INSULIN, UNSPECIFIED CKD STAGE: Primary | ICD-10-CM

## 2022-02-15 DIAGNOSIS — I10 ESSENTIAL HYPERTENSION: ICD-10-CM

## 2022-02-15 NOTE — TELEPHONE ENCOUNTER
I told patient yesterday not to go [facility].  Patient said to call daughter.  I was unable to get daughter to answer phone after 3 attempts.  [phone].    We have another [facility] that [facility] and they require bar codes with orders and to be scanned to chart.    We do not have bar coded orders or ability to scan to chart.    I have entered labs to be done at any [facility] location except [place].    I spoke to registration  .    She was able to pull the orders out from our system and can handle now.    I spoke to daughter and explained the problem.

## 2022-02-15 NOTE — TELEPHONE ENCOUNTER
She called from [facility].  After discussion with tech they were able to pull [facility] labs and handle w/o bar codes and scanning to chart.

## 2022-02-17 LAB
ALBUMIN SERPL-MCNC: 4 G/DL (ref 3.6–5.1)
ALBUMIN/GLOB SERPL: 1.1 (CALC) (ref 1–2.5)
ALP SERPL-CCNC: 86 U/L (ref 37–153)
ALT SERPL-CCNC: 19 U/L (ref 6–29)
AST SERPL-CCNC: 19 U/L (ref 10–35)
BASOPHILS # BLD AUTO: 24 CELLS/UL (ref 0–200)
BASOPHILS NFR BLD AUTO: 0.3 %
BILIRUB SERPL-MCNC: 0.4 MG/DL (ref 0.2–1.2)
BUN SERPL-MCNC: 26 MG/DL (ref 7–25)
BUN/CREAT SERPL: 16 (CALC) (ref 6–22)
CALCIUM SERPL-MCNC: 9.5 MG/DL (ref 8.6–10.4)
CHLORIDE SERPL-SCNC: 106 MMOL/L (ref 98–110)
CO2 SERPL-SCNC: 25 MMOL/L (ref 20–32)
CREAT SERPL-MCNC: 1.59 MG/DL (ref 0.6–0.88)
EOSINOPHIL # BLD AUTO: 400 CELLS/UL (ref 15–500)
EOSINOPHIL NFR BLD AUTO: 5 %
ERYTHROCYTE [DISTWIDTH] IN BLOOD BY AUTOMATED COUNT: 12.6 % (ref 11–15)
GLOBULIN SER CALC-MCNC: 3.8 G/DL (CALC) (ref 1.9–3.7)
GLUCOSE SERPL-MCNC: 78 MG/DL (ref 65–99)
HBA1C MFR BLD: 4.9 % OF TOTAL HGB
HCT VFR BLD AUTO: 25 % (ref 35–45)
HGB BLD-MCNC: 8 G/DL (ref 11.7–15.5)
LYMPHOCYTES # BLD AUTO: 2400 CELLS/UL (ref 850–3900)
LYMPHOCYTES NFR BLD AUTO: 30 %
MCH RBC QN AUTO: 31.3 PG (ref 27–33)
MCHC RBC AUTO-ENTMCNC: 32 G/DL (ref 32–36)
MCV RBC AUTO: 97.7 FL (ref 80–100)
MONOCYTES # BLD AUTO: 664 CELLS/UL (ref 200–950)
MONOCYTES NFR BLD AUTO: 8.3 %
NEUTROPHILS # BLD AUTO: 4512 CELLS/UL (ref 1500–7800)
NEUTROPHILS NFR BLD AUTO: 56.4 %
PLATELET # BLD AUTO: 285 THOUSAND/UL (ref 140–400)
PMV BLD REES-ECKER: 10 FL (ref 7.5–12.5)
POTASSIUM SERPL-SCNC: 5.2 MMOL/L (ref 3.5–5.3)
PROT SERPL-MCNC: 7.8 G/DL (ref 6.1–8.1)
RBC # BLD AUTO: 2.56 MILLION/UL (ref 3.8–5.1)
SERVICE CMNT-IMP: ABNORMAL
SODIUM SERPL-SCNC: 141 MMOL/L (ref 135–146)
WBC # BLD AUTO: 8 THOUSAND/UL (ref 3.8–10.8)

## 2022-03-03 ENCOUNTER — OFFICE VISIT (OUTPATIENT)
Dept: INTERNAL MEDICINE | Facility: CLINIC | Age: 87
End: 2022-03-03
Payer: MEDICARE

## 2022-03-03 VITALS
DIASTOLIC BLOOD PRESSURE: 64 MMHG | OXYGEN SATURATION: 96 % | HEART RATE: 92 BPM | RESPIRATION RATE: 16 BRPM | HEIGHT: 65 IN | TEMPERATURE: 98 F | WEIGHT: 137.81 LBS | BODY MASS INDEX: 22.96 KG/M2 | SYSTOLIC BLOOD PRESSURE: 126 MMHG

## 2022-03-03 DIAGNOSIS — I10 ESSENTIAL HYPERTENSION: ICD-10-CM

## 2022-03-03 DIAGNOSIS — N18.30 STAGE 3 CHRONIC KIDNEY DISEASE, UNSPECIFIED WHETHER STAGE 3A OR 3B CKD: ICD-10-CM

## 2022-03-03 DIAGNOSIS — D64.9 ANEMIA, UNSPECIFIED TYPE: ICD-10-CM

## 2022-03-03 DIAGNOSIS — E11.22 TYPE 2 DIABETES MELLITUS WITH CHRONIC KIDNEY DISEASE, WITHOUT LONG-TERM CURRENT USE OF INSULIN, UNSPECIFIED CKD STAGE: Primary | ICD-10-CM

## 2022-03-03 DIAGNOSIS — K59.09 CHRONIC CONSTIPATION: ICD-10-CM

## 2022-03-03 DIAGNOSIS — E78.49 OTHER HYPERLIPIDEMIA: ICD-10-CM

## 2022-03-03 PROCEDURE — 1101F PT FALLS ASSESS-DOCD LE1/YR: CPT | Mod: CPTII,S$GLB,, | Performed by: INTERNAL MEDICINE

## 2022-03-03 PROCEDURE — 99499 UNLISTED E&M SERVICE: CPT | Mod: S$GLB,,, | Performed by: INTERNAL MEDICINE

## 2022-03-03 PROCEDURE — 3288F PR FALLS RISK ASSESSMENT DOCUMENTED: ICD-10-PCS | Mod: CPTII,S$GLB,, | Performed by: INTERNAL MEDICINE

## 2022-03-03 PROCEDURE — 1126F PR PAIN SEVERITY QUANTIFIED, NO PAIN PRESENT: ICD-10-PCS | Mod: CPTII,S$GLB,, | Performed by: INTERNAL MEDICINE

## 2022-03-03 PROCEDURE — 1101F PR PT FALLS ASSESS DOC 0-1 FALLS W/OUT INJ PAST YR: ICD-10-PCS | Mod: CPTII,S$GLB,, | Performed by: INTERNAL MEDICINE

## 2022-03-03 PROCEDURE — 1160F RVW MEDS BY RX/DR IN RCRD: CPT | Mod: CPTII,S$GLB,, | Performed by: INTERNAL MEDICINE

## 2022-03-03 PROCEDURE — 99999 PR PBB SHADOW E&M-EST. PATIENT-LVL IV: CPT | Mod: PBBFAC,,, | Performed by: INTERNAL MEDICINE

## 2022-03-03 PROCEDURE — 1160F PR REVIEW ALL MEDS BY PRESCRIBER/CLIN PHARMACIST DOCUMENTED: ICD-10-PCS | Mod: CPTII,S$GLB,, | Performed by: INTERNAL MEDICINE

## 2022-03-03 PROCEDURE — 99499 RISK ADDL DX/OHS AUDIT: ICD-10-PCS | Mod: S$GLB,,, | Performed by: INTERNAL MEDICINE

## 2022-03-03 PROCEDURE — 1159F MED LIST DOCD IN RCRD: CPT | Mod: CPTII,S$GLB,, | Performed by: INTERNAL MEDICINE

## 2022-03-03 PROCEDURE — 1126F AMNT PAIN NOTED NONE PRSNT: CPT | Mod: CPTII,S$GLB,, | Performed by: INTERNAL MEDICINE

## 2022-03-03 PROCEDURE — 1159F PR MEDICATION LIST DOCUMENTED IN MEDICAL RECORD: ICD-10-PCS | Mod: CPTII,S$GLB,, | Performed by: INTERNAL MEDICINE

## 2022-03-03 PROCEDURE — 99214 PR OFFICE/OUTPT VISIT, EST, LEVL IV, 30-39 MIN: ICD-10-PCS | Mod: S$GLB,,, | Performed by: INTERNAL MEDICINE

## 2022-03-03 PROCEDURE — 3288F FALL RISK ASSESSMENT DOCD: CPT | Mod: CPTII,S$GLB,, | Performed by: INTERNAL MEDICINE

## 2022-03-03 PROCEDURE — 99214 OFFICE O/P EST MOD 30 MIN: CPT | Mod: S$GLB,,, | Performed by: INTERNAL MEDICINE

## 2022-03-03 PROCEDURE — 99999 PR PBB SHADOW E&M-EST. PATIENT-LVL IV: ICD-10-PCS | Mod: PBBFAC,,, | Performed by: INTERNAL MEDICINE

## 2022-03-03 RX ORDER — HYDROCHLOROTHIAZIDE 25 MG/1
TABLET ORAL
Qty: 90 TABLET | Refills: 3 | Status: SHIPPED | OUTPATIENT
Start: 2022-03-03 | End: 2023-02-14 | Stop reason: SDUPTHER

## 2022-03-03 RX ORDER — ATORVASTATIN CALCIUM 20 MG/1
20 TABLET, FILM COATED ORAL DAILY
Qty: 90 TABLET | Refills: 3 | Status: SHIPPED | OUTPATIENT
Start: 2022-03-03 | End: 2023-02-14 | Stop reason: SDUPTHER

## 2022-03-03 RX ORDER — GABAPENTIN 100 MG/1
100 CAPSULE ORAL 3 TIMES DAILY
Qty: 270 CAPSULE | Refills: 3 | Status: SHIPPED | OUTPATIENT
Start: 2022-03-03 | End: 2023-02-14 | Stop reason: SDUPTHER

## 2022-03-03 RX ORDER — FERROUS GLUCONATE 324(38)MG
324 TABLET ORAL
Qty: 90 TABLET | Refills: 2 | Status: SHIPPED | OUTPATIENT
Start: 2022-03-03 | End: 2022-08-29

## 2022-03-03 RX ORDER — TRAMADOL HYDROCHLORIDE 50 MG/1
TABLET ORAL
Qty: 180 TABLET | Refills: 1 | Status: SHIPPED | OUTPATIENT
Start: 2022-03-03 | End: 2022-07-12 | Stop reason: SDUPTHER

## 2022-03-03 RX ORDER — METOPROLOL SUCCINATE 25 MG/1
TABLET, EXTENDED RELEASE ORAL
Qty: 90 TABLET | Refills: 3 | Status: SHIPPED | OUTPATIENT
Start: 2022-03-03 | End: 2023-02-14 | Stop reason: SDUPTHER

## 2022-03-03 NOTE — PROGRESS NOTES
Subjective:       Patient ID: Melody Armstrong is a 87 y.o. female.    Chief Complaint: Follow-up (4 mo)    HPI   The patient presents for follow-up of medical conditions.  The patient states she feels fine.  She does not have any complaints.  Her appetite is good.  She is resting well at night.  She reports blood sugar levels have been stable.  No hypoglycemic symptoms have been noted.  Active medical conditions include diabetes mellitus, hypertension, anemia, and chronic kidney disease.  The patient is accompanied by her daughter today.    Review of Systems   Constitutional: Negative for activity change, appetite change and unexpected weight change.   Eyes: Negative for visual disturbance.   Respiratory: Negative for shortness of breath.    Cardiovascular: Negative for chest pain, palpitations and leg swelling.   Gastrointestinal: Negative for abdominal pain, blood in stool and diarrhea.   Genitourinary: Negative for dysuria, frequency, hematuria and urgency.   Neurological: Negative for weakness, numbness and headaches.   Psychiatric/Behavioral: Negative for sleep disturbance.            Physical Exam  Vitals and nursing note reviewed.   Constitutional:       General: She is not in acute distress.     Appearance: She is well-developed.   HENT:      Head: Normocephalic and atraumatic.   Eyes:      General: No scleral icterus.     Conjunctiva/sclera: Conjunctivae normal.      Pupils: Pupils are equal, round, and reactive to light.   Neck:      Thyroid: No thyromegaly.      Vascular: No JVD.   Cardiovascular:      Rate and Rhythm: Normal rate and regular rhythm.      Heart sounds: Normal heart sounds. No murmur heard.    No friction rub. No gallop.   Pulmonary:      Effort: Pulmonary effort is normal. No respiratory distress.      Breath sounds: Normal breath sounds. No wheezing or rales.   Abdominal:      General: Bowel sounds are normal.      Palpations: Abdomen is soft. There is no mass.      Tenderness: There  is no abdominal tenderness.   Musculoskeletal:         General: No tenderness. Normal range of motion.      Cervical back: Normal range of motion and neck supple.   Lymphadenopathy:      Cervical: No cervical adenopathy.   Skin:     General: Skin is warm and dry.      Findings: No rash.      Comments: No foot lesions are present.   Neurological:      Mental Status: She is alert and oriented to person, place, and time.      Cranial Nerves: No cranial nerve deficit.      Comments: Sensory exam is intact in both feet on monofilament  testing.   Psychiatric:         Behavior: Behavior normal.         Protective Sensation (w/ 10 gram monofilament):  Right: Intact  Left: Intact    Visual Inspection:  dystrophic toenails bilaterally.    Pedal Pulses:   Right: Present  Left: Present    Posterior tibialis:   Right:Diminished  Left: Diminished         Telephone on 02/15/2022   Component Date Value Ref Range Status    Glucose 02/15/2022 78  65 - 99 mg/dL Final    Comment:               Fasting reference interval         BUN 02/15/2022 26 (A) 7 - 25 mg/dL Final    Creatinine 02/15/2022 1.59 (A) 0.60 - 0.88 mg/dL Final    Comment: For patients >49 years of age, the reference limit  for Creatinine is approximately 13% higher for people  identified as -American.         eGFR if non  02/15/2022 29 (A) > OR = 60 mL/min/1.73m2 Final    eGFR if  02/15/2022 33 (A) > OR = 60 mL/min/1.73m2 Final    BUN/Creatinine Ratio 02/15/2022 16  6 - 22 (calc) Final    Sodium 02/15/2022 141  135 - 146 mmol/L Final    Potassium 02/15/2022 5.2  3.5 - 5.3 mmol/L Final    Chloride 02/15/2022 106  98 - 110 mmol/L Final    CO2 02/15/2022 25  20 - 32 mmol/L Final    Calcium 02/15/2022 9.5  8.6 - 10.4 mg/dL Final    Total Protein 02/15/2022 7.8  6.1 - 8.1 g/dL Final    Albumin 02/15/2022 4.0  3.6 - 5.1 g/dL Final    Globulin, Total 02/15/2022 3.8 (A) 1.9 - 3.7 g/dL (calc) Final    Albumin/Globulin Ratio  02/15/2022 1.1  1.0 - 2.5 (calc) Final    Total Bilirubin 02/15/2022 0.4  0.2 - 1.2 mg/dL Final    Alkaline Phosphatase 02/15/2022 86  37 - 153 U/L Final    AST 02/15/2022 19  10 - 35 U/L Final    ALT 02/15/2022 19  6 - 29 U/L Final    WBC 02/15/2022 8.0  3.8 - 10.8 Thousand/uL Final    RBC 02/15/2022 2.56 (A) 3.80 - 5.10 Million/uL Final    Hemoglobin 02/15/2022 8.0 (A) 11.7 - 15.5 g/dL Final    Hematocrit 02/15/2022 25.0 (A) 35.0 - 45.0 % Final    MCV 02/15/2022 97.7  80.0 - 100.0 fL Final    MCH 02/15/2022 31.3  27.0 - 33.0 pg Final    MCHC 02/15/2022 32.0  32.0 - 36.0 g/dL Final    RDW 02/15/2022 12.6  11.0 - 15.0 % Final    Platelets 02/15/2022 285  140 - 400 Thousand/uL Final    MPV 02/15/2022 10.0  7.5 - 12.5 fL Final    Neutrophils, Abs 02/15/2022 4,512  1,500 - 7,800 cells/uL Final    Lymph # 02/15/2022 2,400  850 - 3,900 cells/uL Final    Mono # 02/15/2022 664  200 - 950 cells/uL Final    Eos # 02/15/2022 400  15 - 500 cells/uL Final    Baso # 02/15/2022 24  0 - 200 cells/uL Final    Neutrophils Relative 02/15/2022 56.4  % Final    Lymph % 02/15/2022 30.0  % Final    Mono % 02/15/2022 8.3  % Final    Eosinophil % 02/15/2022 5.0  % Final    Basophil % 02/15/2022 0.3  % Final    Differential Comment 02/15/2022    Final    Comment: Review of peripheral smear confirms  automated results.      Hemoglobin A1C 02/15/2022 4.9  <5.7 % of total Hgb Final    Comment: For the purpose of screening for the presence of  diabetes:     <5.7%       Consistent with the absence of diabetes  5.7-6.4%    Consistent with increased risk for diabetes              (prediabetes)  > or =6.5%  Consistent with diabetes     This assay result is consistent with a decreased risk  of diabetes.     Currently, no consensus exists regarding use of  hemoglobin A1c for diagnosis of diabetes in children.     According to American Diabetes Association (ADA)  guidelines, hemoglobin A1c <7.0% represents optimal  control in  non-pregnant diabetic patients. Different  metrics may apply to specific patient populations.   Standards of Medical Care in Diabetes(ADA).             Assessment & Plan:      Melody was seen today for follow-up.  A prescription order for a trial of iron will be sent.  An EGD and colonoscopy will be ordered.  Current therapy will be continued.  Fasting blood tests will be obtained in 4 months.    Diagnoses and all orders for this visit:    Type 2 diabetes mellitus with chronic kidney disease, without long-term current use of insulin, unspecified CKD stage  -     Cancel: CBC Auto Differential; Future  -     Cancel: Lipid Panel; Future  -     Cancel: Comprehensive Metabolic Panel; Future  -     Cancel: Hemoglobin A1C; Future  -     Hemoglobin A1C; Future  -     Comprehensive Metabolic Panel; Future  -     Lipid Panel; Future  -     CBC Auto Differential; Future  -     Hemoglobin A1C  -     Comprehensive Metabolic Panel  -     Lipid Panel  -     CBC Auto Differential    Essential hypertension  -     Cancel: CBC Auto Differential; Future  -     Cancel: Lipid Panel; Future  -     Cancel: Comprehensive Metabolic Panel; Future  -     Cancel: Hemoglobin A1C; Future  -     Hemoglobin A1C; Future  -     Comprehensive Metabolic Panel; Future  -     Lipid Panel; Future  -     CBC Auto Differential; Future  -     Hemoglobin A1C  -     Comprehensive Metabolic Panel  -     Lipid Panel  -     CBC Auto Differential    Other hyperlipidemia  -     Cancel: CBC Auto Differential; Future  -     Cancel: Lipid Panel; Future  -     Cancel: Comprehensive Metabolic Panel; Future  -     Cancel: Hemoglobin A1C; Future  -     Hemoglobin A1C; Future  -     Comprehensive Metabolic Panel; Future  -     Lipid Panel; Future  -     CBC Auto Differential; Future  -     Hemoglobin A1C  -     Comprehensive Metabolic Panel  -     Lipid Panel  -     CBC Auto Differential    Chronic constipation    Stage 3 chronic kidney disease, unspecified whether stage  3a or 3b CKD    Anemia, unspecified type  -     Case Request Endoscopy: EGD (ESOPHAGOGASTRODUODENOSCOPY), COLONOSCOPY    Other orders  -     ferrous gluconate (FERGON) 324 MG tablet; Take 1 tablet (324 mg total) by mouth daily with breakfast.  -     atorvastatin (LIPITOR) 20 MG tablet; Take 1 tablet (20 mg total) by mouth once daily.  -     gabapentin (NEURONTIN) 100 MG capsule; Take 1 capsule (100 mg total) by mouth 3 (three) times daily.  -     metoprolol succinate (TOPROL-XL) 25 MG 24 hr tablet; TAKE 1 TABLET BY MOUTH DAILY FOR HIGH BLOOD PRESSURE  -     traMADoL (ULTRAM) 50 mg tablet; TAKE 2 TABLETS EVERY 8 HOURS AS NEEDED FOR PAIN  -     hydroCHLOROthiazide (HYDRODIURIL) 25 MG tablet; TAKE ONE TABLET BY MOUTH DAILY FOR FLUID.         Follow up in about 4 months (around 7/3/2022).     Zac Andres MD

## 2022-05-05 ENCOUNTER — PES CALL (OUTPATIENT)
Dept: ADMINISTRATIVE | Facility: CLINIC | Age: 87
End: 2022-05-05
Payer: MEDICARE

## 2022-07-06 ENCOUNTER — PES CALL (OUTPATIENT)
Dept: ADMINISTRATIVE | Facility: CLINIC | Age: 87
End: 2022-07-06
Payer: MEDICARE

## 2022-07-06 LAB
ALBUMIN SERPL-MCNC: 3.9 G/DL (ref 3.6–5.1)
ALBUMIN/GLOB SERPL: 1.1 (CALC) (ref 1–2.5)
ALP SERPL-CCNC: 85 U/L (ref 37–153)
ALT SERPL-CCNC: 17 U/L (ref 6–29)
AST SERPL-CCNC: 21 U/L (ref 10–35)
BASOPHILS # BLD AUTO: 37 CELLS/UL (ref 0–200)
BASOPHILS NFR BLD AUTO: 0.5 %
BILIRUB SERPL-MCNC: 0.4 MG/DL (ref 0.2–1.2)
BUN SERPL-MCNC: 28 MG/DL (ref 7–25)
BUN/CREAT SERPL: 19 (CALC) (ref 6–22)
CALCIUM SERPL-MCNC: 9.5 MG/DL (ref 8.6–10.4)
CHLORIDE SERPL-SCNC: 107 MMOL/L (ref 98–110)
CHOLEST SERPL-MCNC: 130 MG/DL
CHOLEST/HDLC SERPL: 2.1 (CALC)
CO2 SERPL-SCNC: 27 MMOL/L (ref 20–32)
CREAT SERPL-MCNC: 1.5 MG/DL (ref 0.6–0.88)
EOSINOPHIL # BLD AUTO: 340 CELLS/UL (ref 15–500)
EOSINOPHIL NFR BLD AUTO: 4.6 %
ERYTHROCYTE [DISTWIDTH] IN BLOOD BY AUTOMATED COUNT: 12.7 % (ref 11–15)
GLOBULIN SER CALC-MCNC: 3.5 G/DL (CALC) (ref 1.9–3.7)
GLUCOSE SERPL-MCNC: 82 MG/DL (ref 65–99)
HBA1C MFR BLD: 4.8 % OF TOTAL HGB
HCT VFR BLD AUTO: 23.6 % (ref 35–45)
HDLC SERPL-MCNC: 61 MG/DL
HGB BLD-MCNC: 7.8 G/DL (ref 11.7–15.5)
LDLC SERPL CALC-MCNC: 52 MG/DL (CALC)
LYMPHOCYTES # BLD AUTO: 2568 CELLS/UL (ref 850–3900)
LYMPHOCYTES NFR BLD AUTO: 34.7 %
MCH RBC QN AUTO: 31.6 PG (ref 27–33)
MCHC RBC AUTO-ENTMCNC: 33.1 G/DL (ref 32–36)
MCV RBC AUTO: 95.5 FL (ref 80–100)
MONOCYTES # BLD AUTO: 725 CELLS/UL (ref 200–950)
MONOCYTES NFR BLD AUTO: 9.8 %
NEUTROPHILS # BLD AUTO: 3730 CELLS/UL (ref 1500–7800)
NEUTROPHILS NFR BLD AUTO: 50.4 %
NONHDLC SERPL-MCNC: 69 MG/DL (CALC)
PLATELET # BLD AUTO: 302 THOUSAND/UL (ref 140–400)
PMV BLD REES-ECKER: 9.9 FL (ref 7.5–12.5)
POTASSIUM SERPL-SCNC: 5.5 MMOL/L (ref 3.5–5.3)
PROT SERPL-MCNC: 7.4 G/DL (ref 6.1–8.1)
RBC # BLD AUTO: 2.47 MILLION/UL (ref 3.8–5.1)
SODIUM SERPL-SCNC: 139 MMOL/L (ref 135–146)
TRIGL SERPL-MCNC: 87 MG/DL
WBC # BLD AUTO: 7.4 THOUSAND/UL (ref 3.8–10.8)

## 2022-07-12 ENCOUNTER — OFFICE VISIT (OUTPATIENT)
Dept: INTERNAL MEDICINE | Facility: CLINIC | Age: 87
End: 2022-07-12
Payer: MEDICARE

## 2022-07-12 VITALS
HEIGHT: 65 IN | RESPIRATION RATE: 16 BRPM | SYSTOLIC BLOOD PRESSURE: 138 MMHG | DIASTOLIC BLOOD PRESSURE: 68 MMHG | HEART RATE: 68 BPM | WEIGHT: 132 LBS | TEMPERATURE: 98 F | BODY MASS INDEX: 21.99 KG/M2

## 2022-07-12 DIAGNOSIS — D64.9 ANEMIA, UNSPECIFIED TYPE: ICD-10-CM

## 2022-07-12 DIAGNOSIS — I10 ESSENTIAL HYPERTENSION: ICD-10-CM

## 2022-07-12 DIAGNOSIS — N18.30 STAGE 3 CHRONIC KIDNEY DISEASE, UNSPECIFIED WHETHER STAGE 3A OR 3B CKD: ICD-10-CM

## 2022-07-12 DIAGNOSIS — E78.49 OTHER HYPERLIPIDEMIA: ICD-10-CM

## 2022-07-12 DIAGNOSIS — E11.22 TYPE 2 DIABETES MELLITUS WITH CHRONIC KIDNEY DISEASE, WITHOUT LONG-TERM CURRENT USE OF INSULIN, UNSPECIFIED CKD STAGE: Primary | ICD-10-CM

## 2022-07-12 DIAGNOSIS — M15.9 PRIMARY OSTEOARTHRITIS INVOLVING MULTIPLE JOINTS: ICD-10-CM

## 2022-07-12 PROCEDURE — 99214 OFFICE O/P EST MOD 30 MIN: CPT | Mod: S$GLB,,, | Performed by: INTERNAL MEDICINE

## 2022-07-12 PROCEDURE — 3288F FALL RISK ASSESSMENT DOCD: CPT | Mod: CPTII,S$GLB,, | Performed by: INTERNAL MEDICINE

## 2022-07-12 PROCEDURE — 3288F PR FALLS RISK ASSESSMENT DOCUMENTED: ICD-10-PCS | Mod: CPTII,S$GLB,, | Performed by: INTERNAL MEDICINE

## 2022-07-12 PROCEDURE — 1101F PR PT FALLS ASSESS DOC 0-1 FALLS W/OUT INJ PAST YR: ICD-10-PCS | Mod: CPTII,S$GLB,, | Performed by: INTERNAL MEDICINE

## 2022-07-12 PROCEDURE — 99999 PR PBB SHADOW E&M-EST. PATIENT-LVL V: ICD-10-PCS | Mod: PBBFAC,,, | Performed by: INTERNAL MEDICINE

## 2022-07-12 PROCEDURE — 99999 PR PBB SHADOW E&M-EST. PATIENT-LVL V: CPT | Mod: PBBFAC,,, | Performed by: INTERNAL MEDICINE

## 2022-07-12 PROCEDURE — 99214 PR OFFICE/OUTPT VISIT, EST, LEVL IV, 30-39 MIN: ICD-10-PCS | Mod: S$GLB,,, | Performed by: INTERNAL MEDICINE

## 2022-07-12 PROCEDURE — 1159F PR MEDICATION LIST DOCUMENTED IN MEDICAL RECORD: ICD-10-PCS | Mod: CPTII,S$GLB,, | Performed by: INTERNAL MEDICINE

## 2022-07-12 PROCEDURE — 1159F MED LIST DOCD IN RCRD: CPT | Mod: CPTII,S$GLB,, | Performed by: INTERNAL MEDICINE

## 2022-07-12 PROCEDURE — 1126F PR PAIN SEVERITY QUANTIFIED, NO PAIN PRESENT: ICD-10-PCS | Mod: CPTII,S$GLB,, | Performed by: INTERNAL MEDICINE

## 2022-07-12 PROCEDURE — 1126F AMNT PAIN NOTED NONE PRSNT: CPT | Mod: CPTII,S$GLB,, | Performed by: INTERNAL MEDICINE

## 2022-07-12 PROCEDURE — 1101F PT FALLS ASSESS-DOCD LE1/YR: CPT | Mod: CPTII,S$GLB,, | Performed by: INTERNAL MEDICINE

## 2022-07-12 RX ORDER — NALOXONE HYDROCHLORIDE 4 MG/.1ML
SPRAY NASAL
Qty: 1 EACH | Refills: 11 | Status: SHIPPED | OUTPATIENT
Start: 2022-07-12

## 2022-07-12 RX ORDER — TRAMADOL HYDROCHLORIDE 50 MG/1
TABLET ORAL
Qty: 180 TABLET | Refills: 1 | Status: SHIPPED | OUTPATIENT
Start: 2022-07-12 | End: 2022-11-14

## 2022-07-13 NOTE — PROGRESS NOTES
Subjective:       Patient ID: Melody Armstrong is a 87 y.o. female.    Chief Complaint: Diabetes (4 mos w/labs.  Back bothers her off and on. 0 pain today.)    HPI   The patient presents for routine follow-up evaluation today.  She is accompanied by her daughter Ailin Gabriel.  The patient reports she is generally doing well.  Her appetite remains good.  She eats 3 meals a day and she takes and Ensure supplement.  She does not monitor blood sugar levels.  She denies experiencing any hypoglycemic symptoms.  She is intolerant of the iron pills.  She states that they made her sick and dizzy.  She has anemia.  She did not obtain the colonoscopy that was ordered at the last office visit.  We discussed today whether the patient wishes to pursue endoscopy and she states that she is.  Her daughter states she will go along with her mother's decision.    Active medical conditions also include type 2 diabetes mellitus, hypertension, osteoarthritis, hyperlipidemia, chronic kidney disease.  The patient reports she does not monitor blood pressure.  Her intake of water is fair a on a daily basis.  She does have chronic joint pains and will use tramadol intermittently for severe pain.  She also has intermittent episodes of lower back pain.  The pain is nonradiating.  There is no bowel or bladder sphincter dysfunction present.  She denies having any lower extremity weakness.  She is using a cane for assisted ambulation when she leaves the house.    Review of Systems   Constitutional: Negative for activity change, appetite change, fatigue and unexpected weight change.   Eyes: Negative for visual disturbance.   Respiratory: Negative for shortness of breath.    Cardiovascular: Negative for chest pain, palpitations and leg swelling.   Gastrointestinal: Negative for abdominal pain, blood in stool and diarrhea.   Genitourinary: Negative for dysuria, frequency, hematuria and urgency.   Musculoskeletal: Positive for arthralgias and back  pain.   Neurological: Negative for dizziness, weakness, numbness and headaches.   Psychiatric/Behavioral: Negative for sleep disturbance.            Physical Exam  Vitals and nursing note reviewed.   Constitutional:       General: She is not in acute distress.     Appearance: Normal appearance. She is well-developed. She is not ill-appearing.      Comments: The patient has lost 5 lb since 03/03/2022.   HENT:      Head: Normocephalic and atraumatic.   Eyes:      General: No scleral icterus.     Conjunctiva/sclera: Conjunctivae normal.      Pupils: Pupils are equal, round, and reactive to light.   Neck:      Thyroid: No thyromegaly.      Vascular: No JVD.   Cardiovascular:      Rate and Rhythm: Normal rate and regular rhythm.      Heart sounds: Normal heart sounds. No murmur heard.    No friction rub. No gallop.      Comments: Trace left ankle edema is present; no right lower extremity edema is present.  Pulmonary:      Effort: Pulmonary effort is normal. No respiratory distress.      Breath sounds: Normal breath sounds. No wheezing or rales.   Abdominal:      General: Bowel sounds are normal.      Palpations: Abdomen is soft. There is no mass.      Tenderness: There is no abdominal tenderness.   Musculoskeletal:         General: No tenderness. Normal range of motion.      Cervical back: Normal range of motion and neck supple.   Lymphadenopathy:      Cervical: No cervical adenopathy.   Skin:     General: Skin is warm and dry.      Findings: No rash.      Comments: No foot lesions are present.   Neurological:      Mental Status: She is alert and oriented to person, place, and time.      Cranial Nerves: No cranial nerve deficit.      Comments: Sensory exam is intact in both feet on monofilament  testing.   Psychiatric:         Mood and Affect: Mood normal.         Behavior: Behavior normal.         Thought Content: Thought content normal.         Judgment: Judgment normal.         Protective Sensation (w/ 10 gram  monofilament):  Right: Intact  Left: Intact    Visual Inspection:  Normal -  Bilateral    Pedal Pulses:   Right: Present  Left: Present    Posterior tibialis:   Right:Present  Left: Present         No visits with results within 3 Month(s) from this visit.   Latest known visit with results is:   Office Visit on 03/03/2022   Component Date Value Ref Range Status    Hemoglobin A1C 07/05/2022 4.8  <5.7 % of total Hgb Final    Comment: For the purpose of screening for the presence of  diabetes:     <5.7%       Consistent with the absence of diabetes  5.7-6.4%    Consistent with increased risk for diabetes              (prediabetes)  > or =6.5%  Consistent with diabetes     This assay result is consistent with a decreased risk  of diabetes.     Currently, no consensus exists regarding use of  hemoglobin A1c for diagnosis of diabetes in children.     According to American Diabetes Association (ADA)  guidelines, hemoglobin A1c <7.0% represents optimal  control in non-pregnant diabetic patients. Different  metrics may apply to specific patient populations.   Standards of Medical Care in Diabetes(ADA).          Glucose 07/05/2022 82  65 - 99 mg/dL Final    Comment:               Fasting reference interval         BUN 07/05/2022 28 (A) 7 - 25 mg/dL Final    Creatinine 07/05/2022 1.50 (A) 0.60 - 0.88 mg/dL Final    Comment: For patients >49 years of age, the reference limit  for Creatinine is approximately 13% higher for people  identified as -American.         eGFR if non African American 07/05/2022 31 (A) > OR = 60 mL/min/1.73m2 Final    eGFR if  07/05/2022 36 (A) > OR = 60 mL/min/1.73m2 Final    BUN/Creatinine Ratio 07/05/2022 19  6 - 22 (calc) Final    Sodium 07/05/2022 139  135 - 146 mmol/L Final    Potassium 07/05/2022 5.5 (A) 3.5 - 5.3 mmol/L Final    Chloride 07/05/2022 107  98 - 110 mmol/L Final    CO2 07/05/2022 27  20 - 32 mmol/L Final    Calcium 07/05/2022 9.5  8.6 - 10.4 mg/dL  Final    Total Protein 07/05/2022 7.4  6.1 - 8.1 g/dL Final    Albumin 07/05/2022 3.9  3.6 - 5.1 g/dL Final    Globulin, Total 07/05/2022 3.5  1.9 - 3.7 g/dL (calc) Final    Albumin/Globulin Ratio 07/05/2022 1.1  1.0 - 2.5 (calc) Final    Total Bilirubin 07/05/2022 0.4  0.2 - 1.2 mg/dL Final    Alkaline Phosphatase 07/05/2022 85  37 - 153 U/L Final    AST 07/05/2022 21  10 - 35 U/L Final    ALT 07/05/2022 17  6 - 29 U/L Final    Cholesterol 07/05/2022 130  <200 mg/dL Final    HDL 07/05/2022 61  > OR = 50 mg/dL Final    Triglycerides 07/05/2022 87  <150 mg/dL Final    LDL Cholesterol 07/05/2022 52  mg/dL (calc) Final    Comment: Reference range: <100     Desirable range <100 mg/dL for primary prevention;    <70 mg/dL for patients with CHD or diabetic patients   with > or = 2 CHD risk factors.     LDL-C is now calculated using the Nikolas-Kimberly   calculation, which is a validated novel method providing   better accuracy than the Friedewald equation in the   estimation of LDL-C.   Nikolas SS et al. DONNA. 2013;310(19): 4520-0146   (http://education.[employer].eBaoTech/faq/RVQ714)      HDL/Cholesterol Ratio 07/05/2022 2.1  <5.0 (calc) Final    Non HDL Chol. (LDL+VLDL) 07/05/2022 69  <130 mg/dL (calc) Final    Comment: For patients with diabetes plus 1 major ASCVD risk   factor, treating to a non-HDL-C goal of <100 mg/dL   (LDL-C of <70 mg/dL) is considered a therapeutic   option.      WBC 07/05/2022 7.4  3.8 - 10.8 Thousand/uL Final    RBC 07/05/2022 2.47 (A) 3.80 - 5.10 Million/uL Final    Hemoglobin 07/05/2022 7.8 (A) 11.7 - 15.5 g/dL Final    Hematocrit 07/05/2022 23.6 (A) 35.0 - 45.0 % Final    MCV 07/05/2022 95.5  80.0 - 100.0 fL Final    MCH 07/05/2022 31.6  27.0 - 33.0 pg Final    MCHC 07/05/2022 33.1  32.0 - 36.0 g/dL Final    RDW 07/05/2022 12.7  11.0 - 15.0 % Final    Platelets 07/05/2022 302  140 - 400 Thousand/uL Final    MPV 07/05/2022 9.9  7.5 - 12.5 fL Final    Neutrophils, Abs  07/05/2022 3,730  1,500 - 7,800 cells/uL Final    Lymph # 07/05/2022 2,568  850 - 3,900 cells/uL Final    Mono # 07/05/2022 725  200 - 950 cells/uL Final    Eos # 07/05/2022 340  15 - 500 cells/uL Final    Baso # 07/05/2022 37  0 - 200 cells/uL Final    Neutrophils Relative 07/05/2022 50.4  % Final    Lymph % 07/05/2022 34.7  % Final    Mono % 07/05/2022 9.8  % Final    Eosinophil % 07/05/2022 4.6  % Final    Basophil % 07/05/2022 0.5  % Final       Assessment & Plan:      Melody was seen today for diabetes.  An EGD and colonoscopy will be obtained.  (the original order was placed on 03/03/2022).  A stool tests for occult blood will be ordered.  Hematology-Oncology consultation will be obtained for further assessment and treatment of the anemia.  The patient has been unable to tolerate oral medication for iron replacement.  She and her daughter are interested in the option of iron infusion therapy.    Tramadol will be renewed for severe pain as requested.  The patient will continue use Tylenol for milder pain symptoms.    The patient was advised to obtain the 2nd COVID-19 booster vaccine through her local pharmacy.    Potassium supplement can be discontinued at this time due to current elevated potassium level.    Diagnoses and all orders for this visit:    Type 2 diabetes mellitus with chronic kidney disease, without long-term current use of insulin, unspecified CKD stage  -     Comprehensive Metabolic Panel; Future  -     CBC Auto Differential; Future  -     Hemoglobin A1C; Future    Essential hypertension  -     Comprehensive Metabolic Panel; Future  -     CBC Auto Differential; Future  -     Hemoglobin A1C; Future    Other hyperlipidemia  -     Comprehensive Metabolic Panel; Future  -     CBC Auto Differential; Future  -     Hemoglobin A1C; Future    Stage 3 chronic kidney disease, unspecified whether stage 3a or 3b CKD  -     Comprehensive Metabolic Panel; Future  -     CBC Auto Differential;  Future  -     Hemoglobin A1C; Future    Anemia, unspecified type  -     Occult blood x 1, stool; Future  -     Ambulatory referral/consult to Hematology / Oncology; Future    Primary osteoarthritis involving multiple joints    Other orders  -     traMADoL (ULTRAM) 50 mg tablet; TAKE 2 TABLETS EVERY 8 HOURS AS NEEDED FOR PAIN  -     naloxone (NARCAN) 4 mg/actuation Spry; 4mg by nasal route as needed for opioid overdose; may repeat every 2-3 minutes in alternating nostrils until medical help arrives. Call 911         Follow up in about 4 months (around 11/12/2022).     Zac Andres MD

## 2022-08-02 ENCOUNTER — LAB VISIT (OUTPATIENT)
Dept: LAB | Facility: HOSPITAL | Age: 87
End: 2022-08-02
Attending: PHYSICIAN ASSISTANT
Payer: MEDICARE

## 2022-08-02 ENCOUNTER — OFFICE VISIT (OUTPATIENT)
Dept: HEMATOLOGY/ONCOLOGY | Facility: CLINIC | Age: 87
End: 2022-08-02
Payer: MEDICARE

## 2022-08-02 VITALS
DIASTOLIC BLOOD PRESSURE: 90 MMHG | HEART RATE: 115 BPM | BODY MASS INDEX: 22.66 KG/M2 | TEMPERATURE: 98 F | HEIGHT: 65 IN | OXYGEN SATURATION: 96 % | WEIGHT: 136 LBS | SYSTOLIC BLOOD PRESSURE: 137 MMHG | RESPIRATION RATE: 17 BRPM

## 2022-08-02 DIAGNOSIS — D64.9 ANEMIA, UNSPECIFIED TYPE: Primary | ICD-10-CM

## 2022-08-02 DIAGNOSIS — D64.9 ANEMIA, UNSPECIFIED TYPE: ICD-10-CM

## 2022-08-02 LAB
ALBUMIN SERPL BCP-MCNC: 3.4 G/DL (ref 3.5–5.2)
ALP SERPL-CCNC: 87 U/L (ref 55–135)
ALT SERPL W/O P-5'-P-CCNC: 16 U/L (ref 10–44)
ANION GAP SERPL CALC-SCNC: 6 MMOL/L (ref 8–16)
AST SERPL-CCNC: 22 U/L (ref 10–40)
BASOPHILS # BLD AUTO: 0.03 K/UL (ref 0–0.2)
BASOPHILS NFR BLD: 0.4 % (ref 0–1.9)
BILIRUB SERPL-MCNC: 0.4 MG/DL (ref 0.1–1)
BUN SERPL-MCNC: 21 MG/DL (ref 8–23)
CALCIUM SERPL-MCNC: 9.4 MG/DL (ref 8.7–10.5)
CHLORIDE SERPL-SCNC: 106 MMOL/L (ref 95–110)
CO2 SERPL-SCNC: 27 MMOL/L (ref 23–29)
CREAT SERPL-MCNC: 1.5 MG/DL (ref 0.5–1.4)
DIFFERENTIAL METHOD: ABNORMAL
EOSINOPHIL # BLD AUTO: 0.2 K/UL (ref 0–0.5)
EOSINOPHIL NFR BLD: 2.3 % (ref 0–8)
ERYTHROCYTE [DISTWIDTH] IN BLOOD BY AUTOMATED COUNT: 13.7 % (ref 11.5–14.5)
EST. GFR  (NO RACE VARIABLE): 33.3 ML/MIN/1.73 M^2
FERRITIN SERPL-MCNC: 121 NG/ML (ref 20–300)
GLUCOSE SERPL-MCNC: 85 MG/DL (ref 70–110)
HCT VFR BLD AUTO: 24.2 % (ref 37–48.5)
HGB BLD-MCNC: 7.6 G/DL (ref 12–16)
IMM GRANULOCYTES # BLD AUTO: 0.02 K/UL (ref 0–0.04)
IMM GRANULOCYTES NFR BLD AUTO: 0.3 % (ref 0–0.5)
IRON SERPL-MCNC: 32 UG/DL (ref 30–160)
LDH SERPL L TO P-CCNC: 190 U/L (ref 110–260)
LYMPHOCYTES # BLD AUTO: 2.2 K/UL (ref 1–4.8)
LYMPHOCYTES NFR BLD: 30.2 % (ref 18–48)
MCH RBC QN AUTO: 31.8 PG (ref 27–31)
MCHC RBC AUTO-ENTMCNC: 31.4 G/DL (ref 32–36)
MCV RBC AUTO: 101 FL (ref 82–98)
MONOCYTES # BLD AUTO: 0.8 K/UL (ref 0.3–1)
MONOCYTES NFR BLD: 10.8 % (ref 4–15)
NEUTROPHILS # BLD AUTO: 4.1 K/UL (ref 1.8–7.7)
NEUTROPHILS NFR BLD: 56 % (ref 38–73)
NRBC BLD-RTO: 0 /100 WBC
PLATELET # BLD AUTO: 306 K/UL (ref 150–450)
PMV BLD AUTO: 9 FL (ref 9.2–12.9)
POTASSIUM SERPL-SCNC: 4.7 MMOL/L (ref 3.5–5.1)
PROT SERPL-MCNC: 7.6 G/DL (ref 6–8.4)
RBC # BLD AUTO: 2.39 M/UL (ref 4–5.4)
RETICS/RBC NFR AUTO: 1.8 % (ref 0.5–2.5)
SATURATED IRON: 11 % (ref 20–50)
SODIUM SERPL-SCNC: 139 MMOL/L (ref 136–145)
TOTAL IRON BINDING CAPACITY: 295 UG/DL (ref 250–450)
TRANSFERRIN SERPL-MCNC: 199 MG/DL (ref 200–375)
WBC # BLD AUTO: 7.29 K/UL (ref 3.9–12.7)

## 2022-08-02 PROCEDURE — 99999 PR PBB SHADOW E&M-EST. PATIENT-LVL V: ICD-10-PCS | Mod: PBBFAC,,, | Performed by: PHYSICIAN ASSISTANT

## 2022-08-02 PROCEDURE — 1101F PT FALLS ASSESS-DOCD LE1/YR: CPT | Mod: CPTII,S$GLB,, | Performed by: PHYSICIAN ASSISTANT

## 2022-08-02 PROCEDURE — 82728 ASSAY OF FERRITIN: CPT | Performed by: PHYSICIAN ASSISTANT

## 2022-08-02 PROCEDURE — 1159F MED LIST DOCD IN RCRD: CPT | Mod: CPTII,S$GLB,, | Performed by: PHYSICIAN ASSISTANT

## 2022-08-02 PROCEDURE — 83615 LACTATE (LD) (LDH) ENZYME: CPT | Performed by: PHYSICIAN ASSISTANT

## 2022-08-02 PROCEDURE — 1160F PR REVIEW ALL MEDS BY PRESCRIBER/CLIN PHARMACIST DOCUMENTED: ICD-10-PCS | Mod: CPTII,S$GLB,, | Performed by: PHYSICIAN ASSISTANT

## 2022-08-02 PROCEDURE — 84466 ASSAY OF TRANSFERRIN: CPT | Performed by: PHYSICIAN ASSISTANT

## 2022-08-02 PROCEDURE — 3288F PR FALLS RISK ASSESSMENT DOCUMENTED: ICD-10-PCS | Mod: CPTII,S$GLB,, | Performed by: PHYSICIAN ASSISTANT

## 2022-08-02 PROCEDURE — 1159F PR MEDICATION LIST DOCUMENTED IN MEDICAL RECORD: ICD-10-PCS | Mod: CPTII,S$GLB,, | Performed by: PHYSICIAN ASSISTANT

## 2022-08-02 PROCEDURE — 99205 OFFICE O/P NEW HI 60 MIN: CPT | Mod: S$GLB,,, | Performed by: PHYSICIAN ASSISTANT

## 2022-08-02 PROCEDURE — 1126F AMNT PAIN NOTED NONE PRSNT: CPT | Mod: CPTII,S$GLB,, | Performed by: PHYSICIAN ASSISTANT

## 2022-08-02 PROCEDURE — 99999 PR PBB SHADOW E&M-EST. PATIENT-LVL V: CPT | Mod: PBBFAC,,, | Performed by: PHYSICIAN ASSISTANT

## 2022-08-02 PROCEDURE — 85025 COMPLETE CBC W/AUTO DIFF WBC: CPT | Performed by: PHYSICIAN ASSISTANT

## 2022-08-02 PROCEDURE — 82668 ASSAY OF ERYTHROPOIETIN: CPT | Performed by: PHYSICIAN ASSISTANT

## 2022-08-02 PROCEDURE — 85045 AUTOMATED RETICULOCYTE COUNT: CPT | Performed by: PHYSICIAN ASSISTANT

## 2022-08-02 PROCEDURE — 3288F FALL RISK ASSESSMENT DOCD: CPT | Mod: CPTII,S$GLB,, | Performed by: PHYSICIAN ASSISTANT

## 2022-08-02 PROCEDURE — 80053 COMPREHEN METABOLIC PANEL: CPT | Performed by: PHYSICIAN ASSISTANT

## 2022-08-02 PROCEDURE — 1126F PR PAIN SEVERITY QUANTIFIED, NO PAIN PRESENT: ICD-10-PCS | Mod: CPTII,S$GLB,, | Performed by: PHYSICIAN ASSISTANT

## 2022-08-02 PROCEDURE — 1160F RVW MEDS BY RX/DR IN RCRD: CPT | Mod: CPTII,S$GLB,, | Performed by: PHYSICIAN ASSISTANT

## 2022-08-02 PROCEDURE — 1101F PR PT FALLS ASSESS DOC 0-1 FALLS W/OUT INJ PAST YR: ICD-10-PCS | Mod: CPTII,S$GLB,, | Performed by: PHYSICIAN ASSISTANT

## 2022-08-02 PROCEDURE — 99205 PR OFFICE/OUTPT VISIT, NEW, LEVL V, 60-74 MIN: ICD-10-PCS | Mod: S$GLB,,, | Performed by: PHYSICIAN ASSISTANT

## 2022-08-02 NOTE — PROGRESS NOTES
[facility] Hospitalist Progress Note    Subjective  Patient reports that her slurred speech is better.  She reports that her arms feel weak and she cannot lift them up.  She states that these are new new for her, but she cannot really tell me exactly when it started.    Objective  Blood pressure 137/71, pulse 60, temperature 97.3 °F (36.3 °C), temperature source Oral, resp. rate 17, height 5' (1.524 m), weight 58.8 kg (129 lb 10.1 oz), SpO2 99 %.    Gen: Not in acute distress, Non-obese  HEENT: anicteric, mmm, No conjunctival pallor/injection,  Heme: No lymphadenopathy, no bruises, no bleeding, no pallor  C/V: No elevated jugular venous pressure, No carotid bruits, S1, S2, No m/r/g, No LE edema  Lungs: Clear to auscultation b/l  Abd: +BS, Nondistended, nontender, No organomegaly  : No CVAT, No suprapubic tenderness  Ext: No joint erythema/swelling/effusion  Skin: No rash, no jaundice  Neuro:  AAO to person, place, partially to time, CN grossly intact, generalized weakness    Labs     Recent Labs     04/03/21  1807 04/04/21  0605 04/06/21  0634   SODIUM 148* 143 147*   POTASSIUM 4.1 3.6 3.8   CO2 24 23 24   ANIONGAP 17 16 15   GLUCOSE 101* 87 116*   BUN 18 12 7   CREATININE 0.92 0.71 0.80   BCRAT 20 17 9   CALCIUM 8.3* 8.5 8.4   BILIRUBIN  --  0.4  --    AST  --  19  --    GPT  --  12  --    ALKPT  --  45  --    GLOB  --  4.0  --    AGR  --  0.7*  --         Recent Labs     04/03/21  1807 04/04/21  0605 04/06/21  0634   WBC 7.1 6.3 6.3   RBC 3.94* 4.13 4.39   HGB 9.7* 10.2* 10.9*   HCT 31.6* 32.7* 34.7*    180 173   MCV 80.2 79.2 79.0   MCH 24.6* 24.7* 24.8*   MCHC 30.7* 31.2* 31.4*   NRBCRE 0 0 0         Imaging  TRANSTHORACIC ECHO (TTE) COMPLETE W/ W/O IMAGING AGENT  Patient: Stacy Monae    Study Date/Time:       Apr 4 2021 1:27PM  SUMMARY:  1. Left ventricle: The cavity size is normal. Wall thickness is mildly increased. There is concentric hypertrophy. The ejection fraction was measured by visual estimation.  Section of Hematology and Stem Cell Transplantation  New Patient Consult     Referred by:  Dr. Zac Andres  Date of visit: 8/2/22    Reason for visit: Anemia, unspecified type [D64.9]    History of Present Ilness:   Melody Armstrong (Melody) is a pleasant 88 y.o.female with past medical history of hyperlipidemia, hypertension, coronary artery disease, Stage III CKD referred by her PCP for evaluation of anemia. Reivew of lab work reveals a slowly progressive anemia dating back to at least 2012, with recent lab values with normocytic anemia and Hgb 7.8. The pt reports she has been feeling in her usual state of health, she lives alone, performs all ADLs, and has good energy. She denies PICA sx, and reports normal/balanced diet. She reports she's been told she has anemia for many years, couldn't previously tolerate PO iron, and has had kidney disease for many years. She also reports strong fam hx of anemia, ? Thalassemia - pt denies known fhx of hemoglobinopathy. She denies any sources of bleeding, recent hemoccult negative. Otherwise, no B symptoms or concerns from the patient/her daughter.     Cancer Screening:  · Colonoscopy: Not up to date - ordered, needs to be scheduled. Hemoccult negative.   · Pap Smear/Pelvic Exam: No longer being screened, doesn't recall her last pap   · Mammogram: Not up to date - many years, doesn't recall   · Smoking Status: Non-smoker  · Family History: No known family history of malignancy       PAST MEDICAL HISTORY:   Past Medical History:   Diagnosis Date    Anemia     Asymptomatic cholelithiasis     Coronary artery disease     Diabetes mellitus type II     GERD (gastroesophageal reflux disease)     Hyperlipidemia     Hypertension        PAST SURGICAL HISTORY:   Past Surgical History:   Procedure Laterality Date    CORONARY ARTERY BYPASS GRAFT      JOINT REPLACEMENT      right    KNEE SURGERY      PARTIAL HYSTERECTOMY      SHOULDER ARTHROSCOPY W/ ROTATOR CUFF REPAIR       right shoulder       PAST SOCIAL HISTORY:  Social History     Tobacco Use    Smoking status: Never Smoker    Smokeless tobacco: Never Used   Substance Use Topics    Alcohol use: No    Drug use: No       FAMILY HISTORY:  Family History   Problem Relation Age of Onset    Diabetes Mother     Heart disease Mother     Diabetes Maternal Grandmother        CURRENT MEDICATIONS:   Current Outpatient Medications   Medication Sig    acetaminophen (TYLENOL) 325 MG tablet Take 325 mg by mouth every 6 (six) hours as needed for Pain.    aspirin 325 MG tablet Take 1 tablet by mouth.    atorvastatin (LIPITOR) 20 MG tablet Take 1 tablet (20 mg total) by mouth once daily.    cyanocobalamin (VITAMIN B-12) 1000 MCG tablet Take 1 tablet by mouth.    diclofenac sodium (VOLTAREN) 1 % Gel Apply 2 g topically 4 (four) times daily. (Patient taking differently: Apply 2 g topically as needed.)    gabapentin (NEURONTIN) 100 MG capsule Take 1 capsule (100 mg total) by mouth 3 (three) times daily.    hydroCHLOROthiazide (HYDRODIURIL) 25 MG tablet TAKE ONE TABLET BY MOUTH DAILY FOR FLUID.    lactulose (CHRONULAC) 10 gram/15 mL solution Take 30 mLs (20 g total) by mouth once daily. For chronic constipation.    losartan (COZAAR) 50 MG tablet TAKE 1 TABLET BY MOUTH EVERY DAY FOR HIGH BLOOD PRESSURE    metoprolol succinate (TOPROL-XL) 25 MG 24 hr tablet TAKE 1 TABLET BY MOUTH DAILY FOR HIGH BLOOD PRESSURE    naloxone (NARCAN) 4 mg/actuation Spry 4mg by nasal route as needed for opioid overdose; may repeat every 2-3 minutes in alternating nostrils until medical help arrives. Call 911    traMADoL (ULTRAM) 50 mg tablet TAKE 2 TABLETS EVERY 8 HOURS AS NEEDED FOR PAIN    blood sugar diagnostic Strp To check BG once daily, to use with insurance preferred meter (Patient not taking: No sig reported)    blood-glucose meter kit To check BG once daily, to use with insurance preferred meter (Patient not taking: Reported on 9/21/2020)     Doppler parameters are consistent with abnormal left ventricular relaxation (grade 1 diastolic dysfunction).  The ejection fraction is 70%.  2. Left atrium: The atrium is moderately dilated.    EEG Routine; With Video  Impression  Normal electroencephalogram, awake, drowsy and with activation procedures.   There are no focal lateralizing or epileptiform feature    MRI BRAIN WO CONTRAST  Impression:   1.   No acute intracranial abnormality.  2.   Chronic lacunar infarcts in the deep gray nuclei.  3.   Senescent changes as detailed in the findings section.   4.   Degenerative changes in the cervical spine.  Findings are resulting  multilevel spinal canal stenosis.  Consider cervical spine MRI.  Signed on: 4/6/2021 1:19 PM           Imaging  MRI BRAIN WO CONTRAST  Narrative: MRI OF THE BRAIN WITHOUT CONTRAST.    HISTORY: Slurred speech.    Technique: MRI of the brain was performed according to the routine adult  brain protocol without uneventful administration of intravenous contrast.      COMPARISON: MRI brain on 01/30/2019.  CT head on 04/03/2021.  CTA head/neck  on 04/03/2021.    FINDINGS:    Diffusion weighted imaging is unremarkable, without evidence of acute or  subacute infarction.       Diffuse mild global parenchymal volume loss with concordant prominence of  the ventricles and cortical sulci.     Chronic lacunar infarcts in the basal ganglia and thalami are again  identified.    There is mild  burden of scattered T2/FLAIR hyperintense signal in the  periventricular, deep and/or subcortical white matter and hailee. These are  nonspecific but probably related to chronic small vessel ischemic change.     No significant mass-effect is seen. The ventricular system without shift of  midline structures.  No extra-axial fluid collections or hydrocephalus is  appreciated.    Degenerative changes in the cervical spine.  Findings are resulting in  multilevel spinal canal stenosis.  Impression: 1.   No acute intracranial  abnormality.  2.   Chronic lacunar infarcts in the deep gray nuclei.  3.   Senescent changes as detailed in the findings section.   4.   Degenerative changes in the cervical spine.  Findings are resulting  multilevel spinal canal stenosis.  Consider cervical spine MRI.    FOR PHYSICIAN USE ONLY - Please note that this report was generated using  voice recognition software.  If you require clarification or feel that  there has been an error in this report please contact me through [employer].  Thank you very much for allowing me to participate in the care of  your patient.    Electronically Signed by: RIOS REYNOLDS   Signed on: 4/6/2021 1:19 PM          Assessment/Plan  83-year-old female who was brought to the ED by EMS with complaint of worsening slurred speech.     Slurred speech with possible acute TIA  Right MCA stenosis  CTA head with chronic short segment moderate to severe stenosis involving the anterior M2 branch of the right MCA  MRI brain showed no acute infarct.  Showed chronic lacunar infarct and senescent changes.  2d ECHO EF 70%, grade 1 DD, left atrium moderately dilated with no thromboemboli, concentric hypertrophy, regional wma  EEG normal   Neurology recommends repeat EEG in 8-10 weeks  Neurology consulted  PT/OT/ST  ASA, plavix, statin      Multilevel cervical spine stenosis  Possible cervical spinal cord compression with bilateral arm weakness  Multilevel degenerative changes in cervical spine resulting in severe stenosis at C3 through C6 levels with radiology recommending further evaluation   Patient with also noted to have bilateral arm weakness, which seems new for the patient. Patient is a poor historian.   MRI of C-spine was done which showed multiple severe stenosis and cord indentations in C3-C4 through C6-C7.    Neurosurgery paged.  Discussed with Dr. Pickett.  No urgent surgical intervention recommended.  Patient to follow-up as an outpatient.    Hypernatremia   Sodium elevated  ferrous gluconate (FERGON) 324 MG tablet Take 1 tablet (324 mg total) by mouth daily with breakfast. (Patient not taking: No sig reported)    lancets Misc To check BG once daily, to use with insurance preferred meter (Patient not taking: No sig reported)    levocetirizine (XYZAL) 5 MG tablet TAKE 1 TABLET (5 MG TOTAL) BY MOUTH DAILY AS NEEDED FOR ALLERGIES.    meclizine (ANTIVERT) 12.5 mg tablet Take 1 tablet (12.5 mg total) by mouth 3 (three) times daily as needed for Dizziness. (Patient not taking: No sig reported)    meclizine (ANTIVERT) 12.5 mg tablet Take 1 tablet by mouth three times a day as needed for dizziness (Patient not taking: No sig reported)    walker Misc Use daily for assisted ambulation (Patient not taking: No sig reported)     No current facility-administered medications for this visit.       ALLERGIES:   Review of patient's allergies indicates:  No Known Allergies    Review of Systems:     Review of Systems   Constitutional: Negative for chills, diaphoresis, fever, malaise/fatigue and weight loss.   HENT: Negative for congestion, nosebleeds and sore throat.    Respiratory: Negative for cough and shortness of breath.    Cardiovascular: Negative for chest pain and leg swelling.   Gastrointestinal: Negative for abdominal pain, blood in stool, constipation, heartburn, melena, nausea and vomiting.   Genitourinary: Negative for hematuria.   Musculoskeletal: Negative for falls, joint pain and myalgias.   Neurological: Negative for dizziness and headaches.   Psychiatric/Behavioral: Negative.          Physical Exam:     Vitals:    08/02/22 1014   BP: (!) 137/90   Pulse: (!) 115   Resp: 17   Temp: 98.3 °F (36.8 °C)       Physical Exam  Constitutional:       General: She is not in acute distress.     Appearance: Normal appearance.   HENT:      Head: Normocephalic.      Right Ear: External ear normal.      Left Ear: External ear normal.      Nose: Nose normal.      Mouth/Throat:      Mouth: Mucous  membranes are moist.      Pharynx: Oropharynx is clear.   Eyes:      Extraocular Movements: Extraocular movements intact.      Conjunctiva/sclera: Conjunctivae normal.      Pupils: Pupils are equal, round, and reactive to light.   Cardiovascular:      Rate and Rhythm: Regular rhythm. Tachycardia present.      Pulses: Normal pulses.      Heart sounds: Normal heart sounds.   Pulmonary:      Effort: Pulmonary effort is normal.   Abdominal:      General: Abdomen is flat. There is no distension.      Palpations: Abdomen is soft.      Tenderness: There is no abdominal tenderness.   Musculoskeletal:         General: No swelling. Normal range of motion.      Cervical back: Neck supple.   Lymphadenopathy:      Cervical: No cervical adenopathy.   Neurological:      General: No focal deficit present.      Mental Status: She is alert. Mental status is at baseline.      Cranial Nerves: No cranial nerve deficit.          Labs:   Lab Results   Component Value Date    WBC 7.4 07/05/2022    HGB 7.8 (L) 07/05/2022    HCT 23.6 (L) 07/05/2022    MCV 95.5 07/05/2022     07/05/2022       Lab Results   Component Value Date     07/05/2022    K 5.5 (H) 07/05/2022     07/05/2022    CO2 27 07/05/2022    BUN 28 (H) 07/05/2022    CREATININE 1.50 (H) 07/05/2022    ALBUMIN 3.9 07/05/2022    BILITOT 0.4 07/05/2022    ALKPHOS 94 10/15/2021    AST 21 07/05/2022    ALT 17 07/05/2022       Lab Results   Component Value Date    IRON 48 10/22/2021    TIBC 300 10/22/2021    FERRITIN 187 10/22/2021       No components found for: RETICULOCYTES    Lab Results   Component Value Date    HAPTOGLOBIN 78 07/25/2011     07/25/2011            Assessment and Plan:   Melody Armstrong (Melody) is a pleasant 88 y.o.female referred by her PCP for evaluation of chronic, normocytic anemia.     1. Normocytic Anemia  - Pt with a slowly progressive anemia over at least the past decade  - She is not up to date on routine cancer screenings, has  on admission at 148.    Still persistent. Start IVFs with 0.45 NSS  Encourage free water intake     Hypomagnesemia  Given repletion  Now resolved     Type 2 diabetes mellitus with hyperglycemia  Diabetic neuropathy  Will hold home metformin during hospitalization.    Cont home gabapentin  Hba1c 6.8    ISS, [place]     Hypertensive urgency  BP elevated on admission as high as 205/96.    Continue home lisinopril and metoprolol XL   Monitor BP and adjust meds as needed     Chronic dementia  Resume home donepezil.     Multinodular thyroid gland  Seen on CT  TSH normal/low normal range.    Follow-up outpatient with primary care provider for evaluation for ultrasound thyroid    Anemia, likely anemia of chronic disease  No current active bleeding. Hgb stable. Follow up outpatient with PCP.        Fluids  • sodium chloride 0.45 % infusion 80 mL/hr at 21 0903        Prophylaxis    Diet  Dietary Orders (From admission, onward)     Start     Ordered    21 1626  Consistent Carb Moderate (45-75 gm/meal), Regular Diet  DIET EFFECTIVE NOW     Question Answer Comment   Diet Modifiers Consistent Carb Moderate (45-75 gm/meal)    Diet Modifiers Regular        21 1626                 Activity    Disposition  Patient needs nondaily skilled therapy after discharge, Patient requires intermittent nonskilled assistance to perform mobility and/or ADLs safely    PCP: No primary care provider on file.     Code Status Information     Code Status    Full Resuscitation           Kiko Luke MD  2021      HOME HEALTH FACE TO FACE DOCUMENTATION AND CERTIFICATION  CMS Rules: Certifying Patients for the Medicare Home Health Benefit    Home Health Eligibility Summary    Name: Stacy Monae  : 1937  MRN: [id number]    Service to Home Care order content:  There are no questions and answers to display.       I certify this patient is under my care and  I, or a nurse practitioner, clinical nurse specialist or physician  assistant working with me, had a face-to-face encounter with this patient on the date listed.    Medical Condition Related to Home Health Services  The encounter with the patient was in whole, or in part, for the following medical condition(s), which are the reasons for home health care:   1. Neurological deficit present           Certification of Medical Necessity(Certification is required for select Home Health beneficiaries only)  I certify based on my clinical findings, the services ordered in the referral are medically necessary home health services.    Homebound Status and Living Situation  I certify my clinical findings support this patient is homebound due to the homebound reason(s) listed in the Service to Home Care order content link above.  If reason for homebound status is not specified, this document does not certify the patient as homebound.    I have authorized these skilled home health services and the \"Physician to follow\" listed above will accept and assume care for this patient and sign the Home Health Plan of Care.    Date of completion of form: 4/6/2021           colonoscopy/EGD ordered and awaiting scheduling (hemoccult negative). No GERD, no melena/hematochezia, no abdominal complaints. No B symptoms. She denies any sources of bleeding/bruising.  - Apart from renal dysfunction, no evidence of elevated protein gap, hypercalcemia, proteinuria, bone complaints. Low concern for plasma cell process but will check SPEP/SWAPNA to be thorough  - Will check iron panel, ferritin, EPO, retic, CBC,CMP. Last B12 in Oct 2021 >2000, will not repeat.   - Strong Fhx of anemia, though no known hemoglobinopathies. 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 Keep f/u w/ marty on 8/23   Infusion scheduling note    Injection scheduling note    Labs CBC, CMP, immunoglobulins and SPEP   Lab interval:  Please schedule lab visit AM prior to 8/23 appt    Imaging    Pharmacy appointment    Other referrals           Thank you for allowing me to partake in the care of this patient. If there are any questions, please do not hesitate to reach out.    Diana Huston PA-C  Hematology, Oncology, and Stem Cell Transplantation  [facility] and [facility]

## 2022-08-04 DIAGNOSIS — D53.9 NUTRITIONAL ANEMIA, UNSPECIFIED: ICD-10-CM

## 2022-08-04 DIAGNOSIS — D64.9 ANEMIA, UNSPECIFIED TYPE: Primary | ICD-10-CM

## 2022-08-04 DIAGNOSIS — D75.89 MACROCYTOSIS: ICD-10-CM

## 2022-08-04 LAB — EPO SERPL-ACNC: 27.7 MIU/ML (ref 2.6–18.5)

## 2022-08-08 DIAGNOSIS — D64.9 ANEMIA, UNSPECIFIED TYPE: Primary | ICD-10-CM

## 2022-08-23 ENCOUNTER — LAB VISIT (OUTPATIENT)
Dept: LAB | Facility: HOSPITAL | Age: 87
End: 2022-08-23
Payer: MEDICARE

## 2022-08-23 ENCOUNTER — OFFICE VISIT (OUTPATIENT)
Dept: HEMATOLOGY/ONCOLOGY | Facility: CLINIC | Age: 87
End: 2022-08-23
Payer: MEDICARE

## 2022-08-23 VITALS
SYSTOLIC BLOOD PRESSURE: 177 MMHG | HEART RATE: 98 BPM | WEIGHT: 134.94 LBS | RESPIRATION RATE: 16 BRPM | OXYGEN SATURATION: 100 % | HEIGHT: 61 IN | BODY MASS INDEX: 25.48 KG/M2 | DIASTOLIC BLOOD PRESSURE: 68 MMHG

## 2022-08-23 DIAGNOSIS — D64.9 ANEMIA, UNSPECIFIED TYPE: ICD-10-CM

## 2022-08-23 DIAGNOSIS — D75.89 MACROCYTOSIS: ICD-10-CM

## 2022-08-23 DIAGNOSIS — N18.30 ANEMIA OF CHRONIC KIDNEY FAILURE, STAGE 3 (MODERATE): Primary | ICD-10-CM

## 2022-08-23 DIAGNOSIS — D63.1 ANEMIA OF CHRONIC KIDNEY FAILURE, STAGE 3 (MODERATE): Primary | ICD-10-CM

## 2022-08-23 DIAGNOSIS — D53.9 NUTRITIONAL ANEMIA, UNSPECIFIED: ICD-10-CM

## 2022-08-23 LAB
ALBUMIN SERPL BCP-MCNC: 3.6 G/DL (ref 3.5–5.2)
ALP SERPL-CCNC: 83 U/L (ref 55–135)
ALT SERPL W/O P-5'-P-CCNC: 14 U/L (ref 10–44)
ANION GAP SERPL CALC-SCNC: 5 MMOL/L (ref 8–16)
AST SERPL-CCNC: 21 U/L (ref 10–40)
BASOPHILS # BLD AUTO: 0.03 K/UL (ref 0–0.2)
BASOPHILS NFR BLD: 0.4 % (ref 0–1.9)
BILIRUB SERPL-MCNC: 0.5 MG/DL (ref 0.1–1)
BUN SERPL-MCNC: 22 MG/DL (ref 8–23)
CALCIUM SERPL-MCNC: 9.6 MG/DL (ref 8.7–10.5)
CHLORIDE SERPL-SCNC: 106 MMOL/L (ref 95–110)
CO2 SERPL-SCNC: 26 MMOL/L (ref 23–29)
CREAT SERPL-MCNC: 1.4 MG/DL (ref 0.5–1.4)
DIFFERENTIAL METHOD: ABNORMAL
EOSINOPHIL # BLD AUTO: 0.3 K/UL (ref 0–0.5)
EOSINOPHIL NFR BLD: 3.1 % (ref 0–8)
ERYTHROCYTE [DISTWIDTH] IN BLOOD BY AUTOMATED COUNT: 13.4 % (ref 11.5–14.5)
EST. GFR  (NO RACE VARIABLE): 36.2 ML/MIN/1.73 M^2
FOLATE SERPL-MCNC: 18.4 NG/ML (ref 4–24)
GLUCOSE SERPL-MCNC: 87 MG/DL (ref 70–110)
HCT VFR BLD AUTO: 23.6 % (ref 37–48.5)
HGB BLD-MCNC: 7.8 G/DL (ref 12–16)
IGA SERPL-MCNC: 212 MG/DL (ref 40–350)
IGG SERPL-MCNC: 2120 MG/DL (ref 650–1600)
IGM SERPL-MCNC: 62 MG/DL (ref 50–300)
IMM GRANULOCYTES # BLD AUTO: 0.06 K/UL (ref 0–0.04)
IMM GRANULOCYTES NFR BLD AUTO: 0.7 % (ref 0–0.5)
LYMPHOCYTES # BLD AUTO: 3 K/UL (ref 1–4.8)
LYMPHOCYTES NFR BLD: 37.7 % (ref 18–48)
MCH RBC QN AUTO: 31.6 PG (ref 27–31)
MCHC RBC AUTO-ENTMCNC: 33.1 G/DL (ref 32–36)
MCV RBC AUTO: 96 FL (ref 82–98)
MONOCYTES # BLD AUTO: 0.9 K/UL (ref 0.3–1)
MONOCYTES NFR BLD: 11.2 % (ref 4–15)
NEUTROPHILS # BLD AUTO: 3.8 K/UL (ref 1.8–7.7)
NEUTROPHILS NFR BLD: 46.9 % (ref 38–73)
NRBC BLD-RTO: 0 /100 WBC
PLATELET # BLD AUTO: 315 K/UL (ref 150–450)
PMV BLD AUTO: 9.1 FL (ref 9.2–12.9)
POTASSIUM SERPL-SCNC: 4.5 MMOL/L (ref 3.5–5.1)
PROT SERPL-MCNC: 8 G/DL (ref 6–8.4)
RBC # BLD AUTO: 2.47 M/UL (ref 4–5.4)
SODIUM SERPL-SCNC: 137 MMOL/L (ref 136–145)
VIT B12 SERPL-MCNC: 1331 PG/ML (ref 210–950)
WBC # BLD AUTO: 8.01 K/UL (ref 3.9–12.7)

## 2022-08-23 PROCEDURE — 86334 PATHOLOGIST INTERPRETATION IFE: ICD-10-PCS | Mod: 26,,, | Performed by: PATHOLOGY

## 2022-08-23 PROCEDURE — 3288F PR FALLS RISK ASSESSMENT DOCUMENTED: ICD-10-PCS | Mod: CPTII,S$GLB,, | Performed by: PHYSICIAN ASSISTANT

## 2022-08-23 PROCEDURE — 82784 ASSAY IGA/IGD/IGG/IGM EACH: CPT | Mod: 59 | Performed by: PHYSICIAN ASSISTANT

## 2022-08-23 PROCEDURE — 86334 IMMUNOFIX E-PHORESIS SERUM: CPT | Mod: 26,,, | Performed by: PATHOLOGY

## 2022-08-23 PROCEDURE — 99499 RISK ADDL DX/OHS AUDIT: ICD-10-PCS | Mod: S$GLB,,, | Performed by: PHYSICIAN ASSISTANT

## 2022-08-23 PROCEDURE — 83520 IMMUNOASSAY QUANT NOS NONAB: CPT | Performed by: PHYSICIAN ASSISTANT

## 2022-08-23 PROCEDURE — 1126F AMNT PAIN NOTED NONE PRSNT: CPT | Mod: CPTII,S$GLB,, | Performed by: PHYSICIAN ASSISTANT

## 2022-08-23 PROCEDURE — 99215 PR OFFICE/OUTPT VISIT, EST, LEVL V, 40-54 MIN: ICD-10-PCS | Mod: S$GLB,,, | Performed by: PHYSICIAN ASSISTANT

## 2022-08-23 PROCEDURE — 1159F PR MEDICATION LIST DOCUMENTED IN MEDICAL RECORD: ICD-10-PCS | Mod: CPTII,S$GLB,, | Performed by: PHYSICIAN ASSISTANT

## 2022-08-23 PROCEDURE — 1160F RVW MEDS BY RX/DR IN RCRD: CPT | Mod: CPTII,S$GLB,, | Performed by: PHYSICIAN ASSISTANT

## 2022-08-23 PROCEDURE — 84165 PATHOLOGIST INTERPRETATION SPE: ICD-10-PCS | Mod: 26,,, | Performed by: PATHOLOGY

## 2022-08-23 PROCEDURE — 99999 PR PBB SHADOW E&M-EST. PATIENT-LVL IV: ICD-10-PCS | Mod: PBBFAC,,, | Performed by: PHYSICIAN ASSISTANT

## 2022-08-23 PROCEDURE — 1101F PR PT FALLS ASSESS DOC 0-1 FALLS W/OUT INJ PAST YR: ICD-10-PCS | Mod: CPTII,S$GLB,, | Performed by: PHYSICIAN ASSISTANT

## 2022-08-23 PROCEDURE — 1101F PT FALLS ASSESS-DOCD LE1/YR: CPT | Mod: CPTII,S$GLB,, | Performed by: PHYSICIAN ASSISTANT

## 2022-08-23 PROCEDURE — 82746 ASSAY OF FOLIC ACID SERUM: CPT | Performed by: PHYSICIAN ASSISTANT

## 2022-08-23 PROCEDURE — 84165 PROTEIN E-PHORESIS SERUM: CPT | Mod: 26,,, | Performed by: PATHOLOGY

## 2022-08-23 PROCEDURE — 3288F FALL RISK ASSESSMENT DOCD: CPT | Mod: CPTII,S$GLB,, | Performed by: PHYSICIAN ASSISTANT

## 2022-08-23 PROCEDURE — 80053 COMPREHEN METABOLIC PANEL: CPT | Performed by: PHYSICIAN ASSISTANT

## 2022-08-23 PROCEDURE — 1159F MED LIST DOCD IN RCRD: CPT | Mod: CPTII,S$GLB,, | Performed by: PHYSICIAN ASSISTANT

## 2022-08-23 PROCEDURE — 84165 PROTEIN E-PHORESIS SERUM: CPT | Performed by: PHYSICIAN ASSISTANT

## 2022-08-23 PROCEDURE — 99215 OFFICE O/P EST HI 40 MIN: CPT | Mod: S$GLB,,, | Performed by: PHYSICIAN ASSISTANT

## 2022-08-23 PROCEDURE — 82607 VITAMIN B-12: CPT | Performed by: PHYSICIAN ASSISTANT

## 2022-08-23 PROCEDURE — 99499 UNLISTED E&M SERVICE: CPT | Mod: S$GLB,,, | Performed by: PHYSICIAN ASSISTANT

## 2022-08-23 PROCEDURE — 36415 COLL VENOUS BLD VENIPUNCTURE: CPT | Performed by: PHYSICIAN ASSISTANT

## 2022-08-23 PROCEDURE — 86334 IMMUNOFIX E-PHORESIS SERUM: CPT | Performed by: PHYSICIAN ASSISTANT

## 2022-08-23 PROCEDURE — 99999 PR PBB SHADOW E&M-EST. PATIENT-LVL IV: CPT | Mod: PBBFAC,,, | Performed by: PHYSICIAN ASSISTANT

## 2022-08-23 PROCEDURE — 85025 COMPLETE CBC W/AUTO DIFF WBC: CPT | Performed by: PHYSICIAN ASSISTANT

## 2022-08-23 PROCEDURE — 1160F PR REVIEW ALL MEDS BY PRESCRIBER/CLIN PHARMACIST DOCUMENTED: ICD-10-PCS | Mod: CPTII,S$GLB,, | Performed by: PHYSICIAN ASSISTANT

## 2022-08-23 PROCEDURE — 1126F PR PAIN SEVERITY QUANTIFIED, NO PAIN PRESENT: ICD-10-PCS | Mod: CPTII,S$GLB,, | Performed by: PHYSICIAN ASSISTANT

## 2022-08-23 NOTE — PROGRESS NOTES
Section of Hematology and Stem Cell Transplantation  Follow Up Note     Visit Date: 08/23/2022    Primary Oncologic Diagnosis: Anemia of chronic kidney failure, stage 3 (moderate) [N18.30, D63.1]    History of Present Ilness: (From Initial consult 8/2/2022)    Melody Armstrong (Melody) is a pleasant 88 y.o.female with past medical history of hyperlipidemia, hypertension, coronary artery disease, Stage III CKD referred by her PCP for evaluation of anemia. Reivew of lab work reveals a slowly progressive anemia dating back to at least 2012, with recent lab values with normocytic anemia and Hgb 7.8. The pt reports she has been feeling in her usual state of health, she lives alone, performs all ADLs, and has good energy. She denies PICA sx, and reports normal/balanced diet. She reports she's been told she has anemia for many years, couldn't previously tolerate PO iron, and has had kidney disease for many years. She also reports strong fam hx of anemia, ? Thalassemia - pt denies known fhx of hemoglobinopathy. She denies any sources of bleeding, recent hemoccult negative. Otherwise, no B symptoms or concerns from the patient/her daughter.      Cancer Screening:  · Colonoscopy: Not up to date - ordered, needs to be scheduled. Hemoccult negative.   · Pap Smear/Pelvic Exam: No longer being screened, doesn't recall her last pap   · Mammogram: Not up to date - many years, doesn't recall   · Smoking Status: Non-smoker  · Family History: No known family history of malignancy       Interval History: 08/23/2022   Ms. Armstrong presents for follow up with her daughter for recent evaluation of anemia. Work up thus far revealing of worsening chronic anemia, in setting of CKD. She continues to deny bleeding/bruising, hemoccult negative. No evidence of MARANDA. No CP/SOB/abd pain, fevers/chills. Only complaint is slightly decreased stamina, otherwise she is very independent and active. Concern for AOCD persists, today B12/folate and plasma  cell work up is pending to be thorough. She is not up to date on cancer screening as above, but does not wish to pursue aggressive evaluation for malignancy given her age which we agreed was very reasonable, especially in setting of otherwise no symptoms.    We discussed starting procrit given low epo levels/AOCKD, pt agreeable. We discussed relative risks/contraindications in setting of VTE risk and malignancy w/o UTD cancer screening. Pt's son/daughter both live very close to patient and will be helping to administer weekly procrit. We will schedule q2wk labs in [place] while administering weekly epo.         Past Medical History, Social History, and Past Family History are unchanged since last evaluation except for HPI.     CURRENT MEDICATIONS:   Current Outpatient Medications   Medication Sig    acetaminophen (TYLENOL) 325 MG tablet Take 325 mg by mouth every 6 (six) hours as needed for Pain.    aspirin 325 MG tablet Take 1 tablet by mouth.    atorvastatin (LIPITOR) 20 MG tablet Take 1 tablet (20 mg total) by mouth once daily.    blood sugar diagnostic Strp To check BG once daily, to use with insurance preferred meter (Patient not taking: No sig reported)    blood-glucose meter kit To check BG once daily, to use with insurance preferred meter (Patient not taking: Reported on 9/21/2020)    cyanocobalamin (VITAMIN B-12) 1000 MCG tablet Take 1 tablet by mouth.    diclofenac sodium (VOLTAREN) 1 % Gel Apply 2 g topically 4 (four) times daily. (Patient taking differently: Apply 2 g topically as needed.)    ferrous gluconate (FERGON) 324 MG tablet Take 1 tablet (324 mg total) by mouth daily with breakfast. (Patient not taking: No sig reported)    gabapentin (NEURONTIN) 100 MG capsule Take 1 capsule (100 mg total) by mouth 3 (three) times daily.    hydroCHLOROthiazide (HYDRODIURIL) 25 MG tablet TAKE ONE TABLET BY MOUTH DAILY FOR FLUID.    lactulose (CHRONULAC) 10 gram/15 mL solution Take 30 mLs (20 g total)  by mouth once daily. For chronic constipation.    lancets Misc To check BG once daily, to use with insurance preferred meter (Patient not taking: No sig reported)    levocetirizine (XYZAL) 5 MG tablet TAKE 1 TABLET (5 MG TOTAL) BY MOUTH DAILY AS NEEDED FOR ALLERGIES.    losartan (COZAAR) 50 MG tablet TAKE 1 TABLET BY MOUTH EVERY DAY FOR HIGH BLOOD PRESSURE    meclizine (ANTIVERT) 12.5 mg tablet Take 1 tablet (12.5 mg total) by mouth 3 (three) times daily as needed for Dizziness. (Patient not taking: No sig reported)    meclizine (ANTIVERT) 12.5 mg tablet Take 1 tablet by mouth three times a day as needed for dizziness (Patient not taking: No sig reported)    metoprolol succinate (TOPROL-XL) 25 MG 24 hr tablet TAKE 1 TABLET BY MOUTH DAILY FOR HIGH BLOOD PRESSURE    naloxone (NARCAN) 4 mg/actuation Spry 4mg by nasal route as needed for opioid overdose; may repeat every 2-3 minutes in alternating nostrils until medical help arrives. Call 911    traMADoL (ULTRAM) 50 mg tablet TAKE 2 TABLETS EVERY 8 HOURS AS NEEDED FOR PAIN    walker Misc Use daily for assisted ambulation (Patient not taking: No sig reported)     No current facility-administered medications for this visit.       ALLERGIES:   Review of patient's allergies indicates:  No Known Allergies      Review of Systems:     Review of Systems   Constitutional: Negative for chills, diaphoresis, fever, malaise/fatigue and weight loss.   HENT: Negative for congestion, nosebleeds and sore throat.    Respiratory: Negative for cough and shortness of breath.    Cardiovascular: Negative for chest pain and leg swelling.   Gastrointestinal: Negative for abdominal pain, blood in stool, constipation, heartburn, melena, nausea and vomiting.   Genitourinary: Negative for hematuria.   Musculoskeletal: Negative for falls, joint pain and myalgias.   Neurological: Negative for dizziness and headaches.   Psychiatric/Behavioral: Negative.        Physical Exam:     There were no  vitals filed for this visit.    Physical Exam  Constitutional:       General: She is not in acute distress.     Appearance: Normal appearance.   HENT:      Head: Normocephalic.      Right Ear: External ear normal.      Left Ear: External ear normal.      Nose: Nose normal.      Mouth/Throat:      Mouth: Mucous membranes are moist.      Pharynx: Oropharynx is clear.   Eyes:      Extraocular Movements: Extraocular movements intact.      Conjunctiva/sclera: Conjunctivae normal.      Pupils: Pupils are equal, round, and reactive to light.   Cardiovascular:      Rate and Rhythm: Regular rhythm. Tachycardia present.      Pulses: Normal pulses.      Heart sounds: Normal heart sounds.   Pulmonary:      Effort: Pulmonary effort is normal.   Abdominal:      General: Abdomen is flat. There is no distension.      Palpations: Abdomen is soft.      Tenderness: There is no abdominal tenderness.   Musculoskeletal:         General: No swelling. Normal range of motion.      Cervical back: Neck supple.   Lymphadenopathy:      Cervical: No cervical adenopathy.   Neurological:      General: No focal deficit present.      Mental Status: She is alert. Mental status is at baseline.      Cranial Nerves: No cranial nerve deficit.             Labs:   Lab Results   Component Value Date    WBC 7.29 08/02/2022    HGB 7.6 (L) 08/02/2022    HCT 24.2 (L) 08/02/2022     (H) 08/02/2022     08/02/2022       Lab Results   Component Value Date     08/02/2022    K 4.7 08/02/2022     08/02/2022    CO2 27 08/02/2022    BUN 21 08/02/2022    CREATININE 1.5 (H) 08/02/2022    ALBUMIN 3.4 (L) 08/02/2022    BILITOT 0.4 08/02/2022    ALKPHOS 87 08/02/2022    AST 22 08/02/2022    ALT 16 08/02/2022       Imaging:           Assessment and Plan:   Melody Armstrong (Melody) is a pleasant 88 y.o.female referred for evaluation of anemia.     1. Anemia  - Pt with a slowly progressive anemia over at least the past decade, primary complaints are  recently worsening stamina   - She is not up to date on routine cancer screenings, has colonoscopy/EGD ordered though hemoccult negative and pt does not wish to proceed with invasive procedures as she is asymptomatic and is concerned regarding her age. No GERD, no melena/hematochezia, no abdominal complaints. No B symptoms. She denies any sources of bleeding/bruising.   - Apart from renal dysfunction, no evidence of elevated protein gap, hypercalcemia, proteinuria, bone complaints. Low concern for plasma cell process but will check SPEP/SWAPNA to be thorough, pending  - Ferritin WNL, slightly decreased iron panel; c/w AOCD. Macrocytic at last visit, normocytic again today, b12/folate levels pending.  - Strong Fhx of anemia, though no known hemoglobinopathies. 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 Notably only slightly elevated EPO, also c/w AOCKD.  - Tentative plan for EPO replacement, we did discuss relative contraindications of EPO (VTE risk/malignancy) and that the pt is not UTD on cancer screening. However, the patient and her daughter are adamant that their focus remains improvement on QOL rather than aggressive/invasive work up given pt's age, as such, they do not wish to proceed with routine cancer screening which after lengthy discussion we agreed is very reasonable. We will start 4,000u procrit weekly with q2wk labs initially, so long as Hgb <10. Will send rx to specialty pharmacy for prior auth. Will call to discuss rx process with pt/daughter when PA processed.           Follow Up:          BMT Chart Routing  Urgent    Follow up with physician    Follow up with BARNEY 3 months. F/u with rigo in 3 mo   Infusion scheduling note    Injection scheduling note    Labs CBC and CMP   Lab interval:  Schedule q2wk labs at [place] starting in 1 week   Imaging    Pharmacy appointment     Other referrals             Thank you for allowing me to partake in the care of this patient.       Diana Huston PA-C  Hematology, Oncology, and Stem Cell Transplantation  [facility]

## 2022-08-24 ENCOUNTER — SPECIALTY PHARMACY (OUTPATIENT)
Dept: PHARMACY | Facility: CLINIC | Age: 87
End: 2022-08-24
Payer: MEDICARE

## 2022-08-24 DIAGNOSIS — N18.4 CHRONIC KIDNEY DISEASE, STAGE 4 (SEVERE): Primary | ICD-10-CM

## 2022-08-24 LAB
ALBUMIN SERPL ELPH-MCNC: 3.62 G/DL (ref 3.35–5.55)
ALPHA1 GLOB SERPL ELPH-MCNC: 0.5 G/DL (ref 0.17–0.41)
ALPHA2 GLOB SERPL ELPH-MCNC: 0.87 G/DL (ref 0.43–0.99)
B-GLOBULIN SERPL ELPH-MCNC: 0.86 G/DL (ref 0.5–1.1)
GAMMA GLOB SERPL ELPH-MCNC: 1.85 G/DL (ref 0.67–1.58)
INTERPRETATION SERPL IFE-IMP: NORMAL
KAPPA LC SER QL IA: 11.52 MG/DL (ref 0.33–1.94)
KAPPA LC/LAMBDA SER IA: 1.7 (ref 0.26–1.65)
LAMBDA LC SER QL IA: 6.77 MG/DL (ref 0.57–2.63)
PROT SERPL-MCNC: 7.7 G/DL (ref 6–8.4)

## 2022-08-25 DIAGNOSIS — D64.9 ANEMIA, UNSPECIFIED TYPE: Primary | ICD-10-CM

## 2022-08-25 DIAGNOSIS — N18.30 ANEMIA OF CHRONIC KIDNEY FAILURE, STAGE 3 (MODERATE): ICD-10-CM

## 2022-08-25 DIAGNOSIS — D63.1 ANEMIA OF CHRONIC KIDNEY FAILURE, STAGE 3 (MODERATE): ICD-10-CM

## 2022-08-25 LAB
PATHOLOGIST INTERPRETATION IFE: NORMAL
PATHOLOGIST INTERPRETATION SPE: NORMAL

## 2022-08-25 NOTE — TELEPHONE ENCOUNTER
Delon, this is Livier Merida with [facility].  We are working on your prescription that your doctor has sent us. We will be working with your insurance to get this approved for you. We will be calling you along the way with updates on your medication.  If you have any questions, you can reach us at [phone].    Welcome call outcome: Patient/caregiver reached

## 2022-08-26 ENCOUNTER — TELEPHONE (OUTPATIENT)
Dept: HEMATOLOGY/ONCOLOGY | Facility: CLINIC | Age: 87
End: 2022-08-26
Payer: MEDICARE

## 2022-08-26 NOTE — TELEPHONE ENCOUNTER
No FA/maryjane funding available currently. Patient not eligible for copay card due to having Medicare insurance. Forwarding to assigned [facility] for initial consult.

## 2022-08-26 NOTE — TELEPHONE ENCOUNTER
Tucker from [employer] called looking for the MD phone number to get clarification on the paper work that was sent to them. Provide [employer] with the MD phone number

## 2022-08-26 NOTE — TELEPHONE ENCOUNTER
----- Message from Quiana Bowens sent at 8/26/2022 11:00 AM CDT -----  Regarding: Prescription Approval  Name of caller: Mary    Pharmacy name and phone number: [facility]  [phone]    Already have a approval on file for pt so authorization is not required    PA:   [id number]

## 2022-08-29 ENCOUNTER — SPECIALTY PHARMACY (OUTPATIENT)
Dept: PHARMACY | Facility: CLINIC | Age: 87
End: 2022-08-29
Payer: MEDICARE

## 2022-08-29 DIAGNOSIS — R11.0 NAUSEA: ICD-10-CM

## 2022-08-29 DIAGNOSIS — D64.9 ANEMIA, UNSPECIFIED TYPE: Primary | ICD-10-CM

## 2022-08-29 DIAGNOSIS — N18.4 CHRONIC KIDNEY DISEASE, STAGE 4 (SEVERE): Primary | ICD-10-CM

## 2022-08-29 RX ORDER — MULTIVITAMIN
1 TABLET ORAL DAILY
COMMUNITY

## 2022-08-29 RX ORDER — ONDANSETRON 4 MG/1
4 TABLET, FILM COATED ORAL EVERY 12 HOURS PRN
Qty: 30 TABLET | Refills: 1 | Status: SHIPPED | OUTPATIENT
Start: 2022-08-29 | End: 2023-08-29

## 2022-08-29 NOTE — TELEPHONE ENCOUNTER
Specialty Pharmacy - Initial Clinical Assessment    Specialty Medication Orders Linked to Encounter      Flowsheet Row Most Recent Value   Medication #1 epoetin dodie-epbx (RETACRIT) 4,000 unit/mL injection (Order#[id number], Rx#[id number])          Patient Diagnosis   D64.9 - Anemia  N18.4 - Chronic kidney disease, stage 4 (severe)    Subjective    Melody INGRID Armstrong is a 88 y.o. female, who is followed by the specialty pharmacy service for management and education.    Recent Encounters       Date Type Provider Description    08/29/2022 Specialty Pharmacy Florence Alvarado, FredericD Initial Clinical Assessment    08/24/2022 Specialty Pharmacy Livier Merida, Josie Referral Authorization          Clinical call attempts since last clinical assessment   No call attempts found.     Current Outpatient Medications   Medication Sig    acetaminophen (TYLENOL) 325 MG tablet Take 325 mg by mouth every 6 (six) hours as needed for Pain.    aspirin 325 MG tablet Take 1 tablet by mouth.    atorvastatin (LIPITOR) 20 MG tablet Take 1 tablet (20 mg total) by mouth once daily.    blood sugar diagnostic Strp To check BG once daily, to use with insurance preferred meter (Patient not taking: No sig reported)    blood-glucose meter kit To check BG once daily, to use with insurance preferred meter (Patient not taking: Reported on 9/21/2020)    cyanocobalamin (VITAMIN B-12) 1000 MCG tablet Take 1 tablet by mouth.    diclofenac sodium (VOLTAREN) 1 % Gel Apply 2 g topically 4 (four) times daily. (Patient taking differently: Apply 2 g topically as needed.)    epoetin dodie-epbx (RETACRIT) 4,000 unit/mL injection Inject 1 mL (4,000 Units total) into the skin every 7 days for Hgb <10.    gabapentin (NEURONTIN) 100 MG capsule Take 1 capsule (100 mg total) by mouth 3 (three) times daily.    hydroCHLOROthiazide (HYDRODIURIL) 25 MG tablet TAKE ONE TABLET BY MOUTH DAILY FOR FLUID.    lactulose (CHRONULAC) 10 gram/15 mL solution Take 30 mLs (20 g total)  by mouth once daily. For chronic constipation.    lancets Misc To check BG once daily, to use with insurance preferred meter (Patient not taking: No sig reported)    levocetirizine (XYZAL) 5 MG tablet TAKE 1 TABLET (5 MG TOTAL) BY MOUTH DAILY AS NEEDED FOR ALLERGIES.    losartan (COZAAR) 50 MG tablet TAKE 1 TABLET BY MOUTH EVERY DAY FOR HIGH BLOOD PRESSURE    meclizine (ANTIVERT) 12.5 mg tablet Take 1 tablet by mouth three times a day as needed for dizziness (Patient not taking: No sig reported)    metoprolol succinate (TOPROL-XL) 25 MG 24 hr tablet TAKE 1 TABLET BY MOUTH DAILY FOR HIGH BLOOD PRESSURE    multivitamin (THERAGRAN) per tablet Take 1 tablet by mouth once daily.    naloxone (NARCAN) 4 mg/actuation Spry 4mg by nasal route as needed for opioid overdose; may repeat every 2-3 minutes in alternating nostrils until medical help arrives. Call 911    traMADoL (ULTRAM) 50 mg tablet TAKE 2 TABLETS EVERY 8 HOURS AS NEEDED FOR PAIN    walker Misc Use daily for assisted ambulation (Patient not taking: No sig reported)   Last reviewed on 8/29/2022 12:03 PM by Florence Alvarado, PharmD    Review of patient's allergies indicates:  No Known AllergiesLast reviewed on  8/29/2022 12:04 PM by Florence Alvarado    Drug Interactions    Drug interactions evaluated: yes  Clinically relevant drug interactions identified: no  Provided the patient with educational material regarding drug interactions: not applicable         Adverse Effects    Bruises/bleeds easily:  (Comment: on Voltaren gel and APAP)  Arthralgias: Pos  *All other systems reviewed and are negative       Assessment Questions - Documented Responses      Flowsheet Row Most Recent Value   Assessment    Medication Reconciliation completed for patient Yes   During the past 4 weeks, has patient missed any activities due to condition or medication? No   During the past 4 weeks, did patient have any of the following urgent care visits? None   Goals of Therapy Status Discussed  (new start)   Status of the patients ability to self-administer: Caregiver to administer  [daughter will be administering - reviewed education with daughter]   All education points have been covered with patient? Yes, supplemental printed education provided   Welcome packet contents reviewed and discussed with patient? Yes   Assesment completed? Yes   Plan Therapy being initiated   Do you need to open a clinical intervention (i-vent)? No   Do you want to schedule first shipment? Yes   Medication #1 Assessment Info    Patient status New medication, New to [facility]   Is this medication appropriate for the patient? Yes   Is this medication effective? Not yet started          Refill Questions - Documented Responses      Flowsheet Row Most Recent Value   Patient Availability and HIPAA Verification    Does patient want to proceed with activity? Yes   HIPAA/medical authority confirmed? Yes   Relationship to patient of person spoken to? Child   Refill Screening Questions    When does the patient need to receive the medication? 08/31/22   Refill Delivery Questions    How will the patient receive the medication? Delivery Sarah   When does the patient need to receive the medication? 08/31/22   Shipping Address Prescription   Address in [facility] confirmed and updated if neccessary? Yes   Expected Copay ($) 100   Is the patient able to afford the medication copay? Yes   Payment Method CC on file   Days supply of Refill 28   Supplies needed? Syringes   Refill activity completed? Yes   Refill activity plan Refill scheduled   Shipment/Pickup Date: 08/29/22            Objective    She has a past medical history of Anemia, Asymptomatic cholelithiasis, Coronary artery disease, Diabetes mellitus type II, GERD (gastroesophageal reflux disease), Hyperlipidemia, and Hypertension.    Tried/failed medications: none    BP Readings from Last 4 Encounters:   08/23/22 (!) 177/68   08/02/22 (!) 137/90   07/12/22 138/68   03/03/22 126/64  "    Ht Readings from Last 4 Encounters:   08/23/22 5' 1" (1.549 m)   08/02/22 5' 5" (1.651 m)   07/12/22 5' 5" (1.651 m)   03/03/22 5' 5" (1.651 m)     Wt Readings from Last 4 Encounters:   08/23/22 61.2 kg (134 lb 14.7 oz)   08/02/22 61.7 kg (136 lb 0.4 oz)   07/12/22 59.9 kg (132 lb)   03/03/22 62.5 kg (137 lb 12.6 oz)     Recent Labs   Lab Result Units 08/29/22  1008 08/23/22  0842 08/02/22  1050 07/05/22  1008   Creatinine mg/dL 1.56 H 1.4 1.5 H 1.50 H   ALT U/L 24 14 16 17   AST U/L 33 21 22 21   Hemoglobin g/dL 7.5 L 7.8 L 7.6 L 7.8 L     The goals of prescribed drug therapy management include:  Supporting patient to meet the prescriber's medical treatment objectives  Improving or maintaining quality of life  Maintaining optimal therapy adherence  Minimizing and managing side effects      Goals of Therapy Status: Discussed (new start)    Assessment/Plan  Patient plans to start therapy on 08/31/22      Indication, dosage, appropriateness, effectiveness, safety and convenience of her specialty medication(s) were reviewed today.     Patient Education   Patient received education on the following:   Expectations and possible outcomes of therapy  Proper use, timely administration, and missed dose management  Duration of therapy  Side effects, including prevention, minimization, and management  Contraindications and safety precautions  New or changed medications, including prescribe and over the counter medications and supplements  Reviews recommended vaccinations, as appropriate  Storage, safe handling, and disposal    At last office visit, pt's BP was elevated. Confirmed with daughter that patient does have a BP machine. Advised to continue to check BP regularly (ideally once daily) and keep a log to monitor.     Stressed to daughter the importance of labs as the pt will only administer Retacrit 4000units/mL once weekly if her Hgb < 10. Daughter confirmed understanding. Per chart notes, pt will start with labs " every 2 weeks.     Daughter requested [facility] to reach out to provider to get script for anti-nausea medication. Messaged provider on 8/29 to send script to local pharmacy: [id number] - [place] - [id number] [[id number]]    Tasks added this encounter   9/21/2022 - Refill Call (Auto Added)  5/29/2023 - Clinical - Follow Up Assesement (Annual)   Tasks due within next 3 months   No tasks due.     Florence Alvarado, PharmD  [facility]  [address] [place] [id number]  Phone: [phone]  Fax: [phone]

## 2022-09-16 DIAGNOSIS — Z12.12 ENCOUNTER FOR COLORECTAL CANCER SCREENING: Primary | ICD-10-CM

## 2022-09-16 DIAGNOSIS — D64.9 ANEMIA, UNSPECIFIED TYPE: Primary | ICD-10-CM

## 2022-09-16 DIAGNOSIS — Z12.11 ENCOUNTER FOR COLORECTAL CANCER SCREENING: Primary | ICD-10-CM

## 2022-09-16 DIAGNOSIS — K21.9 GASTROESOPHAGEAL REFLUX DISEASE, UNSPECIFIED WHETHER ESOPHAGITIS PRESENT: ICD-10-CM

## 2022-09-21 ENCOUNTER — SPECIALTY PHARMACY (OUTPATIENT)
Dept: PHARMACY | Facility: CLINIC | Age: 87
End: 2022-09-21
Payer: MEDICARE

## 2022-09-21 NOTE — TELEPHONE ENCOUNTER
Per KARLEE Tavarez, pt should continue with Retacrit. Outgoing call to pts daughter to set up delivery. No answer, no voicemail. Will continue to follow up.

## 2022-09-21 NOTE — TELEPHONE ENCOUNTER
Outgoing call to pt for refill of Retacrit. Pts daughter stated that she has one injection left to administer 9/21, and was unsure if she should continue. Asked to message [facility] to see how many more she would need. Message sent to [facility]. Last HG on 9/12 was 7.8 g/dL. Next lab apt is 9/26. Will follow up with [facility] decision.

## 2022-09-21 NOTE — TELEPHONE ENCOUNTER
Specialty Pharmacy - Refill Coordination    Specialty Medication Orders Linked to Encounter      Flowsheet Row Most Recent Value   Medication #1 epoetin dodie-epbx (RETACRIT) 4,000 unit/mL injection (Order#[id number], Rx#[id number])            Refill Questions - Documented Responses      Flowsheet Row Most Recent Value   Patient Availability and HIPAA Verification    Does patient want to proceed with activity? Yes   HIPAA/medical authority confirmed? Yes   Relationship to patient of person spoken to? Care Giver   Refill Screening Questions    Changes to allergies? No   Changes to medications? No   New conditions since last clinic visit? No   How does patient/caregiver feel medication is working? Good   Financial problems or insurance changes? No   Would patient like to speak to a pharmacist? No   When does the patient need to receive the medication? 09/28/22   Refill Delivery Questions    How will the patient receive the medication? Delivery Sarah   When does the patient need to receive the medication? 09/28/22   Shipping Address Prescription   Address in [facility] confirmed and updated if neccessary? Yes   Expected Copay ($) 100   Is the patient able to afford the medication copay? Yes   Payment Method CC on file   Days supply of Refill 28   Supplies needed? No supplies needed   Refill activity completed? Yes   Refill activity plan Refill scheduled   Shipment/Pickup Date: 09/21/22            Current Outpatient Medications   Medication Sig    acetaminophen (TYLENOL) 325 MG tablet Take 325 mg by mouth every 6 (six) hours as needed for Pain.    aspirin 325 MG tablet Take 1 tablet by mouth.    atorvastatin (LIPITOR) 20 MG tablet Take 1 tablet (20 mg total) by mouth once daily.    blood sugar diagnostic Strp To check BG once daily, to use with insurance preferred meter (Patient not taking: No sig reported)    blood-glucose meter kit To check BG once daily, to use with insurance preferred meter (Patient not taking:  Reported on 9/21/2020)    cyanocobalamin (VITAMIN B-12) 1000 MCG tablet Take 1 tablet by mouth.    diclofenac sodium (VOLTAREN) 1 % Gel Apply 2 g topically 4 (four) times daily. (Patient taking differently: Apply 2 g topically as needed.)    epoetin dodie-epbx (RETACRIT) 4,000 unit/mL injection Inject 1 mL (4,000 Units total) into the skin every 7 days for Hgb <10.    gabapentin (NEURONTIN) 100 MG capsule Take 1 capsule (100 mg total) by mouth 3 (three) times daily.    hydroCHLOROthiazide (HYDRODIURIL) 25 MG tablet TAKE ONE TABLET BY MOUTH DAILY FOR FLUID.    lactulose (CHRONULAC) 10 gram/15 mL solution Take 30 mLs (20 g total) by mouth once daily. For chronic constipation.    lancets Misc To check BG once daily, to use with insurance preferred meter (Patient not taking: No sig reported)    levocetirizine (XYZAL) 5 MG tablet TAKE 1 TABLET (5 MG TOTAL) BY MOUTH DAILY AS NEEDED FOR ALLERGIES.    losartan (COZAAR) 50 MG tablet TAKE 1 TABLET BY MOUTH EVERY DAY FOR HIGH BLOOD PRESSURE    meclizine (ANTIVERT) 12.5 mg tablet Take 1 tablet by mouth three times a day as needed for dizziness (Patient not taking: No sig reported)    metoprolol succinate (TOPROL-XL) 25 MG 24 hr tablet TAKE 1 TABLET BY MOUTH DAILY FOR HIGH BLOOD PRESSURE    multivitamin (THERAGRAN) per tablet Take 1 tablet by mouth once daily.    naloxone (NARCAN) 4 mg/actuation Spry 4mg by nasal route as needed for opioid overdose; may repeat every 2-3 minutes in alternating nostrils until medical help arrives. Call 911    ondansetron (ZOFRAN) 4 MG tablet Take 1 tablet (4 mg total) by mouth every 12 (twelve) hours as needed for Nausea.    traMADoL (ULTRAM) 50 mg tablet TAKE 2 TABLETS EVERY 8 HOURS AS NEEDED FOR PAIN    walker Misc Use daily for assisted ambulation (Patient not taking: No sig reported)   Last reviewed on 8/29/2022 12:03 PM by Florence Alvarado, PharmD    Review of patient's allergies indicates:  No Known Allergies Last reviewed on  8/29/2022 12:04  PM by Florence Alvarado      Tasks added this encounter   No tasks added.   Tasks due within next 3 months   9/21/2022 - Refill Call (Auto Added)     RANDEE COY, PharmD  [facility]  [address]  [facility] [address]  Phone: [phone]  Fax: [phone]

## 2022-10-19 ENCOUNTER — SPECIALTY PHARMACY (OUTPATIENT)
Dept: PHARMACY | Facility: CLINIC | Age: 87
End: 2022-10-19
Payer: MEDICARE

## 2022-10-19 NOTE — TELEPHONE ENCOUNTER
Specialty Pharmacy - Refill Coordination    Specialty Medication Orders Linked to Encounter      Flowsheet Row Most Recent Value   Medication #1 epoetin dodie-epbx (RETACRIT) 4,000 unit/mL injection (Order#[id number], Rx#[id number])            Refill Questions - Documented Responses      Flowsheet Row Most Recent Value   Patient Availability and HIPAA Verification    Does patient want to proceed with activity? Yes   HIPAA/medical authority confirmed? Yes   Relationship to patient of person spoken to? Self   Refill Screening Questions    Changes to allergies? No   Changes to medications? No   New conditions since last clinic visit? No   Unplanned office visit, urgent care, ED, or hospital admission in the last 4 weeks? No   How does patient/caregiver feel medication is working? Very good   Financial problems or insurance changes? No   How many doses of your specialty medications were missed in the last 4 weeks? 0   Would patient like to speak to a pharmacist? No   When does the patient need to receive the medication? 10/22/22   Refill Delivery Questions    How will the patient receive the medication? MEDRx   When does the patient need to receive the medication? 10/22/22   Shipping Address Prescription   Address in [facility] confirmed and updated if neccessary? Yes   Expected Copay ($) 100   Is the patient able to afford the medication copay? Yes   Payment Method CC on file   Days supply of Refill 28   Supplies needed? Alcohol Swabs, Pen needles  [Injection supplies.]   Refill activity completed? Yes   Refill activity plan Refill scheduled   Shipment/Pickup Date: 10/20/22            Current Outpatient Medications   Medication Sig    acetaminophen (TYLENOL) 325 MG tablet Take 325 mg by mouth every 6 (six) hours as needed for Pain.    aspirin 325 MG tablet Take 1 tablet by mouth.    atorvastatin (LIPITOR) 20 MG tablet Take 1 tablet (20 mg total) by mouth once daily.    blood sugar diagnostic Strp To check BG once  daily, to use with insurance preferred meter (Patient not taking: No sig reported)    blood-glucose meter kit To check BG once daily, to use with insurance preferred meter (Patient not taking: Reported on 9/21/2020)    cyanocobalamin (VITAMIN B-12) 1000 MCG tablet Take 1 tablet by mouth.    diclofenac sodium (VOLTAREN) 1 % Gel Apply 2 g topically 4 (four) times daily. (Patient taking differently: Apply 2 g topically as needed.)    epoetin dodie-epbx (RETACRIT) 4,000 unit/mL injection Inject 1 mL (4,000 Units total) into the skin every 7 days for Hgb <10.    gabapentin (NEURONTIN) 100 MG capsule Take 1 capsule (100 mg total) by mouth 3 (three) times daily.    hydroCHLOROthiazide (HYDRODIURIL) 25 MG tablet TAKE ONE TABLET BY MOUTH DAILY FOR FLUID.    lactulose (CHRONULAC) 10 gram/15 mL solution Take 30 mLs (20 g total) by mouth once daily. For chronic constipation.    lancets Misc To check BG once daily, to use with insurance preferred meter (Patient not taking: No sig reported)    levocetirizine (XYZAL) 5 MG tablet TAKE 1 TABLET (5 MG TOTAL) BY MOUTH DAILY AS NEEDED FOR ALLERGIES.    losartan (COZAAR) 50 MG tablet TAKE 1 TABLET BY MOUTH EVERY DAY FOR HIGH BLOOD PRESSURE    meclizine (ANTIVERT) 12.5 mg tablet Take 1 tablet by mouth three times a day as needed for dizziness (Patient not taking: No sig reported)    metoprolol succinate (TOPROL-XL) 25 MG 24 hr tablet TAKE 1 TABLET BY MOUTH DAILY FOR HIGH BLOOD PRESSURE    multivitamin (THERAGRAN) per tablet Take 1 tablet by mouth once daily.    naloxone (NARCAN) 4 mg/actuation Spry 4mg by nasal route as needed for opioid overdose; may repeat every 2-3 minutes in alternating nostrils until medical help arrives. Call 911    ondansetron (ZOFRAN) 4 MG tablet Take 1 tablet (4 mg total) by mouth every 12 (twelve) hours as needed for Nausea.    traMADoL (ULTRAM) 50 mg tablet TAKE 2 TABLETS EVERY 8 HOURS AS NEEDED FOR PAIN    walker Misc Use daily for assisted ambulation (Patient  not taking: No sig reported)   Last reviewed on 8/29/2022 12:03 PM by Florence Alvarado, Josie    Review of patient's allergies indicates:  No Known Allergies Last reviewed on  8/29/2022 12:04 PM by Florence Alvarado      Tasks added this encounter   No tasks added.   Tasks due within next 3 months   10/19/2022 - Refill Call (Auto Added)     [facility]  [address] [place] [address]  Phone: [phone]  Fax: [phone]

## 2022-11-14 ENCOUNTER — SPECIALTY PHARMACY (OUTPATIENT)
Dept: PHARMACY | Facility: CLINIC | Age: 87
End: 2022-11-14
Payer: MEDICARE

## 2022-11-14 ENCOUNTER — OFFICE VISIT (OUTPATIENT)
Dept: INTERNAL MEDICINE | Facility: CLINIC | Age: 87
End: 2022-11-14
Payer: MEDICARE

## 2022-11-14 VITALS
OXYGEN SATURATION: 97 % | DIASTOLIC BLOOD PRESSURE: 70 MMHG | HEIGHT: 61 IN | SYSTOLIC BLOOD PRESSURE: 140 MMHG | RESPIRATION RATE: 15 BRPM | TEMPERATURE: 98 F | HEART RATE: 97 BPM | WEIGHT: 127.88 LBS | BODY MASS INDEX: 24.15 KG/M2

## 2022-11-14 DIAGNOSIS — E11.22 TYPE 2 DIABETES MELLITUS WITH CHRONIC KIDNEY DISEASE, WITHOUT LONG-TERM CURRENT USE OF INSULIN, UNSPECIFIED CKD STAGE: Primary | Chronic | ICD-10-CM

## 2022-11-14 DIAGNOSIS — I10 ESSENTIAL HYPERTENSION: Chronic | ICD-10-CM

## 2022-11-14 DIAGNOSIS — M17.10 PRIMARY LOCALIZED OSTEOARTHROSIS OF LOWER LEG, UNSPECIFIED LATERALITY: ICD-10-CM

## 2022-11-14 DIAGNOSIS — E78.49 OTHER HYPERLIPIDEMIA: ICD-10-CM

## 2022-11-14 PROCEDURE — 99999 PR PBB SHADOW E&M-EST. PATIENT-LVL V: CPT | Mod: PBBFAC,,, | Performed by: INTERNAL MEDICINE

## 2022-11-14 PROCEDURE — 1126F PR PAIN SEVERITY QUANTIFIED, NO PAIN PRESENT: ICD-10-PCS | Mod: CPTII,S$GLB,, | Performed by: INTERNAL MEDICINE

## 2022-11-14 PROCEDURE — 1101F PT FALLS ASSESS-DOCD LE1/YR: CPT | Mod: CPTII,S$GLB,, | Performed by: INTERNAL MEDICINE

## 2022-11-14 PROCEDURE — 3288F PR FALLS RISK ASSESSMENT DOCUMENTED: ICD-10-PCS | Mod: CPTII,S$GLB,, | Performed by: INTERNAL MEDICINE

## 2022-11-14 PROCEDURE — 3288F FALL RISK ASSESSMENT DOCD: CPT | Mod: CPTII,S$GLB,, | Performed by: INTERNAL MEDICINE

## 2022-11-14 PROCEDURE — 99999 PR PBB SHADOW E&M-EST. PATIENT-LVL V: ICD-10-PCS | Mod: PBBFAC,,, | Performed by: INTERNAL MEDICINE

## 2022-11-14 PROCEDURE — 1126F AMNT PAIN NOTED NONE PRSNT: CPT | Mod: CPTII,S$GLB,, | Performed by: INTERNAL MEDICINE

## 2022-11-14 PROCEDURE — 99214 PR OFFICE/OUTPT VISIT, EST, LEVL IV, 30-39 MIN: ICD-10-PCS | Mod: S$GLB,,, | Performed by: INTERNAL MEDICINE

## 2022-11-14 PROCEDURE — 1101F PR PT FALLS ASSESS DOC 0-1 FALLS W/OUT INJ PAST YR: ICD-10-PCS | Mod: CPTII,S$GLB,, | Performed by: INTERNAL MEDICINE

## 2022-11-14 PROCEDURE — 99214 OFFICE O/P EST MOD 30 MIN: CPT | Mod: S$GLB,,, | Performed by: INTERNAL MEDICINE

## 2022-11-14 RX ORDER — LEVOCETIRIZINE DIHYDROCHLORIDE 5 MG/1
5 TABLET, FILM COATED ORAL DAILY PRN
Qty: 90 TABLET | Refills: 1 | Status: SHIPPED | OUTPATIENT
Start: 2022-11-14 | End: 2023-05-25

## 2022-11-14 RX ORDER — TRAMADOL HYDROCHLORIDE 50 MG/1
50 TABLET ORAL EVERY 12 HOURS PRN
Qty: 180 TABLET | Refills: 1 | Status: SHIPPED | OUTPATIENT
Start: 2022-11-14 | End: 2023-03-28 | Stop reason: SDUPTHER

## 2022-11-14 NOTE — TELEPHONE ENCOUNTER
Specialty Pharmacy - Refill Coordination    Specialty Medication Orders Linked to Encounter      Flowsheet Row Most Recent Value   Medication #1 epoetin dodie-epbx (RETACRIT) 4,000 unit/mL injection (Order#[id number], Rx#[id number])            Refill Questions - Documented Responses      Flowsheet Row Most Recent Value   Patient Availability and HIPAA Verification    Does patient want to proceed with activity? Yes   HIPAA/medical authority confirmed? Yes   Relationship to patient of person spoken to? Child   Refill Screening Questions    Changes to allergies? No   Changes to medications? No   New conditions since last clinic visit? No   Unplanned office visit, urgent care, ED, or hospital admission in the last 4 weeks? No   How does patient/caregiver feel medication is working? Good   Financial problems or insurance changes? No   How many doses of your specialty medications were missed in the last 4 weeks? 0   Would patient like to speak to a pharmacist? No   When does the patient need to receive the medication? 11/23/22   Refill Delivery Questions    How will the patient receive the medication? MEDRx   When does the patient need to receive the medication? 11/23/22   Shipping Address Prescription   Address in [facility] confirmed and updated if neccessary? Yes   Expected Copay ($) 100   Is the patient able to afford the medication copay? Yes   Payment Method CC on file   Days supply of Refill 28   Supplies needed? Alcohol Swabs, Syringes   Refill activity completed? Yes   Refill activity plan Refill scheduled   Shipment/Pickup Date: 11/17/22            Current Outpatient Medications   Medication Sig    acetaminophen (TYLENOL) 325 MG tablet Take 325 mg by mouth every 6 (six) hours as needed for Pain.    aspirin 325 MG tablet Take 1 tablet by mouth.    atorvastatin (LIPITOR) 20 MG tablet Take 1 tablet (20 mg total) by mouth once daily.    blood sugar diagnostic Strp To check BG once daily, to use with insurance  preferred meter    blood-glucose meter kit To check BG once daily, to use with insurance preferred meter (Patient not taking: Reported on 9/21/2020)    cyanocobalamin (VITAMIN B-12) 1000 MCG tablet Take 1 tablet by mouth.    diclofenac sodium (VOLTAREN) 1 % Gel Apply 2 g topically 4 (four) times daily. (Patient taking differently: Apply 2 g topically as needed.)    epoetin dodie-epbx (RETACRIT) 4,000 unit/mL injection Inject 1 mL (4,000 Units total) into the skin every 7 days for Hgb <10.    gabapentin (NEURONTIN) 100 MG capsule Take 1 capsule (100 mg total) by mouth 3 (three) times daily.    hydroCHLOROthiazide (HYDRODIURIL) 25 MG tablet TAKE ONE TABLET BY MOUTH DAILY FOR FLUID.    lactulose (CHRONULAC) 10 gram/15 mL solution Take 30 mLs (20 g total) by mouth once daily. For chronic constipation.    lancets Misc To check BG once daily, to use with insurance preferred meter    levocetirizine (XYZAL) 5 MG tablet Take 1 tablet (5 mg total) by mouth daily as needed for Allergies.    losartan (COZAAR) 50 MG tablet TAKE 1 TABLET BY MOUTH EVERY DAY FOR HIGH BLOOD PRESSURE    meclizine (ANTIVERT) 12.5 mg tablet Take 1 tablet by mouth three times a day as needed for dizziness    metoprolol succinate (TOPROL-XL) 25 MG 24 hr tablet TAKE 1 TABLET BY MOUTH DAILY FOR HIGH BLOOD PRESSURE    multivitamin (THERAGRAN) per tablet Take 1 tablet by mouth once daily.    naloxone (NARCAN) 4 mg/actuation Spry 4mg by nasal route as needed for opioid overdose; may repeat every 2-3 minutes in alternating nostrils until medical help arrives. Call 911    ondansetron (ZOFRAN) 4 MG tablet Take 1 tablet (4 mg total) by mouth every 12 (twelve) hours as needed for Nausea.    traMADoL (ULTRAM) 50 mg tablet Take 1 tablet (50 mg total) by mouth every 12 (twelve) hours as needed for Pain.    walker Misc Use daily for assisted ambulation   Last reviewed on 11/14/2022  1:55 PM by Zac Andres MD    Review of patient's allergies indicates:  No Known  Allergies Last reviewed on  11/14/2022 1:55 PM by Zac Andres      Tasks added this encounter   12/14/2022 - Refill Call (Auto Added)   Tasks due within next 3 months   No tasks due.     Nena Johns, PharmD  [facility]  [address] [place], [place] [address]  [facility] [address]  Phone: [phone]  Fax: [phone]

## 2022-11-17 ENCOUNTER — OFFICE VISIT (OUTPATIENT)
Dept: HEMATOLOGY/ONCOLOGY | Facility: CLINIC | Age: 87
End: 2022-11-17
Payer: MEDICARE

## 2022-11-17 ENCOUNTER — IMMUNIZATION (OUTPATIENT)
Dept: INTERNAL MEDICINE | Facility: CLINIC | Age: 87
End: 2022-11-17
Payer: MEDICARE

## 2022-11-17 VITALS
HEART RATE: 103 BPM | OXYGEN SATURATION: 96 % | DIASTOLIC BLOOD PRESSURE: 60 MMHG | HEIGHT: 61 IN | BODY MASS INDEX: 24.04 KG/M2 | SYSTOLIC BLOOD PRESSURE: 127 MMHG | WEIGHT: 127.31 LBS | RESPIRATION RATE: 16 BRPM

## 2022-11-17 DIAGNOSIS — D63.1 ANEMIA OF CHRONIC KIDNEY FAILURE, STAGE 3 (MODERATE): Primary | ICD-10-CM

## 2022-11-17 DIAGNOSIS — N18.30 ANEMIA OF CHRONIC KIDNEY FAILURE, STAGE 3 (MODERATE): ICD-10-CM

## 2022-11-17 DIAGNOSIS — Z23 NEED FOR VACCINATION: Primary | ICD-10-CM

## 2022-11-17 DIAGNOSIS — D63.1 ANEMIA OF CHRONIC KIDNEY FAILURE, STAGE 3 (MODERATE): ICD-10-CM

## 2022-11-17 DIAGNOSIS — N18.30 ANEMIA OF CHRONIC KIDNEY FAILURE, STAGE 3 (MODERATE): Primary | ICD-10-CM

## 2022-11-17 PROCEDURE — 91312 COVID-19, MRNA, LNP-S, BIVALENT BOOSTER, PF, 30 MCG/0.3 ML DOSE: CPT | Mod: S$GLB,,, | Performed by: INTERNAL MEDICINE

## 2022-11-17 PROCEDURE — 1126F PR PAIN SEVERITY QUANTIFIED, NO PAIN PRESENT: ICD-10-PCS | Mod: CPTII,S$GLB,, | Performed by: PHYSICIAN ASSISTANT

## 2022-11-17 PROCEDURE — 1126F AMNT PAIN NOTED NONE PRSNT: CPT | Mod: CPTII,S$GLB,, | Performed by: PHYSICIAN ASSISTANT

## 2022-11-17 PROCEDURE — 1160F RVW MEDS BY RX/DR IN RCRD: CPT | Mod: CPTII,S$GLB,, | Performed by: PHYSICIAN ASSISTANT

## 2022-11-17 PROCEDURE — 3288F FALL RISK ASSESSMENT DOCD: CPT | Mod: CPTII,S$GLB,, | Performed by: PHYSICIAN ASSISTANT

## 2022-11-17 PROCEDURE — 1101F PT FALLS ASSESS-DOCD LE1/YR: CPT | Mod: CPTII,S$GLB,, | Performed by: PHYSICIAN ASSISTANT

## 2022-11-17 PROCEDURE — 99999 PR PBB SHADOW E&M-EST. PATIENT-LVL V: ICD-10-PCS | Mod: PBBFAC,,, | Performed by: PHYSICIAN ASSISTANT

## 2022-11-17 PROCEDURE — 3288F PR FALLS RISK ASSESSMENT DOCUMENTED: ICD-10-PCS | Mod: CPTII,S$GLB,, | Performed by: PHYSICIAN ASSISTANT

## 2022-11-17 PROCEDURE — 1159F PR MEDICATION LIST DOCUMENTED IN MEDICAL RECORD: ICD-10-PCS | Mod: CPTII,S$GLB,, | Performed by: PHYSICIAN ASSISTANT

## 2022-11-17 PROCEDURE — 99214 OFFICE O/P EST MOD 30 MIN: CPT | Mod: S$GLB,,, | Performed by: PHYSICIAN ASSISTANT

## 2022-11-17 PROCEDURE — 99999 PR PBB SHADOW E&M-EST. PATIENT-LVL V: CPT | Mod: PBBFAC,,, | Performed by: PHYSICIAN ASSISTANT

## 2022-11-17 PROCEDURE — 1101F PR PT FALLS ASSESS DOC 0-1 FALLS W/OUT INJ PAST YR: ICD-10-PCS | Mod: CPTII,S$GLB,, | Performed by: PHYSICIAN ASSISTANT

## 2022-11-17 PROCEDURE — 1160F PR REVIEW ALL MEDS BY PRESCRIBER/CLIN PHARMACIST DOCUMENTED: ICD-10-PCS | Mod: CPTII,S$GLB,, | Performed by: PHYSICIAN ASSISTANT

## 2022-11-17 PROCEDURE — 91312 COVID-19, MRNA, LNP-S, BIVALENT BOOSTER, PF, 30 MCG/0.3 ML DOSE: ICD-10-PCS | Mod: S$GLB,,, | Performed by: INTERNAL MEDICINE

## 2022-11-17 PROCEDURE — 99214 PR OFFICE/OUTPT VISIT, EST, LEVL IV, 30-39 MIN: ICD-10-PCS | Mod: S$GLB,,, | Performed by: PHYSICIAN ASSISTANT

## 2022-11-17 PROCEDURE — 1159F MED LIST DOCD IN RCRD: CPT | Mod: CPTII,S$GLB,, | Performed by: PHYSICIAN ASSISTANT

## 2022-11-17 PROCEDURE — 0124A COVID-19, MRNA, LNP-S, BIVALENT BOOSTER, PF, 30 MCG/0.3 ML DOSE: CPT | Mod: CV19,PBBFAC | Performed by: INTERNAL MEDICINE

## 2022-11-17 RX ORDER — EPOETIN ALFA-EPBX 4000 [IU]/ML
4000 INJECTION, SOLUTION INTRAVENOUS; SUBCUTANEOUS
Qty: 4 ML | Refills: 4 | Status: CANCELLED | OUTPATIENT
Start: 2022-11-17

## 2022-11-17 NOTE — TELEPHONE ENCOUNTER
Outgoing call to patients daughter. She is aware [facility] is getting a new prescription from office. Informed pt FA is currently unavailable.

## 2022-11-17 NOTE — PROGRESS NOTES
Section of Hematology and Stem Cell Transplantation  Follow Up Note     Visit Date: 11/17/2022    Primary Oncologic Diagnosis: Anemia of chronic kidney failure, stage 3 (moderate) [N18.30, D63.1]    History of Present Ilness: (From Initial consult 8/2/2022)    Melody [facility] (Melody) is a pleasant 88 y.o.female with past medical history of hyperlipidemia, hypertension, coronary artery disease, Stage III CKD referred by her PCP for evaluation of anemia. Reivew of lab work reveals a slowly progressive anemia dating back to at least 2012, with recent lab values with normocytic anemia and Hgb 7.8. The pt reports she has been feeling in her usual state of health, she lives alone, performs all ADLs, and has good energy. She denies PICA sx, and reports normal/balanced diet. She reports she's been told she has anemia for many years, couldn't previously tolerate PO iron, and has had kidney disease for many years. She also reports strong fam hx of anemia, ? Thalassemia - pt denies known fhx of hemoglobinopathy. She denies any sources of bleeding, recent hemoccult negative. Otherwise, no B symptoms or concerns from the patient/her daughter.      Cancer Screening:  Colonoscopy: Not up to date - ordered, needs to be scheduled. Hemoccult negative.   Pap Smear/Pelvic Exam: No longer being screened, doesn't recall her last pap   Mammogram: Not up to date - many years, doesn't recall   Smoking Status: Non-smoker  Family History: No known family history of malignancy       Interval History: 11/17/2022   Ms. Armstrong presents for follow up with her daughter for recent evaluation of anemia.  Her previous work up was unrevealing. She was not UTD on routine cancer screening, but this did not align with GO and she did not wish for invasive colonscopy. Her hemoccult was negative.    Given this, and known CKD, we opted for epo replacement. She has been on 4,000u subcutaneous weekly with a small improvement in her Hgb. We will challenge  and increase to 8,000u weekly. She feels her energy has improved. She denies CP/SOB. No bleeding. No other complaints. She is with her daughter today, who does injections at home. They are excited for the holiday season and thanksgiving next week.    Gave QR code to set up my chart for easier communication online. She continues twice weekly labs, as Hgb gets closer to 10 will need to adjust to weekly labs for appropriate epo dosing.       Past Medical History, Social History, and Past Family History are unchanged since last evaluation except for HPI.     CURRENT MEDICATIONS:   Current Outpatient Medications   Medication Sig    acetaminophen (TYLENOL) 325 MG tablet Take 325 mg by mouth every 6 (six) hours as needed for Pain.    aspirin 325 MG tablet Take 1 tablet by mouth.    atorvastatin (LIPITOR) 20 MG tablet Take 1 tablet (20 mg total) by mouth once daily.    blood sugar diagnostic Strp To check BG once daily, to use with insurance preferred meter    cyanocobalamin (VITAMIN B-12) 1000 MCG tablet Take 1 tablet by mouth.    diclofenac sodium (VOLTAREN) 1 % Gel Apply 2 g topically 4 (four) times daily. (Patient taking differently: Apply 2 g topically as needed.)    epoetin dodie-epbx (RETACRIT) 4,000 unit/mL injection Inject 1 mL (4,000 Units total) into the skin every 7 days for Hgb <10.    gabapentin (NEURONTIN) 100 MG capsule Take 1 capsule (100 mg total) by mouth 3 (three) times daily.    hydroCHLOROthiazide (HYDRODIURIL) 25 MG tablet TAKE ONE TABLET BY MOUTH DAILY FOR FLUID.    lactulose (CHRONULAC) 10 gram/15 mL solution Take 30 mLs (20 g total) by mouth once daily. For chronic constipation.    lancets Misc To check BG once daily, to use with insurance preferred meter    levocetirizine (XYZAL) 5 MG tablet Take 1 tablet (5 mg total) by mouth daily as needed for Allergies.    losartan (COZAAR) 50 MG tablet TAKE 1 TABLET BY MOUTH EVERY DAY FOR HIGH BLOOD PRESSURE    meclizine (ANTIVERT) 12.5 mg tablet Take 1 tablet  by mouth three times a day as needed for dizziness    metoprolol succinate (TOPROL-XL) 25 MG 24 hr tablet TAKE 1 TABLET BY MOUTH DAILY FOR HIGH BLOOD PRESSURE    multivitamin (THERAGRAN) per tablet Take 1 tablet by mouth once daily.    naloxone (NARCAN) 4 mg/actuation Spry 4mg by nasal route as needed for opioid overdose; may repeat every 2-3 minutes in alternating nostrils until medical help arrives. Call 911    ondansetron (ZOFRAN) 4 MG tablet Take 1 tablet (4 mg total) by mouth every 12 (twelve) hours as needed for Nausea.    traMADoL (ULTRAM) 50 mg tablet Take 1 tablet (50 mg total) by mouth every 12 (twelve) hours as needed for Pain.    walker Misc Use daily for assisted ambulation    blood-glucose meter kit To check BG once daily, to use with insurance preferred meter (Patient not taking: Reported on 9/21/2020)     No current facility-administered medications for this visit.       ALLERGIES:   Review of patient's allergies indicates:  No Known Allergies      Review of Systems:     Review of Systems   Constitutional:  Negative for chills, diaphoresis, fever, malaise/fatigue and weight loss.   HENT:  Negative for congestion, nosebleeds and sore throat.    Respiratory:  Negative for cough and shortness of breath.    Cardiovascular:  Negative for chest pain and leg swelling.   Gastrointestinal:  Negative for abdominal pain, blood in stool, constipation, heartburn, melena, nausea and vomiting.   Genitourinary:  Negative for hematuria.   Musculoskeletal:  Negative for falls, joint pain and myalgias.   Neurological:  Negative for dizziness and headaches.   Psychiatric/Behavioral: Negative.       Physical Exam:     Vitals:    11/17/22 1033   BP: 127/60   Pulse: 103   Resp: 16       Physical Exam  Constitutional:       General: She is not in acute distress.     Appearance: Normal appearance.   HENT:      Head: Normocephalic.      Right Ear: External ear normal.      Left Ear: External ear normal.      Nose: Nose  normal.      Mouth/Throat:      Mouth: Mucous membranes are moist.      Pharynx: Oropharynx is clear.   Eyes:      Extraocular Movements: Extraocular movements intact.      Conjunctiva/sclera: Conjunctivae normal.      Pupils: Pupils are equal, round, and reactive to light.   Cardiovascular:      Rate and Rhythm: Regular rhythm. Tachycardia present.      Pulses: Normal pulses.      Heart sounds: Normal heart sounds.   Pulmonary:      Effort: Pulmonary effort is normal.   Abdominal:      General: Abdomen is flat. There is no distension.      Palpations: Abdomen is soft.      Tenderness: There is no abdominal tenderness.   Musculoskeletal:         General: No swelling. Normal range of motion.      Cervical back: Neck supple.   Lymphadenopathy:      Cervical: No cervical adenopathy.   Neurological:      General: No focal deficit present.      Mental Status: She is alert. Mental status is at baseline.      Cranial Nerves: No cranial nerve deficit.           Labs:   Lab Results   Component Value Date    WBC 7.45 11/07/2022    HGB 8.2 (L) 11/07/2022    HCT 25.9 (L) 11/07/2022    MCV 95 11/07/2022     11/07/2022       Lab Results   Component Value Date     11/07/2022    K 4.5 11/07/2022     11/07/2022    CO2 26 11/07/2022    BUN 28 (H) 11/07/2022    CREATININE 1.37 11/07/2022    ALBUMIN 4.0 11/07/2022    BILITOT 0.3 11/07/2022    ALKPHOS 96 11/07/2022    AST 29 11/07/2022    ALT 19 11/07/2022       Imaging:           Assessment and Plan:   Melody [facility] (Melody) is a pleasant 88 y.o.female referred for evaluation of anemia.     Anemia  - Pt with a slowly progressive anemia over at least the past decade, primary complaints are recently worsening stamina   - She is not up to date on routine cancer screenings, has colonoscopy/EGD ordered though hemoccult negative and pt does not wish to proceed with invasive procedures as she is asymptomatic and is concerned regarding her age. No GERD, no  melena/hematochezia, no abdominal complaints. No B symptoms. She denies any sources of bleeding/bruising.   - SPEP/SWAPNA negative.   - Ferritin WNL, slightly decreased iron panel; c/w AOCD. Macrocytic at last visit, normocytic again today, b12/folate levels normal.   - Strong Fhx of anemia, though no known hemoglobinopathies. 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 Notably only slightly elevated EPO, also c/w AOCKD.  - We did discuss relative contraindications of EPO (VTE risk/malignancy) and that the pt is not UTD on cancer screening. However, the patient and her daughter are adamant that their focus remains improvement on QOL rather than aggressive/invasive work up given pt's age, as such, they do not wish to proceed with routine cancer screening which after lengthy discussion we agreed is very reasonable.   - Has been on EPO replacement 4000u for a little over one month with mild improvement in Hgb (from 7s to 8s). Improvement in energy. No complications from Epo. Will increase to 8,000 weekly for Hgb <10.       Follow Up:          BMT Chart Routing      Follow up with physician    Follow up with BARNEY 3 months.   Provider visit type    Infusion scheduling note    Injection scheduling note    Labs CBC   Lab interval:  labs every other week in [place] as scheduled   Imaging    Pharmacy appointment    Other referrals           Thank you for allowing me to partake in the care of this patient.       Diana Huston PA-C  Hematology, Oncology, and Stem Cell Transplantation  [facility] and [facility]

## 2022-11-17 NOTE — TELEPHONE ENCOUNTER
New prescription with dose change. Messaged provider with quantity change needed. Called pt to discuss dose change unable to get in touch with pt. Unable to leave message.

## 2022-11-18 ENCOUNTER — SPECIALTY PHARMACY (OUTPATIENT)
Dept: PHARMACY | Facility: CLINIC | Age: 87
End: 2022-11-18
Payer: MEDICARE

## 2022-11-18 DIAGNOSIS — D63.1 ANEMIA OF CHRONIC KIDNEY FAILURE, STAGE 3 (MODERATE): ICD-10-CM

## 2022-11-18 DIAGNOSIS — N18.30 ANEMIA OF CHRONIC KIDNEY FAILURE, STAGE 3 (MODERATE): ICD-10-CM

## 2022-11-18 NOTE — TELEPHONE ENCOUNTER
Specialty Pharmacy - Refill Coordination    Specialty Medication Orders Linked to Encounter      Flowsheet Row Most Recent Value   Medication #1 epoetin dodie-epbx (RETACRIT) 4,000 unit/mL injection (Order#[id number], Rx#[id number])            Refill Questions - Documented Responses      Flowsheet Row Most Recent Value   Patient Availability and HIPAA Verification    Does patient want to proceed with activity? Yes   HIPAA/medical authority confirmed? Yes   Relationship to patient of person spoken to? Child   Refill Screening Questions    Changes to allergies? No   Changes to medications? No   New conditions since last clinic visit? No   Unplanned office visit, urgent care, ED, or hospital admission in the last 4 weeks? No   How does patient/caregiver feel medication is working? Good   Financial problems or insurance changes? No   How many doses of your specialty medications were missed in the last 4 weeks? 0   Would patient like to speak to a pharmacist? No   When does the patient need to receive the medication? 11/23/22   Refill Delivery Questions    How will the patient receive the medication? MEDRx   When does the patient need to receive the medication? 11/23/22   Shipping Address Home   Address in [facility] confirmed and updated if neccessary? Yes   Expected Copay ($) 100   Is the patient able to afford the medication copay? Yes   Payment Method CC on file   Days supply of Refill 28   Supplies needed? No supplies needed   Refill activity completed? Yes   Refill activity plan Refill scheduled   Shipment/Pickup Date: 11/21/22            Current Outpatient Medications   Medication Sig    acetaminophen (TYLENOL) 325 MG tablet Take 325 mg by mouth every 6 (six) hours as needed for Pain.    aspirin 325 MG tablet Take 1 tablet by mouth.    atorvastatin (LIPITOR) 20 MG tablet Take 1 tablet (20 mg total) by mouth once daily.    blood sugar diagnostic Strp To check BG once daily, to use with insurance preferred  meter    blood-glucose meter kit To check BG once daily, to use with insurance preferred meter (Patient not taking: Reported on 9/21/2020)    cyanocobalamin (VITAMIN B-12) 1000 MCG tablet Take 1 tablet by mouth.    diclofenac sodium (VOLTAREN) 1 % Gel Apply 2 g topically 4 (four) times daily. (Patient taking differently: Apply 2 g topically as needed.)    epoetin dodie-epbx (RETACRIT) 4,000 unit/mL injection Inject 2 mLs (8,000 Units total) into the skin every 7 days.    gabapentin (NEURONTIN) 100 MG capsule Take 1 capsule (100 mg total) by mouth 3 (three) times daily.    hydroCHLOROthiazide (HYDRODIURIL) 25 MG tablet TAKE ONE TABLET BY MOUTH DAILY FOR FLUID.    lactulose (CHRONULAC) 10 gram/15 mL solution Take 30 mLs (20 g total) by mouth once daily. For chronic constipation.    lancets Misc To check BG once daily, to use with insurance preferred meter    levocetirizine (XYZAL) 5 MG tablet Take 1 tablet (5 mg total) by mouth daily as needed for Allergies.    losartan (COZAAR) 50 MG tablet TAKE 1 TABLET BY MOUTH EVERY DAY FOR HIGH BLOOD PRESSURE    meclizine (ANTIVERT) 12.5 mg tablet Take 1 tablet by mouth three times a day as needed for dizziness    metoprolol succinate (TOPROL-XL) 25 MG 24 hr tablet TAKE 1 TABLET BY MOUTH DAILY FOR HIGH BLOOD PRESSURE    multivitamin (THERAGRAN) per tablet Take 1 tablet by mouth once daily.    naloxone (NARCAN) 4 mg/actuation Spry 4mg by nasal route as needed for opioid overdose; may repeat every 2-3 minutes in alternating nostrils until medical help arrives. Call 911    ondansetron (ZOFRAN) 4 MG tablet Take 1 tablet (4 mg total) by mouth every 12 (twelve) hours as needed for Nausea.    traMADoL (ULTRAM) 50 mg tablet Take 1 tablet (50 mg total) by mouth every 12 (twelve) hours as needed for Pain.    walker Misc Use daily for assisted ambulation   Last reviewed on 11/17/2022 10:52 AM by Diana Huston PA-C    Review of patient's allergies indicates:  No Known Allergies Last  reviewed on  11/18/2022 10:39 AM by Makenzie Welsh      Tasks added this encounter   12/14/2022 - Refill Call (Auto Added)   Tasks due within next 3 months   No tasks due.     Sulma Mccord, PharmD  [facility]  [address]  [facility] [address]  Phone: [phone]  Fax: [phone]

## 2022-11-22 DIAGNOSIS — D63.1 ANEMIA OF CHRONIC KIDNEY FAILURE, STAGE 3 (MODERATE): ICD-10-CM

## 2022-11-22 DIAGNOSIS — N18.30 ANEMIA OF CHRONIC KIDNEY FAILURE, STAGE 3 (MODERATE): ICD-10-CM

## 2022-11-25 ENCOUNTER — PES CALL (OUTPATIENT)
Dept: ADMINISTRATIVE | Facility: CLINIC | Age: 87
End: 2022-11-25
Payer: MEDICARE

## 2022-12-14 ENCOUNTER — SPECIALTY PHARMACY (OUTPATIENT)
Dept: PHARMACY | Facility: CLINIC | Age: 87
End: 2022-12-14
Payer: MEDICARE

## 2022-12-14 NOTE — TELEPHONE ENCOUNTER
Specialty Pharmacy - Refill Coordination    Specialty Medication Orders Linked to Encounter      Flowsheet Row Most Recent Value   Medication #1 epoetin dodie-epbx (RETACRIT) 10,000 unit/mL imjection (Order#[id number], Rx#[id number])            Refill Questions - Documented Responses      Flowsheet Row Most Recent Value   Patient Availability and HIPAA Verification    Does patient want to proceed with activity? Yes   HIPAA/medical authority confirmed? Yes   Relationship to patient of person spoken to? Child   Refill Screening Questions    Changes to allergies? No   Changes to medications? No   New conditions since last clinic visit? No   Unplanned office visit, urgent care, ED, or hospital admission in the last 4 weeks? No   How does patient/caregiver feel medication is working? Good   Financial problems or insurance changes? No   How many doses of your specialty medications were missed in the last 4 weeks? 0   Would patient like to speak to a pharmacist? No   When does the patient need to receive the medication? 12/21/22   Refill Delivery Questions    How will the patient receive the medication? MEDRx   When does the patient need to receive the medication? 12/21/22   Shipping Address Home   Address in [facility] confirmed and updated if neccessary? Yes   Expected Copay ($) 100   Is the patient able to afford the medication copay? Yes   Payment Method CC on file   Days supply of Refill 21   Supplies needed? No supplies needed   Refill activity completed? Yes   Refill activity plan Refill scheduled   Shipment/Pickup Date: 12/19/22            Current Outpatient Medications   Medication Sig    acetaminophen (TYLENOL) 325 MG tablet Take 325 mg by mouth every 6 (six) hours as needed for Pain.    aspirin 325 MG tablet Take 1 tablet by mouth.    atorvastatin (LIPITOR) 20 MG tablet Take 1 tablet (20 mg total) by mouth once daily.    blood sugar diagnostic Strp To check BG once daily, to use with insurance preferred  meter    blood-glucose meter kit To check BG once daily, to use with insurance preferred meter (Patient not taking: Reported on 9/21/2020)    cyanocobalamin (VITAMIN B-12) 1000 MCG tablet Take 1 tablet by mouth.    diclofenac sodium (VOLTAREN) 1 % Gel Apply 2 g topically 4 (four) times daily. (Patient taking differently: Apply 2 g topically as needed.)    epoetin dodie-epbx (RETACRIT) 10,000 unit/mL imjection Inject 0.8 mLs (8,000 Units total) into the skin every 7 days.    gabapentin (NEURONTIN) 100 MG capsule Take 1 capsule (100 mg total) by mouth 3 (three) times daily.    hydroCHLOROthiazide (HYDRODIURIL) 25 MG tablet TAKE ONE TABLET BY MOUTH DAILY FOR FLUID.    lactulose (CHRONULAC) 10 gram/15 mL solution Take 30 mLs (20 g total) by mouth once daily. For chronic constipation.    lancets Misc To check BG once daily, to use with insurance preferred meter    levocetirizine (XYZAL) 5 MG tablet Take 1 tablet (5 mg total) by mouth daily as needed for Allergies.    losartan (COZAAR) 50 MG tablet TAKE 1 TABLET BY MOUTH EVERY DAY FOR HIGH BLOOD PRESSURE    meclizine (ANTIVERT) 12.5 mg tablet Take 1 tablet by mouth three times a day as needed for dizziness    metoprolol succinate (TOPROL-XL) 25 MG 24 hr tablet TAKE 1 TABLET BY MOUTH DAILY FOR HIGH BLOOD PRESSURE    multivitamin (THERAGRAN) per tablet Take 1 tablet by mouth once daily.    naloxone (NARCAN) 4 mg/actuation Spry 4mg by nasal route as needed for opioid overdose; may repeat every 2-3 minutes in alternating nostrils until medical help arrives. Call 911    ondansetron (ZOFRAN) 4 MG tablet Take 1 tablet (4 mg total) by mouth every 12 (twelve) hours as needed for Nausea.    traMADoL (ULTRAM) 50 mg tablet Take 1 tablet (50 mg total) by mouth every 12 (twelve) hours as needed for Pain.    walker Misc Use daily for assisted ambulation   Last reviewed on 11/17/2022 10:52 AM by Diana Huston PA-C    Review of patient's allergies indicates:  No Known Allergies Last  reviewed on  11/22/2022 8:18 AM by Makenzie Welsh      Tasks added this encounter   1/4/2023 - Refill Call (Auto Added)   Tasks due within next 3 months   No tasks due.     Sulma Mccord, PharmD  [facility]  [address]  [facility] [address]  Phone: [phone]  Fax: [phone]

## 2022-12-14 NOTE — TELEPHONE ENCOUNTER
Incoming call from pts daughter stating they were running low on syringes. Added note in [facility] of how many are needed.

## 2023-01-04 ENCOUNTER — PATIENT MESSAGE (OUTPATIENT)
Dept: PHARMACY | Facility: CLINIC | Age: 88
End: 2023-01-04
Payer: MEDICARE

## 2023-01-04 DIAGNOSIS — D63.1 ANEMIA OF CHRONIC KIDNEY FAILURE, STAGE 3 (MODERATE): ICD-10-CM

## 2023-01-04 DIAGNOSIS — N18.30 ANEMIA OF CHRONIC KIDNEY FAILURE, STAGE 3 (MODERATE): ICD-10-CM

## 2023-01-08 NOTE — PROGRESS NOTES
Subjective:       Patient ID: Melody Armstrong is a 88 y.o. female.    Chief Complaint: Follow-up    HPI  The patient presents for follow-up of medical conditions which include type 2 diabetes mellitus, hypertension osteoarthritis of the knees, chronic rhinitis, chronic anemia of renal disease, chronic kidney disease.  The patient states she feels well except for chronic joint pain.  She has been using tramadol and Tylenol in the mornings for pain relief.  She has been receiving iron infusion.  She is resting well at night.  She is not experiencing any hypoglycemic episodes.  She checks blood sugars intermittently.  She does not monitor blood pressures but she is tolerating her medication well without side effects.    Review of Systems   Constitutional:  Negative for activity change, appetite change and unexpected weight change.   Eyes:  Negative for visual disturbance.   Respiratory:  Negative for shortness of breath.    Cardiovascular:  Negative for chest pain, palpitations and leg swelling.   Gastrointestinal:  Negative for abdominal pain, blood in stool and diarrhea.   Genitourinary:  Negative for dysuria, frequency, hematuria and urgency.   Musculoskeletal:  Positive for arthralgias.   Neurological:  Negative for weakness, numbness and headaches.   Psychiatric/Behavioral:  Negative for sleep disturbance.           Physical Exam  Vitals and nursing note reviewed.   Constitutional:       General: She is not in acute distress.     Appearance: Normal appearance. She is well-developed.   HENT:      Head: Normocephalic and atraumatic.   Eyes:      General: No scleral icterus.     Extraocular Movements: Extraocular movements intact.      Conjunctiva/sclera: Conjunctivae normal.   Neck:      Thyroid: No thyromegaly.      Vascular: No JVD.   Cardiovascular:      Rate and Rhythm: Normal rate and regular rhythm.      Heart sounds: Normal heart sounds. No murmur heard.    No friction rub. No gallop.   Pulmonary:       Effort: Pulmonary effort is normal. No respiratory distress.      Breath sounds: Normal breath sounds. No wheezing or rales.   Abdominal:      General: Bowel sounds are normal.      Palpations: Abdomen is soft. There is no mass.      Tenderness: There is no abdominal tenderness.   Musculoskeletal:         General: No tenderness. Normal range of motion.      Cervical back: Normal range of motion and neck supple.   Lymphadenopathy:      Cervical: No cervical adenopathy.   Skin:     General: Skin is warm and dry.      Findings: No rash.      Comments: No foot lesions are present.   Neurological:      Mental Status: She is alert and oriented to person, place, and time.      Cranial Nerves: No cranial nerve deficit.   Psychiatric:         Mood and Affect: Mood normal.         Behavior: Behavior normal.       Protective Sensation (w/ 10 gram monofilament):  Right: Intact  Left: Intact    Visual Inspection:  Normal -  Bilateral    Pedal Pulses:   Right: Present  Left: Present    Posterior tibialis:   Right:Present  Left: Present    Lab Visit on 11/07/2022   Component Date Value Ref Range Status    Sodium 11/07/2022 139  136 - 145 mmol/L Final    Potassium 11/07/2022 4.5  3.5 - 5.1 mmol/L Final    Chloride 11/07/2022 105  95 - 110 mmol/L Final    CO2 11/07/2022 26  23 - 29 mmol/L Final    Glucose 11/07/2022 94  70 - 110 mg/dL Final    BUN 11/07/2022 28 (H)  7 - 17 mg/dL Final    Creatinine 11/07/2022 1.37  0.50 - 1.40 mg/dL Final    Calcium 11/07/2022 9.1  8.7 - 10.5 mg/dL Final    Total Protein 11/07/2022 8.3  6.0 - 8.4 g/dL Final    Albumin 11/07/2022 4.0  3.5 - 5.2 g/dL Final    Total Bilirubin 11/07/2022 0.3  0.1 - 1.0 mg/dL Final    Comment: For infants and newborns, interpretation of results should be based  on gestational age, weight and in agreement with clinical  observations.    Premature Infant recommended reference ranges:  Up to 24 hours.............<8.0 mg/dL  Up to 48 hours............<12.0 mg/dL  3-5  days..................<15.0 mg/dL  6-29 days.................<15.0 mg/dL      Alkaline Phosphatase 11/07/2022 96  38 - 126 U/L Final    AST 11/07/2022 29  15 - 46 U/L Final    ALT 11/07/2022 19  10 - 44 U/L Final    Anion Gap 11/07/2022 8  8 - 16 mmol/L Final    eGFR 11/07/2022 37.1 (A)  >60 mL/min/1.73 m^2 Final    WBC 11/07/2022 7.45  3.90 - 12.70 K/uL Final    RBC 11/07/2022 2.72 (L)  4.00 - 5.40 M/uL Final    Hemoglobin 11/07/2022 8.2 (L)  12.0 - 16.0 g/dL Final    Hematocrit 11/07/2022 25.9 (L)  37.0 - 48.5 % Final    MCV 11/07/2022 95  82 - 98 fL Final    MCH 11/07/2022 30.1  27.0 - 31.0 pg Final    MCHC 11/07/2022 31.7 (L)  32.0 - 36.0 g/dL Final    RDW 11/07/2022 14.7 (H)  11.5 - 14.5 % Final    Platelets 11/07/2022 356  150 - 450 K/uL Final    MPV 11/07/2022 9.5  9.2 - 12.9 fL Final    Immature Granulocytes 11/07/2022 0.3  0.0 - 0.5 % Final    Gran # (ANC) 11/07/2022 4.0  1.8 - 7.7 K/uL Final    Immature Grans (Abs) 11/07/2022 0.02  0.00 - 0.04 K/uL Final    Comment: Mild elevation in immature granulocytes is non specific and   can be seen in a variety of conditions including stress response,   acute inflammation, trauma and pregnancy. Correlation with other   laboratory and clinical findings is essential.      Lymph # 11/07/2022 2.3  1.0 - 4.8 K/uL Final    Mono # 11/07/2022 0.7  0.3 - 1.0 K/uL Final    Eos # 11/07/2022 0.3  0.0 - 0.5 K/uL Final    Baso # 11/07/2022 0.04  0.00 - 0.20 K/uL Final    nRBC 11/07/2022 0  0 /100 WBC Final    Gran % 11/07/2022 54.0  38.0 - 73.0 % Final    Lymph % 11/07/2022 30.7  18.0 - 48.0 % Final    Mono % 11/07/2022 9.9  4.0 - 15.0 % Final    Eosinophil % 11/07/2022 4.6  0.0 - 8.0 % Final    Basophil % 11/07/2022 0.5  0.0 - 1.9 % Final    Differential Method 11/07/2022 Automated   Final    Hemoglobin A1C 11/07/2022 4.8  4.0 - 5.6 % Final    Comment: ADA Screening Guidelines:  5.7-6.4%  Consistent with prediabetes  >or=6.5%  Consistent with diabetes    High levels of fetal  hemoglobin interfere with the HbA1C  assay. Heterozygous hemoglobin variants (HbS, HgC, etc)do  not significantly interfere with this assay.   However, presence of multiple variants may affect accuracy.      Estimated Avg Glucose 11/07/2022 91  68 - 131 mg/dL Final       Assessment & Plan:    Melody was seen today for follow-up.  Current medications will be continued.  Tramadol will be renewed.  Fasting blood tests will be obtained in 4 months.    Diagnoses and all orders for this visit:    Type 2 diabetes mellitus with chronic kidney disease, without long-term current use of insulin, unspecified CKD stage  -     Urinalysis; Future  -     Microalbumin/Creatinine Ratio, Urine; Future  -     Comprehensive Metabolic Panel; Future  -     Lipid Panel; Future  -     CBC Auto Differential; Future  -     Hemoglobin A1C; Future    Essential hypertension  -     Urinalysis; Future  -     Microalbumin/Creatinine Ratio, Urine; Future  -     Comprehensive Metabolic Panel; Future  -     Lipid Panel; Future  -     CBC Auto Differential; Future  -     Hemoglobin A1C; Future    Other hyperlipidemia  -     Comprehensive Metabolic Panel; Future  -     Lipid Panel; Future  -     CBC Auto Differential; Future  -     Hemoglobin A1C; Future    Primary localized osteoarthrosis of lower leg, unspecified laterality    Other orders  -     levocetirizine (XYZAL) 5 MG tablet; Take 1 tablet (5 mg total) by mouth daily as needed for Allergies.  -     traMADoL (ULTRAM) 50 mg tablet; Take 1 tablet (50 mg total) by mouth every 12 (twelve) hours as needed for Pain.         Follow up in about 4 months (around 3/14/2023).     Zac Andres MD

## 2023-01-08 NOTE — PROGRESS NOTES
Patient, Melody Armstrong (MRN #[id number]), presented with a recent Estimated Glumerular Filtration Rate (EGFR) between 30 and 45 consistent with the definition of chronic kidney disease stage 3 - moderate (ICD10 - N18.3).    eGFR if    Date Value Ref Range Status   07/05/2022 36 (L) > OR = 60 mL/min/1.73m2 Final       The patient's chronic kidney disease stage 3 was monitored, evaluated, addressed and/or treated. This addendum to the medical record is made on 01/08/2023.

## 2023-01-08 NOTE — PROGRESS NOTES
US Retroperitoneal Complete  Narrative: EXAMINATION:  US RETROPERITONEAL COMPLETE    CLINICAL HISTORY:  Chronic kidney disease, stage 3 (moderate)    TECHNIQUE:  Ultrasound of the kidneys and urinary bladder was performed including color flow and Doppler evaluation of the kidneys.    COMPARISON:  None.    FINDINGS:  The right kidney is prominent in length measuring 13.0 cm. The left kidney is normal in length measuring 10.2 cm. Segmental arterial resistive indices elevated.  Renal cortical thinning with increased echogenicity noted.  Right midpole cyst measuring 7.8 cm, left midpole cyst measuring 2.8 cm, left upper pole cyst measuring 1.9 cm, left upper pole cyst measuring 2.8 cm.  No hydronephrosis or renal masses identified.  The bladder is grossly unremarkable.  Impression: Bilateral renal cysts.  No hydronephrosis or renal masses..    Electronically signed by: Eulogio Yates MD  Date:    08/09/2018  Time:    11:40      No results found for this or any previous visit.

## 2023-01-09 ENCOUNTER — PATIENT MESSAGE (OUTPATIENT)
Dept: PHARMACY | Facility: CLINIC | Age: 88
End: 2023-01-09
Payer: MEDICARE

## 2023-01-09 ENCOUNTER — SPECIALTY PHARMACY (OUTPATIENT)
Dept: PHARMACY | Facility: CLINIC | Age: 88
End: 2023-01-09
Payer: MEDICARE

## 2023-01-09 NOTE — TELEPHONE ENCOUNTER
Specialty Pharmacy - Refill Coordination    Specialty Medication Orders Linked to Encounter      Flowsheet Row Most Recent Value   Medication #1 epoetin dodie-epbx (RETACRIT) 10,000 unit/mL imjection (Order#[id number], Rx#[id number])            Refill Questions - Documented Responses      Flowsheet Row Most Recent Value   Patient Availability and HIPAA Verification    Does patient want to proceed with activity? Yes   HIPAA/medical authority confirmed? Yes   Relationship to patient of person spoken to? Child   Refill Screening Questions    Changes to allergies? No   Changes to medications? No   New conditions since last clinic visit? No   Unplanned office visit, urgent care, ED, or hospital admission in the last 4 weeks? No   How does patient/caregiver feel medication is working? Good   Financial problems or insurance changes? No   How many doses of your specialty medications were missed in the last 4 weeks? 0   Would patient like to speak to a pharmacist? No   When does the patient need to receive the medication? 01/18/23   Refill Delivery Questions    How will the patient receive the medication? MEDRx   When does the patient need to receive the medication? 01/18/23   Shipping Address Prescription   Address in [facility] confirmed and updated if neccessary? Yes   Expected Copay ($) 100   Is the patient able to afford the medication copay? Yes   Payment Method CC on file   Days supply of Refill 21   Supplies needed? No supplies needed   Refill activity completed? Yes   Refill activity plan Refill scheduled   Shipment/Pickup Date: 01/12/23            Current Outpatient Medications   Medication Sig    acetaminophen (TYLENOL) 325 MG tablet Take 325 mg by mouth every 6 (six) hours as needed for Pain.    aspirin 325 MG tablet Take 1 tablet by mouth.    atorvastatin (LIPITOR) 20 MG tablet Take 1 tablet (20 mg total) by mouth once daily.    blood sugar diagnostic Strp To check BG once daily, to use with insurance  preferred meter    blood-glucose meter kit To check BG once daily, to use with insurance preferred meter (Patient not taking: Reported on 9/21/2020)    cyanocobalamin (VITAMIN B-12) 1000 MCG tablet Take 1 tablet by mouth.    diclofenac sodium (VOLTAREN) 1 % Gel Apply 2 g topically 4 (four) times daily. (Patient taking differently: Apply 2 g topically as needed.)    epoetin dodie-epbx (RETACRIT) 10,000 unit/mL imjection Inject 0.8 mLs (8,000 Units total) into the skin every 7 days.    gabapentin (NEURONTIN) 100 MG capsule Take 1 capsule (100 mg total) by mouth 3 (three) times daily.    hydroCHLOROthiazide (HYDRODIURIL) 25 MG tablet TAKE ONE TABLET BY MOUTH DAILY FOR FLUID.    lactulose (CHRONULAC) 10 gram/15 mL solution Take 30 mLs (20 g total) by mouth once daily. For chronic constipation.    lancets Misc To check BG once daily, to use with insurance preferred meter    levocetirizine (XYZAL) 5 MG tablet Take 1 tablet (5 mg total) by mouth daily as needed for Allergies.    losartan (COZAAR) 50 MG tablet TAKE 1 TABLET BY MOUTH EVERY DAY FOR HIGH BLOOD PRESSURE    meclizine (ANTIVERT) 12.5 mg tablet Take 1 tablet by mouth three times a day as needed for dizziness    metoprolol succinate (TOPROL-XL) 25 MG 24 hr tablet TAKE 1 TABLET BY MOUTH DAILY FOR HIGH BLOOD PRESSURE    multivitamin (THERAGRAN) per tablet Take 1 tablet by mouth once daily.    naloxone (NARCAN) 4 mg/actuation Spry 4mg by nasal route as needed for opioid overdose; may repeat every 2-3 minutes in alternating nostrils until medical help arrives. Call 911    ondansetron (ZOFRAN) 4 MG tablet Take 1 tablet (4 mg total) by mouth every 12 (twelve) hours as needed for Nausea.    traMADoL (ULTRAM) 50 mg tablet Take 1 tablet (50 mg total) by mouth every 12 (twelve) hours as needed for Pain.    walker Misc Use daily for assisted ambulation   Last reviewed on 11/17/2022 10:52 AM by Diana Huston PA-C    Review of patient's allergies indicates:  No Known  Allergies Last reviewed on  1/4/2023 3:24 PM by Makenzie Welsh      Tasks added this encounter   2/1/2023 - Refill Call (Auto Added)   Tasks due within next 3 months   No tasks due.     Sulma Mccord, PharmD  [facility]  [address]  [facility] [address]  Phone: [phone]  Fax: [phone]

## 2023-01-30 ENCOUNTER — PATIENT MESSAGE (OUTPATIENT)
Dept: HEMATOLOGY/ONCOLOGY | Facility: CLINIC | Age: 88
End: 2023-01-30
Payer: MEDICARE

## 2023-01-30 DIAGNOSIS — R30.0 DYSURIA: Primary | ICD-10-CM

## 2023-01-30 NOTE — PROGRESS NOTES
Spoke with patient's daughter over the phone. Ms. Armstrong has been experiencing increased urinary frequency and dysuria over last 3 days. This feels similar to her previous UTIs. No fevers/chills or other sx.     We will obtain UA with Ucx reflex locally tomorrow and send abx.    Diana Huston PA-C  [facility]

## 2023-01-31 ENCOUNTER — PATIENT MESSAGE (OUTPATIENT)
Dept: HEMATOLOGY/ONCOLOGY | Facility: CLINIC | Age: 88
End: 2023-01-31
Payer: MEDICARE

## 2023-01-31 DIAGNOSIS — R30.0 DYSURIA: Primary | ICD-10-CM

## 2023-01-31 RX ORDER — NITROFURANTOIN 25; 75 MG/1; MG/1
100 CAPSULE ORAL 2 TIMES DAILY
Qty: 10 CAPSULE | Refills: 0 | Status: SHIPPED | OUTPATIENT
Start: 2023-01-31 | End: 2023-02-05

## 2023-01-31 NOTE — PROGRESS NOTES
Pt with symptoms concerning for uncomplicated UTI. UA with reflex UC pending. Sent Macrobid rx.    Diana Huston PA-C  [facility]

## 2023-02-01 ENCOUNTER — SPECIALTY PHARMACY (OUTPATIENT)
Dept: PHARMACY | Facility: CLINIC | Age: 88
End: 2023-02-01
Payer: MEDICARE

## 2023-02-01 DIAGNOSIS — D63.1 ANEMIA OF CHRONIC KIDNEY FAILURE, STAGE 3 (MODERATE): ICD-10-CM

## 2023-02-01 DIAGNOSIS — N18.30 ANEMIA OF CHRONIC KIDNEY FAILURE, STAGE 3 (MODERATE): ICD-10-CM

## 2023-02-01 NOTE — TELEPHONE ENCOUNTER
Outgoing call to pt about refill of Retacrit. Per pts daughter, next injection due 2/8. Informed her that request was sent to [facility] and once new rx received will reach out to set up delivery. Voiced understanding.

## 2023-02-07 ENCOUNTER — SPECIALTY PHARMACY (OUTPATIENT)
Dept: PHARMACY | Facility: CLINIC | Age: 88
End: 2023-02-07
Payer: MEDICARE

## 2023-02-07 DIAGNOSIS — D63.1 ANEMIA OF CHRONIC KIDNEY FAILURE, STAGE 3 (MODERATE): ICD-10-CM

## 2023-02-07 DIAGNOSIS — N18.30 ANEMIA OF CHRONIC KIDNEY FAILURE, STAGE 3 (MODERATE): ICD-10-CM

## 2023-02-07 NOTE — TELEPHONE ENCOUNTER
Specialty Pharmacy - Refill Coordination    Specialty Medication Orders Linked to Encounter      Flowsheet Row Most Recent Value   Medication #1 epoetin dodie-epbx (RETACRIT) 10,000 unit/mL imjection (Order#[id number], Rx#)            Refill Questions - Documented Responses      Flowsheet Row Most Recent Value   Patient Availability and HIPAA Verification    Does patient want to proceed with activity? Yes   HIPAA/medical authority confirmed? Yes   Relationship to patient of person spoken to? Self   Refill Screening Questions    Changes to allergies? No   Changes to medications? No   New conditions since last clinic visit? No   Unplanned office visit, urgent care, ED, or hospital admission in the last 4 weeks? No   How does patient/caregiver feel medication is working? Good   Financial problems or insurance changes? No   How many doses of your specialty medications were missed in the last 4 weeks? 0   Would patient like to speak to a pharmacist? No   When does the patient need to receive the medication? 02/15/23   Refill Delivery Questions    How will the patient receive the medication? MEDRx   When does the patient need to receive the medication? 02/15/23   Shipping Address Prescription   Address in [facility] confirmed and updated if neccessary? Yes   Expected Copay ($) 100   Is the patient able to afford the medication copay? Yes   Payment Method CC on file   Days supply of Refill 28   Supplies needed? Alcohol Swabs, Pen needles  [Inj kit]   Refill activity completed? Yes   Refill activity plan Refill scheduled   Shipment/Pickup Date: 02/09/23            Current Outpatient Medications   Medication Sig    acetaminophen (TYLENOL) 325 MG tablet Take 325 mg by mouth every 6 (six) hours as needed for Pain.    aspirin 325 MG tablet Take 1 tablet by mouth.    atorvastatin (LIPITOR) 20 MG tablet Take 1 tablet (20 mg total) by mouth once daily.    blood sugar diagnostic Strp To check BG once daily, to use with  insurance preferred meter    blood-glucose meter kit To check BG once daily, to use with insurance preferred meter (Patient not taking: Reported on 9/21/2020)    cyanocobalamin (VITAMIN B-12) 1000 MCG tablet Take 1 tablet by mouth.    diclofenac sodium (VOLTAREN) 1 % Gel Apply 2 g topically 4 (four) times daily. (Patient taking differently: Apply 2 g topically as needed.)    epoetin dodie-epbx (RETACRIT) 10,000 unit/mL imjection Inject 0.8 mLs (8,000 Units total) into the skin every 7 days.    gabapentin (NEURONTIN) 100 MG capsule Take 1 capsule (100 mg total) by mouth 3 (three) times daily.    hydroCHLOROthiazide (HYDRODIURIL) 25 MG tablet TAKE ONE TABLET BY MOUTH DAILY FOR FLUID.    lactulose (CHRONULAC) 10 gram/15 mL solution Take 30 mLs (20 g total) by mouth once daily. For chronic constipation.    lancets Misc To check BG once daily, to use with insurance preferred meter    levocetirizine (XYZAL) 5 MG tablet Take 1 tablet (5 mg total) by mouth daily as needed for Allergies.    losartan (COZAAR) 50 MG tablet TAKE 1 TABLET BY MOUTH EVERY DAY FOR HIGH BLOOD PRESSURE    meclizine (ANTIVERT) 12.5 mg tablet Take 1 tablet by mouth three times a day as needed for dizziness    metoprolol succinate (TOPROL-XL) 25 MG 24 hr tablet TAKE 1 TABLET BY MOUTH DAILY FOR HIGH BLOOD PRESSURE    multivitamin (THERAGRAN) per tablet Take 1 tablet by mouth once daily.    naloxone (NARCAN) 4 mg/actuation Spry 4mg by nasal route as needed for opioid overdose; may repeat every 2-3 minutes in alternating nostrils until medical help arrives. Call 911    ondansetron (ZOFRAN) 4 MG tablet Take 1 tablet (4 mg total) by mouth every 12 (twelve) hours as needed for Nausea.    traMADoL (ULTRAM) 50 mg tablet Take 1 tablet (50 mg total) by mouth every 12 (twelve) hours as needed for Pain.    walker Misc Use daily for assisted ambulation   Last reviewed on 1/31/2023  9:37 AM by Diana Huston PA-C    Review of patient's allergies indicates:  No  Known Allergies Last reviewed on  2/6/2023 11:13 AM by Coty Santo      Tasks added this encounter   3/1/2023 - Refill Call (Auto Added)   Tasks due within next 3 months   No tasks due.     Julio Hu, PharmD  [facility]  [address]  [facility] [address]  Phone: [phone]  Fax: [phone]

## 2023-02-08 DIAGNOSIS — N18.30 ANEMIA OF CHRONIC KIDNEY FAILURE, STAGE 3 (MODERATE): ICD-10-CM

## 2023-02-08 DIAGNOSIS — D63.1 ANEMIA OF CHRONIC KIDNEY FAILURE, STAGE 3 (MODERATE): ICD-10-CM

## 2023-02-14 ENCOUNTER — TELEPHONE (OUTPATIENT)
Dept: INTERNAL MEDICINE | Facility: CLINIC | Age: 88
End: 2023-02-14
Payer: MEDICARE

## 2023-02-14 RX ORDER — GABAPENTIN 100 MG/1
100 CAPSULE ORAL 3 TIMES DAILY
Qty: 270 CAPSULE | Refills: 3 | Status: SHIPPED | OUTPATIENT
Start: 2023-02-14

## 2023-02-14 RX ORDER — HYDROCHLOROTHIAZIDE 25 MG/1
TABLET ORAL
Qty: 90 TABLET | Refills: 3 | Status: SHIPPED | OUTPATIENT
Start: 2023-02-14 | End: 2024-02-15

## 2023-02-14 RX ORDER — LOSARTAN POTASSIUM 50 MG/1
TABLET ORAL
Qty: 90 TABLET | Refills: 3 | Status: SHIPPED | OUTPATIENT
Start: 2023-02-14 | End: 2024-02-15

## 2023-02-14 RX ORDER — METOPROLOL SUCCINATE 25 MG/1
TABLET, EXTENDED RELEASE ORAL
Qty: 90 TABLET | Refills: 3 | Status: SHIPPED | OUTPATIENT
Start: 2023-02-14 | End: 2024-02-15

## 2023-02-14 RX ORDER — ATORVASTATIN CALCIUM 20 MG/1
20 TABLET, FILM COATED ORAL DAILY
Qty: 90 TABLET | Refills: 3 | Status: SHIPPED | OUTPATIENT
Start: 2023-02-14 | End: 2024-03-26

## 2023-02-14 NOTE — TELEPHONE ENCOUNTER
Call put thru for daughter to speak to me 4 minutes before this call came in .    Duplicate request.  I already sent to dr barclay for approval.

## 2023-02-14 NOTE — TELEPHONE ENCOUNTER
No new care gaps identified.  [facility] Embedded Care Gaps. Reference number: [id number]. 2/14/2023   10:28:03 AM CST

## 2023-02-14 NOTE — TELEPHONE ENCOUNTER
----- Message from Arlene Andres MA sent at 2/14/2023 10:24 AM CST -----  Type:  RX Refill Request    Who Called:  Mandy with Pharmacy  Refill or New Rx: refill  RX Name and Strength: losartan (COZAAR) 50 MG tablet and atorvastatin (LIPITOR) 20 MG tablet  How is the patient currently taking it? (ex. 1XDay):   Is this a 30 day or 90 day RX:   Preferred Pharmacy with phone number: [facility] [phone]  Local or Mail Order: local  Ordering Provider: Dmitry  Would the patient rather a call back or a response via [facility]? Call back  Best Call Back Number: [phone]  Additional Information: pharmacy calling on behalf of pt to have refills sent in, please call pharmacy once sent

## 2023-02-14 NOTE — TELEPHONE ENCOUNTER
Call put thru.  Daughter, siria weeks, gave me attitude because we haven't taken care of her mothers refills.  She says pharmacy faxed us a week ago and haven't heard from us..  I have nothing pending erx or fax.  I am caught up on my faxes.    She didn't have drug names but to say meds for pressure and cholesterol.  We went over med list together.   I told her i'd send dr barclay request for atorvastatin, hctz,metoprolol, losartan, gabapentin and request he approve today.  She thinks she needs tramadol but looks like refills should still be on file.    She was upset [facility] was on her pharmacy list. Says she has always used [facility].  I don't know how [facility] also got on her pharmacy list. I removed and told her list updated.  Above med[facility].    I called and spoke to pharmacy at [facility],  they did have refills left on file for tramadol and will fill now.     Lov 11/22,  nov 3/28/23

## 2023-02-17 ENCOUNTER — TELEPHONE (OUTPATIENT)
Dept: HEMATOLOGY/ONCOLOGY | Facility: CLINIC | Age: 88
End: 2023-02-17
Payer: MEDICARE

## 2023-03-01 ENCOUNTER — PATIENT MESSAGE (OUTPATIENT)
Dept: PHARMACY | Facility: CLINIC | Age: 88
End: 2023-03-01
Payer: MEDICARE

## 2023-03-03 ENCOUNTER — SPECIALTY PHARMACY (OUTPATIENT)
Dept: PHARMACY | Facility: CLINIC | Age: 88
End: 2023-03-03
Payer: MEDICARE

## 2023-03-03 DIAGNOSIS — N18.30 ANEMIA OF CHRONIC KIDNEY FAILURE, STAGE 3 (MODERATE): ICD-10-CM

## 2023-03-03 DIAGNOSIS — D63.1 ANEMIA OF CHRONIC KIDNEY FAILURE, STAGE 3 (MODERATE): ICD-10-CM

## 2023-03-07 NOTE — TELEPHONE ENCOUNTER
Urgent appeal submitted. Will follow on determination closely. Called pts daughter to notify, unable to leave VM. Notified pt.

## 2023-03-09 NOTE — TELEPHONE ENCOUNTER
Specialty Pharmacy - Refill Coordination  Specialty Pharmacy - Medication/Referral Authorization    Specialty Medication Orders Linked to Encounter      Flowsheet Row Most Recent Value   Medication #1 epoetin dodie-epbx (RETACRIT) 10,000 unit/mL imjection (Order#[id number], Rx#[id number])            Refill Questions - Documented Responses      Flowsheet Row Most Recent Value   Patient Availability and HIPAA Verification    Does patient want to proceed with activity? Yes   HIPAA/medical authority confirmed? Yes   Relationship to patient of person spoken to? Child   Refill Screening Questions    Changes to allergies? No   Changes to medications? No   New conditions since last clinic visit? No   Unplanned office visit, urgent care, ED, or hospital admission in the last 4 weeks? No   How does patient/caregiver feel medication is working? Good   Financial problems or insurance changes? No   How many doses of your specialty medications were missed in the last 4 weeks? 0   Would patient like to speak to a pharmacist? No   When does the patient need to receive the medication? 03/15/23   Refill Delivery Questions    How will the patient receive the medication? MEDRx   When does the patient need to receive the medication? 03/15/23   Shipping Address Home   Address in [facility] confirmed and updated if neccessary? Yes   Expected Copay ($) 100   Is the patient able to afford the medication copay? Yes   Payment Method CC on file   Days supply of Refill 7   Supplies needed? No supplies needed   Refill activity completed? Yes   Refill activity plan Refill scheduled   Shipment/Pickup Date: 03/13/23            Current Outpatient Medications   Medication Sig    acetaminophen (TYLENOL) 325 MG tablet Take 325 mg by mouth every 6 (six) hours as needed for Pain.    aspirin 325 MG tablet Take 1 tablet by mouth.    atorvastatin (LIPITOR) 20 MG tablet Take 1 tablet (20 mg total) by mouth once daily.    blood sugar diagnostic Strp To  check BG once daily, to use with insurance preferred meter    blood-glucose meter kit To check BG once daily, to use with insurance preferred meter (Patient not taking: Reported on 9/21/2020)    cyanocobalamin (VITAMIN B-12) 1000 MCG tablet Take 1 tablet by mouth.    diclofenac sodium (VOLTAREN) 1 % Gel Apply 2 g topically 4 (four) times daily. (Patient taking differently: Apply 2 g topically as needed.)    epoetin dodie-epbx (RETACRIT) 10,000 unit/mL imjection Inject 0.8 mLs (8,000 Units total) into the skin every 7 days.    gabapentin (NEURONTIN) 100 MG capsule Take 1 capsule (100 mg total) by mouth 3 (three) times daily.    hydroCHLOROthiazide (HYDRODIURIL) 25 MG tablet TAKE ONE TABLET BY MOUTH DAILY FOR FLUID.    lactulose (CHRONULAC) 10 gram/15 mL solution Take 30 mLs (20 g total) by mouth once daily. For chronic constipation.    lancets Misc To check BG once daily, to use with insurance preferred meter    levocetirizine (XYZAL) 5 MG tablet Take 1 tablet (5 mg total) by mouth daily as needed for Allergies.    losartan (COZAAR) 50 MG tablet TAKE 1 TABLET BY MOUTH EVERY DAY FOR HIGH BLOOD PRESSURE    meclizine (ANTIVERT) 12.5 mg tablet Take 1 tablet by mouth three times a day as needed for dizziness    metoprolol succinate (TOPROL-XL) 25 MG 24 hr tablet TAKE 1 TABLET BY MOUTH DAILY FOR HIGH BLOOD PRESSURE    multivitamin (THERAGRAN) per tablet Take 1 tablet by mouth once daily.    naloxone (NARCAN) 4 mg/actuation Spry 4mg by nasal route as needed for opioid overdose; may repeat every 2-3 minutes in alternating nostrils until medical help arrives. Call 911    ondansetron (ZOFRAN) 4 MG tablet Take 1 tablet (4 mg total) by mouth every 12 (twelve) hours as needed for Nausea.    traMADoL (ULTRAM) 50 mg tablet Take 1 tablet (50 mg total) by mouth every 12 (twelve) hours as needed for Pain.    walker Misc Use daily for assisted ambulation   Last reviewed on 1/31/2023  9:37 AM by Diana Huston PA-C    Review of  patient's allergies indicates:  No Known Allergies Last reviewed on  2/7/2023 4:12 PM by Diana Huston      Tasks added this encounter   3/14/2023 - Refill Call (Auto Added)   Tasks due within next 3 months   5/29/2023 - Clinical - Follow Up Assesement (Annual)     Elaina Andres, PharmD  [facility]  [address]  [facility] [address]  Phone: [phone]  Fax: [phone]

## 2023-03-09 NOTE — TELEPHONE ENCOUNTER
Incoming call from Holly at [facility]. Retacrit  appeal is overturned / approved. [id number]. Approved 2/26/23 - 12/31/23. Holly will fax the approval letter to [facility].

## 2023-03-15 ENCOUNTER — SPECIALTY PHARMACY (OUTPATIENT)
Dept: PHARMACY | Facility: CLINIC | Age: 88
End: 2023-03-15
Payer: MEDICARE

## 2023-03-15 NOTE — TELEPHONE ENCOUNTER
Specialty Pharmacy - Refill Coordination    Specialty Medication Orders Linked to Encounter      Flowsheet Row Most Recent Value   Medication #1 epoetin dodie-epbx (RETACRIT) 10,000 unit/mL imjection (Order#[id number], Rx#[id number])            Refill Questions - Documented Responses      Flowsheet Row Most Recent Value   Patient Availability and HIPAA Verification    Does patient want to proceed with activity? Yes   HIPAA/medical authority confirmed? Yes   Relationship to patient of person spoken to? Child   Refill Screening Questions    Changes to allergies? No   Changes to medications? No   New conditions since last clinic visit? No   Unplanned office visit, urgent care, ED, or hospital admission in the last 4 weeks? No   How does patient/caregiver feel medication is working? Good   Financial problems or insurance changes? No   How many doses of your specialty medications were missed in the last 4 weeks? 0   Would patient like to speak to a pharmacist? No   When does the patient need to receive the medication? 03/22/23   Refill Delivery Questions    How will the patient receive the medication? MEDRx   When does the patient need to receive the medication? 03/22/23   Shipping Address Home   Address in [facility] confirmed and updated if neccessary? Yes   Expected Copay ($) 100   Is the patient able to afford the medication copay? Yes   Payment Method CC on file   Days supply of Refill 7   Supplies needed? No supplies needed   Refill activity completed? Yes   Refill activity plan Refill scheduled   Shipment/Pickup Date: 03/16/23            Current Outpatient Medications   Medication Sig    acetaminophen (TYLENOL) 325 MG tablet Take 325 mg by mouth every 6 (six) hours as needed for Pain.    aspirin 325 MG tablet Take 1 tablet by mouth.    atorvastatin (LIPITOR) 20 MG tablet Take 1 tablet (20 mg total) by mouth once daily.    blood sugar diagnostic Strp To check BG once daily, to use with insurance preferred  meter    blood-glucose meter kit To check BG once daily, to use with insurance preferred meter (Patient not taking: Reported on 9/21/2020)    cyanocobalamin (VITAMIN B-12) 1000 MCG tablet Take 1 tablet by mouth.    diclofenac sodium (VOLTAREN) 1 % Gel Apply 2 g topically 4 (four) times daily. (Patient taking differently: Apply 2 g topically as needed.)    epoetin doide-epbx (RETACRIT) 10,000 unit/mL imjection Inject 0.8 mLs (8,000 Units total) into the skin every 7 days.    gabapentin (NEURONTIN) 100 MG capsule Take 1 capsule (100 mg total) by mouth 3 (three) times daily.    hydroCHLOROthiazide (HYDRODIURIL) 25 MG tablet TAKE ONE TABLET BY MOUTH DAILY FOR FLUID.    lactulose (CHRONULAC) 10 gram/15 mL solution Take 30 mLs (20 g total) by mouth once daily. For chronic constipation.    lancets Misc To check BG once daily, to use with insurance preferred meter    levocetirizine (XYZAL) 5 MG tablet Take 1 tablet (5 mg total) by mouth daily as needed for Allergies.    losartan (COZAAR) 50 MG tablet TAKE 1 TABLET BY MOUTH EVERY DAY FOR HIGH BLOOD PRESSURE    meclizine (ANTIVERT) 12.5 mg tablet Take 1 tablet by mouth three times a day as needed for dizziness    metoprolol succinate (TOPROL-XL) 25 MG 24 hr tablet TAKE 1 TABLET BY MOUTH DAILY FOR HIGH BLOOD PRESSURE    multivitamin (THERAGRAN) per tablet Take 1 tablet by mouth once daily.    naloxone (NARCAN) 4 mg/actuation Spry 4mg by nasal route as needed for opioid overdose; may repeat every 2-3 minutes in alternating nostrils until medical help arrives. Call 911    ondansetron (ZOFRAN) 4 MG tablet Take 1 tablet (4 mg total) by mouth every 12 (twelve) hours as needed for Nausea.    traMADoL (ULTRAM) 50 mg tablet Take 1 tablet (50 mg total) by mouth every 12 (twelve) hours as needed for Pain.    walker Misc Use daily for assisted ambulation   Last reviewed on 1/31/2023  9:37 AM by Diana Huston PA-C    Review of patient's allergies indicates:  No Known Allergies Last  reviewed on  2/7/2023 4:12 PM by Diana Huston      Tasks added this encounter   3/22/2023 - Refill Call (Auto Added)   Tasks due within next 3 months   5/29/2023 - Clinical - Follow Up Assesement (Annual)     Elaina Andres, PharmD  [facility]  [address]  [facility] [address]  Phone: [phone]  Fax: [phone]

## 2023-03-22 ENCOUNTER — SPECIALTY PHARMACY (OUTPATIENT)
Dept: PHARMACY | Facility: CLINIC | Age: 88
End: 2023-03-22
Payer: MEDICARE

## 2023-03-22 NOTE — TELEPHONE ENCOUNTER
Specialty Pharmacy - Refill Coordination    Specialty Medication Orders Linked to Encounter      Flowsheet Row Most Recent Value   Medication #1 epoetin dodie-epbx (RETACRIT) 10,000 unit/mL imjection (Order#[id number], Rx#[id number])          Labs reviewed. Appropriate for Retacrit therapy.    Refill Questions - Documented Responses      Flowsheet Row Most Recent Value   Patient Availability and HIPAA Verification    Does patient want to proceed with activity? Yes   HIPAA/medical authority confirmed? Yes   Relationship to patient of person spoken to? Child   Refill Screening Questions    Changes to allergies? No   Changes to medications? No   New conditions since last clinic visit? No   Unplanned office visit, urgent care, ED, or hospital admission in the last 4 weeks? No   How does patient/caregiver feel medication is working? Good   Financial problems or insurance changes? No   How many doses of your specialty medications were missed in the last 4 weeks? 0   Would patient like to speak to a pharmacist? No   When does the patient need to receive the medication? 03/29/23   Refill Delivery Questions    How will the patient receive the medication? MEDRx   When does the patient need to receive the medication? 03/29/23   Shipping Address Home   Address in [facility] confirmed and updated if neccessary? Yes   Expected Copay ($) 100   Is the patient able to afford the medication copay? Yes   Payment Method CC on file   Days supply of Refill 7   Supplies needed? No supplies needed   Refill activity completed? Yes   Refill activity plan Refill scheduled   Shipment/Pickup Date: 03/23/23            Current Outpatient Medications   Medication Sig    acetaminophen (TYLENOL) 325 MG tablet Take 325 mg by mouth every 6 (six) hours as needed for Pain.    aspirin 325 MG tablet Take 1 tablet by mouth.    atorvastatin (LIPITOR) 20 MG tablet Take 1 tablet (20 mg total) by mouth once daily.    blood sugar diagnostic Strp To check  BG once daily, to use with insurance preferred meter    blood-glucose meter kit To check BG once daily, to use with insurance preferred meter (Patient not taking: Reported on 9/21/2020)    cyanocobalamin (VITAMIN B-12) 1000 MCG tablet Take 1 tablet by mouth.    diclofenac sodium (VOLTAREN) 1 % Gel Apply 2 g topically 4 (four) times daily. (Patient taking differently: Apply 2 g topically as needed.)    epoetin dodie-epbx (RETACRIT) 10,000 unit/mL imjection Inject 0.8 mLs (8,000 Units total) into the skin every 7 days.    gabapentin (NEURONTIN) 100 MG capsule Take 1 capsule (100 mg total) by mouth 3 (three) times daily.    hydroCHLOROthiazide (HYDRODIURIL) 25 MG tablet TAKE ONE TABLET BY MOUTH DAILY FOR FLUID.    lactulose (CHRONULAC) 10 gram/15 mL solution Take 30 mLs (20 g total) by mouth once daily. For chronic constipation.    lancets Misc To check BG once daily, to use with insurance preferred meter    levocetirizine (XYZAL) 5 MG tablet Take 1 tablet (5 mg total) by mouth daily as needed for Allergies.    losartan (COZAAR) 50 MG tablet TAKE 1 TABLET BY MOUTH EVERY DAY FOR HIGH BLOOD PRESSURE    meclizine (ANTIVERT) 12.5 mg tablet Take 1 tablet by mouth three times a day as needed for dizziness    metoprolol succinate (TOPROL-XL) 25 MG 24 hr tablet TAKE 1 TABLET BY MOUTH DAILY FOR HIGH BLOOD PRESSURE    multivitamin (THERAGRAN) per tablet Take 1 tablet by mouth once daily.    naloxone (NARCAN) 4 mg/actuation Spry 4mg by nasal route as needed for opioid overdose; may repeat every 2-3 minutes in alternating nostrils until medical help arrives. Call 911    ondansetron (ZOFRAN) 4 MG tablet Take 1 tablet (4 mg total) by mouth every 12 (twelve) hours as needed for Nausea.    traMADoL (ULTRAM) 50 mg tablet Take 1 tablet (50 mg total) by mouth every 12 (twelve) hours as needed for Pain.    walker Misc Use daily for assisted ambulation   Last reviewed on 1/31/2023  9:37 AM by Diana Huston PA-C    Review of patient's  allergies indicates:  No Known Allergies Last reviewed on  2/7/2023 4:12 PM by Diana Huston      Tasks added this encounter   No tasks added.   Tasks due within next 3 months   5/29/2023 - Clinical - Follow Up Assesement (Annual)  3/22/2023 - Refill Call (Auto Added)     Elaina Andres, PharmD  [facility]  [address]  [facility] [address]  Phone: [phone]  Fax: [phone]

## 2023-03-28 ENCOUNTER — OFFICE VISIT (OUTPATIENT)
Dept: INTERNAL MEDICINE | Facility: CLINIC | Age: 88
End: 2023-03-28
Payer: MEDICARE

## 2023-03-28 VITALS
BODY MASS INDEX: 23.74 KG/M2 | DIASTOLIC BLOOD PRESSURE: 78 MMHG | HEART RATE: 72 BPM | RESPIRATION RATE: 14 BRPM | TEMPERATURE: 98 F | SYSTOLIC BLOOD PRESSURE: 136 MMHG | WEIGHT: 125.69 LBS

## 2023-03-28 DIAGNOSIS — N18.32 STAGE 3B CHRONIC KIDNEY DISEASE: ICD-10-CM

## 2023-03-28 DIAGNOSIS — D64.9 ANEMIA, UNSPECIFIED TYPE: ICD-10-CM

## 2023-03-28 DIAGNOSIS — E11.22 TYPE 2 DIABETES MELLITUS WITH CHRONIC KIDNEY DISEASE, WITHOUT LONG-TERM CURRENT USE OF INSULIN, UNSPECIFIED CKD STAGE: Primary | ICD-10-CM

## 2023-03-28 DIAGNOSIS — I10 ESSENTIAL HYPERTENSION: ICD-10-CM

## 2023-03-28 DIAGNOSIS — M15.9 PRIMARY OSTEOARTHRITIS INVOLVING MULTIPLE JOINTS: ICD-10-CM

## 2023-03-28 PROBLEM — N18.4 CHRONIC KIDNEY DISEASE, STAGE 4 (SEVERE): Status: RESOLVED | Noted: 2020-01-20 | Resolved: 2023-03-28

## 2023-03-28 PROCEDURE — 1125F AMNT PAIN NOTED PAIN PRSNT: CPT | Mod: CPTII,S$GLB,, | Performed by: INTERNAL MEDICINE

## 2023-03-28 PROCEDURE — 1159F PR MEDICATION LIST DOCUMENTED IN MEDICAL RECORD: ICD-10-PCS | Mod: CPTII,S$GLB,, | Performed by: INTERNAL MEDICINE

## 2023-03-28 PROCEDURE — 99999 PR PBB SHADOW E&M-EST. PATIENT-LVL IV: CPT | Mod: PBBFAC,,, | Performed by: INTERNAL MEDICINE

## 2023-03-28 PROCEDURE — 99999 PR PBB SHADOW E&M-EST. PATIENT-LVL IV: ICD-10-PCS | Mod: PBBFAC,,, | Performed by: INTERNAL MEDICINE

## 2023-03-28 PROCEDURE — 1160F RVW MEDS BY RX/DR IN RCRD: CPT | Mod: CPTII,S$GLB,, | Performed by: INTERNAL MEDICINE

## 2023-03-28 PROCEDURE — 99214 OFFICE O/P EST MOD 30 MIN: CPT | Mod: S$GLB,,, | Performed by: INTERNAL MEDICINE

## 2023-03-28 PROCEDURE — 1125F PR PAIN SEVERITY QUANTIFIED, PAIN PRESENT: ICD-10-PCS | Mod: CPTII,S$GLB,, | Performed by: INTERNAL MEDICINE

## 2023-03-28 PROCEDURE — 1159F MED LIST DOCD IN RCRD: CPT | Mod: CPTII,S$GLB,, | Performed by: INTERNAL MEDICINE

## 2023-03-28 PROCEDURE — 99214 PR OFFICE/OUTPT VISIT, EST, LEVL IV, 30-39 MIN: ICD-10-PCS | Mod: S$GLB,,, | Performed by: INTERNAL MEDICINE

## 2023-03-28 PROCEDURE — 1160F PR REVIEW ALL MEDS BY PRESCRIBER/CLIN PHARMACIST DOCUMENTED: ICD-10-PCS | Mod: CPTII,S$GLB,, | Performed by: INTERNAL MEDICINE

## 2023-03-28 RX ORDER — TRAMADOL HYDROCHLORIDE 50 MG/1
50 TABLET ORAL EVERY 12 HOURS PRN
Qty: 180 TABLET | Refills: 1 | Status: SHIPPED | OUTPATIENT
Start: 2023-03-28 | End: 2023-08-29 | Stop reason: SDUPTHER

## 2023-03-29 ENCOUNTER — SPECIALTY PHARMACY (OUTPATIENT)
Dept: PHARMACY | Facility: CLINIC | Age: 88
End: 2023-03-29
Payer: MEDICARE

## 2023-03-29 NOTE — TELEPHONE ENCOUNTER
Labs from 3/21 reviewed and found appropriate for Retacrit refill.    Hemoglobin   Date Value Ref Range Status   03/21/2023 8.8 (L) 12.0 - 16.0 g/dL Final   03/07/2023 8.4 (L) 12.0 - 16.0 g/dL Final     Hematocrit   Date Value Ref Range Status   03/21/2023 29.2 (L) 37.0 - 48.5 % Final   03/07/2023 27.3 (L) 37.0 - 48.5 % Final

## 2023-04-05 ENCOUNTER — SPECIALTY PHARMACY (OUTPATIENT)
Dept: PHARMACY | Facility: CLINIC | Age: 88
End: 2023-04-05
Payer: MEDICARE

## 2023-04-05 NOTE — TELEPHONE ENCOUNTER
Specialty Pharmacy - Refill Coordination    Specialty Medication Orders Linked to Encounter      Flowsheet Row Most Recent Value   Medication #1 epoetin dodie-epbx (RETACRIT) 10,000 unit/mL imjection (Order#[id number], Rx#[id number])            Refill Questions - Documented Responses      Flowsheet Row Most Recent Value   Patient Availability and HIPAA Verification    Does patient want to proceed with activity? Yes   HIPAA/medical authority confirmed? Yes   Relationship to patient of person spoken to? Child   Refill Screening Questions    Changes to allergies? No   Changes to medications? No   New conditions since last clinic visit? No   Unplanned office visit, urgent care, ED, or hospital admission in the last 4 weeks? No   How does patient/caregiver feel medication is working? Good   Financial problems or insurance changes? No   How many doses of your specialty medications were missed in the last 4 weeks? 0   Would patient like to speak to a pharmacist? No   When does the patient need to receive the medication? 04/12/23   Refill Delivery Questions    How will the patient receive the medication? [facility]   When does the patient need to receive the medication? 04/12/23   Shipping Address Prescription   Address in [facility] confirmed and updated if neccessary? Yes   Expected Copay ($) 100   Is the patient able to afford the medication copay? Yes   Payment Method CC on file   Days supply of Refill 21   Supplies needed? No supplies needed   Refill activity completed? Yes   Refill activity plan Refill scheduled   Shipment/Pickup Date: 04/06/23            Current Outpatient Medications   Medication Sig    acetaminophen (TYLENOL) 325 MG tablet Take 325 mg by mouth every 6 (six) hours as needed for Pain.    aspirin 325 MG tablet Take 1 tablet by mouth.    atorvastatin (LIPITOR) 20 MG tablet Take 1 tablet (20 mg total) by mouth once daily.    blood sugar diagnostic Strp To check BG once daily, to use with insurance  preferred meter    blood-glucose meter kit To check BG once daily, to use with insurance preferred meter (Patient not taking: Reported on 9/21/2020)    cyanocobalamin (VITAMIN B-12) 1000 MCG tablet Take 1 tablet by mouth.    diclofenac sodium (VOLTAREN) 1 % Gel Apply 2 g topically 4 (four) times daily. (Patient not taking: Reported on 3/28/2023)    epoetin dodie-epbx (RETACRIT) 10,000 unit/mL imjection Inject 0.8 mLs (8,000 Units total) into the skin every 7 days.    gabapentin (NEURONTIN) 100 MG capsule Take 1 capsule (100 mg total) by mouth 3 (three) times daily.    hydroCHLOROthiazide (HYDRODIURIL) 25 MG tablet TAKE ONE TABLET BY MOUTH DAILY FOR FLUID.    lactulose (CHRONULAC) 10 gram/15 mL solution Take 30 mLs (20 g total) by mouth once daily. For chronic constipation.    lancets Misc To check BG once daily, to use with insurance preferred meter    levocetirizine (XYZAL) 5 MG tablet Take 1 tablet (5 mg total) by mouth daily as needed for Allergies.    losartan (COZAAR) 50 MG tablet TAKE 1 TABLET BY MOUTH EVERY DAY FOR HIGH BLOOD PRESSURE    meclizine (ANTIVERT) 12.5 mg tablet Take 1 tablet by mouth three times a day as needed for dizziness (Patient not taking: Reported on 3/28/2023)    metoprolol succinate (TOPROL-XL) 25 MG 24 hr tablet TAKE 1 TABLET BY MOUTH DAILY FOR HIGH BLOOD PRESSURE    multivitamin (THERAGRAN) per tablet Take 1 tablet by mouth once daily.    naloxone (NARCAN) 4 mg/actuation Spry 4mg by nasal route as needed for opioid overdose; may repeat every 2-3 minutes in alternating nostrils until medical help arrives. Call 911 (Patient not taking: Reported on 3/28/2023)    ondansetron (ZOFRAN) 4 MG tablet Take 1 tablet (4 mg total) by mouth every 12 (twelve) hours as needed for Nausea. (Patient not taking: Reported on 3/28/2023)    traMADoL (ULTRAM) 50 mg tablet Take 1 tablet (50 mg total) by mouth every 12 (twelve) hours as needed for Pain.    walker Misc Use daily for assisted ambulation (Patient  not taking: Reported on 3/28/2023)   Last reviewed on 3/28/2023 10:52 AM by Zac Andres MD    Review of patient's allergies indicates:  No Known Allergies Last reviewed on  3/28/2023 10:52 AM by Zac Andres      Tasks added this encounter   4/26/2023 - Refill Call (Auto Added)   Tasks due within next 3 months   No tasks due.     Sulma Mccord, PharmD  [facility]  [address] [place] [id number]  Phone: [phone]  Fax: [phone]

## 2023-04-07 NOTE — PROGRESS NOTES
Subjective:       Patient ID: Melody Armstrong is a 88 y.o. female.    Chief Complaint: Follow-up and Diabetes    HPI  The patient presents for follow-up of medical conditions which include type 2 diabetes mellitus, hypertension, chronic kidney disease, chronic anemia.  The patient is receiving Retacrit injections for management of her chronic anemia under the supervision of her Hematology-Oncology specialist.  She also has osteoarthritis and uses Tylenol and tramadol in the mornings for joint pain.  She has not experienced any joint swelling except for her left ankle.  The patient reports that her appetite has remained stable.  She eats smaller meals daily.  She has not experienced shortness of breath, chest pain, abdominal pain, nausea or vomiting.  She has not been monitoring her blood sugar levels.  She does not monitor her blood pressure levels but she is tolerating her medication well without side effects.  She is accompanied today by her daughter who is her primary caregiver at home.    Review of Systems   Constitutional:  Negative for activity change, appetite change and unexpected weight change.   Eyes:  Negative for visual disturbance.   Respiratory:  Negative for shortness of breath.    Cardiovascular:  Negative for chest pain, palpitations and leg swelling.   Gastrointestinal:  Negative for abdominal pain, blood in stool and diarrhea.   Genitourinary:  Negative for dysuria, frequency, hematuria and urgency.   Musculoskeletal:  Positive for arthralgias.   Neurological:  Negative for weakness, numbness and headaches.   Psychiatric/Behavioral:  Negative for sleep disturbance.           Physical Exam  Vitals and nursing note reviewed.   Constitutional:       General: She is not in acute distress.     Appearance: Normal appearance. She is well-developed.   HENT:      Head: Normocephalic and atraumatic.   Eyes:      General: No scleral icterus.     Extraocular Movements: Extraocular movements intact.       Conjunctiva/sclera: Conjunctivae normal.   Neck:      Thyroid: No thyromegaly.      Vascular: No JVD.   Cardiovascular:      Rate and Rhythm: Normal rate and regular rhythm.      Heart sounds: Normal heart sounds. No murmur heard.    No friction rub. No gallop.   Pulmonary:      Effort: Pulmonary effort is normal. No respiratory distress.      Breath sounds: Normal breath sounds. No wheezing or rales.   Abdominal:      General: Bowel sounds are normal.      Palpations: Abdomen is soft. There is no mass.      Tenderness: There is no abdominal tenderness.   Musculoskeletal:         General: No tenderness. Normal range of motion.      Cervical back: Normal range of motion and neck supple.   Lymphadenopathy:      Cervical: No cervical adenopathy.   Skin:     General: Skin is warm and dry.      Findings: No rash.      Comments: No foot lesions are present.   Neurological:      Mental Status: She is alert and oriented to person, place, and time.      Cranial Nerves: No cranial nerve deficit.   Psychiatric:         Mood and Affect: Mood normal.         Behavior: Behavior normal.       Protective Sensation (w/ 10 gram monofilament):  Right: Intact  Left: Intact    Visual Inspection:  Normal -  Bilateral    Pedal Pulses:   Right: Present  Left: Present    Posterior Tibialis Pulses:   Right:Present  Left: Present    Lab Visit on 03/21/2023   Component Date Value Ref Range Status    Sodium 03/21/2023 141  136 - 145 mmol/L Final    Potassium 03/21/2023 4.6  3.5 - 5.1 mmol/L Final    Chloride 03/21/2023 107  95 - 110 mmol/L Final    CO2 03/21/2023 29  23 - 29 mmol/L Final    Glucose 03/21/2023 90  70 - 110 mg/dL Final    BUN 03/21/2023 23 (H)  7 - 17 mg/dL Final    Creatinine 03/21/2023 1.47 (H)  0.50 - 1.40 mg/dL Final    Calcium 03/21/2023 9.3  8.7 - 10.5 mg/dL Final    Total Protein 03/21/2023 8.1  6.0 - 8.4 g/dL Final    Albumin 03/21/2023 4.2  3.5 - 5.2 g/dL Final    Total Bilirubin 03/21/2023 0.5  0.1 - 1.0 mg/dL Final     Comment: For infants and newborns, interpretation of results should be based  on gestational age, weight and in agreement with clinical  observations.    Premature Infant recommended reference ranges:  Up to 24 hours.............<8.0 mg/dL  Up to 48 hours............<12.0 mg/dL  3-5 days..................<15.0 mg/dL  6-29 days.................<15.0 mg/dL      Alkaline Phosphatase 03/21/2023 83  38 - 126 U/L Final    AST 03/21/2023 31  15 - 46 U/L Final    ALT 03/21/2023 23  10 - 44 U/L Final    Anion Gap 03/21/2023 5 (L)  8 - 16 mmol/L Final    eGFR 03/21/2023 34.1 (A)  >60 mL/min/1.73 m^2 Final    Cholesterol 03/21/2023 120  120 - 199 mg/dL Final    Comment: The National Cholesterol Education Program (NCEP) has set the  following guidelines (reference ranges) for Cholesterol:  Optimal.....................<200 mg/dL  Borderline High.............200-239 mg/dL  High........................> or = 240 mg/dL      Triglycerides 03/21/2023 64  30 - 150 mg/dL Final    Comment: The National Cholesterol Education Program (NCEP) has set the  following guidelines (reference values) for triglycerides:  Normal......................<150 mg/dL  Borderline High.............150-199 mg/dL  High........................200-499 mg/dL      HDL 03/21/2023 64  40 - 75 mg/dL Final    Comment: The National Cholesterol Education Program (NCEP) has set the  following guidelines (reference values) for HDL Cholesterol:  Low...............<40 mg/dL  Optimal...........>60 mg/dL      LDL Cholesterol 03/21/2023 43.2 (L)  63.0 - 159.0 mg/dL Final    Comment: The National Cholesterol Education Program (NCEP) has set the  following guidelines (reference values) for LDL Cholesterol:  Optimal.......................<130 mg/dL  Borderline High...............130-159 mg/dL  High..........................160-189 mg/dL  Very High.....................>190 mg/dL      HDL/Cholesterol Ratio 03/21/2023 53.3 (H)  20.0 - 50.0 % Final    Total Cholesterol/HDL Ratio  03/21/2023 1.9 (L)  2.0 - 5.0 Final    Non-HDL Cholesterol 03/21/2023 56  mg/dL Final    Comment: Risk category and Non-HDL cholesterol goals:  Coronary heart disease (CHD)or equivalent (10-year risk of CHD >20%):  Non-HDL cholesterol goal     <130 mg/dL  Two or more CHD risk factors and 10-year risk of CHD <= 20%:  Non-HDL cholesterol goal     <160 mg/dL  0 to 1 CHD risk factor:  Non-HDL cholesterol goal     <190 mg/dL      WBC 03/21/2023 8.15  3.90 - 12.70 K/uL Final    RBC 03/21/2023 3.08 (L)  4.00 - 5.40 M/uL Final    Hemoglobin 03/21/2023 8.8 (L)  12.0 - 16.0 g/dL Final    Hematocrit 03/21/2023 29.2 (L)  37.0 - 48.5 % Final    MCV 03/21/2023 95  82 - 98 fL Final    MCH 03/21/2023 28.6  27.0 - 31.0 pg Final    MCHC 03/21/2023 30.1 (L)  32.0 - 36.0 g/dL Final    RDW 03/21/2023 15.9 (H)  11.5 - 14.5 % Final    Platelets 03/21/2023 414  150 - 450 K/uL Final    MPV 03/21/2023 9.4  9.2 - 12.9 fL Final    Immature Granulocytes 03/21/2023 0.2  0.0 - 0.5 % Final    Gran # (ANC) 03/21/2023 4.5  1.8 - 7.7 K/uL Final    Immature Grans (Abs) 03/21/2023 0.02  0.00 - 0.04 K/uL Final    Comment: Mild elevation in immature granulocytes is non specific and   can be seen in a variety of conditions including stress response,   acute inflammation, trauma and pregnancy. Correlation with other   laboratory and clinical findings is essential.      Lymph # 03/21/2023 2.3  1.0 - 4.8 K/uL Final    Mono # 03/21/2023 0.8  0.3 - 1.0 K/uL Final    Eos # 03/21/2023 0.5  0.0 - 0.5 K/uL Final    Baso # 03/21/2023 0.05  0.00 - 0.20 K/uL Final    nRBC 03/21/2023 0  0 /100 WBC Final    Gran % 03/21/2023 55.9  38.0 - 73.0 % Final    Lymph % 03/21/2023 28.3  18.0 - 48.0 % Final    Mono % 03/21/2023 9.4  4.0 - 15.0 % Final    Eosinophil % 03/21/2023 5.6  0.0 - 8.0 % Final    Basophil % 03/21/2023 0.6  0.0 - 1.9 % Final    Differential Method 03/21/2023 Automated   Final    Hemoglobin A1C 03/21/2023 5.0  4.0 - 5.6 % Final    Comment: ADA Screening  Guidelines:  5.7-6.4%  Consistent with prediabetes  >or=6.5%  Consistent with diabetes    High levels of fetal hemoglobin interfere with the HbA1C  assay. Heterozygous hemoglobin variants (HbS, HgC, etc)do  not significantly interfere with this assay.   However, presence of multiple variants may affect accuracy.      Estimated Avg Glucose 03/21/2023 97  68 - 131 mg/dL Final   Lab Visit on 03/07/2023   Component Date Value Ref Range Status    Sodium 03/07/2023 139  136 - 145 mmol/L Final    Potassium 03/07/2023 4.5  3.5 - 5.1 mmol/L Final    Chloride 03/07/2023 104  95 - 110 mmol/L Final    CO2 03/07/2023 28  23 - 29 mmol/L Final    Glucose 03/07/2023 86  70 - 110 mg/dL Final    BUN 03/07/2023 24 (H)  7 - 17 mg/dL Final    Creatinine 03/07/2023 1.42 (H)  0.50 - 1.40 mg/dL Final    Calcium 03/07/2023 9.0  8.7 - 10.5 mg/dL Final    Total Protein 03/07/2023 7.8  6.0 - 8.4 g/dL Final    Albumin 03/07/2023 4.0  3.5 - 5.2 g/dL Final    Total Bilirubin 03/07/2023 0.3  0.1 - 1.0 mg/dL Final    Comment: For infants and newborns, interpretation of results should be based  on gestational age, weight and in agreement with clinical  observations.    Premature Infant recommended reference ranges:  Up to 24 hours.............<8.0 mg/dL  Up to 48 hours............<12.0 mg/dL  3-5 days..................<15.0 mg/dL  6-29 days.................<15.0 mg/dL      Alkaline Phosphatase 03/07/2023 89  38 - 126 U/L Final    AST 03/07/2023 29  15 - 46 U/L Final    ALT 03/07/2023 20  10 - 44 U/L Final    Anion Gap 03/07/2023 7 (L)  8 - 16 mmol/L Final    eGFR 03/07/2023 35.6 (A)  >60 mL/min/1.73 m^2 Final    WBC 03/07/2023 7.25  3.90 - 12.70 K/uL Final    RBC 03/07/2023 2.88 (L)  4.00 - 5.40 M/uL Final    Hemoglobin 03/07/2023 8.4 (L)  12.0 - 16.0 g/dL Final    Hematocrit 03/07/2023 27.3 (L)  37.0 - 48.5 % Final    MCV 03/07/2023 95  82 - 98 fL Final    MCH 03/07/2023 29.2  27.0 - 31.0 pg Final    MCHC 03/07/2023 30.8 (L)  32.0 - 36.0 g/dL Final     RDW 03/07/2023 15.9 (H)  11.5 - 14.5 % Final    Platelets 03/07/2023 361  150 - 450 K/uL Final    MPV 03/07/2023 9.1 (L)  9.2 - 12.9 fL Final    Immature Granulocytes 03/07/2023 0.1  0.0 - 0.5 % Final    Gran # (ANC) 03/07/2023 3.8  1.8 - 7.7 K/uL Final    Immature Grans (Abs) 03/07/2023 0.01  0.00 - 0.04 K/uL Final    Comment: Mild elevation in immature granulocytes is non specific and   can be seen in a variety of conditions including stress response,   acute inflammation, trauma and pregnancy. Correlation with other   laboratory and clinical findings is essential.      Lymph # 03/07/2023 2.2  1.0 - 4.8 K/uL Final    Mono # 03/07/2023 0.8  0.3 - 1.0 K/uL Final    Eos # 03/07/2023 0.5  0.0 - 0.5 K/uL Final    Baso # 03/07/2023 0.03  0.00 - 0.20 K/uL Final    nRBC 03/07/2023 0  0 /100 WBC Final    Gran % 03/07/2023 52.1  38.0 - 73.0 % Final    Lymph % 03/07/2023 29.9  18.0 - 48.0 % Final    Mono % 03/07/2023 10.5  4.0 - 15.0 % Final    Eosinophil % 03/07/2023 7.0  0.0 - 8.0 % Final    Basophil % 03/07/2023 0.4  0.0 - 1.9 % Final    Differential Method 03/07/2023 Automated   Final   Lab Visit on 02/28/2023   Component Date Value Ref Range Status    Sodium 02/28/2023 140  136 - 145 mmol/L Final    Potassium 02/28/2023 4.8  3.5 - 5.1 mmol/L Final    Chloride 02/28/2023 106  95 - 110 mmol/L Final    CO2 02/28/2023 27  23 - 29 mmol/L Final    Glucose 02/28/2023 81  70 - 110 mg/dL Final    BUN 02/28/2023 21 (H)  7 - 17 mg/dL Final    Creatinine 02/28/2023 1.56 (H)  0.50 - 1.40 mg/dL Final    Calcium 02/28/2023 8.9  8.7 - 10.5 mg/dL Final    Total Protein 02/28/2023 7.7  6.0 - 8.4 g/dL Final    Albumin 02/28/2023 4.0  3.5 - 5.2 g/dL Final    Total Bilirubin 02/28/2023 0.4  0.1 - 1.0 mg/dL Final    Comment: For infants and newborns, interpretation of results should be based  on gestational age, weight and in agreement with clinical  observations.    Premature Infant recommended reference ranges:  Up to 24  hours.............<8.0 mg/dL  Up to 48 hours............<12.0 mg/dL  3-5 days..................<15.0 mg/dL  6-29 days.................<15.0 mg/dL      Alkaline Phosphatase 02/28/2023 86  38 - 126 U/L Final    AST 02/28/2023 27  15 - 46 U/L Final    ALT 02/28/2023 18  10 - 44 U/L Final    Anion Gap 02/28/2023 7 (L)  8 - 16 mmol/L Final    eGFR 02/28/2023 31.8 (A)  >60 mL/min/1.73 m^2 Final    WBC 02/28/2023 7.20  3.90 - 12.70 K/uL Final    RBC 02/28/2023 2.81 (L)  4.00 - 5.40 M/uL Final    Hemoglobin 02/28/2023 8.2 (L)  12.0 - 16.0 g/dL Final    Hematocrit 02/28/2023 26.9 (L)  37.0 - 48.5 % Final    MCV 02/28/2023 96  82 - 98 fL Final    MCH 02/28/2023 29.2  27.0 - 31.0 pg Final    MCHC 02/28/2023 30.5 (L)  32.0 - 36.0 g/dL Final    RDW 02/28/2023 15.5 (H)  11.5 - 14.5 % Final    Platelets 02/28/2023 373  150 - 450 K/uL Final    MPV 02/28/2023 9.4  9.2 - 12.9 fL Final    Immature Granulocytes 02/28/2023 0.3  0.0 - 0.5 % Final    Gran # (ANC) 02/28/2023 3.6  1.8 - 7.7 K/uL Final    Immature Grans (Abs) 02/28/2023 0.02  0.00 - 0.04 K/uL Final    Comment: Mild elevation in immature granulocytes is non specific and   can be seen in a variety of conditions including stress response,   acute inflammation, trauma and pregnancy. Correlation with other   laboratory and clinical findings is essential.      Lymph # 02/28/2023 2.6  1.0 - 4.8 K/uL Final    Mono # 02/28/2023 0.7  0.3 - 1.0 K/uL Final    Eos # 02/28/2023 0.3  0.0 - 0.5 K/uL Final    Baso # 02/28/2023 0.03  0.00 - 0.20 K/uL Final    nRBC 02/28/2023 0  0 /100 WBC Final    Gran % 02/28/2023 49.8  38.0 - 73.0 % Final    Lymph % 02/28/2023 36.3  18.0 - 48.0 % Final    Mono % 02/28/2023 9.0  4.0 - 15.0 % Final    Eosinophil % 02/28/2023 4.2  0.0 - 8.0 % Final    Basophil % 02/28/2023 0.4  0.0 - 1.9 % Final    Differential Method 02/28/2023 Automated   Final   Lab Visit on 02/14/2023   Component Date Value Ref Range Status    Sodium 02/14/2023 141  136 - 145 mmol/L Final     Potassium 02/14/2023 4.4  3.5 - 5.1 mmol/L Final    Chloride 02/14/2023 104  95 - 110 mmol/L Final    CO2 02/14/2023 27  23 - 29 mmol/L Final    Glucose 02/14/2023 85  70 - 110 mg/dL Final    BUN 02/14/2023 21 (H)  7 - 17 mg/dL Final    Creatinine 02/14/2023 1.41 (H)  0.50 - 1.40 mg/dL Final    Calcium 02/14/2023 8.9  8.7 - 10.5 mg/dL Final    Total Protein 02/14/2023 7.8  6.0 - 8.4 g/dL Final    Albumin 02/14/2023 3.9  3.5 - 5.2 g/dL Final    Total Bilirubin 02/14/2023 0.3  0.1 - 1.0 mg/dL Final    Comment: For infants and newborns, interpretation of results should be based  on gestational age, weight and in agreement with clinical  observations.    Premature Infant recommended reference ranges:  Up to 24 hours.............<8.0 mg/dL  Up to 48 hours............<12.0 mg/dL  3-5 days..................<15.0 mg/dL  6-29 days.................<15.0 mg/dL      Alkaline Phosphatase 02/14/2023 81  38 - 126 U/L Final    AST 02/14/2023 30  15 - 46 U/L Final    ALT 02/14/2023 21  10 - 44 U/L Final    Anion Gap 02/14/2023 10  8 - 16 mmol/L Final    eGFR 02/14/2023 35.9 (A)  >60 mL/min/1.73 m^2 Final    WBC 02/14/2023 7.64  3.90 - 12.70 K/uL Final    RBC 02/14/2023 2.59 (L)  4.00 - 5.40 M/uL Final    Hemoglobin 02/14/2023 7.6 (L)  12.0 - 16.0 g/dL Final    Hematocrit 02/14/2023 25.0 (L)  37.0 - 48.5 % Final    MCV 02/14/2023 97  82 - 98 fL Final    MCH 02/14/2023 29.3  27.0 - 31.0 pg Final    MCHC 02/14/2023 30.4 (L)  32.0 - 36.0 g/dL Final    RDW 02/14/2023 15.7 (H)  11.5 - 14.5 % Final    Platelets 02/14/2023 387  150 - 450 K/uL Final    MPV 02/14/2023 9.3  9.2 - 12.9 fL Final    Immature Granulocytes 02/14/2023 0.3  0.0 - 0.5 % Final    Gran # (ANC) 02/14/2023 4.4  1.8 - 7.7 K/uL Final    Immature Grans (Abs) 02/14/2023 0.02  0.00 - 0.04 K/uL Final    Comment: Mild elevation in immature granulocytes is non specific and   can be seen in a variety of conditions including stress response,   acute inflammation, trauma and  pregnancy. Correlation with other   laboratory and clinical findings is essential.      Lymph # 02/14/2023 2.0  1.0 - 4.8 K/uL Final    Mono # 02/14/2023 0.9  0.3 - 1.0 K/uL Final    Eos # 02/14/2023 0.3  0.0 - 0.5 K/uL Final    Baso # 02/14/2023 0.03  0.00 - 0.20 K/uL Final    nRBC 02/14/2023 0  0 /100 WBC Final    Gran % 02/14/2023 57.4  38.0 - 73.0 % Final    Lymph % 02/14/2023 25.9  18.0 - 48.0 % Final    Mono % 02/14/2023 11.5  4.0 - 15.0 % Final    Eosinophil % 02/14/2023 4.5  0.0 - 8.0 % Final    Basophil % 02/14/2023 0.4  0.0 - 1.9 % Final    Differential Method 02/14/2023 Automated   Final   Lab Visit on 02/07/2023   Component Date Value Ref Range Status    Sodium 02/07/2023 140  136 - 145 mmol/L Final    Potassium 02/07/2023 4.3  3.5 - 5.1 mmol/L Final    Chloride 02/07/2023 105  95 - 110 mmol/L Final    CO2 02/07/2023 27  23 - 29 mmol/L Final    Glucose 02/07/2023 85  70 - 110 mg/dL Final    BUN 02/07/2023 31 (H)  7 - 17 mg/dL Final    Creatinine 02/07/2023 1.44 (H)  0.50 - 1.40 mg/dL Final    Calcium 02/07/2023 8.8  8.7 - 10.5 mg/dL Final    Total Protein 02/07/2023 7.9  6.0 - 8.4 g/dL Final    Albumin 02/07/2023 4.0  3.5 - 5.2 g/dL Final    Total Bilirubin 02/07/2023 0.6  0.1 - 1.0 mg/dL Final    Comment: For infants and newborns, interpretation of results should be based  on gestational age, weight and in agreement with clinical  observations.    Premature Infant recommended reference ranges:  Up to 24 hours.............<8.0 mg/dL  Up to 48 hours............<12.0 mg/dL  3-5 days..................<15.0 mg/dL  6-29 days.................<15.0 mg/dL      Alkaline Phosphatase 02/07/2023 87  38 - 126 U/L Final    AST 02/07/2023 31  15 - 46 U/L Final    ALT 02/07/2023 19  10 - 44 U/L Final    Anion Gap 02/07/2023 8  8 - 16 mmol/L Final    eGFR 02/07/2023 35.0 (A)  >60 mL/min/1.73 m^2 Final    WBC 02/07/2023 8.84  3.90 - 12.70 K/uL Final    RBC 02/07/2023 2.50 (L)  4.00 - 5.40 M/uL Final    Hemoglobin  02/07/2023 7.4 (L)  12.0 - 16.0 g/dL Final    Hematocrit 02/07/2023 24.6 (L)  37.0 - 48.5 % Final    MCV 02/07/2023 98  82 - 98 fL Final    MCH 02/07/2023 29.6  27.0 - 31.0 pg Final    MCHC 02/07/2023 30.1 (L)  32.0 - 36.0 g/dL Final    RDW 02/07/2023 15.8 (H)  11.5 - 14.5 % Final    Platelets 02/07/2023 360  150 - 450 K/uL Final    MPV 02/07/2023 9.2  9.2 - 12.9 fL Final    Immature Granulocytes 02/07/2023 0.3  0.0 - 0.5 % Final    Gran # (ANC) 02/07/2023 5.1  1.8 - 7.7 K/uL Final    Immature Grans (Abs) 02/07/2023 0.03  0.00 - 0.04 K/uL Final    Comment: Mild elevation in immature granulocytes is non specific and   can be seen in a variety of conditions including stress response,   acute inflammation, trauma and pregnancy. Correlation with other   laboratory and clinical findings is essential.      Lymph # 02/07/2023 2.3  1.0 - 4.8 K/uL Final    Mono # 02/07/2023 0.8  0.3 - 1.0 K/uL Final    Eos # 02/07/2023 0.6 (H)  0.0 - 0.5 K/uL Final    Baso # 02/07/2023 0.05  0.00 - 0.20 K/uL Final    nRBC 02/07/2023 0  0 /100 WBC Final    Gran % 02/07/2023 57.8  38.0 - 73.0 % Final    Lymph % 02/07/2023 26.0  18.0 - 48.0 % Final    Mono % 02/07/2023 8.7  4.0 - 15.0 % Final    Eosinophil % 02/07/2023 6.6  0.0 - 8.0 % Final    Basophil % 02/07/2023 0.6  0.0 - 1.9 % Final    Differential Method 02/07/2023 Automated   Final   Lab Visit on 01/31/2023   Component Date Value Ref Range Status    Specimen UA 01/31/2023 Urine, Clean Catch   Final    Color, UA 01/31/2023 Yellow  Yellow, Straw, [place] Final    Appearance, UA 01/31/2023 Hazy (A)  Clear Final    pH, UA 01/31/2023 6.0  5.0 - 8.0 Final    Specific Gravity, UA 01/31/2023 1.020  1.005 - 1.030 Final    Protein, UA 01/31/2023 Negative  Negative Final    Comment: Recommend a 24 hour urine protein or a urine   protein/creatinine ratio if globulin induced proteinuria is  clinically suspected.      Glucose, UA 01/31/2023 Negative  Negative Final    Ketones, UA 01/31/2023 Negative   Negative Final    Bilirubin (UA) 01/31/2023 Negative  Negative Final    Occult Blood UA 01/31/2023 Negative  Negative Final    Nitrite, UA 01/31/2023 Negative  Negative Final    Urobilinogen, UA 01/31/2023 Negative  <2.0 EU/dL Final    Leukocytes, UA 01/31/2023 3+ (A)  Negative Final    Microalbumin, Urine 01/31/2023 45.0  ug/mL Final    Creatinine, Urine 01/31/2023 88.0  15.0 - 325.0 mg/dL Final    Microalb/Creat Ratio 01/31/2023 51.1 (H)  0.0 - 30.0 ug/mg Final    RBC, UA 01/31/2023 3  0 - 4 /hpf Final    WBC, UA 01/31/2023 >100 (H)  0 - 5 /hpf Final    Bacteria 01/31/2023 Moderate (A)  None-Occ /hpf Final    Microscopic Comment 01/31/2023 SEE COMMENT   Final    Comment: Other formed elements not mentioned in the report are not   present in the microscopic examination.      Lab Visit on 01/24/2023   Component Date Value Ref Range Status    Sodium 01/24/2023 142  136 - 145 mmol/L Final    Potassium 01/24/2023 4.7  3.5 - 5.1 mmol/L Final    Chloride 01/24/2023 104  95 - 110 mmol/L Final    CO2 01/24/2023 27  23 - 29 mmol/L Final    Glucose 01/24/2023 78  70 - 110 mg/dL Final    BUN 01/24/2023 31 (H)  7 - 17 mg/dL Final    Creatinine 01/24/2023 1.55 (H)  0.50 - 1.40 mg/dL Final    Calcium 01/24/2023 9.1  8.7 - 10.5 mg/dL Final    Total Protein 01/24/2023 8.0  6.0 - 8.4 g/dL Final    Albumin 01/24/2023 4.1  3.5 - 5.2 g/dL Final    Total Bilirubin 01/24/2023 0.4  0.1 - 1.0 mg/dL Final    Comment: For infants and newborns, interpretation of results should be based  on gestational age, weight and in agreement with clinical  observations.    Premature Infant recommended reference ranges:  Up to 24 hours.............<8.0 mg/dL  Up to 48 hours............<12.0 mg/dL  3-5 days..................<15.0 mg/dL  6-29 days.................<15.0 mg/dL      Alkaline Phosphatase 01/24/2023 94  38 - 126 U/L Final    AST 01/24/2023 27  15 - 46 U/L Final    ALT 01/24/2023 19  10 - 44 U/L Final    Anion Gap 01/24/2023 11  8 - 16 mmol/L  Final    eGFR 01/24/2023 32.0 (A)  >60 mL/min/1.73 m^2 Final    WBC 01/24/2023 7.32  3.90 - 12.70 K/uL Final    RBC 01/24/2023 2.87 (L)  4.00 - 5.40 M/uL Final    Hemoglobin 01/24/2023 8.6 (L)  12.0 - 16.0 g/dL Final    Hematocrit 01/24/2023 27.8 (L)  37.0 - 48.5 % Final    MCV 01/24/2023 97  82 - 98 fL Final    MCH 01/24/2023 30.0  27.0 - 31.0 pg Final    MCHC 01/24/2023 30.9 (L)  32.0 - 36.0 g/dL Final    RDW 01/24/2023 15.4 (H)  11.5 - 14.5 % Final    Platelets 01/24/2023 375  150 - 450 K/uL Final    MPV 01/24/2023 9.0 (L)  9.2 - 12.9 fL Final    Immature Granulocytes 01/24/2023 0.1  0.0 - 0.5 % Final    Gran # (ANC) 01/24/2023 3.7  1.8 - 7.7 K/uL Final    Immature Grans (Abs) 01/24/2023 0.01  0.00 - 0.04 K/uL Final    Comment: Mild elevation in immature granulocytes is non specific and   can be seen in a variety of conditions including stress response,   acute inflammation, trauma and pregnancy. Correlation with other   laboratory and clinical findings is essential.      Lymph # 01/24/2023 2.2  1.0 - 4.8 K/uL Final    Mono # 01/24/2023 0.9  0.3 - 1.0 K/uL Final    Eos # 01/24/2023 0.5  0.0 - 0.5 K/uL Final    Baso # 01/24/2023 0.03  0.00 - 0.20 K/uL Final    nRBC 01/24/2023 0  0 /100 WBC Final    Gran % 01/24/2023 50.9  38.0 - 73.0 % Final    Lymph % 01/24/2023 29.9  18.0 - 48.0 % Final    Mono % 01/24/2023 12.0  4.0 - 15.0 % Final    Eosinophil % 01/24/2023 6.7  0.0 - 8.0 % Final    Basophil % 01/24/2023 0.4  0.0 - 1.9 % Final    Differential Method 01/24/2023 Automated   Final   Lab Visit on 01/17/2023   Component Date Value Ref Range Status    Sodium 01/17/2023 142  136 - 145 mmol/L Final    Potassium 01/17/2023 4.3  3.5 - 5.1 mmol/L Final    Chloride 01/17/2023 106  95 - 110 mmol/L Final    CO2 01/17/2023 27  23 - 29 mmol/L Final    Glucose 01/17/2023 98  70 - 110 mg/dL Final    BUN 01/17/2023 23 (H)  7 - 17 mg/dL Final    Creatinine 01/17/2023 1.37  0.50 - 1.40 mg/dL Final    Calcium 01/17/2023 9.3  8.7 -  10.5 mg/dL Final    Total Protein 01/17/2023 8.3  6.0 - 8.4 g/dL Final    Albumin 01/17/2023 4.2  3.5 - 5.2 g/dL Final    Total Bilirubin 01/17/2023 0.5  0.1 - 1.0 mg/dL Final    Comment: For infants and newborns, interpretation of results should be based  on gestational age, weight and in agreement with clinical  observations.    Premature Infant recommended reference ranges:  Up to 24 hours.............<8.0 mg/dL  Up to 48 hours............<12.0 mg/dL  3-5 days..................<15.0 mg/dL  6-29 days.................<15.0 mg/dL      Alkaline Phosphatase 01/17/2023 88  38 - 126 U/L Final    AST 01/17/2023 30  15 - 46 U/L Final    ALT 01/17/2023 24  10 - 44 U/L Final    Anion Gap 01/17/2023 9  8 - 16 mmol/L Final    eGFR 01/17/2023 37.1 (A)  >60 mL/min/1.73 m^2 Final    WBC 01/17/2023 8.57  3.90 - 12.70 K/uL Final    RBC 01/17/2023 2.93 (L)  4.00 - 5.40 M/uL Final    Hemoglobin 01/17/2023 8.8 (L)  12.0 - 16.0 g/dL Final    Hematocrit 01/17/2023 28.6 (L)  37.0 - 48.5 % Final    MCV 01/17/2023 98  82 - 98 fL Final    MCH 01/17/2023 30.0  27.0 - 31.0 pg Final    MCHC 01/17/2023 30.8 (L)  32.0 - 36.0 g/dL Final    RDW 01/17/2023 15.5 (H)  11.5 - 14.5 % Final    Platelets 01/17/2023 403  150 - 450 K/uL Final    MPV 01/17/2023 9.1 (L)  9.2 - 12.9 fL Final    Immature Granulocytes 01/17/2023 0.2  0.0 - 0.5 % Final    Gran # (ANC) 01/17/2023 4.6  1.8 - 7.7 K/uL Final    Immature Grans (Abs) 01/17/2023 0.02  0.00 - 0.04 K/uL Final    Comment: Mild elevation in immature granulocytes is non specific and   can be seen in a variety of conditions including stress response,   acute inflammation, trauma and pregnancy. Correlation with other   laboratory and clinical findings is essential.      Lymph # 01/17/2023 2.7  1.0 - 4.8 K/uL Final    Mono # 01/17/2023 0.7  0.3 - 1.0 K/uL Final    Eos # 01/17/2023 0.6 (H)  0.0 - 0.5 K/uL Final    Baso # 01/17/2023 0.03  0.00 - 0.20 K/uL Final    nRBC 01/17/2023 0  0 /100 WBC Final    Gran %  01/17/2023 53.9  38.0 - 73.0 % Final    Lymph % 01/17/2023 30.9  18.0 - 48.0 % Final    Mono % 01/17/2023 8.1  4.0 - 15.0 % Final    Eosinophil % 01/17/2023 6.5  0.0 - 8.0 % Final    Basophil % 01/17/2023 0.4  0.0 - 1.9 % Final    Differential Method 01/17/2023 Automated   Final   Lab Visit on 01/10/2023   Component Date Value Ref Range Status    Sodium 01/10/2023 141  136 - 145 mmol/L Final    Potassium 01/10/2023 4.5  3.5 - 5.1 mmol/L Final    Chloride 01/10/2023 106  95 - 110 mmol/L Final    CO2 01/10/2023 28  23 - 29 mmol/L Final    Glucose 01/10/2023 89  70 - 110 mg/dL Final    BUN 01/10/2023 22 (H)  7 - 17 mg/dL Final    Creatinine 01/10/2023 1.48 (H)  0.50 - 1.40 mg/dL Final    Calcium 01/10/2023 9.0  8.7 - 10.5 mg/dL Final    Total Protein 01/10/2023 7.9  6.0 - 8.4 g/dL Final    Albumin 01/10/2023 4.1  3.5 - 5.2 g/dL Final    Total Bilirubin 01/10/2023 0.3  0.1 - 1.0 mg/dL Final    Comment: For infants and newborns, interpretation of results should be based  on gestational age, weight and in agreement with clinical  observations.    Premature Infant recommended reference ranges:  Up to 24 hours.............<8.0 mg/dL  Up to 48 hours............<12.0 mg/dL  3-5 days..................<15.0 mg/dL  6-29 days.................<15.0 mg/dL      Alkaline Phosphatase 01/10/2023 81  38 - 126 U/L Final    AST 01/10/2023 30  15 - 46 U/L Final    ALT 01/10/2023 23  10 - 44 U/L Final    Anion Gap 01/10/2023 7 (L)  8 - 16 mmol/L Final    eGFR 01/10/2023 33.9 (A)  >60 mL/min/1.73 m^2 Final    WBC 01/10/2023 8.00  3.90 - 12.70 K/uL Final    RBC 01/10/2023 2.70 (L)  4.00 - 5.40 M/uL Final    Hemoglobin 01/10/2023 8.2 (L)  12.0 - 16.0 g/dL Final    Hematocrit 01/10/2023 26.4 (L)  37.0 - 48.5 % Final    MCV 01/10/2023 98  82 - 98 fL Final    MCH 01/10/2023 30.4  27.0 - 31.0 pg Final    MCHC 01/10/2023 31.1 (L)  32.0 - 36.0 g/dL Final    RDW 01/10/2023 15.4 (H)  11.5 - 14.5 % Final    Platelets 01/10/2023 361  150 - 450 K/uL  Final    MPV 01/10/2023 9.5  9.2 - 12.9 fL Final    Immature Granulocytes 01/10/2023 0.3  0.0 - 0.5 % Final    Gran # (ANC) 01/10/2023 4.0  1.8 - 7.7 K/uL Final    Immature Grans (Abs) 01/10/2023 0.02  0.00 - 0.04 K/uL Final    Comment: Mild elevation in immature granulocytes is non specific and   can be seen in a variety of conditions including stress response,   acute inflammation, trauma and pregnancy. Correlation with other   laboratory and clinical findings is essential.      Lymph # 01/10/2023 2.5  1.0 - 4.8 K/uL Final    Mono # 01/10/2023 0.9  0.3 - 1.0 K/uL Final    Eos # 01/10/2023 0.5  0.0 - 0.5 K/uL Final    Baso # 01/10/2023 0.04  0.00 - 0.20 K/uL Final    nRBC 01/10/2023 0  0 /100 WBC Final    Gran % 01/10/2023 49.8  38.0 - 73.0 % Final    Lymph % 01/10/2023 31.3  18.0 - 48.0 % Final    Mono % 01/10/2023 11.3  4.0 - 15.0 % Final    Eosinophil % 01/10/2023 6.8  0.0 - 8.0 % Final    Basophil % 01/10/2023 0.5  0.0 - 1.9 % Final    Differential Method 01/10/2023 Automated   Final       Assessment & Plan:      Melody was seen today for follow-up and diabetes.  Current therapy will be continued.  Tramadol was renewed.  The patient was encouraged to try to remain physically active in spite of joint pain. Fasting blood tests will be repeated in 4 months.    Diagnoses and all orders for this visit:    Type 2 diabetes mellitus with chronic kidney disease, without long-term current use of insulin, unspecified CKD stage  -     Comprehensive Metabolic Panel; Future  -     CBC Auto Differential; Future  -     Hemoglobin A1C; Future    Stage 3b chronic kidney disease  -     Comprehensive Metabolic Panel; Future  -     CBC Auto Differential; Future  -     Hemoglobin A1C; Future    Essential hypertension  -     Comprehensive Metabolic Panel; Future  -     CBC Auto Differential; Future  -     Hemoglobin A1C; Future    Primary osteoarthritis involving multiple joints    Anemia, unspecified type  -     Comprehensive  Metabolic Panel; Future  -     CBC Auto Differential; Future  -     Hemoglobin A1C; Future    Other orders  -     traMADoL (ULTRAM) 50 mg tablet; Take 1 tablet (50 mg total) by mouth every 12 (twelve) hours as needed for Pain.         Follow up in about 4 months (around 7/28/2023).     Zac Andres MD

## 2023-04-10 ENCOUNTER — TELEPHONE (OUTPATIENT)
Dept: HEMATOLOGY/ONCOLOGY | Facility: CLINIC | Age: 88
End: 2023-04-10
Payer: MEDICARE

## 2023-04-10 NOTE — TELEPHONE ENCOUNTER
----- Message from Remy Dixon sent at 4/10/2023 12:32 PM CDT -----    ----- Message -----  From: Jesus Morales  Sent: 4/10/2023  12:25 PM CDT  To: , #    Type:  concerns about labs    Who Called: pt's daughter   Would the patient rather a call back or a response via [facility]? call  Best Call Back Number: [phone]  Additional Information:    Pt been seen Diana Huston PA-C  Pt's daughter requesting a call about labs

## 2023-04-10 NOTE — TELEPHONE ENCOUNTER
Number for daughter is ringing as not available. Contacted pt. Pt states she is unsure what the call is in relation to, as pt states daughter assists with her medical care. Advised pt to have daughter call clinic. Pt verbalized understanding.

## 2023-04-14 ENCOUNTER — PES CALL (OUTPATIENT)
Dept: ADMINISTRATIVE | Facility: CLINIC | Age: 88
End: 2023-04-14
Payer: MEDICARE

## 2023-04-26 ENCOUNTER — SPECIALTY PHARMACY (OUTPATIENT)
Dept: PHARMACY | Facility: CLINIC | Age: 88
End: 2023-04-26
Payer: MEDICARE

## 2023-04-26 DIAGNOSIS — D63.1 ANEMIA OF CHRONIC KIDNEY FAILURE, STAGE 3 (MODERATE): ICD-10-CM

## 2023-04-26 DIAGNOSIS — N18.30 ANEMIA OF CHRONIC KIDNEY FAILURE, STAGE 3 (MODERATE): ICD-10-CM

## 2023-04-26 DIAGNOSIS — D63.1 ANEMIA DUE TO CHRONIC KIDNEY DISEASE, UNSPECIFIED CKD STAGE: Primary | ICD-10-CM

## 2023-04-26 DIAGNOSIS — N18.30 STAGE 3 CHRONIC KIDNEY DISEASE, UNSPECIFIED WHETHER STAGE 3A OR 3B CKD: Chronic | ICD-10-CM

## 2023-04-26 DIAGNOSIS — N18.9 ANEMIA DUE TO CHRONIC KIDNEY DISEASE, UNSPECIFIED CKD STAGE: Primary | ICD-10-CM

## 2023-04-26 RX ORDER — EPOETIN ALFA-EPBX 10000 [IU]/ML
8000 INJECTION, SOLUTION INTRAVENOUS; SUBCUTANEOUS
Qty: 1 ML | Refills: 6 | Status: CANCELLED | OUTPATIENT
Start: 2023-04-26

## 2023-04-26 NOTE — TELEPHONE ENCOUNTER
Outgoing call: Informed pt's daughter that [facility] has sent a refill request to provider and will call pt as soon as it's approved. Daughter stated mom will need it for next weeks dose.

## 2023-05-01 NOTE — TELEPHONE ENCOUNTER
Specialty Pharmacy - Refill Coordination    Specialty Medication Orders Linked to Encounter      Flowsheet Row Most Recent Value   Medication #1 epoetin dodie-epbx (RETACRIT) 10,000 unit/mL imjection (Order#[id number], Rx#[id number])            Refill Questions - Documented Responses      Flowsheet Row Most Recent Value   Patient Availability and HIPAA Verification    Does patient want to proceed with activity? Yes   HIPAA/medical authority confirmed? Yes   Relationship to patient of person spoken to? Child   Refill Screening Questions    Changes to allergies? No   Changes to medications? No   New conditions since last clinic visit? No   Unplanned office visit, urgent care, ED, or hospital admission in the last 4 weeks? No   How does patient/caregiver feel medication is working? Very good   Financial problems or insurance changes? No   How many doses of your specialty medications were missed in the last 4 weeks? 0   Would patient like to speak to a pharmacist? No   When does the patient need to receive the medication? 05/09/23   Refill Delivery Questions    How will the patient receive the medication? [facility]   When does the patient need to receive the medication? 05/09/23   Shipping Address Prescription   Address in [facility] confirmed and updated if neccessary? Yes   Expected Copay ($) 100   Is the patient able to afford the medication copay? Yes   Payment Method CC on file  [Ending 2182]   Days supply of Refill 28   Supplies needed? Syringes  [Inj kit]   Refill activity completed? Yes   Refill activity plan Refill scheduled   Shipment/Pickup Date: 05/03/23            Current Outpatient Medications   Medication Sig    acetaminophen (TYLENOL) 325 MG tablet Take 325 mg by mouth every 6 (six) hours as needed for Pain.    aspirin 325 MG tablet Take 1 tablet by mouth.    atorvastatin (LIPITOR) 20 MG tablet Take 1 tablet (20 mg total) by mouth once daily.    blood sugar diagnostic Strp To check BG once daily, to  use with insurance preferred meter    blood-glucose meter kit To check BG once daily, to use with insurance preferred meter (Patient not taking: Reported on 9/21/2020)    cyanocobalamin (VITAMIN B-12) 1000 MCG tablet Take 1 tablet by mouth.    diclofenac sodium (VOLTAREN) 1 % Gel Apply 2 g topically 4 (four) times daily. (Patient not taking: Reported on 3/28/2023)    epoetin dodie-epbx (RETACRIT) 10,000 unit/mL imjection Inject 0.8 mLs (8,000 Units total) into the skin every 7 days as needed    gabapentin (NEURONTIN) 100 MG capsule Take 1 capsule (100 mg total) by mouth 3 (three) times daily.    hydroCHLOROthiazide (HYDRODIURIL) 25 MG tablet TAKE ONE TABLET BY MOUTH DAILY FOR FLUID.    lactulose (CHRONULAC) 10 gram/15 mL solution Take 30 mLs (20 g total) by mouth once daily. For chronic constipation.    lancets Misc To check BG once daily, to use with insurance preferred meter    levocetirizine (XYZAL) 5 MG tablet Take 1 tablet (5 mg total) by mouth daily as needed for Allergies.    losartan (COZAAR) 50 MG tablet TAKE 1 TABLET BY MOUTH EVERY DAY FOR HIGH BLOOD PRESSURE    meclizine (ANTIVERT) 12.5 mg tablet Take 1 tablet by mouth three times a day as needed for dizziness (Patient not taking: Reported on 3/28/2023)    metoprolol succinate (TOPROL-XL) 25 MG 24 hr tablet TAKE 1 TABLET BY MOUTH DAILY FOR HIGH BLOOD PRESSURE    multivitamin (THERAGRAN) per tablet Take 1 tablet by mouth once daily.    naloxone (NARCAN) 4 mg/actuation Spry 4mg by nasal route as needed for opioid overdose; may repeat every 2-3 minutes in alternating nostrils until medical help arrives. Call 911 (Patient not taking: Reported on 3/28/2023)    ondansetron (ZOFRAN) 4 MG tablet Take 1 tablet (4 mg total) by mouth every 12 (twelve) hours as needed for Nausea. (Patient not taking: Reported on 3/28/2023)    traMADoL (ULTRAM) 50 mg tablet Take 1 tablet (50 mg total) by mouth every 12 (twelve) hours as needed for Pain.    walker Misc Use daily for  assisted ambulation (Patient not taking: Reported on 3/28/2023)   Last reviewed on 3/28/2023 10:52 AM by Zac Andres MD    Review of patient's allergies indicates:  No Known Allergies Last reviewed on  3/28/2023 10:52 AM by Zac Andres      Tasks added this encounter   No tasks added.   Tasks due within next 3 months   5/1/2023 - Refill Coordination Outreach (1 time occurrence)     Julio Hu, PharmD  [facility]  [address] [place] [id number]  Phone: [phone]  Fax: [phone]

## 2023-05-15 ENCOUNTER — OFFICE VISIT (OUTPATIENT)
Dept: HEMATOLOGY/ONCOLOGY | Facility: CLINIC | Age: 88
End: 2023-05-15
Payer: MEDICARE

## 2023-05-15 VITALS
BODY MASS INDEX: 24.12 KG/M2 | OXYGEN SATURATION: 96 % | RESPIRATION RATE: 16 BRPM | WEIGHT: 127.75 LBS | HEART RATE: 94 BPM | TEMPERATURE: 99 F | HEIGHT: 61 IN | DIASTOLIC BLOOD PRESSURE: 65 MMHG | SYSTOLIC BLOOD PRESSURE: 144 MMHG

## 2023-05-15 DIAGNOSIS — N18.30 ANEMIA OF CHRONIC KIDNEY FAILURE, STAGE 3 (MODERATE): Primary | ICD-10-CM

## 2023-05-15 DIAGNOSIS — D63.1 ANEMIA OF CHRONIC KIDNEY FAILURE, STAGE 3 (MODERATE): Primary | ICD-10-CM

## 2023-05-15 PROCEDURE — 99999 PR PBB SHADOW E&M-EST. PATIENT-LVL IV: ICD-10-PCS | Mod: PBBFAC,,, | Performed by: PHYSICIAN ASSISTANT

## 2023-05-15 PROCEDURE — 1126F PR PAIN SEVERITY QUANTIFIED, NO PAIN PRESENT: ICD-10-PCS | Mod: CPTII,S$GLB,, | Performed by: PHYSICIAN ASSISTANT

## 2023-05-15 PROCEDURE — 1160F PR REVIEW ALL MEDS BY PRESCRIBER/CLIN PHARMACIST DOCUMENTED: ICD-10-PCS | Mod: CPTII,S$GLB,, | Performed by: PHYSICIAN ASSISTANT

## 2023-05-15 PROCEDURE — 99214 PR OFFICE/OUTPT VISIT, EST, LEVL IV, 30-39 MIN: ICD-10-PCS | Mod: S$GLB,,, | Performed by: PHYSICIAN ASSISTANT

## 2023-05-15 PROCEDURE — 1126F AMNT PAIN NOTED NONE PRSNT: CPT | Mod: CPTII,S$GLB,, | Performed by: PHYSICIAN ASSISTANT

## 2023-05-15 PROCEDURE — 99999 PR PBB SHADOW E&M-EST. PATIENT-LVL IV: CPT | Mod: PBBFAC,,, | Performed by: PHYSICIAN ASSISTANT

## 2023-05-15 PROCEDURE — 99214 OFFICE O/P EST MOD 30 MIN: CPT | Mod: S$GLB,,, | Performed by: PHYSICIAN ASSISTANT

## 2023-05-15 PROCEDURE — 1160F RVW MEDS BY RX/DR IN RCRD: CPT | Mod: CPTII,S$GLB,, | Performed by: PHYSICIAN ASSISTANT

## 2023-05-15 PROCEDURE — 1159F MED LIST DOCD IN RCRD: CPT | Mod: CPTII,S$GLB,, | Performed by: PHYSICIAN ASSISTANT

## 2023-05-15 PROCEDURE — 1159F PR MEDICATION LIST DOCUMENTED IN MEDICAL RECORD: ICD-10-PCS | Mod: CPTII,S$GLB,, | Performed by: PHYSICIAN ASSISTANT

## 2023-05-15 NOTE — PROGRESS NOTES
Section of Hematology and Stem Cell Transplantation  Follow Up Note     Visit Date: 05/15/2023    Primary Oncologic Diagnosis: Anemia of chronic kidney failure, stage 3 (moderate) [N18.30, D63.1]    History of Present Ilness: (From Initial consult 8/2/2022)    Melody Armstrong (Melody) is a pleasant 88 y.o.female with past medical history of hyperlipidemia, hypertension, coronary artery disease, Stage III CKD referred by her PCP for evaluation of anemia. Reivew of lab work reveals a slowly progressive anemia dating back to at least 2012, with recent lab values with normocytic anemia and Hgb 7.8. The pt reports she has been feeling in her usual state of health, she lives alone, performs all ADLs, and has good energy. She denies PICA sx, and reports normal/balanced diet. She reports she's been told she has anemia for many years, couldn't previously tolerate PO iron, and has had kidney disease for many years. She also reports strong fam hx of anemia, ? Thalassemia - pt denies known fhx of hemoglobinopathy. She denies any sources of bleeding, recent hemoccult negative. Otherwise, no B symptoms or concerns from the patient/her daughter.      Cancer Screening:  Colonoscopy: Not up to date - ordered, needs to be scheduled. Hemoccult negative.   Pap Smear/Pelvic Exam: No longer being screened, doesn't recall her last pap   Mammogram: Not up to date - many years, doesn't recall   Smoking Status: Non-smoker  Family History: No known family history of malignancy       Interval History: 05/15/2023   Ms. Armstrong presents for follow up with her daughter for recent evaluation of anemia.  Her previous work up was unrevealing. She was not UTD on routine cancer screening, but this did not align with [facility] and she did not wish for invasive colonscopy. Her hemoccult was negative.    Given this, and known CKD, we opted for epo replacement. She has been on 8,000u weekly. She feels her energy has improved. She denies CP/SOB. No bleeding.  No other complaints. She is with her daughter today, who does injections at home. When she missed a few weeks due to not having medicine, her Hgb dropped into 7s and thus they feel epo is keeping her hgb stable in 8s. We will up dose to 10,000 to see if more efficacious.         Past Medical History, Social History, and Past Family History are unchanged since last evaluation except for HPI.     CURRENT MEDICATIONS:   Current Outpatient Medications   Medication Sig    acetaminophen (TYLENOL) 325 MG tablet Take 325 mg by mouth every 6 (six) hours as needed for Pain.    aspirin 325 MG tablet Take 1 tablet by mouth.    atorvastatin (LIPITOR) 20 MG tablet Take 1 tablet (20 mg total) by mouth once daily.    blood sugar diagnostic Strp To check BG once daily, to use with insurance preferred meter    cyanocobalamin (VITAMIN B-12) 1000 MCG tablet Take 1 tablet by mouth.    epoetin dodie-epbx (RETACRIT) 10,000 unit/mL imjection Inject 0.8 mLs (8,000 Units total) into the skin every 7 days as needed    gabapentin (NEURONTIN) 100 MG capsule Take 1 capsule (100 mg total) by mouth 3 (three) times daily.    hydroCHLOROthiazide (HYDRODIURIL) 25 MG tablet TAKE ONE TABLET BY MOUTH DAILY FOR FLUID.    lactulose (CHRONULAC) 10 gram/15 mL solution Take 30 mLs (20 g total) by mouth once daily. For chronic constipation.    lancets Misc To check BG once daily, to use with insurance preferred meter    levocetirizine (XYZAL) 5 MG tablet Take 1 tablet (5 mg total) by mouth daily as needed for Allergies.    losartan (COZAAR) 50 MG tablet TAKE 1 TABLET BY MOUTH EVERY DAY FOR HIGH BLOOD PRESSURE    meclizine (ANTIVERT) 12.5 mg tablet Take 1 tablet by mouth three times a day as needed for dizziness    metoprolol succinate (TOPROL-XL) 25 MG 24 hr tablet TAKE 1 TABLET BY MOUTH DAILY FOR HIGH BLOOD PRESSURE    multivitamin (THERAGRAN) per tablet Take 1 tablet by mouth once daily.    naloxone (NARCAN) 4 mg/actuation Spry 4mg by nasal route as needed  for opioid overdose; may repeat every 2-3 minutes in alternating nostrils until medical help arrives. Call 911    ondansetron (ZOFRAN) 4 MG tablet Take 1 tablet (4 mg total) by mouth every 12 (twelve) hours as needed for Nausea.    traMADoL (ULTRAM) 50 mg tablet Take 1 tablet (50 mg total) by mouth every 12 (twelve) hours as needed for Pain.    walker Misc Use daily for assisted ambulation    blood-glucose meter kit To check BG once daily, to use with insurance preferred meter (Patient not taking: Reported on 9/21/2020)    diclofenac sodium (VOLTAREN) 1 % Gel Apply 2 g topically 4 (four) times daily. (Patient not taking: Reported on 3/28/2023)     No current facility-administered medications for this visit.       ALLERGIES:   Review of patient's allergies indicates:  No Known Allergies      Review of Systems:     Review of Systems   Constitutional:  Negative for chills, diaphoresis, fever, malaise/fatigue and weight loss.   HENT:  Negative for congestion, nosebleeds and sore throat.    Respiratory:  Negative for cough and shortness of breath.    Cardiovascular:  Negative for chest pain and leg swelling.   Gastrointestinal:  Negative for abdominal pain, blood in stool, constipation, heartburn, melena, nausea and vomiting.   Genitourinary:  Negative for hematuria.   Musculoskeletal:  Negative for falls, joint pain and myalgias.   Neurological:  Negative for dizziness and headaches.   Psychiatric/Behavioral: Negative.       Physical Exam:     Vitals:    05/15/23 1140   BP: (!) 144/65   Pulse: 94   Resp: 16   Temp: 98.5 °F (36.9 °C)       Physical Exam  Constitutional:       General: She is not in acute distress.     Appearance: Normal appearance.   HENT:      Head: Normocephalic.      Right Ear: External ear normal.      Left Ear: External ear normal.      Nose: Nose normal.      Mouth/Throat:      Mouth: Mucous membranes are moist.      Pharynx: Oropharynx is clear.   Eyes:      Extraocular Movements: Extraocular  movements intact.      Conjunctiva/sclera: Conjunctivae normal.      Pupils: Pupils are equal, round, and reactive to light.   Cardiovascular:      Rate and Rhythm: Regular rhythm. Tachycardia present.      Pulses: Normal pulses.      Heart sounds: Normal heart sounds.   Pulmonary:      Effort: Pulmonary effort is normal.   Abdominal:      General: Abdomen is flat. There is no distension.      Palpations: Abdomen is soft.      Tenderness: There is no abdominal tenderness.   Musculoskeletal:         General: No swelling. Normal range of motion.      Cervical back: Neck supple.   Lymphadenopathy:      Cervical: No cervical adenopathy.   Neurological:      General: No focal deficit present.      Mental Status: She is alert. Mental status is at baseline.      Cranial Nerves: No cranial nerve deficit.           Labs:   Lab Results   Component Value Date    WBC 7.97 05/09/2023    HGB 8.4 (L) 05/09/2023    HCT 26.9 (L) 05/09/2023    MCV 93 05/09/2023     05/09/2023       Lab Results   Component Value Date     05/09/2023    K 4.6 05/09/2023     05/09/2023    CO2 26 05/09/2023    BUN 29 (H) 05/09/2023    CREATININE 1.55 (H) 05/09/2023    ALBUMIN 3.9 05/09/2023    BILITOT 0.4 05/09/2023    ALKPHOS 100 05/09/2023    AST 31 05/09/2023    ALT 22 05/09/2023       Imaging:           Assessment and Plan:   Melody Armstrong (Melody) is a pleasant 88 y.o.female referred for evaluation of anemia.     Anemia of CKD  - Pt with a slowly progressive anemia over at least the past decade, primary complaints are recently worsening stamina   - She is not up to date on routine cancer screenings, has colonoscopy/EGD ordered though hemoccult negative and pt does not wish to proceed with invasive procedures as she is asymptomatic and is concerned regarding her age. No GERD, no melena/hematochezia, no abdominal complaints. No B symptoms. She denies any sources of bleeding/bruising.   - SPEP/SWAPNA negative.   - Ferritin WNL,  slightly decreased iron panel; c/w AOCD. Macrocytic at last visit, normocytic again today, b12/folate levels normal.   - Strong Fhx of anemia, though no known hemoglobinopathies.   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 Notably only slightly elevated EPO, also c/w AOCKD.  - We did discuss relative contraindications of EPO (VTE risk/malignancy) and that the pt is not UTD on cancer screening. However, the patient and her daughter are adamant that their focus remains improvement on QOL rather than aggressive/invasive work up given pt's age, as such, they do not wish to proceed with routine cancer screening which after lengthy discussion we agreed is very reasonable.   - Has been on EPO replacement 8,000u weekly. When she has briefly missed these doses, her Hgb drops into the 7s, but maintains Hgb in mid-upper 8s with weekly Epo, which greatly improves her QOL. Will adjuseto to continue 10,000u weekly.  Will continue q2wk labs and f/u in 3 months. Will check 1x epo, retic level at next lab.    Follow Up:          BMT Chart Routing  Urgent    Follow up with physician    Follow up with BARNEY 4 months. f/u with rigo in 3-4 months   Provider visit type    Infusion scheduling note    Injection scheduling note    Labs CBC   Scheduling:  Preferred lab:  Lab interval: every 2 weeks  q2wk CBC at [place]. (at next lab ONLY, please add a retic and epo level as well)   Imaging    Pharmacy appointment    Other referrals               Thank you for allowing me to partake in the care of this patient.       Rigo Huston PA-C  Hematology, Oncology, and Stem Cell Transplantation  [facility]

## 2023-05-24 ENCOUNTER — SPECIALTY PHARMACY (OUTPATIENT)
Dept: PHARMACY | Facility: CLINIC | Age: 88
End: 2023-05-24
Payer: MEDICARE

## 2023-05-24 DIAGNOSIS — N18.30 ANEMIA OF CHRONIC KIDNEY FAILURE, STAGE 3 (MODERATE): ICD-10-CM

## 2023-05-24 DIAGNOSIS — D63.1 ANEMIA OF CHRONIC KIDNEY FAILURE, STAGE 3 (MODERATE): ICD-10-CM

## 2023-05-24 NOTE — TELEPHONE ENCOUNTER
Outgoing call: Spoke with pt's daughter. Confirmed that pt is still on therapy. Will need a new script for EPO. Dose changed to [id number] weekly.

## 2023-05-24 NOTE — TELEPHONE ENCOUNTER
New script received. Sig still says 0.8mL (8000units) messaged [facility] for permission to added. Will release order once approved.

## 2023-05-25 RX ORDER — LEVOCETIRIZINE DIHYDROCHLORIDE 5 MG/1
TABLET, FILM COATED ORAL
Qty: 90 TABLET | Refills: 3 | Status: SHIPPED | OUTPATIENT
Start: 2023-05-25

## 2023-05-25 NOTE — TELEPHONE ENCOUNTER
No care due was identified.  [facility] Embedded Care Due Messages. Reference number: [id number].   5/25/2023 12:47:06 AM CDT

## 2023-05-25 NOTE — TELEPHONE ENCOUNTER
Refill Decision Note   Melody Armstrong  is requesting a refill authorization.  Brief Assessment and Rationale for Refill:  Approve     Medication Therapy Plan:       Medication Reconciliation Completed: No   Comments:     No Care Gaps recommended.     Note composed:8:25 AM 05/25/2023

## 2023-05-26 NOTE — TELEPHONE ENCOUNTER
Specialty Pharmacy - Refill Coordination    Specialty Medication Orders Linked to Encounter      Flowsheet Row Most Recent Value   Medication #1 epoetin dodie-epbx (RETACRIT) 10,000 unit/mL imjection (Order#[id number], Rx#[id number])            Refill Questions - Documented Responses      Flowsheet Row Most Recent Value   Patient Availability and HIPAA Verification    Does patient want to proceed with activity? Yes   HIPAA/medical authority confirmed? Yes   Relationship to patient of person spoken to? Family Member   Refill Screening Questions    Changes to allergies? No   Changes to medications? No   New conditions since last clinic visit? No   Unplanned office visit, urgent care, ED, or hospital admission in the last 4 weeks? No   How does patient/caregiver feel medication is working? Very good   Financial problems or insurance changes? No   How many doses of your specialty medications were missed in the last 4 weeks? 0   Would patient like to speak to a pharmacist? No   When does the patient need to receive the medication? 06/01/23   Refill Delivery Questions    How will the patient receive the medication? MEDRx   When does the patient need to receive the medication? 06/01/23   Shipping Address Prescription   Address in [facility] confirmed and updated if neccessary? Yes   Expected Copay ($) 100   Is the patient able to afford the medication copay? Yes   Payment Method CC on file   Days supply of Refill 28   Supplies needed? --  [Inj Kit]   Refill activity completed? Yes   Refill activity plan Refill scheduled   Shipment/Pickup Date: 05/30/23            Current Outpatient Medications   Medication Sig    levocetirizine (XYZAL) 5 MG tablet TAKE 1 TABLET BY MOUTH DAILY AS NEEDED FOR ALLERGIES.    acetaminophen (TYLENOL) 325 MG tablet Take 325 mg by mouth every 6 (six) hours as needed for Pain.    aspirin 325 MG tablet Take 1 tablet by mouth.    atorvastatin (LIPITOR) 20 MG tablet Take 1 tablet (20 mg total) by  mouth once daily.    blood sugar diagnostic Strp To check BG once daily, to use with insurance preferred meter    blood-glucose meter kit To check BG once daily, to use with insurance preferred meter (Patient not taking: Reported on 9/21/2020)    cyanocobalamin (VITAMIN B-12) 1000 MCG tablet Take 1 tablet by mouth.    diclofenac sodium (VOLTAREN) 1 % Gel Apply 2 g topically 4 (four) times daily. (Patient not taking: Reported on 3/28/2023)    epoetin dodie-epbx (RETACRIT) 10,000 unit/mL imjection Inject 1.0 mLs (10,000 Units total) into the skin every 7 days as needed    epoetin dodie-epbx (RETACRIT) 10,000 unit/mL imjection Inject 1 mL (10,000 Units total) into the skin every 7 days as needed    gabapentin (NEURONTIN) 100 MG capsule Take 1 capsule (100 mg total) by mouth 3 (three) times daily.    hydroCHLOROthiazide (HYDRODIURIL) 25 MG tablet TAKE ONE TABLET BY MOUTH DAILY FOR FLUID.    lactulose (CHRONULAC) 10 gram/15 mL solution Take 30 mLs (20 g total) by mouth once daily. For chronic constipation.    lancets Misc To check BG once daily, to use with insurance preferred meter    losartan (COZAAR) 50 MG tablet TAKE 1 TABLET BY MOUTH EVERY DAY FOR HIGH BLOOD PRESSURE    meclizine (ANTIVERT) 12.5 mg tablet Take 1 tablet by mouth three times a day as needed for dizziness    metoprolol succinate (TOPROL-XL) 25 MG 24 hr tablet TAKE 1 TABLET BY MOUTH DAILY FOR HIGH BLOOD PRESSURE    multivitamin (THERAGRAN) per tablet Take 1 tablet by mouth once daily.    naloxone (NARCAN) 4 mg/actuation Spry 4mg by nasal route as needed for opioid overdose; may repeat every 2-3 minutes in alternating nostrils until medical help arrives. Call 911    ondansetron (ZOFRAN) 4 MG tablet Take 1 tablet (4 mg total) by mouth every 12 (twelve) hours as needed for Nausea.    traMADoL (ULTRAM) 50 mg tablet Take 1 tablet (50 mg total) by mouth every 12 (twelve) hours as needed for Pain.    walker Misc Use daily for assisted ambulation   Last reviewed  on 5/15/2023 11:52 AM by Diana Huston PA-C    Review of patient's allergies indicates:  No Known Allergies Last reviewed on  5/24/2023 1:57 PM by Makenzie Welsh      Tasks added this encounter   No tasks added.   Tasks due within next 3 months   No tasks due.     Julio Hu, PharmD  [facility]  [address] [place] [address]  Phone: [phone]  Fax: [phone]

## 2023-06-20 ENCOUNTER — SPECIALTY PHARMACY (OUTPATIENT)
Dept: PHARMACY | Facility: CLINIC | Age: 88
End: 2023-06-20
Payer: MEDICARE

## 2023-06-20 NOTE — TELEPHONE ENCOUNTER
Specialty Pharmacy - Refill Coordination    Specialty Medication Orders Linked to Encounter      Flowsheet Row Most Recent Value   Medication #1 epoetin dodie-epbx (RETACRIT) 10,000 unit/mL imjection (Order#[id number], Rx#[id number])            Refill Questions - Documented Responses      Flowsheet Row Most Recent Value   Patient Availability and HIPAA Verification    Does patient want to proceed with activity? Yes   HIPAA/medical authority confirmed? Yes   Relationship to patient of person spoken to? Family Member   Refill Screening Questions    Changes to allergies? No   Changes to medications? No   New conditions since last clinic visit? No   Unplanned office visit, urgent care, ED, or hospital admission in the last 4 weeks? No   How does patient/caregiver feel medication is working? Very good   Financial problems or insurance changes? No   How many doses of your specialty medications were missed in the last 4 weeks? 0   Would patient like to speak to a pharmacist? No   When does the patient need to receive the medication? 06/28/23   Refill Delivery Questions    How will the patient receive the medication? MEDRx   When does the patient need to receive the medication? 06/28/23   Shipping Address Prescription   Address in [facility] confirmed and updated if neccessary? No   Expected Copay ($) 100   Is the patient able to afford the medication copay? Yes   Payment Method CC on file   Days supply of Refill 28   Supplies needed? --  [Inj Kit]   Refill activity completed? Yes   Refill activity plan Refill scheduled   Shipment/Pickup Date: 06/22/23            Current Outpatient Medications   Medication Sig    levocetirizine (XYZAL) 5 MG tablet TAKE 1 TABLET BY MOUTH DAILY AS NEEDED FOR ALLERGIES.    acetaminophen (TYLENOL) 325 MG tablet Take 325 mg by mouth every 6 (six) hours as needed for Pain.    aspirin 325 MG tablet Take 1 tablet by mouth.    atorvastatin (LIPITOR) 20 MG tablet Take 1 tablet (20 mg total) by  mouth once daily.    blood sugar diagnostic Strp To check BG once daily, to use with insurance preferred meter    blood-glucose meter kit To check BG once daily, to use with insurance preferred meter (Patient not taking: Reported on 9/21/2020)    cyanocobalamin (VITAMIN B-12) 1000 MCG tablet Take 1 tablet by mouth.    diclofenac sodium (VOLTAREN) 1 % Gel Apply 2 g topically 4 (four) times daily. (Patient not taking: Reported on 3/28/2023)    epoetin dodie-epbx (RETACRIT) 10,000 unit/mL imjection Inject 1.0 mLs (10,000 Units total) into the skin every 7 days as needed    epoetin dodie-epbx (RETACRIT) 10,000 unit/mL imjection Inject 1 mL (10,000 Units total) into the skin every 7 days as needed    gabapentin (NEURONTIN) 100 MG capsule Take 1 capsule (100 mg total) by mouth 3 (three) times daily.    hydroCHLOROthiazide (HYDRODIURIL) 25 MG tablet TAKE ONE TABLET BY MOUTH DAILY FOR FLUID.    lactulose (CHRONULAC) 10 gram/15 mL solution Take 30 mLs (20 g total) by mouth once daily. For chronic constipation.    lancets Misc To check BG once daily, to use with insurance preferred meter    losartan (COZAAR) 50 MG tablet TAKE 1 TABLET BY MOUTH EVERY DAY FOR HIGH BLOOD PRESSURE    meclizine (ANTIVERT) 12.5 mg tablet Take 1 tablet by mouth three times a day as needed for dizziness    metoprolol succinate (TOPROL-XL) 25 MG 24 hr tablet TAKE 1 TABLET BY MOUTH DAILY FOR HIGH BLOOD PRESSURE    multivitamin (THERAGRAN) per tablet Take 1 tablet by mouth once daily.    naloxone (NARCAN) 4 mg/actuation Spry 4mg by nasal route as needed for opioid overdose; may repeat every 2-3 minutes in alternating nostrils until medical help arrives. Call 911    ondansetron (ZOFRAN) 4 MG tablet Take 1 tablet (4 mg total) by mouth every 12 (twelve) hours as needed for Nausea.    traMADoL (ULTRAM) 50 mg tablet Take 1 tablet (50 mg total) by mouth every 12 (twelve) hours as needed for Pain.    walker Misc Use daily for assisted ambulation   Last reviewed  on 5/15/2023 11:52 AM by Diana Huston PA-C    Review of patient's allergies indicates:  No Known Allergies Last reviewed on  5/24/2023 1:57 PM by Makenzie Welsh      Tasks added this encounter   No tasks added.   Tasks due within next 3 months   No tasks due.     Julio Hu, PharmD  [facility]  [address] [place] [address]  Phone: [phone]  Fax: [phone]

## 2023-07-17 ENCOUNTER — SPECIALTY PHARMACY (OUTPATIENT)
Dept: PHARMACY | Facility: CLINIC | Age: 88
End: 2023-07-17
Payer: MEDICARE

## 2023-07-17 NOTE — TELEPHONE ENCOUNTER
Specialty Pharmacy - Refill Coordination    Specialty Medication Orders Linked to Encounter      Flowsheet Row Most Recent Value   Medication #1 epoetin dodie-epbx (RETACRIT) 10,000 unit/mL imjection (Order#[id number], Rx#[id number])            Refill Questions - Documented Responses      Flowsheet Row Most Recent Value   Patient Availability and HIPAA Verification    Does patient want to proceed with activity? Yes   HIPAA/medical authority confirmed? Yes   Relationship to patient of person spoken to? Self   Refill Screening Questions    Changes to allergies? No   Changes to medications? No   New conditions since last clinic visit? No   Unplanned office visit, urgent care, ED, or hospital admission in the last 4 weeks? No   How does patient/caregiver feel medication is working? Good   Financial problems or insurance changes? No   How many doses of your specialty medications were missed in the last 4 weeks? 0   Would patient like to speak to a pharmacist? No   When does the patient need to receive the medication? 07/21/23   Refill Delivery Questions    How will the patient receive the medication? MEDRx   When does the patient need to receive the medication? 07/21/23   Shipping Address Prescription   Address in [facility] confirmed and updated if neccessary? Yes   Expected Copay ($) 100   Is the patient able to afford the medication copay? Yes   Payment Method CC on file   Days supply of Refill 28   Supplies needed? No supplies needed  [Usual supplies if needed for retacrit]   Refill activity completed? Yes   Refill activity plan Refill scheduled   Shipment/Pickup Date: 07/20/23            Current Outpatient Medications   Medication Sig    levocetirizine (XYZAL) 5 MG tablet TAKE 1 TABLET BY MOUTH DAILY AS NEEDED FOR ALLERGIES.    acetaminophen (TYLENOL) 325 MG tablet Take 325 mg by mouth every 6 (six) hours as needed for Pain.    aspirin 325 MG tablet Take 1 tablet by mouth.    atorvastatin (LIPITOR) 20 MG  tablet Take 1 tablet (20 mg total) by mouth once daily.    blood sugar diagnostic Strp To check BG once daily, to use with insurance preferred meter    blood-glucose meter kit To check BG once daily, to use with insurance preferred meter (Patient not taking: Reported on 9/21/2020)    cyanocobalamin (VITAMIN B-12) 1000 MCG tablet Take 1 tablet by mouth.    diclofenac sodium (VOLTAREN) 1 % Gel Apply 2 g topically 4 (four) times daily. (Patient not taking: Reported on 3/28/2023)    epoetin dodie-epbx (RETACRIT) 10,000 unit/mL imjection Inject 1.0 mLs (10,000 Units total) into the skin every 7 days as needed    epoetin dodie-epbx (RETACRIT) 10,000 unit/mL imjection Inject 1 mL (10,000 Units total) into the skin every 7 days as needed    gabapentin (NEURONTIN) 100 MG capsule Take 1 capsule (100 mg total) by mouth 3 (three) times daily.    hydroCHLOROthiazide (HYDRODIURIL) 25 MG tablet TAKE ONE TABLET BY MOUTH DAILY FOR FLUID.    lactulose (CHRONULAC) 10 gram/15 mL solution Take 30 mLs (20 g total) by mouth once daily. For chronic constipation.    lancets Misc To check BG once daily, to use with insurance preferred meter    losartan (COZAAR) 50 MG tablet TAKE 1 TABLET BY MOUTH EVERY DAY FOR HIGH BLOOD PRESSURE    meclizine (ANTIVERT) 12.5 mg tablet Take 1 tablet by mouth three times a day as needed for dizziness    metoprolol succinate (TOPROL-XL) 25 MG 24 hr tablet TAKE 1 TABLET BY MOUTH DAILY FOR HIGH BLOOD PRESSURE    multivitamin (THERAGRAN) per tablet Take 1 tablet by mouth once daily.    naloxone (NARCAN) 4 mg/actuation Spry 4mg by nasal route as needed for opioid overdose; may repeat every 2-3 minutes in alternating nostrils until medical help arrives. Call 911    ondansetron (ZOFRAN) 4 MG tablet Take 1 tablet (4 mg total) by mouth every 12 (twelve) hours as needed for Nausea.    traMADoL (ULTRAM) 50 mg tablet Take 1 tablet (50 mg total) by mouth every 12 (twelve) hours as needed for Pain.    walker Misc Use daily  for assisted ambulation   Last reviewed on 5/15/2023 11:52 AM by Diana Huston PA-C    Review of patient's allergies indicates:  No Known Allergies Last reviewed on  5/24/2023 1:57 PM by Makenzie Welsh      Tasks added this encounter   No tasks added.   Tasks due within next 3 months   7/17/2023 - Refill Coordination Outreach (1 time occurrence)     Josse Lerma, PharmD  [facility]  [facility]  [facility] [address]  Phone: [phone]  Fax: [phone]

## 2023-08-09 ENCOUNTER — PATIENT MESSAGE (OUTPATIENT)
Dept: PHARMACY | Facility: CLINIC | Age: 88
End: 2023-08-09
Payer: MEDICARE

## 2023-08-14 ENCOUNTER — PATIENT MESSAGE (OUTPATIENT)
Dept: PHARMACY | Facility: CLINIC | Age: 88
End: 2023-08-14
Payer: MEDICARE

## 2023-08-17 ENCOUNTER — SPECIALTY PHARMACY (OUTPATIENT)
Dept: PHARMACY | Facility: CLINIC | Age: 88
End: 2023-08-17
Payer: MEDICARE

## 2023-08-17 NOTE — TELEPHONE ENCOUNTER
Specialty Pharmacy - Refill Coordination    Specialty Medication Orders Linked to Encounter      Flowsheet Row Most Recent Value   Medication #1 epoetin dodie-epbx (RETACRIT) 10,000 unit/mL imjection (Order#[id number], Rx#[id number])            Refill Questions - Documented Responses      Flowsheet Row Most Recent Value   Patient Availability and HIPAA Verification    Does patient want to proceed with activity? Yes   HIPAA/medical authority confirmed? Yes   Relationship to patient of person spoken to? Child   Refill Screening Questions    Changes to allergies? No   Changes to medications? No   New conditions since last clinic visit? No   Unplanned office visit, urgent care, ED, or hospital admission in the last 4 weeks? No   How does patient/caregiver feel medication is working? Good   Financial problems or insurance changes? No   How many doses of your specialty medications were missed in the last 4 weeks? 0   Would patient like to speak to a pharmacist? No   When does the patient need to receive the medication? 08/23/23   Refill Delivery Questions    How will the patient receive the medication? MEDRx   When does the patient need to receive the medication? 08/23/23   Shipping Address Prescription   Address in [facility] confirmed and updated if neccessary? Yes   Expected Copay ($) 100   Is the patient able to afford the medication copay? Yes   Payment Method CC on file   Days supply of Refill 28   Supplies needed? No supplies needed   Refill activity completed? Yes   Refill activity plan Refill scheduled   Shipment/Pickup Date: 08/17/23            Current Outpatient Medications   Medication Sig    levocetirizine (XYZAL) 5 MG tablet TAKE 1 TABLET BY MOUTH DAILY AS NEEDED FOR ALLERGIES.    acetaminophen (TYLENOL) 325 MG tablet Take 325 mg by mouth every 6 (six) hours as needed for Pain.    aspirin 325 MG tablet Take 1 tablet by mouth.    atorvastatin (LIPITOR) 20 MG tablet Take 1 tablet (20 mg total) by mouth  once daily.    blood sugar diagnostic Strp To check BG once daily, to use with insurance preferred meter    blood-glucose meter kit To check BG once daily, to use with insurance preferred meter (Patient not taking: Reported on 9/21/2020)    cyanocobalamin (VITAMIN B-12) 1000 MCG tablet Take 1 tablet by mouth.    diclofenac sodium (VOLTAREN) 1 % Gel Apply 2 g topically 4 (four) times daily. (Patient not taking: Reported on 3/28/2023)    epoetin dodie-epbx (RETACRIT) 10,000 unit/mL imjection Inject 1.0 mLs (10,000 Units total) into the skin every 7 days as needed    epoetin dodie-epbx (RETACRIT) 10,000 unit/mL imjection Inject 1 mL (10,000 Units total) into the skin every 7 days as needed    gabapentin (NEURONTIN) 100 MG capsule Take 1 capsule (100 mg total) by mouth 3 (three) times daily.    hydroCHLOROthiazide (HYDRODIURIL) 25 MG tablet TAKE ONE TABLET BY MOUTH DAILY FOR FLUID.    lactulose (CHRONULAC) 10 gram/15 mL solution Take 30 mLs (20 g total) by mouth once daily. For chronic constipation.    lancets Misc To check BG once daily, to use with insurance preferred meter    losartan (COZAAR) 50 MG tablet TAKE 1 TABLET BY MOUTH EVERY DAY FOR HIGH BLOOD PRESSURE    meclizine (ANTIVERT) 12.5 mg tablet Take 1 tablet by mouth three times a day as needed for dizziness    metoprolol succinate (TOPROL-XL) 25 MG 24 hr tablet TAKE 1 TABLET BY MOUTH DAILY FOR HIGH BLOOD PRESSURE    multivitamin (THERAGRAN) per tablet Take 1 tablet by mouth once daily.    naloxone (NARCAN) 4 mg/actuation Spry 4mg by nasal route as needed for opioid overdose; may repeat every 2-3 minutes in alternating nostrils until medical help arrives. Call 911    ondansetron (ZOFRAN) 4 MG tablet Take 1 tablet (4 mg total) by mouth every 12 (twelve) hours as needed for Nausea.    traMADoL (ULTRAM) 50 mg tablet Take 1 tablet (50 mg total) by mouth every 12 (twelve) hours as needed for Pain.    walker Misc Use daily for assisted ambulation   Last reviewed on  5/15/2023 11:52 AM by Diana Hutson, PAHiC    Review of patient's allergies indicates:  No Known Allergies Last reviewed on  5/24/2023 1:57 PM by Makenzie Welsh      Tasks added this encounter   No tasks added.   Tasks due within next 3 months   8/17/2023 - Refill Coordination Outreach (1 time occurrence)     Josse Lerma, PharmD  [facility]  [address] [place] [id number]  Phone: [phone]  Fax: [phone]

## 2023-08-29 ENCOUNTER — OFFICE VISIT (OUTPATIENT)
Dept: INTERNAL MEDICINE | Facility: CLINIC | Age: 88
End: 2023-08-29
Payer: MEDICARE

## 2023-08-29 VITALS
DIASTOLIC BLOOD PRESSURE: 60 MMHG | BODY MASS INDEX: 23.3 KG/M2 | TEMPERATURE: 97 F | WEIGHT: 123.44 LBS | RESPIRATION RATE: 16 BRPM | HEIGHT: 61 IN | SYSTOLIC BLOOD PRESSURE: 122 MMHG | HEART RATE: 60 BPM

## 2023-08-29 DIAGNOSIS — E11.22 TYPE 2 DIABETES MELLITUS WITH CHRONIC KIDNEY DISEASE, WITHOUT LONG-TERM CURRENT USE OF INSULIN, UNSPECIFIED CKD STAGE: Primary | ICD-10-CM

## 2023-08-29 DIAGNOSIS — N18.32 STAGE 3B CHRONIC KIDNEY DISEASE: ICD-10-CM

## 2023-08-29 DIAGNOSIS — I10 ESSENTIAL HYPERTENSION: ICD-10-CM

## 2023-08-29 DIAGNOSIS — E78.49 OTHER HYPERLIPIDEMIA: ICD-10-CM

## 2023-08-29 DIAGNOSIS — M15.9 PRIMARY OSTEOARTHRITIS INVOLVING MULTIPLE JOINTS: ICD-10-CM

## 2023-08-29 PROCEDURE — 1101F PR PT FALLS ASSESS DOC 0-1 FALLS W/OUT INJ PAST YR: ICD-10-PCS | Mod: CPTII,S$GLB,, | Performed by: INTERNAL MEDICINE

## 2023-08-29 PROCEDURE — 1160F RVW MEDS BY RX/DR IN RCRD: CPT | Mod: CPTII,S$GLB,, | Performed by: INTERNAL MEDICINE

## 2023-08-29 PROCEDURE — 1160F PR REVIEW ALL MEDS BY PRESCRIBER/CLIN PHARMACIST DOCUMENTED: ICD-10-PCS | Mod: CPTII,S$GLB,, | Performed by: INTERNAL MEDICINE

## 2023-08-29 PROCEDURE — 99214 PR OFFICE/OUTPT VISIT, EST, LEVL IV, 30-39 MIN: ICD-10-PCS | Mod: S$GLB,,, | Performed by: INTERNAL MEDICINE

## 2023-08-29 PROCEDURE — 1101F PT FALLS ASSESS-DOCD LE1/YR: CPT | Mod: CPTII,S$GLB,, | Performed by: INTERNAL MEDICINE

## 2023-08-29 PROCEDURE — 1126F AMNT PAIN NOTED NONE PRSNT: CPT | Mod: CPTII,S$GLB,, | Performed by: INTERNAL MEDICINE

## 2023-08-29 PROCEDURE — 1159F MED LIST DOCD IN RCRD: CPT | Mod: CPTII,S$GLB,, | Performed by: INTERNAL MEDICINE

## 2023-08-29 PROCEDURE — 1159F PR MEDICATION LIST DOCUMENTED IN MEDICAL RECORD: ICD-10-PCS | Mod: CPTII,S$GLB,, | Performed by: INTERNAL MEDICINE

## 2023-08-29 PROCEDURE — 99999 PR PBB SHADOW E&M-EST. PATIENT-LVL IV: CPT | Mod: PBBFAC,,, | Performed by: INTERNAL MEDICINE

## 2023-08-29 PROCEDURE — 3288F FALL RISK ASSESSMENT DOCD: CPT | Mod: CPTII,S$GLB,, | Performed by: INTERNAL MEDICINE

## 2023-08-29 PROCEDURE — 99214 OFFICE O/P EST MOD 30 MIN: CPT | Mod: S$GLB,,, | Performed by: INTERNAL MEDICINE

## 2023-08-29 PROCEDURE — 3288F PR FALLS RISK ASSESSMENT DOCUMENTED: ICD-10-PCS | Mod: CPTII,S$GLB,, | Performed by: INTERNAL MEDICINE

## 2023-08-29 PROCEDURE — 99999 PR PBB SHADOW E&M-EST. PATIENT-LVL IV: ICD-10-PCS | Mod: PBBFAC,,, | Performed by: INTERNAL MEDICINE

## 2023-08-29 PROCEDURE — 1126F PR PAIN SEVERITY QUANTIFIED, NO PAIN PRESENT: ICD-10-PCS | Mod: CPTII,S$GLB,, | Performed by: INTERNAL MEDICINE

## 2023-08-29 RX ORDER — TRAMADOL HYDROCHLORIDE 50 MG/1
50 TABLET ORAL EVERY 12 HOURS PRN
Qty: 180 TABLET | Refills: 1 | Status: SHIPPED | OUTPATIENT
Start: 2023-08-29 | End: 2024-03-18 | Stop reason: SDUPTHER

## 2023-08-29 NOTE — PROGRESS NOTES
Subjective:       Patient ID: Melody Armstrong is a 89 y.o. female.    Chief Complaint: Diabetes (4-5 mos w/labs )    HPI  The patient presents for follow-up of medical conditions which include type 2 diabetes mellitus, hypertension, chronic kidney disease, and chronic anemia.  The patient is on EPO injections under the direction of Hematology/Oncology.  She is tolerating injections well which are being administered by her daughter who accompanies her today for her visit.  The patient reports she is doing well.  She has not experienced any falls.  She is eating 3 meals a day.  She has not been monitoring her blood sugar or blood pressure levels.  She is tolerating her medication well without side effects.  She has osteoarthritis involving the knees.  She uses Tylenol and intermittently uses tramadol in the mornings for joint pain.  She has not experienced any adverse effects from the pain medication.  She denies having any daytime drowsiness or dizziness.    Review of Systems   Constitutional:  Negative for activity change, appetite change and unexpected weight change.   Eyes:  Negative for visual disturbance.   Respiratory:  Negative for shortness of breath.    Cardiovascular:  Negative for chest pain, palpitations and leg swelling.   Gastrointestinal:  Negative for abdominal pain, blood in stool and diarrhea.   Genitourinary:  Negative for dysuria, frequency, hematuria and urgency.   Musculoskeletal:  Positive for arthralgias.   Neurological:  Negative for weakness, numbness and headaches.   Psychiatric/Behavioral:  Negative for sleep disturbance.             Physical Exam  Vitals and nursing note reviewed.   Constitutional:       General: She is not in acute distress.     Appearance: Normal appearance. She is well-developed.   HENT:      Head: Normocephalic and atraumatic.   Eyes:      General: No scleral icterus.     Extraocular Movements: Extraocular movements intact.      Conjunctiva/sclera: Conjunctivae  normal.   Neck:      Thyroid: No thyromegaly.      Vascular: No JVD.   Cardiovascular:      Rate and Rhythm: Normal rate and regular rhythm.      Heart sounds: Normal heart sounds. No murmur heard.     No friction rub. No gallop.   Pulmonary:      Effort: Pulmonary effort is normal. No respiratory distress.      Breath sounds: Normal breath sounds. No wheezing or rales.   Abdominal:      General: Bowel sounds are normal.      Palpations: Abdomen is soft. There is no mass.      Tenderness: There is no abdominal tenderness.   Musculoskeletal:         General: No tenderness.      Cervical back: Normal range of motion and neck supple.      Comments: Knees:  Bilateral crepitus is present.  No effusion is present.  Mild tenderness is noted on flexion.   Lymphadenopathy:      Cervical: No cervical adenopathy.   Skin:     General: Skin is warm and dry.      Findings: No rash.      Comments: No foot lesions are present.   Neurological:      Mental Status: She is alert and oriented to person, place, and time.      Cranial Nerves: No cranial nerve deficit.      Comments: Gait:  Antalgic.  The patient is using a cane for assisted ambulation.   Psychiatric:         Mood and Affect: Mood normal.         Behavior: Behavior normal.         Protective Sensation (w/ 10 gram monofilament):  Right: Intact  Left: Intact    Visual Inspection:  Bilateral bunions with lateral toe deviation.    Pedal Pulses:   Right: Present  Left: Present    Posterior Tibialis Pulses:   Right:Present  Left: Present      Lab Visit on 08/21/2023   Component Date Value Ref Range Status    WBC 08/21/2023 7.35  3.90 - 12.70 K/uL Final    RBC 08/21/2023 3.26 (L)  4.00 - 5.40 M/uL Final    Hemoglobin 08/21/2023 9.0 (L)  12.0 - 16.0 g/dL Final    Hematocrit 08/21/2023 29.6 (L)  37.0 - 48.5 % Final    MCV 08/21/2023 91  82 - 98 fL Final    MCH 08/21/2023 27.6  27.0 - 31.0 pg Final    MCHC 08/21/2023 30.4 (L)  32.0 - 36.0 g/dL Final    RDW 08/21/2023 17.0 (H)  11.5  - 14.5 % Final    Platelets 08/21/2023 426  150 - 450 K/uL Final    MPV 08/21/2023 9.5  9.2 - 12.9 fL Final    Immature Granulocytes 08/21/2023 0.1  0.0 - 0.5 % Final    Gran # (ANC) 08/21/2023 4.5  1.8 - 7.7 K/uL Final    Immature Grans (Abs) 08/21/2023 0.01  0.00 - 0.04 K/uL Final    Comment: Mild elevation in immature granulocytes is non specific and   can be seen in a variety of conditions including stress response,   acute inflammation, trauma and pregnancy. Correlation with other   laboratory and clinical findings is essential.      Lymph # 08/21/2023 2.0  1.0 - 4.8 K/uL Final    Mono # 08/21/2023 0.6  0.3 - 1.0 K/uL Final    Eos # 08/21/2023 0.3  0.0 - 0.5 K/uL Final    Baso # 08/21/2023 0.03  0.00 - 0.20 K/uL Final    nRBC 08/21/2023 0  0 /100 WBC Final    Gran % 08/21/2023 60.8  38.0 - 73.0 % Final    Lymph % 08/21/2023 27.1  18.0 - 48.0 % Final    Mono % 08/21/2023 7.9  4.0 - 15.0 % Final    Eosinophil % 08/21/2023 3.7  0.0 - 8.0 % Final    Basophil % 08/21/2023 0.4  0.0 - 1.9 % Final    Differential Method 08/21/2023 Automated   Final   Lab Visit on 08/21/2023   Component Date Value Ref Range Status    Sodium 08/21/2023 138  136 - 145 mmol/L Final    Potassium 08/21/2023 4.0  3.5 - 5.1 mmol/L Final    Chloride 08/21/2023 106  95 - 110 mmol/L Final    CO2 08/21/2023 22 (L)  23 - 29 mmol/L Final    Glucose 08/21/2023 91  70 - 110 mg/dL Final    BUN 08/21/2023 25 (H)  7 - 17 mg/dL Final    Creatinine 08/21/2023 1.32  0.50 - 1.40 mg/dL Final    Calcium 08/21/2023 9.0  8.7 - 10.5 mg/dL Final    Total Protein 08/21/2023 8.4  6.0 - 8.4 g/dL Final    Albumin 08/21/2023 3.9  3.5 - 5.2 g/dL Final    Total Bilirubin 08/21/2023 0.5  0.1 - 1.0 mg/dL Final    Comment: For infants and newborns, interpretation of results should be based  on gestational age, weight and in agreement with clinical  observations.    Premature Infant recommended reference ranges:  Up to 24 hours.............<8.0 mg/dL  Up to 48  hours............<12.0 mg/dL  3-5 days..................<15.0 mg/dL  6-29 days.................<15.0 mg/dL      Alkaline Phosphatase 08/21/2023 92  38 - 126 U/L Final    AST 08/21/2023 26  15 - 46 U/L Final    ALT 08/21/2023 16  10 - 44 U/L Final    Anion Gap 08/21/2023 10  8 - 16 mmol/L Final    eGFR 08/21/2023 38.6 (A)  >60 mL/min/1.73 m^2 Final    WBC 08/21/2023 7.35  3.90 - 12.70 K/uL Final    RBC 08/21/2023 3.23 (L)  4.00 - 5.40 M/uL Final    Hemoglobin 08/21/2023 9.1 (L)  12.0 - 16.0 g/dL Final    Hematocrit 08/21/2023 29.4 (L)  37.0 - 48.5 % Final    MCV 08/21/2023 91  82 - 98 fL Final    MCH 08/21/2023 28.2  27.0 - 31.0 pg Final    MCHC 08/21/2023 31.0 (L)  32.0 - 36.0 g/dL Final    RDW 08/21/2023 16.9 (H)  11.5 - 14.5 % Final    Platelets 08/21/2023 412  150 - 450 K/uL Final    MPV 08/21/2023 9.1 (L)  9.2 - 12.9 fL Final    Immature Granulocytes 08/21/2023 0.3  0.0 - 0.5 % Final    Gran # (ANC) 08/21/2023 4.5  1.8 - 7.7 K/uL Final    Immature Grans (Abs) 08/21/2023 0.02  0.00 - 0.04 K/uL Final    Comment: Mild elevation in immature granulocytes is non specific and   can be seen in a variety of conditions including stress response,   acute inflammation, trauma and pregnancy. Correlation with other   laboratory and clinical findings is essential.      Lymph # 08/21/2023 1.9  1.0 - 4.8 K/uL Final    Mono # 08/21/2023 0.6  0.3 - 1.0 K/uL Final    Eos # 08/21/2023 0.3  0.0 - 0.5 K/uL Final    Baso # 08/21/2023 0.04  0.00 - 0.20 K/uL Final    nRBC 08/21/2023 0  0 /100 WBC Final    Gran % 08/21/2023 60.7  38.0 - 73.0 % Final    Lymph % 08/21/2023 25.9  18.0 - 48.0 % Final    Mono % 08/21/2023 8.7  4.0 - 15.0 % Final    Eosinophil % 08/21/2023 3.9  0.0 - 8.0 % Final    Basophil % 08/21/2023 0.5  0.0 - 1.9 % Final    Differential Method 08/21/2023 Automated   Final    Hemoglobin A1C 08/21/2023 4.9  4.0 - 5.6 % Final    Comment: ADA Screening Guidelines:  5.7-6.4%  Consistent with prediabetes  >or=6.5%  Consistent  with diabetes    High levels of fetal hemoglobin interfere with the HbA1C  assay. Heterozygous hemoglobin variants (HbS, HgC, etc)do  not significantly interfere with this assay.   However, presence of multiple variants may affect accuracy.      Estimated Avg Glucose 08/21/2023 94  68 - 131 mg/dL Final   Lab Visit on 08/07/2023   Component Date Value Ref Range Status    WBC 08/07/2023 7.36  3.90 - 12.70 K/uL Final    RBC 08/07/2023 3.01 (L)  4.00 - 5.40 M/uL Final    Hemoglobin 08/07/2023 8.5 (L)  12.0 - 16.0 g/dL Final    Hematocrit 08/07/2023 27.8 (L)  37.0 - 48.5 % Final    MCV 08/07/2023 92  82 - 98 fL Final    MCH 08/07/2023 28.2  27.0 - 31.0 pg Final    MCHC 08/07/2023 30.6 (L)  32.0 - 36.0 g/dL Final    RDW 08/07/2023 16.9 (H)  11.5 - 14.5 % Final    Platelets 08/07/2023 379  150 - 450 K/uL Final    MPV 08/07/2023 9.7  9.2 - 12.9 fL Final    Immature Granulocytes 08/07/2023 0.3  0.0 - 0.5 % Final    Gran # (ANC) 08/07/2023 4.3  1.8 - 7.7 K/uL Final    Immature Grans (Abs) 08/07/2023 0.02  0.00 - 0.04 K/uL Final    Comment: Mild elevation in immature granulocytes is non specific and   can be seen in a variety of conditions including stress response,   acute inflammation, trauma and pregnancy. Correlation with other   laboratory and clinical findings is essential.      Lymph # 08/07/2023 1.9  1.0 - 4.8 K/uL Final    Mono # 08/07/2023 0.7  0.3 - 1.0 K/uL Final    Eos # 08/07/2023 0.4  0.0 - 0.5 K/uL Final    Baso # 08/07/2023 0.03  0.00 - 0.20 K/uL Final    nRBC 08/07/2023 0  0 /100 WBC Final    Gran % 08/07/2023 58.0  38.0 - 73.0 % Final    Lymph % 08/07/2023 26.1  18.0 - 48.0 % Final    Mono % 08/07/2023 9.6  4.0 - 15.0 % Final    Eosinophil % 08/07/2023 5.6  0.0 - 8.0 % Final    Basophil % 08/07/2023 0.4  0.0 - 1.9 % Final    Differential Method 08/07/2023 Automated   Final       Assessment & Plan:      Melody was seen today for diabetes.  The patient's diabetes control is excellent and her condition is  essentially in remission.  Current medications will be continued for management of diabetes and hypertension.  She will follow-up with Hematology-Oncology regarding her chronic anemia.  She and her daughter have refused any further workup of her anemia.  She is tolerating the EPO injections well.    Laboratory studies will be repeated in 6 months.    Diagnoses and all orders for this visit:    Type 2 diabetes mellitus with chronic kidney disease, without long-term current use of insulin, unspecified CKD stage    Essential hypertension    Stage 3b chronic kidney disease    Primary osteoarthritis involving multiple joints    Other hyperlipidemia    Other orders  -     traMADoL (ULTRAM) 50 mg tablet; Take 1 tablet (50 mg total) by mouth every 12 (twelve) hours as needed for Pain.         Follow up in about 6 months (around 2/29/2024).     Zac Andres MD

## 2023-09-18 ENCOUNTER — OFFICE VISIT (OUTPATIENT)
Dept: HEMATOLOGY/ONCOLOGY | Facility: CLINIC | Age: 88
End: 2023-09-18
Payer: MEDICARE

## 2023-09-18 VITALS
HEART RATE: 86 BPM | DIASTOLIC BLOOD PRESSURE: 57 MMHG | TEMPERATURE: 99 F | BODY MASS INDEX: 23.68 KG/M2 | RESPIRATION RATE: 18 BRPM | WEIGHT: 125.44 LBS | HEIGHT: 61 IN | OXYGEN SATURATION: 97 % | SYSTOLIC BLOOD PRESSURE: 130 MMHG

## 2023-09-18 DIAGNOSIS — N18.30 ANEMIA OF CHRONIC KIDNEY FAILURE, STAGE 3 (MODERATE): Primary | ICD-10-CM

## 2023-09-18 DIAGNOSIS — D63.1 ANEMIA OF CHRONIC KIDNEY FAILURE, STAGE 3 (MODERATE): Primary | ICD-10-CM

## 2023-09-18 PROCEDURE — 3288F PR FALLS RISK ASSESSMENT DOCUMENTED: ICD-10-PCS | Mod: CPTII,S$GLB,, | Performed by: PHYSICIAN ASSISTANT

## 2023-09-18 PROCEDURE — 1126F AMNT PAIN NOTED NONE PRSNT: CPT | Mod: CPTII,S$GLB,, | Performed by: PHYSICIAN ASSISTANT

## 2023-09-18 PROCEDURE — 1160F PR REVIEW ALL MEDS BY PRESCRIBER/CLIN PHARMACIST DOCUMENTED: ICD-10-PCS | Mod: CPTII,S$GLB,, | Performed by: PHYSICIAN ASSISTANT

## 2023-09-18 PROCEDURE — 1160F RVW MEDS BY RX/DR IN RCRD: CPT | Mod: CPTII,S$GLB,, | Performed by: PHYSICIAN ASSISTANT

## 2023-09-18 PROCEDURE — 99999 PR PBB SHADOW E&M-EST. PATIENT-LVL V: ICD-10-PCS | Mod: PBBFAC,,, | Performed by: PHYSICIAN ASSISTANT

## 2023-09-18 PROCEDURE — 1159F PR MEDICATION LIST DOCUMENTED IN MEDICAL RECORD: ICD-10-PCS | Mod: CPTII,S$GLB,, | Performed by: PHYSICIAN ASSISTANT

## 2023-09-18 PROCEDURE — 99999 PR PBB SHADOW E&M-EST. PATIENT-LVL V: CPT | Mod: PBBFAC,,, | Performed by: PHYSICIAN ASSISTANT

## 2023-09-18 PROCEDURE — 3288F FALL RISK ASSESSMENT DOCD: CPT | Mod: CPTII,S$GLB,, | Performed by: PHYSICIAN ASSISTANT

## 2023-09-18 PROCEDURE — 99214 PR OFFICE/OUTPT VISIT, EST, LEVL IV, 30-39 MIN: ICD-10-PCS | Mod: S$GLB,,, | Performed by: PHYSICIAN ASSISTANT

## 2023-09-18 PROCEDURE — 99214 OFFICE O/P EST MOD 30 MIN: CPT | Mod: S$GLB,,, | Performed by: PHYSICIAN ASSISTANT

## 2023-09-18 PROCEDURE — 1101F PT FALLS ASSESS-DOCD LE1/YR: CPT | Mod: CPTII,S$GLB,, | Performed by: PHYSICIAN ASSISTANT

## 2023-09-18 PROCEDURE — 1126F PR PAIN SEVERITY QUANTIFIED, NO PAIN PRESENT: ICD-10-PCS | Mod: CPTII,S$GLB,, | Performed by: PHYSICIAN ASSISTANT

## 2023-09-18 PROCEDURE — 1159F MED LIST DOCD IN RCRD: CPT | Mod: CPTII,S$GLB,, | Performed by: PHYSICIAN ASSISTANT

## 2023-09-18 PROCEDURE — 1101F PR PT FALLS ASSESS DOC 0-1 FALLS W/OUT INJ PAST YR: ICD-10-PCS | Mod: CPTII,S$GLB,, | Performed by: PHYSICIAN ASSISTANT

## 2023-09-18 NOTE — PROGRESS NOTES
Section of Hematology and Stem Cell Transplantation  Follow Up Note     Visit Date: 09/18/2023    Primary Hematologic Diagnosis: Anemia of chronic kidney failure, stage 3 (moderate) [N18.30, D63.1]    History of Present Ilness: (From Initial consult 8/2/2022)    Melody Armstrong (Melody) is a pleasant 88 y.o.female with past medical history of hyperlipidemia, hypertension, coronary artery disease, Stage III CKD referred by her PCP for evaluation of anemia. Reivew of lab work reveals a slowly progressive anemia dating back to at least 2012, with recent lab values with normocytic anemia and Hgb 7.8. The pt reports she has been feeling in her usual state of health, she lives alone, performs all ADLs, and has good energy. She denies PICA sx, and reports normal/balanced diet. She reports she's been told she has anemia for many years, couldn't previously tolerate PO iron, and has had kidney disease for many years. She also reports strong fam hx of anemia, ? Thalassemia - pt denies known fhx of hemoglobinopathy. She denies any sources of bleeding, recent hemoccult negative. Otherwise, no B symptoms or concerns from the patient/her daughter.      Cancer Screening:  Colonoscopy: Not up to date - ordered, needs to be scheduled. Hemoccult negative.   Pap Smear/Pelvic Exam: No longer being screened, doesn't recall her last pap   Mammogram: Not up to date - many years, doesn't recall   Smoking Status: Non-smoker  Family History: No known family history of malignancy       Interval History: 09/18/2023   Ms. Armstrong presents for follow up with her daughter for recent evaluation of anemia.  Her previous work up was unrevealing. She was not UTD on routine cancer screening, but this did not align with GOC and she did not wish for invasive colonscopy. Her hemoccult was negative.    Given this, and known CKD, we opted for epo replacement - recognizing epo replacement is contraindicated in setting of malignancy.     She is currently  on 10,000u weekly which keeps her epo stable in upper 8s/lower 9s. Previously when she went several weeks without Epo her hgb declined into the 7s.    Generally, she feels well and her energy is great. No bleeding. No b symptoms. No complaints. ROS below.       Past Medical History, Social History, and Past Family History are unchanged since last evaluation except for HPI.     CURRENT MEDICATIONS:   Current Outpatient Medications   Medication Sig    acetaminophen (TYLENOL) 325 MG tablet Take 325 mg by mouth every 6 (six) hours as needed for Pain.    aspirin 325 MG tablet Take 1 tablet by mouth once daily.    atorvastatin (LIPITOR) 20 MG tablet Take 1 tablet (20 mg total) by mouth once daily.    cyanocobalamin (VITAMIN B-12) 1000 MCG tablet Take 1 tablet by mouth.    diclofenac sodium (VOLTAREN) 1 % Gel Apply 2 g topically 4 (four) times daily.    epoetin dodie-epbx (RETACRIT) 10,000 unit/mL imjection Inject 1.0 mLs (10,000 Units total) into the skin every 7 days as needed    epoetin dodie-epbx (RETACRIT) 10,000 unit/mL imjection Inject 1 mL (10,000 Units total) into the skin every 7 days as needed    gabapentin (NEURONTIN) 100 MG capsule Take 1 capsule (100 mg total) by mouth 3 (three) times daily.    hydroCHLOROthiazide (HYDRODIURIL) 25 MG tablet TAKE ONE TABLET BY MOUTH DAILY FOR FLUID.    lactulose (CHRONULAC) 10 gram/15 mL solution Take 30 mLs (20 g total) by mouth once daily. For chronic constipation.    levocetirizine (XYZAL) 5 MG tablet TAKE 1 TABLET BY MOUTH DAILY AS NEEDED FOR ALLERGIES.    losartan (COZAAR) 50 MG tablet TAKE 1 TABLET BY MOUTH EVERY DAY FOR HIGH BLOOD PRESSURE    meclizine (ANTIVERT) 12.5 mg tablet Take 1 tablet by mouth three times a day as needed for dizziness    metoprolol succinate (TOPROL-XL) 25 MG 24 hr tablet TAKE 1 TABLET BY MOUTH DAILY FOR HIGH BLOOD PRESSURE    multivitamin (THERAGRAN) per tablet Take 1 tablet by mouth once daily.    naloxone (NARCAN) 4 mg/actuation Spry 4mg by  nasal route as needed for opioid overdose; may repeat every 2-3 minutes in alternating nostrils until medical help arrives. Call 911    traMADoL (ULTRAM) 50 mg tablet Take 1 tablet (50 mg total) by mouth every 12 (twelve) hours as needed for Pain.    walker Misc Use daily for assisted ambulation    blood sugar diagnostic Strp To check BG once daily, to use with insurance preferred meter (Patient not taking: Reported on 9/18/2023)    blood-glucose meter kit To check BG once daily, to use with insurance preferred meter (Patient not taking: Reported on 9/21/2020)    lancets Misc To check BG once daily, to use with insurance preferred meter (Patient not taking: Reported on 9/18/2023)     No current facility-administered medications for this visit.       ALLERGIES:   Review of patient's allergies indicates:  No Known Allergies      Review of Systems:     Review of Systems   Constitutional:  Negative for chills, diaphoresis, fever, malaise/fatigue and weight loss.   HENT:  Negative for congestion, nosebleeds and sore throat.    Respiratory:  Negative for cough and shortness of breath.    Cardiovascular:  Negative for chest pain and leg swelling.   Gastrointestinal:  Negative for abdominal pain, blood in stool, constipation, heartburn, melena, nausea and vomiting.   Genitourinary:  Negative for hematuria.   Musculoskeletal:  Negative for falls, joint pain and myalgias.   Neurological:  Negative for dizziness and headaches.   Psychiatric/Behavioral: Negative.         Physical Exam:     Vitals:    09/18/23 1123   BP: (!) 130/57   Pulse: 86   Resp: 18   Temp: 98.5 °F (36.9 °C)       Physical Exam  Constitutional:       General: She is not in acute distress.     Appearance: Normal appearance.   HENT:      Head: Normocephalic.      Right Ear: External ear normal.      Left Ear: External ear normal.      Nose: Nose normal.      Mouth/Throat:      Mouth: Mucous membranes are moist.      Pharynx: Oropharynx is clear.   Eyes:       Extraocular Movements: Extraocular movements intact.      Conjunctiva/sclera: Conjunctivae normal.      Pupils: Pupils are equal, round, and reactive to light.   Cardiovascular:      Rate and Rhythm: Normal rate and regular rhythm.      Pulses: Normal pulses.      Heart sounds: Normal heart sounds.   Pulmonary:      Effort: Pulmonary effort is normal.   Abdominal:      General: Abdomen is flat. There is no distension.      Palpations: Abdomen is soft.      Tenderness: There is no abdominal tenderness.   Musculoskeletal:         General: No swelling. Normal range of motion.      Cervical back: Neck supple.   Lymphadenopathy:      Cervical: No cervical adenopathy.   Neurological:      General: No focal deficit present.      Mental Status: She is alert. Mental status is at baseline.      Cranial Nerves: No cranial nerve deficit.             Labs:   Lab Results   Component Value Date    WBC 7.63 09/05/2023    HGB 8.8 (L) 09/05/2023    HCT 29.0 (L) 09/05/2023    MCV 92 09/05/2023     09/05/2023       Lab Results   Component Value Date     08/21/2023    K 4.0 08/21/2023     08/21/2023    CO2 22 (L) 08/21/2023    BUN 25 (H) 08/21/2023    CREATININE 1.32 08/21/2023    ALBUMIN 3.9 08/21/2023    BILITOT 0.5 08/21/2023    ALKPHOS 92 08/21/2023    AST 26 08/21/2023    ALT 16 08/21/2023       Imaging:           Assessment and Plan:   Melody Armstrong (Melody) is a pleasant 89 y.o.female referred for evaluation of anemia.     Anemia of CKD  - Patient with AOCKD, currently on weekly Epo replacement which keeps hemgobloin stable in 8/9 range.  - Of note, she is not up to date on routine cancer screenings, has colonoscopy/EGD ordered though hemoccult negative and pt does not wish to proceed with invasive procedures as she is asymptomatic and is concerned regarding her age. No GERD, no melena/hematochezia, no abdominal complaints. No B symptoms. She denies any sources of bleeding/bruising.   - SPEP/SWAPNA negative.  No evidence of nutritional deficiency. Strong Fhx of anemia, though no known hemoglobinopathies.   - We did discuss relative contraindications of EPO (VTE risk/malignancy) and that the pt is not UTD on cancer screening. However, the patient and her daughter are adamant that their focus remains improvement on QOL rather than aggressive/invasive work up given pt's age, as such, they do not wish to proceed with routine cancer screening which after lengthy discussion we agreed is very reasonable.     Continue EPO replacement 10,000u weekly. When she has briefly missed these doses, her Hgb drops into the 7s, but maintains Hgb in mid-upper 8s/lower 9s with weekly Epo, which greatly improves her QOL. Recently price of Retacrit increased, will send trial rx of alternative Epogen to specialty pharmacy.     Will continue q2wk labs and f/u in 4 months. Will check 1x epo, retic level at next lab.    Follow Up:          BMT Chart Routing      Follow up with physician    Follow up with BARNEY 4 months. f/u with marty in 4 mo   Provider visit type    Infusion scheduling note    Injection scheduling note    Labs CBC   Scheduling:  Preferred lab:  Lab interval:  q2week CBC at [place]. (for one lab only, add on retic/epo)   Imaging    Pharmacy appointment    Other referrals                 Thank you for allowing me to partake in the care of this patient.       Diana Huston PA-C  Hematology, Oncology, and Stem Cell Transplantation  [facility]

## 2023-11-24 DIAGNOSIS — D63.1 ANEMIA OF CHRONIC KIDNEY FAILURE, STAGE 3 (MODERATE): ICD-10-CM

## 2023-11-24 DIAGNOSIS — N18.30 ANEMIA OF CHRONIC KIDNEY FAILURE, STAGE 3 (MODERATE): ICD-10-CM

## 2024-01-05 ENCOUNTER — TELEPHONE (OUTPATIENT)
Dept: HEMATOLOGY/ONCOLOGY | Facility: CLINIC | Age: 89
End: 2024-01-05
Payer: MEDICARE

## 2024-01-05 NOTE — TELEPHONE ENCOUNTER
----- Message from Yamel Manrique sent at 1/5/2024  9:22 AM CST -----  Regarding: Consult/Advisory      Name Of Caller:   Ailin (Pt's Daughter)      Contact Preference:   [phone]      Nature of call:    Pt's Daughter wants to speak with a nurse about her mother's Retacrit. The pt states the medication is making her stomach hurt.

## 2024-01-09 ENCOUNTER — TELEPHONE (OUTPATIENT)
Dept: INTERNAL MEDICINE | Facility: CLINIC | Age: 89
End: 2024-01-09
Payer: MEDICARE

## 2024-01-09 DIAGNOSIS — I10 ESSENTIAL HYPERTENSION: ICD-10-CM

## 2024-01-09 DIAGNOSIS — E11.22 TYPE 2 DIABETES MELLITUS WITH CHRONIC KIDNEY DISEASE, WITHOUT LONG-TERM CURRENT USE OF INSULIN, UNSPECIFIED CKD STAGE: Primary | ICD-10-CM

## 2024-01-09 NOTE — TELEPHONE ENCOUNTER
She didn't need labs today.  She had all her labs a month ago.  Today's appt was cancelled and moved sooner  By someone else.    Didn't need repeated this soon.

## 2024-01-09 NOTE — TELEPHONE ENCOUNTER
Fyi-  Phone staff called , Labs were canceled , reordered all of the same labs and linked to visit . No lipid was ordered I dont know if you wanted to order It or not

## 2024-01-09 NOTE — TELEPHONE ENCOUNTER
This was handled by carolina.    Her lab appt today was cancelled and moved up  To 12/4.    She did not need more labs today.    Duplicates were ordered.    I told carolina.

## 2024-01-09 NOTE — TELEPHONE ENCOUNTER
----- Message from Yolanda Wilks MA sent at 1/9/2024  9:54 AM CST -----  Regarding: FW: lab orders    ----- Message -----  From: Loraine Perez  Sent: 1/9/2024   9:28 AM CST  To: [facility] Staff  Subject: lab orders                                       Good morning,    This patient is here to do labs for you all, but there aren't any orders in.    Thank you,  Loraine

## 2024-01-11 ENCOUNTER — TELEPHONE (OUTPATIENT)
Dept: HEMATOLOGY/ONCOLOGY | Facility: CLINIC | Age: 89
End: 2024-01-11
Payer: MEDICARE

## 2024-01-11 ENCOUNTER — PATIENT MESSAGE (OUTPATIENT)
Dept: HEMATOLOGY/ONCOLOGY | Facility: CLINIC | Age: 89
End: 2024-01-11
Payer: MEDICARE

## 2024-01-11 NOTE — TELEPHONE ENCOUNTER
Attempted to call patient daughter x 5 times. Automated recording states the wireless caller is unavailable and to try again later. Spoke with ms. Oliver and she states the injections are leading to stomach pain and that her last injection was last week. She asked that I speak to her daughter and she is unsure why my phone call will not go through to her daughter. Send my chart message to patient portal in attempt to reach daughter.

## 2024-01-11 NOTE — TELEPHONE ENCOUNTER
----- Message from Akanksha Keene PharmD sent at 1/11/2024 10:21 AM CST -----  Regarding: Isha Banegas,     I spoke with Ms. Armstrong' daughter today and she asked that I send you a message to call her back. I saw you contacted her on 1/5 regarding the medication making the patients stomach hurt. Says she never received the call and asked for you to call her again at [phone].     Thanks!  Akanksha Keene PharmD  [facility]   (P): [phone]  (F): [phone]

## 2024-01-16 ENCOUNTER — PATIENT MESSAGE (OUTPATIENT)
Dept: INTERNAL MEDICINE | Facility: CLINIC | Age: 89
End: 2024-01-16
Payer: MEDICARE

## 2024-01-16 ENCOUNTER — TELEPHONE (OUTPATIENT)
Dept: INTERNAL MEDICINE | Facility: CLINIC | Age: 89
End: 2024-01-16
Payer: MEDICARE

## 2024-01-16 NOTE — TELEPHONE ENCOUNTER
----- Message from Loretta Berkowitz sent at 1/16/2024 11:13 AM CST -----  Contact: Pt  [phone]  Pt daughter called and stated that she had to cancel this morning due t the bridge being closed and wanted to know if you can fit her in sooner than June.  You can message her on the portal.     Please call

## 2024-01-16 NOTE — TELEPHONE ENCOUNTER
----- Message from Yolanda Wilks MA sent at 1/16/2024  8:17 AM CST -----  Contact: Mobile  [phone] Ms. Parisi    ----- Message -----  From: Loni Delvalle  Sent: 1/16/2024   8:01 AM CST  To: Dmitry Frank A Staff    Caller is requesting an earlier appointment then we can schedule.  Caller is requesting a message be sent to the provider.  If this is for urgent care symptoms, did you offer other providers at this location, providers at other locations, or [facility]? (yes, no, n/a):  N/A  If this is for the patients physical, did you offer to schedule next available and put on wait list, or to see NP or PA for their physical?  (yes, no, n/a):  N/A  When is the next available appointment with their provider:  06/03/2024.  Reason for the appointment:  4 Month follow up.  Patient preference of timeframe to be scheduled:  As soon as possible.   Would the patient like a call back, or a response through their [facility] portal?:   Call

## 2024-01-19 ENCOUNTER — PATIENT MESSAGE (OUTPATIENT)
Dept: HEMATOLOGY/ONCOLOGY | Facility: CLINIC | Age: 89
End: 2024-01-19
Payer: MEDICARE

## 2024-01-21 ENCOUNTER — PATIENT MESSAGE (OUTPATIENT)
Dept: HEMATOLOGY/ONCOLOGY | Facility: CLINIC | Age: 89
End: 2024-01-21
Payer: MEDICARE

## 2024-01-29 DIAGNOSIS — D63.1 ANEMIA OF CHRONIC KIDNEY FAILURE, STAGE 3 (MODERATE): Primary | ICD-10-CM

## 2024-01-29 DIAGNOSIS — N18.30 ANEMIA OF CHRONIC KIDNEY FAILURE, STAGE 3 (MODERATE): Primary | ICD-10-CM

## 2024-01-29 RX ORDER — HYDROCODONE BITARTRATE AND ACETAMINOPHEN 500; 5 MG/1; MG/1
TABLET ORAL ONCE
Status: CANCELLED | OUTPATIENT
Start: 2024-01-29 | End: 2024-01-29

## 2024-01-29 RX ORDER — ACETAMINOPHEN 325 MG/1
650 TABLET ORAL
Status: CANCELLED | OUTPATIENT
Start: 2024-01-29

## 2024-02-15 RX ORDER — METOPROLOL SUCCINATE 25 MG/1
TABLET, EXTENDED RELEASE ORAL
Qty: 90 TABLET | Refills: 1 | Status: SHIPPED | OUTPATIENT
Start: 2024-02-15 | End: 2024-06-05

## 2024-02-15 RX ORDER — HYDROCHLOROTHIAZIDE 25 MG/1
TABLET ORAL
Qty: 90 TABLET | Refills: 1 | Status: SHIPPED | OUTPATIENT
Start: 2024-02-15 | End: 2024-03-18

## 2024-02-15 RX ORDER — LOSARTAN POTASSIUM 50 MG/1
TABLET ORAL
Qty: 90 TABLET | Refills: 1 | Status: SHIPPED | OUTPATIENT
Start: 2024-02-15 | End: 2024-06-05

## 2024-02-15 NOTE — TELEPHONE ENCOUNTER
Care Due:                  Date            Visit Type   Department     Provider  --------------------------------------------------------------------------------                                EP -                              PRIMARY      MET INTERNAL  Last Visit: 08-      [facility]   MEDICINE       Zac Andres                              EP -                              PRIMARY      [facility] INTERNAL  Next Visit: 03-      [facility]   MEDICINE       Zackyra Andres                                                            Last  Test          Frequency    Reason                     Performed    Due Date  --------------------------------------------------------------------------------    Lipid Panel.  12 months..  atorvastatin.............  03-   03-    [facility] Embedded Care Due Messages. Reference number: [id number].   2/15/2024 12:09:44 AM CST

## 2024-02-16 NOTE — TELEPHONE ENCOUNTER
Provider Staff:  Action required for this patient    Requires labs      Please see care gap opportunities below in Care Due Message.    Thanks!  [facility]     Appointments      Date Provider   Last Visit   8/29/2023 Zac Andres MD   Next Visit   3/18/2024 Zac Andres MD     Refill Decision Note   Melody Armstrong  is requesting a refill authorization.  Brief Assessment and Rationale for Refill:  Approve     Medication Therapy Plan: Renal fx reviewed      Comments:     Note composed:8:30 PM 02/15/2024

## 2024-02-23 ENCOUNTER — LAB VISIT (OUTPATIENT)
Dept: LAB | Facility: HOSPITAL | Age: 89
End: 2024-02-23
Attending: INTERNAL MEDICINE
Payer: MEDICARE

## 2024-02-23 ENCOUNTER — OFFICE VISIT (OUTPATIENT)
Dept: HEMATOLOGY/ONCOLOGY | Facility: CLINIC | Age: 89
End: 2024-02-23
Payer: MEDICARE

## 2024-02-23 VITALS
DIASTOLIC BLOOD PRESSURE: 59 MMHG | SYSTOLIC BLOOD PRESSURE: 132 MMHG | HEIGHT: 61 IN | OXYGEN SATURATION: 97 % | HEART RATE: 93 BPM | WEIGHT: 111.88 LBS | RESPIRATION RATE: 18 BRPM | TEMPERATURE: 98 F | BODY MASS INDEX: 21.12 KG/M2

## 2024-02-23 DIAGNOSIS — D63.1 ANEMIA IN STAGE 4 CHRONIC KIDNEY DISEASE: Primary | ICD-10-CM

## 2024-02-23 DIAGNOSIS — D63.1 ANEMIA OF CHRONIC KIDNEY FAILURE, STAGE 3 (MODERATE): ICD-10-CM

## 2024-02-23 DIAGNOSIS — N18.4 ANEMIA IN STAGE 4 CHRONIC KIDNEY DISEASE: Primary | ICD-10-CM

## 2024-02-23 DIAGNOSIS — N18.30 ANEMIA OF CHRONIC KIDNEY FAILURE, STAGE 3 (MODERATE): ICD-10-CM

## 2024-02-23 LAB
ABO + RH BLD: NORMAL
ALBUMIN SERPL BCP-MCNC: 2.5 G/DL (ref 3.5–5.2)
ALP SERPL-CCNC: 83 U/L (ref 55–135)
ALT SERPL W/O P-5'-P-CCNC: 9 U/L (ref 10–44)
ANION GAP SERPL CALC-SCNC: 5 MMOL/L (ref 8–16)
AST SERPL-CCNC: 20 U/L (ref 10–40)
BASOPHILS # BLD AUTO: 0.03 K/UL (ref 0–0.2)
BASOPHILS NFR BLD: 0.3 % (ref 0–1.9)
BILIRUB SERPL-MCNC: 0.3 MG/DL (ref 0.1–1)
BLD GP AB SCN CELLS X3 SERPL QL: NORMAL
BUN SERPL-MCNC: 29 MG/DL (ref 8–23)
CALCIUM SERPL-MCNC: 9 MG/DL (ref 8.7–10.5)
CHLORIDE SERPL-SCNC: 109 MMOL/L (ref 95–110)
CO2 SERPL-SCNC: 23 MMOL/L (ref 23–29)
CREAT SERPL-MCNC: 1.7 MG/DL (ref 0.5–1.4)
DIFFERENTIAL METHOD BLD: ABNORMAL
EOSINOPHIL # BLD AUTO: 0 K/UL (ref 0–0.5)
EOSINOPHIL NFR BLD: 0.2 % (ref 0–8)
ERYTHROCYTE [DISTWIDTH] IN BLOOD BY AUTOMATED COUNT: 18.9 % (ref 11.5–14.5)
EST. GFR  (NO RACE VARIABLE): 28.5 ML/MIN/1.73 M^2
FERRITIN SERPL-MCNC: 116 NG/ML (ref 20–300)
GLUCOSE SERPL-MCNC: 82 MG/DL (ref 70–110)
HCT VFR BLD AUTO: 24.7 % (ref 37–48.5)
HGB BLD-MCNC: 7.4 G/DL (ref 12–16)
IMM GRANULOCYTES # BLD AUTO: 0.03 K/UL (ref 0–0.04)
IMM GRANULOCYTES NFR BLD AUTO: 0.3 % (ref 0–0.5)
IRON SERPL-MCNC: 18 UG/DL (ref 30–160)
LYMPHOCYTES # BLD AUTO: 1.8 K/UL (ref 1–4.8)
LYMPHOCYTES NFR BLD: 17.1 % (ref 18–48)
MCH RBC QN AUTO: 28.2 PG (ref 27–31)
MCHC RBC AUTO-ENTMCNC: 30 G/DL (ref 32–36)
MCV RBC AUTO: 94 FL (ref 82–98)
MONOCYTES # BLD AUTO: 0.8 K/UL (ref 0.3–1)
MONOCYTES NFR BLD: 7.9 % (ref 4–15)
NEUTROPHILS # BLD AUTO: 7.7 K/UL (ref 1.8–7.7)
NEUTROPHILS NFR BLD: 74.2 % (ref 38–73)
NRBC BLD-RTO: 0 /100 WBC
PLATELET # BLD AUTO: 496 K/UL (ref 150–450)
PMV BLD AUTO: 8.9 FL (ref 9.2–12.9)
POTASSIUM SERPL-SCNC: 4.7 MMOL/L (ref 3.5–5.1)
PROT SERPL-MCNC: 7.5 G/DL (ref 6–8.4)
RBC # BLD AUTO: 2.62 M/UL (ref 4–5.4)
RETICS/RBC NFR AUTO: 1.8 % (ref 0.5–2.5)
SATURATED IRON: 10 % (ref 20–50)
SODIUM SERPL-SCNC: 137 MMOL/L (ref 136–145)
SPECIMEN OUTDATE: NORMAL
TOTAL IRON BINDING CAPACITY: 185 UG/DL (ref 250–450)
TRANSFERRIN SERPL-MCNC: 125 MG/DL (ref 200–375)
WBC # BLD AUTO: 10.36 K/UL (ref 3.9–12.7)

## 2024-02-23 PROCEDURE — 36415 COLL VENOUS BLD VENIPUNCTURE: CPT | Performed by: INTERNAL MEDICINE

## 2024-02-23 PROCEDURE — 86901 BLOOD TYPING SEROLOGIC RH(D): CPT | Performed by: INTERNAL MEDICINE

## 2024-02-23 PROCEDURE — 85045 AUTOMATED RETICULOCYTE COUNT: CPT | Performed by: INTERNAL MEDICINE

## 2024-02-23 PROCEDURE — 99215 OFFICE O/P EST HI 40 MIN: CPT | Mod: S$GLB,,, | Performed by: INTERNAL MEDICINE

## 2024-02-23 PROCEDURE — 85025 COMPLETE CBC W/AUTO DIFF WBC: CPT | Performed by: INTERNAL MEDICINE

## 2024-02-23 PROCEDURE — 82728 ASSAY OF FERRITIN: CPT | Performed by: INTERNAL MEDICINE

## 2024-02-23 PROCEDURE — 80053 COMPREHEN METABOLIC PANEL: CPT | Performed by: INTERNAL MEDICINE

## 2024-02-23 PROCEDURE — 99999 PR PBB SHADOW E&M-EST. PATIENT-LVL V: CPT | Mod: PBBFAC,,, | Performed by: INTERNAL MEDICINE

## 2024-02-23 PROCEDURE — 83540 ASSAY OF IRON: CPT | Performed by: INTERNAL MEDICINE

## 2024-02-23 NOTE — PROGRESS NOTES
Route Chart for Scheduling    BMT Chart Routing      Follow up with physician 3 months.   Follow up with BARNEY    Provider visit type Benign hem   Infusion scheduling note    Injection scheduling note    Labs CBC, CMP, type and screen and reticulocytes   Scheduling:  Preferred lab:  Lab interval: every 2 weeks  at [place] [facility]. PLease include Iron/TIBC and ferritin on day of return visit   Imaging    Pharmacy appointment    Other referrals

## 2024-02-28 NOTE — PROGRESS NOTES
HEMATOLOGIC MALIGNANCIES PROGRESS NOTE    IDENTIFYING STATEMENT   Melody Armstrong (Melody) is a 89 y.o. female with a  of 1934 from [place], [place] with the diagnosis of anemia of chronic kidney disease.      HEMATOLOGY HISTORY:    Anemia of chronic kidney disease  Hypertension  Coronary artery disease  Chronic kidney disease, stage IV  Diabetes mellitus, type 2  Gastroesophageal reflux disease    INTERVAL HISTORY:      Ms. Armstrong returns to clinic in follow-up of anemia of CKD. This is her first visit with me. She had previously seen Diana Huston PA-C, in this clinic. She was initially on epoetin dodie, but she stopped. She developed severe anemia but did not receive transfusion. She was dyspneic, and her daughter restarted epoetin dodie. They present to resume follow-up care.     Past Medical History, Past Social History and Past Family History have been reviewed and are unchanged except as noted in the interval history.    MEDICATIONS:     Current Outpatient Medications on File Prior to Visit   Medication Sig Dispense Refill    acetaminophen (TYLENOL) 325 MG tablet Take 325 mg by mouth every 6 (six) hours as needed for Pain.      aspirin 325 MG tablet Take 1 tablet by mouth once daily.      atorvastatin (LIPITOR) 20 MG tablet Take 1 tablet (20 mg total) by mouth once daily. 90 tablet 3    blood sugar diagnostic Strp To check BG once daily, to use with insurance preferred meter 100 strip 3    cyanocobalamin (VITAMIN B-12) 1000 MCG tablet Take 1 tablet by mouth.      diclofenac sodium (VOLTAREN) 1 % Gel Apply 2 g topically 4 (four) times daily. 1 Tube 2    epoetin dodie-epbx (RETACRIT) 10,000 unit/mL imjection Inject 1.0 mLs (10,000 Units total) into the skin every 7 days as needed 4 mL 6    epoetin dodie-epbx (RETACRIT) 10,000 unit/mL imjection Inject 1 mL (10,000 Units total) into the skin every 7 days as needed 4 mL 6    gabapentin (NEURONTIN) 100 MG capsule Take 1 capsule (100 mg total) by mouth 3  (three) times daily. 270 capsule 3    hydroCHLOROthiazide (HYDRODIURIL) 25 MG tablet TAKE ONE TABLET BY MOUTH DAILY FOR FLUID. 90 tablet 1    lactulose (CHRONULAC) 10 gram/15 mL solution Take 30 mLs (20 g total) by mouth once daily. For chronic constipation. 946 mL 5    levocetirizine (XYZAL) 5 MG tablet TAKE 1 TABLET BY MOUTH DAILY AS NEEDED FOR ALLERGIES. 90 tablet 3    losartan (COZAAR) 50 MG tablet TAKE 1 TABLET BY MOUTH EVERY DAY FOR HIGH BLOOD PRESSURE 90 tablet 1    meclizine (ANTIVERT) 12.5 mg tablet Take 1 tablet by mouth three times a day as needed for dizziness 90 tablet 0    metoprolol succinate (TOPROL-XL) 25 MG 24 hr tablet TAKE 1 TABLET BY MOUTH EVERY DAY FOR HIGH BLOOD PRESSURE 90 tablet 1    multivitamin (THERAGRAN) per tablet Take 1 tablet by mouth once daily.      naloxone (NARCAN) 4 mg/actuation Spry 4mg by nasal route as needed for opioid overdose; may repeat every 2-3 minutes in alternating nostrils until medical help arrives. Call 911 1 each 11    traMADoL (ULTRAM) 50 mg tablet Take 1 tablet (50 mg total) by mouth every 12 (twelve) hours as needed for Pain. 180 tablet 1    walker Misc Use daily for assisted ambulation 1 each 0    blood-glucose meter kit To check BG once daily, to use with insurance preferred meter (Patient not taking: Reported on 9/21/2020) 1 each 0    lancets Misc To check BG once daily, to use with insurance preferred meter (Patient not taking: Reported on 9/18/2023) 100 each 3     No current facility-administered medications on file prior to visit.       ALLERGIES: Review of patient's allergies indicates:  No Known Allergies     ROS:       Review of Systems   Constitutional:  Negative for diaphoresis, fatigue, fever and unexpected weight change.   HENT:   Negative for lump/mass and sore throat.    Eyes:  Negative for icterus.   Respiratory:  Positive for shortness of breath. Negative for cough.    Cardiovascular:  Negative for chest pain and palpitations.  "  Gastrointestinal:  Negative for abdominal distention, constipation, diarrhea, nausea and vomiting.   Genitourinary:  Negative for dysuria and frequency.    Musculoskeletal:  Negative for arthralgias, gait problem and myalgias.   Skin:  Negative for rash.   Neurological:  Negative for dizziness, gait problem and headaches.   Hematological:  Negative for adenopathy. Does not bruise/bleed easily.   Psychiatric/Behavioral:  The patient is not nervous/anxious.        PHYSICAL EXAM:  Vitals:    02/23/24 0922   BP: (!) 132/59   Pulse: 93   Resp: 18   Temp: 98 °F (36.7 °C)   TempSrc: Oral   SpO2: 97%   Weight: 50.8 kg (111 lb 14.1 oz)   Height: 5' 1" (1.549 m)   PainSc:   3   PainLoc: Abdomen       Physical Exam  Constitutional:       General: She is not in acute distress.     Appearance: She is well-developed.   HENT:      Head: Normocephalic and atraumatic.      Mouth/Throat:      Mouth: No oral lesions.   Eyes:      Conjunctiva/sclera: Conjunctivae normal.   Neck:      Thyroid: No thyromegaly.   Cardiovascular:      Rate and Rhythm: Normal rate and regular rhythm.      Heart sounds: Normal heart sounds. No murmur heard.  Pulmonary:      Breath sounds: Normal breath sounds. No wheezing or rales.   Abdominal:      General: There is no distension.      Palpations: Abdomen is soft. There is no hepatomegaly, splenomegaly or mass.      Tenderness: There is no abdominal tenderness.   Lymphadenopathy:      Cervical: No cervical adenopathy.      Right cervical: No deep cervical adenopathy.     Left cervical: No deep cervical adenopathy.   Skin:     Findings: No rash.   Neurological:      Mental Status: She is alert and oriented to person, place, and time.      Cranial Nerves: No cranial nerve deficit.      Coordination: Coordination normal.      Deep Tendon Reflexes: Reflexes are normal and symmetric.         LAB:   Results for orders placed or performed in visit on 01/29/24   CBC Auto Differential   Result Value Ref Range    " WBC 14.04 (H) 3.90 - 12.70 K/uL    RBC 2.33 (L) 4.00 - 5.40 M/uL    Hemoglobin 6.6 (L) 12.0 - 16.0 g/dL    Hematocrit 21.3 (L) 37.0 - 48.5 %    MCV 91 82 - 98 fL    MCH 28.3 27.0 - 31.0 pg    MCHC 31.0 (L) 32.0 - 36.0 g/dL    RDW 18.6 (H) 11.5 - 14.5 %    Platelets 465 (H) 150 - 450 K/uL    MPV 9.0 (L) 9.2 - 12.9 fL    Immature Granulocytes 0.4 0.0 - 0.5 %    Gran # (ANC) 11.2 (H) 1.8 - 7.7 K/uL    Immature Grans (Abs) 0.05 (H) 0.00 - 0.04 K/uL    Lymph # 1.5 1.0 - 4.8 K/uL    Mono # 1.2 (H) 0.3 - 1.0 K/uL    Eos # 0.1 0.0 - 0.5 K/uL    Baso # 0.03 0.00 - 0.20 K/uL    nRBC 0 0 /100 WBC    Gran % 80.0 (H) 38.0 - 73.0 %    Lymph % 10.4 (L) 18.0 - 48.0 %    Mono % 8.5 4.0 - 15.0 %    Eosinophil % 0.5 0.0 - 8.0 %    Basophil % 0.2 0.0 - 1.9 %    Differential Method Automated        PROBLEMS ASSESSED THIS VISIT:    1. Anemia of chronic kidney failure, stage 3 (moderate)        PLAN:       Anemia of chronic kidney disease  Ms. Armstrong has anemia of chronic kidney disease. She had stopped taking erythropoietin with decrease in hemoglobin, but she is restarting. This is being administered by her daughter.    I emphasized the importance of close monitoring on this agent for safety and dose adjustment as necessary.     Follow-up  Labs every 2 weeks  Clinic follow-up in 3 months     Milo Rouse MD  Hematology and Stem Cell Transplant

## 2024-03-13 ENCOUNTER — TELEPHONE (OUTPATIENT)
Dept: INTERNAL MEDICINE | Facility: CLINIC | Age: 89
End: 2024-03-13
Payer: MEDICARE

## 2024-03-13 NOTE — TELEPHONE ENCOUNTER
----- Message from Sushma Santacruz sent at 3/13/2024  3:55 PM CDT -----  Contact: [phone]@[email or web address]  Good afternoon patient would like to request lab order for her upcoming apt on 3/18. Please give patient a call back [phone]

## 2024-03-13 NOTE — TELEPHONE ENCOUNTER
She had full labs in December and again in January.  January labs were not needed, staff did orders in rush when patient was waiting   At labs    More labs not needed before 3/18 appt.  She has labs that am by dr alexis that will be enough.    Left detailed msg on daughter siria phone.

## 2024-03-18 ENCOUNTER — LAB VISIT (OUTPATIENT)
Dept: LAB | Facility: HOSPITAL | Age: 89
End: 2024-03-18
Payer: MEDICARE

## 2024-03-18 ENCOUNTER — OFFICE VISIT (OUTPATIENT)
Dept: INTERNAL MEDICINE | Facility: CLINIC | Age: 89
End: 2024-03-18
Payer: MEDICARE

## 2024-03-18 VITALS
BODY MASS INDEX: 21.14 KG/M2 | TEMPERATURE: 97 F | WEIGHT: 112 LBS | HEIGHT: 61 IN | HEART RATE: 68 BPM | SYSTOLIC BLOOD PRESSURE: 124 MMHG | DIASTOLIC BLOOD PRESSURE: 60 MMHG | RESPIRATION RATE: 16 BRPM

## 2024-03-18 DIAGNOSIS — E11.22 TYPE 2 DIABETES MELLITUS WITH CHRONIC KIDNEY DISEASE, WITHOUT LONG-TERM CURRENT USE OF INSULIN, UNSPECIFIED CKD STAGE: ICD-10-CM

## 2024-03-18 DIAGNOSIS — N18.32 STAGE 3B CHRONIC KIDNEY DISEASE: ICD-10-CM

## 2024-03-18 DIAGNOSIS — I10 ESSENTIAL HYPERTENSION: ICD-10-CM

## 2024-03-18 DIAGNOSIS — D64.9 ANEMIA, UNSPECIFIED TYPE: ICD-10-CM

## 2024-03-18 DIAGNOSIS — K59.09 CHRONIC CONSTIPATION: Chronic | ICD-10-CM

## 2024-03-18 DIAGNOSIS — E78.49 OTHER HYPERLIPIDEMIA: ICD-10-CM

## 2024-03-18 DIAGNOSIS — E11.22 TYPE 2 DIABETES MELLITUS WITH CHRONIC KIDNEY DISEASE, WITHOUT LONG-TERM CURRENT USE OF INSULIN, UNSPECIFIED CKD STAGE: Primary | ICD-10-CM

## 2024-03-18 PROCEDURE — 87077 CULTURE AEROBIC IDENTIFY: CPT | Performed by: INTERNAL MEDICINE

## 2024-03-18 PROCEDURE — 87086 URINE CULTURE/COLONY COUNT: CPT | Performed by: INTERNAL MEDICINE

## 2024-03-18 PROCEDURE — 99999 PR PBB SHADOW E&M-EST. PATIENT-LVL IV: CPT | Mod: PBBFAC,,, | Performed by: INTERNAL MEDICINE

## 2024-03-18 PROCEDURE — 87186 SC STD MICRODIL/AGAR DIL: CPT | Performed by: INTERNAL MEDICINE

## 2024-03-18 PROCEDURE — 81001 URINALYSIS AUTO W/SCOPE: CPT | Performed by: INTERNAL MEDICINE

## 2024-03-18 PROCEDURE — 99214 OFFICE O/P EST MOD 30 MIN: CPT | Mod: S$GLB,,, | Performed by: INTERNAL MEDICINE

## 2024-03-18 PROCEDURE — 87088 URINE BACTERIA CULTURE: CPT | Performed by: INTERNAL MEDICINE

## 2024-03-18 RX ORDER — TRAMADOL HYDROCHLORIDE 50 MG/1
50 TABLET ORAL EVERY 12 HOURS PRN
Qty: 180 TABLET | Refills: 1 | Status: SHIPPED | OUTPATIENT
Start: 2024-03-18

## 2024-03-18 RX ORDER — BUMETANIDE 1 MG/1
TABLET ORAL
Qty: 45 TABLET | Refills: 3 | Status: SHIPPED | OUTPATIENT
Start: 2024-03-18

## 2024-03-18 RX ORDER — LACTULOSE 10 G/15ML
20 SOLUTION ORAL; RECTAL DAILY
Qty: 946 ML | Refills: 5 | Status: SHIPPED | OUTPATIENT
Start: 2024-03-18

## 2024-03-19 LAB
BACTERIA #/AREA URNS AUTO: ABNORMAL /HPF
BILIRUB UR QL STRIP: NEGATIVE
CLARITY UR REFRACT.AUTO: ABNORMAL
COLOR UR AUTO: YELLOW
GLUCOSE UR QL STRIP: NEGATIVE
HGB UR QL STRIP: NEGATIVE
KETONES UR QL STRIP: NEGATIVE
LEUKOCYTE ESTERASE UR QL STRIP: ABNORMAL
MICROSCOPIC COMMENT: ABNORMAL
NITRITE UR QL STRIP: NEGATIVE
PH UR STRIP: 5 [PH] (ref 5–8)
PROT UR QL STRIP: NEGATIVE
RBC #/AREA URNS AUTO: 2 /HPF (ref 0–4)
SP GR UR STRIP: 1.01 (ref 1–1.03)
SQUAMOUS #/AREA URNS AUTO: 0 /HPF
URN SPEC COLLECT METH UR: ABNORMAL
WBC #/AREA URNS AUTO: 10 /HPF (ref 0–5)

## 2024-03-19 NOTE — PROGRESS NOTES
Subjective:       Patient ID: Melody Armstrong is a 89 y.o. female.    Chief Complaint: Diabetes (Labs 12/4 and 1/9 to revu. ) and Vaginal Pain (Notices all the time.  And frequent urination. )    HPI  The patient presents for follow-up visit today accompanied by her daughter.  The patient's follow-up office visit in 01/2024 was rescheduled due to severe whether in the area.  The patient has been noting burning with urination for the past 3 days.  No gross hematuria or increased frequency has been noted.  The patient denies having fever, chills, or flank pain.    The patient has type 2 diabetes mellitus.  She does not monitor blood sugars regularly.  She is eating 3 meals a day.    She has chronic kidney disease.  She is receiving Epogen injections weekly at home as administered by her daughter.    The patient has osteoarthritis of the knees.  She can not take NSAIDs.  She uses Tylenol which has not been very effective.  She intermittently will use tramadol for severe pain symptoms.  She is tolerating the medication without side effects.  It has allowed her to remain active and able to perform her ADLs independently at home.  She does use a cane intermittently for assisted ambulation.    She has hypertension.  She does not monitor blood pressures at home.  She is tolerating her blood pressure medication without side effects.  She is not experiencing morning headaches or dizziness.    Review of Systems   Constitutional:  Negative for activity change, appetite change and unexpected weight change.   Eyes:  Negative for visual disturbance.   Respiratory:  Negative for shortness of breath.    Cardiovascular:  Negative for chest pain, palpitations and leg swelling.   Gastrointestinal:  Negative for abdominal distention, abdominal pain, blood in stool and diarrhea.   Genitourinary:  Positive for dysuria. Negative for flank pain, frequency, hematuria and urgency.   Musculoskeletal:  Positive for arthralgias.    Neurological:  Negative for weakness, numbness and headaches.   Psychiatric/Behavioral:  Negative for sleep disturbance.             Physical Exam  Vitals and nursing note reviewed.   Constitutional:       General: She is not in acute distress.     Appearance: Normal appearance. She is well-developed.      Comments: The patient has lost 11 lb since 08/29/2023.   HENT:      Head: Normocephalic and atraumatic.   Eyes:      General: No scleral icterus.     Extraocular Movements: Extraocular movements intact.      Conjunctiva/sclera: Conjunctivae normal.   Neck:      Thyroid: No thyromegaly.      Vascular: No JVD.   Cardiovascular:      Rate and Rhythm: Normal rate and regular rhythm.      Heart sounds: Normal heart sounds. No murmur heard.     No friction rub. No gallop.   Pulmonary:      Effort: Pulmonary effort is normal. No respiratory distress.      Breath sounds: Normal breath sounds. No wheezing or rales.   Abdominal:      General: Bowel sounds are normal.      Palpations: Abdomen is soft. There is no mass.      Tenderness: There is no abdominal tenderness. There is no right CVA tenderness or left CVA tenderness.   Musculoskeletal:         General: No tenderness. Normal range of motion.      Cervical back: Normal range of motion and neck supple.      Right lower leg: Edema (1+ on the right.) present.      Left lower leg: Edema (2+ on the left with a skin vesicle on the pretibial area with clear fluid noted.) present.   Lymphadenopathy:      Cervical: No cervical adenopathy.   Skin:     General: Skin is warm and dry.      Findings: No rash.      Comments: No foot lesions are present.   Neurological:      Mental Status: She is alert and oriented to person, place, and time.      Cranial Nerves: No cranial nerve deficit.      Comments: Sensory exam is intact in both feet on monofilament testing.   Psychiatric:         Mood and Affect: Mood normal.         Behavior: Behavior normal.         Thought Content: Thought  content normal.         Protective Sensation (w/ 10 gram monofilament):  Right: Intact  Left: Intact    Visual Inspection:  Normal -  Bilateral    Pedal Pulses:   Right: Present  Left: Present    Posterior Tibialis Pulses:   Right:Present  Left: Present    Lab Visit on 03/18/2024   Component Date Value Ref Range Status    Specimen UA 03/18/2024 Urine, Clean Catch   Final    Color, UA 03/18/2024 Yellow  Yellow, Straw, [place] Final    Appearance, UA 03/18/2024 Cloudy (A)  Clear Final    pH, UA 03/18/2024 5.0  5.0 - 8.0 Final    Specific Gravity, UA 03/18/2024 1.015  1.005 - 1.030 Final    Protein, UA 03/18/2024 Negative  Negative Final    Comment: Recommend a 24 hour urine protein or a urine   protein/creatinine ratio if globulin induced proteinuria is  clinically suspected.      Glucose, UA 03/18/2024 Negative  Negative Final    Ketones, UA 03/18/2024 Negative  Negative Final    Bilirubin (UA) 03/18/2024 Negative  Negative Final    Occult Blood UA 03/18/2024 Negative  Negative Final    Nitrite, UA 03/18/2024 Negative  Negative Final    Leukocytes, UA 03/18/2024 1+ (A)  Negative Final    RBC, UA 03/18/2024 2  0 - 4 /hpf Final    WBC, UA 03/18/2024 10 (H)  0 - 5 /hpf Final    Bacteria 03/18/2024 Many (A)  None-Occ /hpf Final    Squam Epithel, UA 03/18/2024 0  /hpf Final    Microscopic Comment 03/18/2024 SEE COMMENT   Final    Comment: Other formed elements not mentioned in the report are not   present in the microscopic examination.      Lab Visit on 03/14/2024   Component Date Value Ref Range Status    WBC 03/14/2024 10.60  3.90 - 12.70 K/uL Final    RBC 03/14/2024 2.43 (L)  4.00 - 5.40 M/uL Final    Hemoglobin 03/14/2024 7.2 (L)  12.0 - 16.0 g/dL Final    Hematocrit 03/14/2024 23.1 (L)  37.0 - 48.5 % Final    MCV 03/14/2024 95  82 - 98 fL Final    MCH 03/14/2024 29.6  27.0 - 31.0 pg Final    MCHC 03/14/2024 31.2 (L)  32.0 - 36.0 g/dL Final    RDW 03/14/2024 18.8 (H)  11.5 - 14.5 % Final    Platelets 03/14/2024 508 (H)   150 - 450 K/uL Final    MPV 03/14/2024 9.1 (L)  9.2 - 12.9 fL Final    Immature Granulocytes 03/14/2024 0.4  0.0 - 0.5 % Final    Gran # (ANC) 03/14/2024 7.6  1.8 - 7.7 K/uL Final    Immature Grans (Abs) 03/14/2024 0.04  0.00 - 0.04 K/uL Final    Comment: Mild elevation in immature granulocytes is non specific and   can be seen in a variety of conditions including stress response,   acute inflammation, trauma and pregnancy. Correlation with other   laboratory and clinical findings is essential.      Lymph # 03/14/2024 1.8  1.0 - 4.8 K/uL Final    Mono # 03/14/2024 0.9  0.3 - 1.0 K/uL Final    Eos # 03/14/2024 0.2  0.0 - 0.5 K/uL Final    Baso # 03/14/2024 0.05  0.00 - 0.20 K/uL Final    nRBC 03/14/2024 0  0 /100 WBC Final    Gran % 03/14/2024 71.3  38.0 - 73.0 % Final    Lymph % 03/14/2024 17.4 (L)  18.0 - 48.0 % Final    Mono % 03/14/2024 8.7  4.0 - 15.0 % Final    Eosinophil % 03/14/2024 1.7  0.0 - 8.0 % Final    Basophil % 03/14/2024 0.5  0.0 - 1.9 % Final    Differential Method 03/14/2024 Automated   Final    Sodium 03/14/2024 142  136 - 145 mmol/L Final    Potassium 03/14/2024 4.4  3.5 - 5.1 mmol/L Final    Chloride 03/14/2024 111 (H)  95 - 110 mmol/L Final    CO2 03/14/2024 21 (L)  23 - 29 mmol/L Final    Glucose 03/14/2024 86  70 - 110 mg/dL Final    BUN 03/14/2024 29 (H)  7 - 17 mg/dL Final    Creatinine 03/14/2024 1.47 (H)  0.50 - 1.40 mg/dL Final    Calcium 03/14/2024 8.5 (L)  8.7 - 10.5 mg/dL Final    Total Protein 03/14/2024 7.3  6.0 - 8.4 g/dL Final    Albumin 03/14/2024 3.0 (L)  3.5 - 5.2 g/dL Final    Total Bilirubin 03/14/2024 0.3  0.1 - 1.0 mg/dL Final    Comment: For infants and newborns, interpretation of results should be based  on gestational age, weight and in agreement with clinical  observations.    Premature Infant recommended reference ranges:  Up to 24 hours.............<8.0 mg/dL  Up to 48 hours............<12.0 mg/dL  3-5 days..................<15.0 mg/dL  6-29  days.................<15.0 mg/dL      Alkaline Phosphatase 03/14/2024 93  38 - 126 U/L Final    AST 03/14/2024 29  15 - 46 U/L Final    ALT 03/14/2024 14  10 - 44 U/L Final    Anion Gap 03/14/2024 10  8 - 16 mmol/L Final    eGFR 03/14/2024 33.9 (A)  >60 mL/min/1.73 m^2 Final    Group & Rh 03/14/2024 O POS   Final    Indirect Jamia 03/14/2024 NEG   Final    Specimen Outdate 03/14/2024 03/17/2024 23:59   Final    Iron 03/14/2024 15 (L)  30 - 160 ug/dL Final    Transferrin 03/14/2024 128 (L)  200 - 375 mg/dL Final    TIBC 03/14/2024 189 (L)  250 - 450 ug/dL Final    Saturated Iron 03/14/2024 8 (L)  20 - 50 % Final    Ferritin 03/14/2024 95  20.0 - 300.0 ng/mL Final    Retic 03/14/2024 2.8 (H)  0.5 - 2.5 % Final   Lab Visit on 02/23/2024   Component Date Value Ref Range Status    WBC 02/23/2024 10.36  3.90 - 12.70 K/uL Final    RBC 02/23/2024 2.62 (L)  4.00 - 5.40 M/uL Final    Hemoglobin 02/23/2024 7.4 (L)  12.0 - 16.0 g/dL Final    Hematocrit 02/23/2024 24.7 (L)  37.0 - 48.5 % Final    MCV 02/23/2024 94  82 - 98 fL Final    MCH 02/23/2024 28.2  27.0 - 31.0 pg Final    MCHC 02/23/2024 30.0 (L)  32.0 - 36.0 g/dL Final    RDW 02/23/2024 18.9 (H)  11.5 - 14.5 % Final    Platelets 02/23/2024 496 (H)  150 - 450 K/uL Final    MPV 02/23/2024 8.9 (L)  9.2 - 12.9 fL Final    Immature Granulocytes 02/23/2024 0.3  0.0 - 0.5 % Final    Gran # (ANC) 02/23/2024 7.7  1.8 - 7.7 K/uL Final    Immature Grans (Abs) 02/23/2024 0.03  0.00 - 0.04 K/uL Final    Comment: Mild elevation in immature granulocytes is non specific and   can be seen in a variety of conditions including stress response,   acute inflammation, trauma and pregnancy. Correlation with other   laboratory and clinical findings is essential.      Lymph # 02/23/2024 1.8  1.0 - 4.8 K/uL Final    Mono # 02/23/2024 0.8  0.3 - 1.0 K/uL Final    Eos # 02/23/2024 0.0  0.0 - 0.5 K/uL Final    Baso # 02/23/2024 0.03  0.00 - 0.20 K/uL Final    nRBC 02/23/2024 0  0 /100 WBC Final     Gran % 02/23/2024 74.2 (H)  38.0 - 73.0 % Final    Lymph % 02/23/2024 17.1 (L)  18.0 - 48.0 % Final    Mono % 02/23/2024 7.9  4.0 - 15.0 % Final    Eosinophil % 02/23/2024 0.2  0.0 - 8.0 % Final    Basophil % 02/23/2024 0.3  0.0 - 1.9 % Final    Differential Method 02/23/2024 Automated   Final    Sodium 02/23/2024 137  136 - 145 mmol/L Final    Potassium 02/23/2024 4.7  3.5 - 5.1 mmol/L Final    Chloride 02/23/2024 109  95 - 110 mmol/L Final    CO2 02/23/2024 23  23 - 29 mmol/L Final    Glucose 02/23/2024 82  70 - 110 mg/dL Final    BUN 02/23/2024 29 (H)  8 - 23 mg/dL Final    Creatinine 02/23/2024 1.7 (H)  0.5 - 1.4 mg/dL Final    Calcium 02/23/2024 9.0  8.7 - 10.5 mg/dL Final    Total Protein 02/23/2024 7.5  6.0 - 8.4 g/dL Final    Albumin 02/23/2024 2.5 (L)  3.5 - 5.2 g/dL Final    Total Bilirubin 02/23/2024 0.3  0.1 - 1.0 mg/dL Final    Comment: For infants and newborns, interpretation of results should be based  on gestational age, weight and in agreement with clinical  observations.    Premature Infant recommended reference ranges:  Up to 24 hours.............<8.0 mg/dL  Up to 48 hours............<12.0 mg/dL  3-5 days..................<15.0 mg/dL  6-29 days.................<15.0 mg/dL      Alkaline Phosphatase 02/23/2024 83  55 - 135 U/L Final    AST 02/23/2024 20  10 - 40 U/L Final    ALT 02/23/2024 9 (L)  10 - 44 U/L Final    eGFR 02/23/2024 28.5 (A)  >60 mL/min/1.73 m^2 Final    Anion Gap 02/23/2024 5 (L)  8 - 16 mmol/L Final    Group & Rh 02/23/2024 O POS   Final    Indirect Jamia 02/23/2024 NEG   Final    Specimen Outdate 02/23/2024 02/26/2024 23:59   Final    Iron 02/23/2024 18 (L)  30 - 160 ug/dL Final    Transferrin 02/23/2024 125 (L)  200 - 375 mg/dL Final    TIBC 02/23/2024 185 (L)  250 - 450 ug/dL Final    Saturated Iron 02/23/2024 10 (L)  20 - 50 % Final    Ferritin 02/23/2024 116  20.0 - 300.0 ng/mL Final    Retic 02/23/2024 1.8  0.5 - 2.5 % Final   Lab Visit on 01/29/2024   Component Date Value  Ref Range Status    WBC 01/29/2024 14.04 (H)  3.90 - 12.70 K/uL Final    RBC 01/29/2024 2.33 (L)  4.00 - 5.40 M/uL Final    Hemoglobin 01/29/2024 6.6 (L)  12.0 - 16.0 g/dL Final    Hematocrit 01/29/2024 21.3 (L)  37.0 - 48.5 % Final    MCV 01/29/2024 91  82 - 98 fL Final    MCH 01/29/2024 28.3  27.0 - 31.0 pg Final    MCHC 01/29/2024 31.0 (L)  32.0 - 36.0 g/dL Final    RDW 01/29/2024 18.6 (H)  11.5 - 14.5 % Final    Platelets 01/29/2024 465 (H)  150 - 450 K/uL Final    MPV 01/29/2024 9.0 (L)  9.2 - 12.9 fL Final    Immature Granulocytes 01/29/2024 0.4  0.0 - 0.5 % Final    Gran # (ANC) 01/29/2024 11.2 (H)  1.8 - 7.7 K/uL Final    Immature Grans (Abs) 01/29/2024 0.05 (H)  0.00 - 0.04 K/uL Final    Comment: Mild elevation in immature granulocytes is non specific and   can be seen in a variety of conditions including stress response,   acute inflammation, trauma and pregnancy. Correlation with other   laboratory and clinical findings is essential.      Lymph # 01/29/2024 1.5  1.0 - 4.8 K/uL Final    Mono # 01/29/2024 1.2 (H)  0.3 - 1.0 K/uL Final    Eos # 01/29/2024 0.1  0.0 - 0.5 K/uL Final    Baso # 01/29/2024 0.03  0.00 - 0.20 K/uL Final    nRBC 01/29/2024 0  0 /100 WBC Final    Gran % 01/29/2024 80.0 (H)  38.0 - 73.0 % Final    Lymph % 01/29/2024 10.4 (L)  18.0 - 48.0 % Final    Mono % 01/29/2024 8.5  4.0 - 15.0 % Final    Eosinophil % 01/29/2024 0.5  0.0 - 8.0 % Final    Basophil % 01/29/2024 0.2  0.0 - 1.9 % Final    Differential Method 01/29/2024 Automated   Final   Lab Visit on 01/09/2024   Component Date Value Ref Range Status    WBC 01/09/2024 11.26  3.90 - 12.70 K/uL Final    RBC 01/09/2024 2.57 (L)  4.00 - 5.40 M/uL Final    Hemoglobin 01/09/2024 7.2 (L)  12.0 - 16.0 g/dL Final    Hematocrit 01/09/2024 23.5 (L)  37.0 - 48.5 % Final    MCV 01/09/2024 91  82 - 98 fL Final    MCH 01/09/2024 28.0  27.0 - 31.0 pg Final    MCHC 01/09/2024 30.6 (L)  32.0 - 36.0 g/dL Final    RDW 01/09/2024 17.7 (H)  11.5 - 14.5 %  Final    Platelets 01/09/2024 418  150 - 450 K/uL Final    MPV 01/09/2024 9.6  9.2 - 12.9 fL Final    Immature Granulocytes 01/09/2024 0.4  0.0 - 0.5 % Final    Gran # (ANC) 01/09/2024 8.2 (H)  1.8 - 7.7 K/uL Final    Immature Grans (Abs) 01/09/2024 0.04  0.00 - 0.04 K/uL Final    Comment: Mild elevation in immature granulocytes is non specific and   can be seen in a variety of conditions including stress response,   acute inflammation, trauma and pregnancy. Correlation with other   laboratory and clinical findings is essential.      Lymph # 01/09/2024 1.9  1.0 - 4.8 K/uL Final    Mono # 01/09/2024 0.9  0.3 - 1.0 K/uL Final    Eos # 01/09/2024 0.2  0.0 - 0.5 K/uL Final    Baso # 01/09/2024 0.03  0.00 - 0.20 K/uL Final    nRBC 01/09/2024 0  0 /100 WBC Final    Gran % 01/09/2024 72.7  38.0 - 73.0 % Final    Lymph % 01/09/2024 16.9 (L)  18.0 - 48.0 % Final    Mono % 01/09/2024 7.9  4.0 - 15.0 % Final    Eosinophil % 01/09/2024 1.8  0.0 - 8.0 % Final    Basophil % 01/09/2024 0.3  0.0 - 1.9 % Final    Differential Method 01/09/2024 Automated   Final    TSH 01/09/2024 0.711  0.400 - 4.000 uIU/mL Final    Comment: Warning:  Heterophilic antibodies in serum or plasma of   certain individuals are known to cause interference with   immunoassays. These antibodies may be present in blood samples   from individuals regularly exposed to animal or who have been   treated with animal products.     Patients taking high doses of supplemental biotin may have  negatively biased results.       Sodium 01/09/2024 140  136 - 145 mmol/L Final    Potassium 01/09/2024 4.9  3.5 - 5.1 mmol/L Final    Chloride 01/09/2024 107  95 - 110 mmol/L Final    CO2 01/09/2024 22 (L)  23 - 29 mmol/L Final    Glucose 01/09/2024 93  70 - 110 mg/dL Final    BUN 01/09/2024 32 (H)  7 - 17 mg/dL Final    Creatinine 01/09/2024 1.59 (H)  0.50 - 1.40 mg/dL Final    Calcium 01/09/2024 9.3  8.7 - 10.5 mg/dL Final    Total Protein 01/09/2024 7.7  6.0 - 8.4 g/dL Final     Albumin 01/09/2024 3.5  3.5 - 5.2 g/dL Final    Total Bilirubin 01/09/2024 0.5  0.1 - 1.0 mg/dL Final    Comment: For infants and newborns, interpretation of results should be based  on gestational age, weight and in agreement with clinical  observations.    Premature Infant recommended reference ranges:  Up to 24 hours.............<8.0 mg/dL  Up to 48 hours............<12.0 mg/dL  3-5 days..................<15.0 mg/dL  6-29 days.................<15.0 mg/dL      Alkaline Phosphatase 01/09/2024 86  38 - 126 U/L Final    AST 01/09/2024 27  15 - 46 U/L Final    ALT 01/09/2024 13  10 - 44 U/L Final    Anion Gap 01/09/2024 11  8 - 16 mmol/L Final    eGFR 01/09/2024 30.9 (A)  >60 mL/min/1.73 m^2 Final    WBC 01/09/2024 11.29  3.90 - 12.70 K/uL Final    RBC 01/09/2024 2.57 (L)  4.00 - 5.40 M/uL Final    Hemoglobin 01/09/2024 7.1 (L)  12.0 - 16.0 g/dL Final    Hematocrit 01/09/2024 23.4 (L)  37.0 - 48.5 % Final    MCV 01/09/2024 91  82 - 98 fL Final    MCH 01/09/2024 27.6  27.0 - 31.0 pg Final    MCHC 01/09/2024 30.3 (L)  32.0 - 36.0 g/dL Final    RDW 01/09/2024 17.9 (H)  11.5 - 14.5 % Final    Platelets 01/09/2024 414  150 - 450 K/uL Final    MPV 01/09/2024 9.4  9.2 - 12.9 fL Final    Immature Granulocytes 01/09/2024 1.9 (H)  0.0 - 0.5 % Final    Gran # (ANC) 01/09/2024 8.0 (H)  1.8 - 7.7 K/uL Final    Immature Grans (Abs) 01/09/2024 0.21 (H)  0.00 - 0.04 K/uL Final    Comment: Mild elevation in immature granulocytes is non specific and   can be seen in a variety of conditions including stress response,   acute inflammation, trauma and pregnancy. Correlation with other   laboratory and clinical findings is essential.      Lymph # 01/09/2024 1.9  1.0 - 4.8 K/uL Final    Mono # 01/09/2024 0.9  0.3 - 1.0 K/uL Final    Eos # 01/09/2024 0.2  0.0 - 0.5 K/uL Final    Baso # 01/09/2024 0.04  0.00 - 0.20 K/uL Final    nRBC 01/09/2024 0  0 /100 WBC Final    Gran % 01/09/2024 71.1  38.0 - 73.0 % Final    Lymph % 01/09/2024 16.7  (L)  18.0 - 48.0 % Final    Mono % 01/09/2024 8.2  4.0 - 15.0 % Final    Eosinophil % 01/09/2024 1.7  0.0 - 8.0 % Final    Basophil % 01/09/2024 0.4  0.0 - 1.9 % Final    Differential Method 01/09/2024 Automated   Final    Hemoglobin A1C 01/09/2024 5.0  4.0 - 5.6 % Final    Comment: ADA Screening Guidelines:  5.7-6.4%  Consistent with prediabetes  >or=6.5%  Consistent with diabetes    High levels of fetal hemoglobin interfere with the HbA1C  assay. Heterozygous hemoglobin variants (HbS, HgC, etc)do  not significantly interfere with this assay.   However, presence of multiple variants may affect accuracy.      Estimated Avg Glucose 01/09/2024 97  68 - 131 mg/dL Final    Free T4 01/09/2024 1.03  0.71 - 1.51 ng/dL Final       Assessment & Plan:      Melody was seen today for diabetes and vaginal pain.  Urine will be sent for urinalysis and culture.    Hydrochlorothiazide will be discontinued and Bumex will be ordered to help manage lower extremity edema.  The patient has been encouraged to elevate her legs while seated for extended periods.  Bumex therapy will be initially utilized every other day.  The dose can be adjusted based on patient's clinical response.    Current medications will be continued for management of diabetes and hypertension.  Her anemia is being managed by Hematology-Oncology.    We discussed the recommended RSV vaccine which is available through the patient's local pharmacy.    Diagnoses and all orders for this visit:    Type 2 diabetes mellitus with chronic kidney disease, without long-term current use of insulin, unspecified CKD stage  -     Urinalysis; Future  -     Urine culture; Future  -     Comprehensive Metabolic Panel; Future  -     CBC Auto Differential; Future  -     Hemoglobin A1C; Future    Essential hypertension  -     Urinalysis; Future  -     Urine culture; Future  -     Comprehensive Metabolic Panel; Future  -     CBC Auto Differential; Future  -     Hemoglobin A1C;  Future    Stage 3b chronic kidney disease  -     Urinalysis; Future  -     Urine culture; Future  -     Comprehensive Metabolic Panel; Future  -     CBC Auto Differential; Future  -     Hemoglobin A1C; Future    Other hyperlipidemia    Chronic constipation    Anemia, unspecified type    Other orders  -     traMADoL (ULTRAM) 50 mg tablet; Take 1 tablet (50 mg total) by mouth every 12 (twelve) hours as needed for Pain.  -     lactulose (CHRONULAC) 10 gram/15 mL solution; Take 30 mLs (20 g total) by mouth once daily. For chronic constipation.  -     bumetanide (BUMEX) 1 MG tablet; Take 1 tab every other day for fluid.         No follow-ups on file.     Zac Andres MD

## 2024-03-22 ENCOUNTER — TELEPHONE (OUTPATIENT)
Dept: INTERNAL MEDICINE | Facility: CLINIC | Age: 89
End: 2024-03-22
Payer: MEDICARE

## 2024-03-22 LAB — BACTERIA UR CULT: ABNORMAL

## 2024-03-22 RX ORDER — AMOXICILLIN 875 MG/1
875 TABLET, FILM COATED ORAL EVERY 12 HOURS
Qty: 14 TABLET | Refills: 0 | Status: SHIPPED | OUTPATIENT
Start: 2024-03-22 | End: 2024-03-29

## 2024-03-22 NOTE — TELEPHONE ENCOUNTER
----- Message from Yolanda Wilks MA sent at 3/18/2024  3:11 PM CDT -----  Regarding: urine test done 3/18 OK

## 2024-03-22 NOTE — TELEPHONE ENCOUNTER
I reached patient after getting daughters no voicemail.    She will tell daughter to  rx.    I also sent email.

## 2024-03-22 NOTE — TELEPHONE ENCOUNTER
A prescription order for amoxicillin 8/75 mg every 12 hours for 7 days has been sent to the patient's pharmacy.

## 2024-03-24 NOTE — TELEPHONE ENCOUNTER
No care due was identified.  [facility] Embedded Care Due Messages. Reference number: [id number].   3/24/2024 7:13:44 AM CDT

## 2024-03-25 NOTE — TELEPHONE ENCOUNTER
Refill Routing Note   Medication(s) are not appropriate for processing by [facility] for the following reason(s):        Required labs outdated    [facility] action(s):  Defer               Appointments  past 12m or future 3m with PCP    Date Provider   Last Visit   3/18/2024 Zac Andres MD   Next Visit   7/23/2024 Zac Andres MD   ED visits in past 90 days: 0        Note composed:3:36 PM 03/25/2024

## 2024-03-26 RX ORDER — ATORVASTATIN CALCIUM 20 MG/1
20 TABLET, FILM COATED ORAL
Qty: 90 TABLET | Refills: 0 | Status: SHIPPED | OUTPATIENT
Start: 2024-03-26

## 2024-05-08 DIAGNOSIS — D63.1 ANEMIA OF CHRONIC KIDNEY FAILURE, STAGE 3 (MODERATE): ICD-10-CM

## 2024-05-08 DIAGNOSIS — N18.30 ANEMIA OF CHRONIC KIDNEY FAILURE, STAGE 3 (MODERATE): ICD-10-CM

## 2024-05-23 ENCOUNTER — OFFICE VISIT (OUTPATIENT)
Dept: HEMATOLOGY/ONCOLOGY | Facility: CLINIC | Age: 89
End: 2024-05-23
Payer: MEDICARE

## 2024-05-23 VITALS — WEIGHT: 107.25 LBS | HEIGHT: 61 IN | BODY MASS INDEX: 20.25 KG/M2

## 2024-05-23 DIAGNOSIS — D63.1 ANEMIA OF CHRONIC KIDNEY FAILURE, STAGE 3 (MODERATE): Primary | ICD-10-CM

## 2024-05-23 DIAGNOSIS — N18.30 ANEMIA OF CHRONIC KIDNEY FAILURE, STAGE 3 (MODERATE): Primary | ICD-10-CM

## 2024-05-23 PROCEDURE — 99999 PR PBB SHADOW E&M-EST. PATIENT-LVL IV: CPT | Mod: PBBFAC,,, | Performed by: PHYSICIAN ASSISTANT

## 2024-05-23 PROCEDURE — 1160F RVW MEDS BY RX/DR IN RCRD: CPT | Mod: CPTII,S$GLB,, | Performed by: PHYSICIAN ASSISTANT

## 2024-05-23 PROCEDURE — 99213 OFFICE O/P EST LOW 20 MIN: CPT | Mod: S$GLB,,, | Performed by: PHYSICIAN ASSISTANT

## 2024-05-23 PROCEDURE — 3288F FALL RISK ASSESSMENT DOCD: CPT | Mod: CPTII,S$GLB,, | Performed by: PHYSICIAN ASSISTANT

## 2024-05-23 PROCEDURE — 1101F PT FALLS ASSESS-DOCD LE1/YR: CPT | Mod: CPTII,S$GLB,, | Performed by: PHYSICIAN ASSISTANT

## 2024-05-23 PROCEDURE — 1159F MED LIST DOCD IN RCRD: CPT | Mod: CPTII,S$GLB,, | Performed by: PHYSICIAN ASSISTANT

## 2024-05-23 PROCEDURE — 1126F AMNT PAIN NOTED NONE PRSNT: CPT | Mod: CPTII,S$GLB,, | Performed by: PHYSICIAN ASSISTANT

## 2024-05-23 RX ORDER — HYDROCHLOROTHIAZIDE 25 MG/1
25 TABLET ORAL
COMMUNITY
Start: 2024-05-12 | End: 2024-06-05

## 2024-05-24 NOTE — PROGRESS NOTES
HEMATOLOGIC MALIGNANCIES PROGRESS NOTE    IDENTIFYING STATEMENT   Melody Armstrong (Melody) is a 89 y.o. female with a  of 1934 from [place], [place] with the diagnosis of anemia of chronic kidney disease.      HEMATOLOGY HISTORY:    Anemia of chronic kidney disease  Hypertension  Coronary artery disease  Chronic kidney disease, stage IV  Diabetes mellitus, type 2  Gastroesophageal reflux disease    INTERVAL HISTORY:      Ms. Armstrong returns to clinic in follow-up of anemia of CKD. She was initially on epoetin dodie, but she stopped. She developed severe anemia but did not receive transfusion. She was dyspneic, and her daughter restarted epoetin dodie. She has continued 10,000u weekly with minimal response - however she is feeling better. Denies any sources of bleeding, any SOB/chest pain, nor any other new complaints.    Discussed dose increase to 20,000u weekly with continued close lab monitoring to which patient is agreeable.     Past Medical History, Past Social History and Past Family History have been reviewed and are unchanged except as noted in the interval history.    MEDICATIONS:     Current Outpatient Medications on File Prior to Visit   Medication Sig Dispense Refill    acetaminophen (TYLENOL) 325 MG tablet Take 325 mg by mouth every 6 (six) hours as needed for Pain.      aspirin 325 MG tablet Take 1 tablet by mouth once daily.      atorvastatin (LIPITOR) 20 MG tablet TAKE 1 TABLET BY MOUTH EVERY DAY 90 tablet 0    blood sugar diagnostic Strp To check BG once daily, to use with insurance preferred meter 100 strip 3    bumetanide (BUMEX) 1 MG tablet Take 1 tab every other day for fluid. 45 tablet 3    cyanocobalamin (VITAMIN B-12) 1000 MCG tablet Take 1 tablet by mouth.      diclofenac sodium (VOLTAREN) 1 % Gel Apply 2 g topically 4 (four) times daily. 1 Tube 2    epoetin dodie-epbx (RETACRIT) 10,000 unit/mL imjection Inject 1.0 mLs (10,000 Units total) into the skin every 7 days as needed 4 mL 6     epoetin dodie-epbx (RETACRIT) 10,000 unit/mL imjection Inject 1 mL (10,000 Units total) into the skin every 7 days as needed 4 mL 6    gabapentin (NEURONTIN) 100 MG capsule Take 1 capsule (100 mg total) by mouth 3 (three) times daily. 270 capsule 3    hydroCHLOROthiazide (HYDRODIURIL) 25 MG tablet Take 25 mg by mouth.      lactulose (CHRONULAC) 10 gram/15 mL solution Take 30 mLs (20 g total) by mouth once daily. For chronic constipation. 946 mL 5    lancets Misc To check BG once daily, to use with insurance preferred meter 100 each 3    levocetirizine (XYZAL) 5 MG tablet TAKE 1 TABLET BY MOUTH DAILY AS NEEDED FOR ALLERGIES. 90 tablet 3    losartan (COZAAR) 50 MG tablet TAKE 1 TABLET BY MOUTH EVERY DAY FOR HIGH BLOOD PRESSURE 90 tablet 1    meclizine (ANTIVERT) 12.5 mg tablet Take 1 tablet by mouth three times a day as needed for dizziness 90 tablet 0    metoprolol succinate (TOPROL-XL) 25 MG 24 hr tablet TAKE 1 TABLET BY MOUTH EVERY DAY FOR HIGH BLOOD PRESSURE 90 tablet 1    multivitamin (THERAGRAN) per tablet Take 1 tablet by mouth once daily.      naloxone (NARCAN) 4 mg/actuation Spry 4mg by nasal route as needed for opioid overdose; may repeat every 2-3 minutes in alternating nostrils until medical help arrives. Call 911 1 each 11    traMADoL (ULTRAM) 50 mg tablet Take 1 tablet (50 mg total) by mouth every 12 (twelve) hours as needed for Pain. 180 tablet 1    walker Misc Use daily for assisted ambulation 1 each 0    [DISCONTINUED] blood-glucose meter kit To check BG once daily, to use with insurance preferred meter (Patient not taking: Reported on 9/21/2020) 1 each 0     No current facility-administered medications on file prior to visit.       ALLERGIES: Review of patient's allergies indicates:  No Known Allergies     ROS:       Review of Systems   Constitutional:  Negative for diaphoresis, fatigue, fever and unexpected weight change.   HENT:   Negative for lump/mass and sore throat.    Eyes:  Negative for  "icterus.   Respiratory:  Positive for shortness of breath. Negative for cough.    Cardiovascular:  Negative for chest pain and palpitations.   Gastrointestinal:  Negative for abdominal distention, constipation, diarrhea, nausea and vomiting.   Genitourinary:  Negative for dysuria and frequency.    Musculoskeletal:  Negative for arthralgias, gait problem and myalgias.   Skin:  Negative for rash.   Neurological:  Negative for dizziness, gait problem and headaches.   Hematological:  Negative for adenopathy. Does not bruise/bleed easily.   Psychiatric/Behavioral:  The patient is not nervous/anxious.        PHYSICAL EXAM:  Vitals:    05/23/24 1032   BP: (!) (P) 116/59   Pulse: (P) 90   Temp: (P) 98.4 °F (36.9 °C)   TempSrc: (P) Oral   SpO2: (P) 99%   Weight: 48.6 kg (107 lb 4.1 oz)   Height: 5' 1" (1.549 m)   PainSc: 0-No pain       Physical Exam  Constitutional:       General: She is not in acute distress.     Appearance: She is well-developed.   HENT:      Head: Normocephalic and atraumatic.      Mouth/Throat:      Mouth: No oral lesions.   Eyes:      Conjunctiva/sclera: Conjunctivae normal.   Neck:      Thyroid: No thyromegaly.   Cardiovascular:      Rate and Rhythm: Normal rate and regular rhythm.      Heart sounds: Normal heart sounds. No murmur heard.  Pulmonary:      Breath sounds: Normal breath sounds. No wheezing or rales.   Abdominal:      General: There is no distension.      Palpations: Abdomen is soft. There is no hepatomegaly, splenomegaly or mass.      Tenderness: There is no abdominal tenderness.   Lymphadenopathy:      Cervical: No cervical adenopathy.      Right cervical: No deep cervical adenopathy.     Left cervical: No deep cervical adenopathy.   Skin:     Findings: No rash.   Neurological:      Mental Status: She is alert and oriented to person, place, and time.      Cranial Nerves: No cranial nerve deficit.      Coordination: Coordination normal.      Deep Tendon Reflexes: Reflexes are normal and " symmetric.         LAB:   Results for orders placed or performed in visit on 05/13/24   CBC Auto Differential   Result Value Ref Range    WBC 10.28 3.90 - 12.70 K/uL    RBC 2.71 (L) 4.00 - 5.40 M/uL    Hemoglobin 7.7 (L) 12.0 - 16.0 g/dL    Hematocrit 25.1 (L) 37.0 - 48.5 %    MCV 93 82 - 98 fL    MCH 28.4 27.0 - 31.0 pg    MCHC 30.7 (L) 32.0 - 36.0 g/dL    RDW 17.7 (H) 11.5 - 14.5 %    Platelets 501 (H) 150 - 450 K/uL    MPV 9.1 (L) 9.2 - 12.9 fL    Immature Granulocytes 0.4 0.0 - 0.5 %    Gran # (ANC) 8.0 (H) 1.8 - 7.7 K/uL    Immature Grans (Abs) 0.04 0.00 - 0.04 K/uL    Lymph # 1.3 1.0 - 4.8 K/uL    Mono # 0.8 0.3 - 1.0 K/uL    Eos # 0.1 0.0 - 0.5 K/uL    Baso # 0.03 0.00 - 0.20 K/uL    nRBC 0 0 /100 WBC    Gran % 78.2 (H) 38.0 - 73.0 %    Lymph % 12.5 (L) 18.0 - 48.0 %    Mono % 7.7 4.0 - 15.0 %    Eosinophil % 1.3 0.0 - 8.0 %    Basophil % 0.3 0.0 - 1.9 %    Differential Method Automated    Comprehensive Metabolic Panel   Result Value Ref Range    Sodium 136 136 - 145 mmol/L    Potassium 4.7 3.5 - 5.1 mmol/L    Chloride 107 95 - 110 mmol/L    CO2 21 (L) 23 - 29 mmol/L    Glucose 84 70 - 110 mg/dL    BUN 37 (H) 8 - 23 mg/dL    Creatinine 1.5 (H) 0.5 - 1.4 mg/dL    Calcium 8.9 8.7 - 10.5 mg/dL    Total Protein 6.7 6.0 - 8.4 g/dL    Albumin 2.2 (L) 3.5 - 5.2 g/dL    Total Bilirubin 0.3 0.1 - 1.0 mg/dL    Alkaline Phosphatase 76 55 - 135 U/L    AST 19 10 - 40 U/L    ALT 11 10 - 44 U/L    eGFR 33.1 (A) >60 mL/min/1.73 m^2    Anion Gap 8 8 - 16 mmol/L   Iron and TIBC   Result Value Ref Range    Iron 20 (L) 30 - 160 ug/dL    Transferrin 127 (L) 200 - 375 mg/dL    TIBC 188 (L) 250 - 450 ug/dL    Saturated Iron 11 (L) 20 - 50 %   Ferritin   Result Value Ref Range    Ferritin 80 20.0 - 300.0 ng/mL   Reticulocytes   Result Value Ref Range    Retic 2.3 0.5 - 2.5 %   Type & Screen   Result Value Ref Range    Group & Rh O POS     Indirect Jamia NEG     Specimen Outdate 05/16/2024 23:59        PROBLEMS ASSESSED THIS  VISIT:    1. Anemia of chronic kidney failure, stage 3 (moderate)        PLAN:       Anemia of chronic kidney disease  Ms. Armstrong has anemia of chronic kidney disease. She had stopped taking erythropoietin with decrease in hemoglobin, but she is restarting. This is being administered by her daughter.    She is not responding well to 10,000u dosing, we will increase to 20,000u/weekly with continued q2week monitoring.     I emphasized the importance of close monitoring on this agent for safety and dose adjustment as necessary.     Follow-up  Labs every 2 weeks  Clinic follow-up in 3 months         BMT Chart Routing      Follow up with physician    Follow up with BARNEY 3 months. f/u with rigo in 3 months   Provider visit type    Infusion scheduling note    Injection scheduling note    Labs CBC   Scheduling:  Preferred lab:  Lab interval:  q2wk labs at [place]   Imaging    Pharmacy appointment    Other referrals                      Rigo Huston PA-C  Hematology and Stem Cell Transplant

## 2024-06-05 RX ORDER — LOSARTAN POTASSIUM 50 MG/1
TABLET ORAL
Qty: 90 TABLET | Refills: 3 | Status: SHIPPED | OUTPATIENT
Start: 2024-06-05

## 2024-06-05 RX ORDER — METOPROLOL SUCCINATE 25 MG/1
TABLET, EXTENDED RELEASE ORAL
Qty: 90 TABLET | Refills: 3 | Status: SHIPPED | OUTPATIENT
Start: 2024-06-05

## 2024-06-05 RX ORDER — HYDROCHLOROTHIAZIDE 25 MG/1
25 TABLET ORAL
Qty: 90 TABLET | Refills: 3 | Status: SHIPPED | OUTPATIENT
Start: 2024-06-05

## 2024-06-05 NOTE — TELEPHONE ENCOUNTER
No care due was identified.  [facility] Embedded Care Due Messages. Reference number: [id number].   6/05/2024 10:24:34 AM CDT

## 2024-06-05 NOTE — TELEPHONE ENCOUNTER
Refill Routing Note   Medication(s) are not appropriate for processing by [facility] for the following reason(s):        No active prescription written by provider  Required labs abnormal    [facility] action(s):  Defer  Approve             Appointments  past 12m or future 3m with PCP    Date Provider   Last Visit   3/18/2024 Zac Andres MD   Next Visit   7/23/2024 Zac Andres MD   ED visits in past 90 days: 0        Note composed:12:34 PM 06/05/2024

## 2024-06-07 RX ORDER — GABAPENTIN 100 MG/1
CAPSULE ORAL
Qty: 270 CAPSULE | Refills: 1 | Status: SHIPPED | OUTPATIENT
Start: 2024-06-07

## 2024-06-07 NOTE — TELEPHONE ENCOUNTER
No care due was identified.  [facility] Embedded Care Due Messages. Reference number: [id number].   6/07/2024 1:21:18 PM CDT

## 2024-06-25 ENCOUNTER — TELEPHONE (OUTPATIENT)
Dept: HEMATOLOGY/ONCOLOGY | Facility: CLINIC | Age: 89
End: 2024-06-25
Payer: MEDICARE

## 2024-06-25 NOTE — TELEPHONE ENCOUNTER
Have attempted calling patient and her daughter (emergency contact) two times (yesterday and today). No answer. Hgb 6.4. Patient needs a blood transfusion. Would recommend ER for evaluation and transfusion.     Sent patient portal message.    Diana Huston PA-C  Malignant Hematology & Bone Marrow Transplant

## 2024-07-16 ENCOUNTER — TELEPHONE (OUTPATIENT)
Dept: HEMATOLOGY/ONCOLOGY | Facility: CLINIC | Age: 89
End: 2024-07-16
Payer: MEDICARE

## 2024-07-16 NOTE — TELEPHONE ENCOUNTER
Attempted to call pt multiple times to discuss lab results. Unable to reach pt. Will send portal message as well.

## 2024-07-19 RX ORDER — ATORVASTATIN CALCIUM 20 MG/1
20 TABLET, FILM COATED ORAL
Qty: 90 TABLET | Refills: 0 | Status: SHIPPED | OUTPATIENT
Start: 2024-07-19

## 2024-07-19 NOTE — TELEPHONE ENCOUNTER
Refill Routing Note   Medication(s) are not appropriate for processing by [facility] for the following reason(s):        Required labs outdated    [facility] action(s):  Defer     Requires labs : Yes             Appointments  past 12m or future 3m with PCP    Date Provider   Last Visit   3/18/2024 Zac Andres MD   Next Visit   7/23/2024 Zac Andres MD   ED visits in past 90 days: 0        Note composed:12:14 PM 07/19/2024

## 2024-07-19 NOTE — TELEPHONE ENCOUNTER
Care Due:                  Date            Visit Type   Department     Provider  --------------------------------------------------------------------------------                                EP -                              PRIMARY      [facility] INTERNAL  Last Visit: 03-      [facility] ([facility])   MEDICINE       Zacjason Andres                              EP -                              PRIMARY      [facility] INTERNAL  Next Visit: 07-      [facility] ([facility])   MEDICINE       Zackyra Andres                                                            Last  Test          Frequency    Reason                     Performed    Due Date  --------------------------------------------------------------------------------    Lipid Panel.  12 months..  atorvastatin.............  03-   03-    [facility] Embedded Care Due Messages. Reference number: [id number].   7/19/2024 10:55:23 AM CDT

## 2024-07-23 ENCOUNTER — OFFICE VISIT (OUTPATIENT)
Dept: INTERNAL MEDICINE | Facility: CLINIC | Age: 89
End: 2024-07-23
Payer: MEDICARE

## 2024-07-23 VITALS
RESPIRATION RATE: 17 BRPM | OXYGEN SATURATION: 96 % | DIASTOLIC BLOOD PRESSURE: 54 MMHG | WEIGHT: 107.56 LBS | HEART RATE: 127 BPM | HEIGHT: 61 IN | TEMPERATURE: 97 F | SYSTOLIC BLOOD PRESSURE: 110 MMHG | BODY MASS INDEX: 20.31 KG/M2

## 2024-07-23 DIAGNOSIS — E11.22 TYPE 2 DIABETES MELLITUS WITH CHRONIC KIDNEY DISEASE, WITHOUT LONG-TERM CURRENT USE OF INSULIN, UNSPECIFIED CKD STAGE: Primary | ICD-10-CM

## 2024-07-23 DIAGNOSIS — E78.49 OTHER HYPERLIPIDEMIA: ICD-10-CM

## 2024-07-23 DIAGNOSIS — N18.32 STAGE 3B CHRONIC KIDNEY DISEASE: ICD-10-CM

## 2024-07-23 DIAGNOSIS — M79.604 RIGHT LEG PAIN: ICD-10-CM

## 2024-07-23 DIAGNOSIS — I10 ESSENTIAL HYPERTENSION: ICD-10-CM

## 2024-07-23 DIAGNOSIS — R26.89 ABNORMALITY OF GAIT DUE TO IMPAIRMENT OF BALANCE: ICD-10-CM

## 2024-07-23 DIAGNOSIS — M15.9 PRIMARY OSTEOARTHRITIS INVOLVING MULTIPLE JOINTS: Chronic | ICD-10-CM

## 2024-07-23 PROCEDURE — 3288F FALL RISK ASSESSMENT DOCD: CPT | Mod: CPTII,S$GLB,, | Performed by: INTERNAL MEDICINE

## 2024-07-23 PROCEDURE — 1125F AMNT PAIN NOTED PAIN PRSNT: CPT | Mod: CPTII,S$GLB,, | Performed by: INTERNAL MEDICINE

## 2024-07-23 PROCEDURE — 1101F PT FALLS ASSESS-DOCD LE1/YR: CPT | Mod: CPTII,S$GLB,, | Performed by: INTERNAL MEDICINE

## 2024-07-23 PROCEDURE — 99214 OFFICE O/P EST MOD 30 MIN: CPT | Mod: S$GLB,,, | Performed by: INTERNAL MEDICINE

## 2024-07-23 PROCEDURE — 1160F RVW MEDS BY RX/DR IN RCRD: CPT | Mod: CPTII,S$GLB,, | Performed by: INTERNAL MEDICINE

## 2024-07-23 PROCEDURE — 1159F MED LIST DOCD IN RCRD: CPT | Mod: CPTII,S$GLB,, | Performed by: INTERNAL MEDICINE

## 2024-07-23 PROCEDURE — 99999 PR PBB SHADOW E&M-EST. PATIENT-LVL V: CPT | Mod: PBBFAC,,, | Performed by: INTERNAL MEDICINE

## 2024-07-23 RX ORDER — TRAMADOL HYDROCHLORIDE 50 MG/1
50 TABLET ORAL EVERY 12 HOURS PRN
Qty: 180 TABLET | Refills: 1 | Status: SHIPPED | OUTPATIENT
Start: 2024-07-23

## 2024-07-23 NOTE — PROGRESS NOTES
Subjective:       Patient ID: Melody Armstrong is a 89 y.o. female.    Chief Complaint: 5m f/u and Leg Pain (Right leg)    HPI  The patient presents for follow-up of medical conditions which include type 2 diabetes mellitus, hypertension, hyperlipidemia, chronic kidney disease, osteoarthritis, and right leg pain.  Patient states she has been doing well.  She has noted pain in her lower right leg for the past 2-3 weeks.  She is on an increased dose of Retacrit for her anemia of renal disease.  She is not experiencing any lower extremity swelling or rash.  She is followed by Hematology/Oncology.    The patient has type 2 diabetes mellitus.  Her diabetes is diet controlled.  She does not monitor blood sugar levels at home.    She has hypertension.  Blood pressure medications include metoprolol, losartan and hydrochlorothiazide.  She has hyperlipidemia and uses atorvastatin for management.  She does not monitor blood pressures at home.    Review of Systems   Constitutional:  Negative for activity change, appetite change and unexpected weight change.   Eyes:  Negative for visual disturbance.   Respiratory:  Negative for shortness of breath.    Cardiovascular:  Negative for chest pain, palpitations and leg swelling.   Gastrointestinal:  Negative for abdominal pain, blood in stool and diarrhea.   Genitourinary:  Negative for dysuria, frequency, hematuria and urgency.   Musculoskeletal:  Positive for leg pain.   Neurological:  Negative for weakness, numbness and headaches.   Psychiatric/Behavioral:  Negative for sleep disturbance.             Physical Exam  Vitals and nursing note reviewed.   Constitutional:       General: She is not in acute distress.     Appearance: Normal appearance. She is well-developed.   HENT:      Head: Normocephalic and atraumatic.   Eyes:      General: No scleral icterus.     Extraocular Movements: Extraocular movements intact.      Conjunctiva/sclera: Conjunctivae normal.   Neck:      Thyroid:  No thyromegaly.      Vascular: No JVD.   Cardiovascular:      Rate and Rhythm: Normal rate and regular rhythm.      Heart sounds: Normal heart sounds. No murmur heard.     No friction rub. No gallop.   Pulmonary:      Effort: Pulmonary effort is normal. No respiratory distress.      Breath sounds: Normal breath sounds. No wheezing or rales.   Abdominal:      General: Bowel sounds are normal.      Palpations: Abdomen is soft. There is no mass.      Tenderness: There is no abdominal tenderness. There is no right CVA tenderness or left CVA tenderness.   Musculoskeletal:         General: Tenderness (Tenderness of the distal right leg on palpation.) present. Normal range of motion.      Cervical back: Normal range of motion and neck supple.      Right lower leg: No edema.      Left lower leg: No edema.   Lymphadenopathy:      Cervical: No cervical adenopathy.   Skin:     General: Skin is warm and dry.      Findings: No rash.      Comments: No foot lesions are present.   Neurological:      Mental Status: She is alert and oriented to person, place, and time.      Cranial Nerves: No cranial nerve deficit.      Comments: Sensory exam is intact in both feet on monofilament and vibration testing.   Psychiatric:         Mood and Affect: Mood normal.         Behavior: Behavior normal.         Protective Sensation (w/ 10 gram monofilament):  Right: Intact  Left: Intact    Visual Inspection:  Normal -  Bilateral    Pedal Pulses:   Right: Present  Left: Present    Posterior Tibialis Pulses:   Right:Present  Left: Present    Lab Visit on 07/16/2024   Component Date Value Ref Range Status    Sodium 07/16/2024 138  136 - 145 mmol/L Final    Potassium 07/16/2024 4.5  3.5 - 5.1 mmol/L Final    Chloride 07/16/2024 106  95 - 110 mmol/L Final    CO2 07/16/2024 24  23 - 29 mmol/L Final    Glucose 07/16/2024 77  70 - 110 mg/dL Final    BUN 07/16/2024 21  8 - 23 mg/dL Final    Creatinine 07/16/2024 1.3  0.5 - 1.4 mg/dL Final    Calcium  07/16/2024 8.7  8.7 - 10.5 mg/dL Final    Total Protein 07/16/2024 7.2  6.0 - 8.4 g/dL Final    Albumin 07/16/2024 2.1 (L)  3.5 - 5.2 g/dL Final    Total Bilirubin 07/16/2024 0.3  0.1 - 1.0 mg/dL Final    Comment: For infants and newborns, interpretation of results should be based  on gestational age, weight and in agreement with clinical  observations.    Premature Infant recommended reference ranges:  Up to 24 hours.............<8.0 mg/dL  Up to 48 hours............<12.0 mg/dL  3-5 days..................<15.0 mg/dL  6-29 days.................<15.0 mg/dL      Alkaline Phosphatase 07/16/2024 87  55 - 135 U/L Final    AST 07/16/2024 21  10 - 40 U/L Final    ALT 07/16/2024 13  10 - 44 U/L Final    eGFR 07/16/2024 39.3 (A)  >60 mL/min/1.73 m^2 Final    Anion Gap 07/16/2024 8  8 - 16 mmol/L Final    WBC 07/16/2024 9.42  3.90 - 12.70 K/uL Final    RBC 07/16/2024 2.55 (L)  4.00 - 5.40 M/uL Final    Hemoglobin 07/16/2024 7.1 (L)  12.0 - 16.0 g/dL Final    Hematocrit 07/16/2024 23.4 (L)  37.0 - 48.5 % Final    MCV 07/16/2024 92  82 - 98 fL Final    MCH 07/16/2024 27.8  27.0 - 31.0 pg Final    MCHC 07/16/2024 30.3 (L)  32.0 - 36.0 g/dL Final    RDW 07/16/2024 18.2 (H)  11.5 - 14.5 % Final    Platelets 07/16/2024 516 (H)  150 - 450 K/uL Final    MPV 07/16/2024 9.1 (L)  9.2 - 12.9 fL Final    Immature Granulocytes 07/16/2024 0.5  0.0 - 0.5 % Final    Gran # (ANC) 07/16/2024 6.9  1.8 - 7.7 K/uL Final    Immature Grans (Abs) 07/16/2024 0.05 (H)  0.00 - 0.04 K/uL Final    Comment: Mild elevation in immature granulocytes is non specific and   can be seen in a variety of conditions including stress response,   acute inflammation, trauma and pregnancy. Correlation with other   laboratory and clinical findings is essential.      Lymph # 07/16/2024 1.5  1.0 - 4.8 K/uL Final    Mono # 07/16/2024 0.8  0.3 - 1.0 K/uL Final    Eos # 07/16/2024 0.2  0.0 - 0.5 K/uL Final    Baso # 07/16/2024 0.02  0.00 - 0.20 K/uL Final    nRBC 07/16/2024 0   0 /100 WBC Final    Gran % 07/16/2024 73.7 (H)  38.0 - 73.0 % Final    Lymph % 07/16/2024 16.0 (L)  18.0 - 48.0 % Final    Mono % 07/16/2024 8.5  4.0 - 15.0 % Final    Eosinophil % 07/16/2024 1.6  0.0 - 8.0 % Final    Basophil % 07/16/2024 0.2  0.0 - 1.9 % Final    Differential Method 07/16/2024 Automated   Final    Hemoglobin A1C 07/16/2024 4.6  4.0 - 5.6 % Final    Comment: ADA Screening Guidelines:  5.7-6.4%  Consistent with prediabetes  >or=6.5%  Consistent with diabetes    High levels of fetal hemoglobin interfere with the HbA1C  assay. Heterozygous hemoglobin variants (HbS, HgC, etc)do  not significantly interfere with this assay.   However, presence of multiple variants may affect accuracy.      Estimated Avg Glucose 07/16/2024 85  68 - 131 mg/dL Final   Lab Visit on 07/08/2024   Component Date Value Ref Range Status    WBC 07/08/2024 10.38  3.90 - 12.70 K/uL Final    RBC 07/08/2024 2.42 (L)  4.00 - 5.40 M/uL Final    Hemoglobin 07/08/2024 6.9 (L)  12.0 - 16.0 g/dL Final    Hematocrit 07/08/2024 22.0 (L)  37.0 - 48.5 % Final    MCV 07/08/2024 91  82 - 98 fL Final    MCH 07/08/2024 28.5  27.0 - 31.0 pg Final    MCHC 07/08/2024 31.4 (L)  32.0 - 36.0 g/dL Final    RDW 07/08/2024 18.3 (H)  11.5 - 14.5 % Final    Platelets 07/08/2024 538 (H)  150 - 450 K/uL Final    MPV 07/08/2024 9.3  9.2 - 12.9 fL Final    Immature Granulocytes 07/08/2024 0.5  0.0 - 0.5 % Final    Gran # (ANC) 07/08/2024 7.2  1.8 - 7.7 K/uL Final    Immature Grans (Abs) 07/08/2024 0.05 (H)  0.00 - 0.04 K/uL Final    Comment: Mild elevation in immature granulocytes is non specific and   can be seen in a variety of conditions including stress response,   acute inflammation, trauma and pregnancy. Correlation with other   laboratory and clinical findings is essential.      Lymph # 07/08/2024 1.9  1.0 - 4.8 K/uL Final    Mono # 07/08/2024 1.0  0.3 - 1.0 K/uL Final    Eos # 07/08/2024 0.2  0.0 - 0.5 K/uL Final    Baso # 07/08/2024 0.03  0.00 - 0.20  K/uL Final    nRBC 07/08/2024 0  0 /100 WBC Final    Gran % 07/08/2024 69.5  38.0 - 73.0 % Final    Lymph % 07/08/2024 18.7  18.0 - 48.0 % Final    Mono % 07/08/2024 9.8  4.0 - 15.0 % Final    Eosinophil % 07/08/2024 1.7  0.0 - 8.0 % Final    Basophil % 07/08/2024 0.3  0.0 - 1.9 % Final    Differential Method 07/08/2024 Automated   Final   Lab Visit on 06/24/2024   Component Date Value Ref Range Status    WBC 06/24/2024 9.13  3.90 - 12.70 K/uL Final    RBC 06/24/2024 2.30 (L)  4.00 - 5.40 M/uL Final    Hemoglobin 06/24/2024 6.4 (L)  12.0 - 16.0 g/dL Final    Hematocrit 06/24/2024 21.4 (L)  37.0 - 48.5 % Final    MCV 06/24/2024 93  82 - 98 fL Final    MCH 06/24/2024 27.8  27.0 - 31.0 pg Final    MCHC 06/24/2024 29.9 (L)  32.0 - 36.0 g/dL Final    RDW 06/24/2024 18.5 (H)  11.5 - 14.5 % Final    Platelets 06/24/2024 483 (H)  150 - 450 K/uL Final    MPV 06/24/2024 9.2  9.2 - 12.9 fL Final    Immature Granulocytes 06/24/2024 0.5  0.0 - 0.5 % Final    Gran # (ANC) 06/24/2024 6.7  1.8 - 7.7 K/uL Final    Immature Grans (Abs) 06/24/2024 0.05 (H)  0.00 - 0.04 K/uL Final    Comment: Mild elevation in immature granulocytes is non specific and   can be seen in a variety of conditions including stress response,   acute inflammation, trauma and pregnancy. Correlation with other   laboratory and clinical findings is essential.      Lymph # 06/24/2024 1.4  1.0 - 4.8 K/uL Final    Mono # 06/24/2024 0.9  0.3 - 1.0 K/uL Final    Eos # 06/24/2024 0.1  0.0 - 0.5 K/uL Final    Baso # 06/24/2024 0.02  0.00 - 0.20 K/uL Final    nRBC 06/24/2024 0  0 /100 WBC Final    Gran % 06/24/2024 73.2 (H)  38.0 - 73.0 % Final    Lymph % 06/24/2024 14.9 (L)  18.0 - 48.0 % Final    Mono % 06/24/2024 10.2  4.0 - 15.0 % Final    Eosinophil % 06/24/2024 1.5  0.0 - 8.0 % Final    Basophil % 06/24/2024 0.2  0.0 - 1.9 % Final    Differential Method 06/24/2024 Automated   Final       Assessment & Plan:      Melody was seen today for 5m f/u and leg pain.  A  venous ultrasound of the right lower extremity will be obtained for further assessment of the patient's leg pain.  There was no local evidence of cellulitis.    A prescription for Rollator walker will be sent to the patient's insurance company for approval.    A renewal order will be given for tramadol for treatment of severe arthritis pain.    Updated lab studies will be obtained in 4 months.  Current medications will be continued for treatment of hypertension and hyperlipidemia.  The patient will continue diet therapy for her diabetes which is very well controlled.    Diagnoses and all orders for this visit:    Type 2 diabetes mellitus with chronic kidney disease, without long-term current use of insulin, unspecified CKD stage  -     Microalbumin/Creatinine Ratio, Urine; Future  -     Comprehensive Metabolic Panel; Future  -     CBC Auto Differential; Future  -     Hemoglobin A1C; Future    Essential hypertension  -     Comprehensive Metabolic Panel; Future  -     CBC Auto Differential; Future  -     Hemoglobin A1C; Future    Stage 3b chronic kidney disease    Other hyperlipidemia    Primary osteoarthritis involving multiple joints  -     walker Misc; Use daily for assisted ambulation    Abnormality of gait due to impairment of balance  -     walker Misc; Use daily for assisted ambulation    Right leg pain  -     CV Ultrasound doppler venous DVT leg right; Future    Other orders  -     traMADoL (ULTRAM) 50 mg tablet; Take 1 tablet (50 mg total) by mouth every 12 (twelve) hours as needed for Pain.         No follow-ups on file.     Zac Andres MD

## 2024-08-16 ENCOUNTER — HOSPITAL ENCOUNTER (OUTPATIENT)
Dept: CARDIOLOGY | Facility: HOSPITAL | Age: 89
Discharge: HOME | End: 2024-08-16
Attending: INTERNAL MEDICINE
Payer: MEDICARE

## 2024-08-16 DIAGNOSIS — M79.604 RIGHT LEG PAIN: ICD-10-CM

## 2024-08-16 PROCEDURE — 93971 EXTREMITY STUDY: CPT | Mod: PO,RT

## 2024-08-16 PROCEDURE — 93971 EXTREMITY STUDY: CPT | Mod: 26,RT,, | Performed by: INTERNAL MEDICINE

## 2024-08-21 ENCOUNTER — TELEPHONE (OUTPATIENT)
Dept: HEMATOLOGY/ONCOLOGY | Facility: CLINIC | Age: 89
End: 2024-08-21
Payer: MEDICARE

## 2024-08-21 NOTE — TELEPHONE ENCOUNTER
----- Message from Leighann Giles sent at 8/21/2024  8:11 AM CDT -----  Regarding: Reschedule Existing Appointment  Contact: daughter @ [phone]  Reschedule Existing Appointment     Current appt date: 8/21/24     Type of appt :EP     Physician: Dr. Huston     Reason for rescheduling: cannot make appt. Do not want Wednesday     Caller: Ailin (daughter)     Contact Preference:[phone]

## 2024-08-22 ENCOUNTER — OFFICE VISIT (OUTPATIENT)
Dept: HEMATOLOGY/ONCOLOGY | Facility: CLINIC | Age: 89
End: 2024-08-22
Payer: MEDICARE

## 2024-08-22 VITALS
WEIGHT: 103.06 LBS | HEIGHT: 61 IN | DIASTOLIC BLOOD PRESSURE: 55 MMHG | HEART RATE: 104 BPM | OXYGEN SATURATION: 98 % | RESPIRATION RATE: 18 BRPM | TEMPERATURE: 98 F | BODY MASS INDEX: 19.46 KG/M2 | SYSTOLIC BLOOD PRESSURE: 116 MMHG

## 2024-08-22 DIAGNOSIS — N18.30 ANEMIA OF CHRONIC KIDNEY FAILURE, STAGE 3 (MODERATE): Primary | ICD-10-CM

## 2024-08-22 DIAGNOSIS — I82.811 VENOUS EMBOLISM AND THROMBOSIS OF SUPERFICIAL VESSELS OF RIGHT LOWER EXTREMITY: ICD-10-CM

## 2024-08-22 DIAGNOSIS — I25.10 CORONARY ARTERY DISEASE INVOLVING NATIVE CORONARY ARTERY WITHOUT ANGINA PECTORIS, UNSPECIFIED WHETHER NATIVE OR TRANSPLANTED HEART: ICD-10-CM

## 2024-08-22 DIAGNOSIS — D63.1 ANEMIA OF CHRONIC KIDNEY FAILURE, STAGE 3 (MODERATE): Primary | ICD-10-CM

## 2024-08-22 PROCEDURE — 99999 PR PBB SHADOW E&M-EST. PATIENT-LVL IV: CPT | Mod: PBBFAC,,, | Performed by: PHYSICIAN ASSISTANT

## 2024-08-22 RX ORDER — ASPIRIN 81 MG/1
81 TABLET ORAL DAILY
Qty: 150 TABLET | Refills: 2 | Status: SHIPPED | OUTPATIENT
Start: 2024-08-22 | End: 2025-08-22

## 2024-08-23 NOTE — PROGRESS NOTES
HEMATOLOGIC MALIGNANCIES PROGRESS NOTE    IDENTIFYING STATEMENT   Melody Armstrong (Melody) is a 89 y.o. female with a  of 1934 from [place], [place] with the diagnosis of anemia of chronic kidney disease.      HEMATOLOGY HISTORY:    Anemia of chronic kidney disease  Hypertension  Coronary artery disease  Chronic kidney disease, stage IV  Diabetes mellitus, type 2  Gastroesophageal reflux disease    INTERVAL HISTORY:      Ms. Armstrong returns to clinic in follow-up of anemia of CKD, accompanied by her daughter. She has been intermittently using epo 20,000u shots. She went several weeks without this and started it again on her own. She does not like having to receive shots. Since resuming, her Hgb has improved by 1 gram.     We did discuss it is her choice to proceed with weekly administration of shots and discussed the risks of further declining hemoglobin, which could be fatal. We discussed focusing on her QOL given her age which she agrees is her priority. She would like to continue every other week dosing of Epo.    She has had some RLE pain, PCP obtained ultrasound which showed chronic superficial thrombus, no DVT. We discussed that Epo can be associated with increased clot risk, but typically in setting of higher hemoglobin levels. It is not an absolute contraindication in this setting and she would prefer continuing intermittent dosing of this to support her hemoglobin. Her leg pain improves with time off of her feet and extremity elevation.     No CP/SOB. No other complaints. Generally feeling well.     Past Medical History, Past Social History and Past Family History have been reviewed and are unchanged except as noted in the interval history.    MEDICATIONS:     Current Outpatient Medications on File Prior to Visit   Medication Sig Dispense Refill    acetaminophen (TYLENOL) 325 MG tablet Take 325 mg by mouth every 6 (six) hours as needed for Pain.      aspirin 325 MG tablet Take 1 tablet by mouth once  daily.      atorvastatin (LIPITOR) 20 MG tablet TAKE 1 TABLET BY MOUTH EVERY DAY 90 tablet 0    blood sugar diagnostic Strp To check BG once daily, to use with insurance preferred meter 100 strip 3    bumetanide (BUMEX) 1 MG tablet Take 1 tab every other day for fluid. 45 tablet 3    cyanocobalamin (VITAMIN B-12) 1000 MCG tablet Take 1 tablet by mouth.      diclofenac sodium (VOLTAREN) 1 % Gel Apply 2 g topically 4 (four) times daily. 1 Tube 2    epoetin dodie-epbx (RETACRIT) 20,000 unit/mL injection Inject 1 mL (20,000 Units total) into the skin every 7 days. 4 mL 6    gabapentin (NEURONTIN) 100 MG capsule TAKE 1 CAPSULE (100 MG TOTAL) BY MOUTH THREE TIMES DAILY. 270 capsule 1    hydroCHLOROthiazide (HYDRODIURIL) 25 MG tablet TAKE ONE TABLET BY MOUTH DAILY FOR FLUID. 90 tablet 3    lactulose (CHRONULAC) 10 gram/15 mL solution Take 30 mLs (20 g total) by mouth once daily. For chronic constipation. 946 mL 5    lancets Misc To check BG once daily, to use with insurance preferred meter 100 each 3    levocetirizine (XYZAL) 5 MG tablet TAKE 1 TABLET BY MOUTH DAILY AS NEEDED FOR ALLERGIES. 90 tablet 3    losartan (COZAAR) 50 MG tablet TAKE 1 TABLET BY MOUTH EVERY DAY FOR HIGH BLOOD PRESSURE 90 tablet 3    meclizine (ANTIVERT) 12.5 mg tablet Take 1 tablet by mouth three times a day as needed for dizziness 90 tablet 0    metoprolol succinate (TOPROL-XL) 25 MG 24 hr tablet TAKE 1 TABLET BY MOUTH EVERY DAY FOR HIGH BLOOD PRESSURE 90 tablet 3    multivitamin (THERAGRAN) per tablet Take 1 tablet by mouth once daily.      naloxone (NARCAN) 4 mg/actuation Spry 4mg by nasal route as needed for opioid overdose; may repeat every 2-3 minutes in alternating nostrils until medical help arrives. Call 911 1 each 11    traMADoL (ULTRAM) 50 mg tablet Take 1 tablet (50 mg total) by mouth every 12 (twelve) hours as needed for Pain. 180 tablet 1    walker Misc Use daily for assisted ambulation 1 each 0    walker Misc Use daily for assisted  "ambulation 1 each 0     No current facility-administered medications on file prior to visit.       ALLERGIES: Review of patient's allergies indicates:  No Known Allergies     ROS:       Review of Systems   Constitutional:  Negative for diaphoresis, fatigue, fever and unexpected weight change.   HENT:   Negative for lump/mass and sore throat.    Eyes:  Negative for icterus.   Respiratory:  Positive for shortness of breath. Negative for cough.    Cardiovascular:  Negative for chest pain and palpitations.   Gastrointestinal:  Negative for abdominal distention, constipation, diarrhea, nausea and vomiting.   Genitourinary:  Negative for dysuria and frequency.    Musculoskeletal:  Negative for arthralgias, gait problem and myalgias.   Skin:  Negative for rash.   Neurological:  Negative for dizziness, gait problem and headaches.   Hematological:  Negative for adenopathy. Does not bruise/bleed easily.   Psychiatric/Behavioral:  The patient is not nervous/anxious.        PHYSICAL EXAM:  Vitals:    08/22/24 1120   BP: (!) 116/55   Pulse: 104   Resp: 18   Temp: 98.4 °F (36.9 °C)   TempSrc: Oral   SpO2: 98%   Weight: 46.7 kg (103 lb 1 oz)   Height: 5' 1" (1.549 m)   PainSc:   8   PainLoc: Abdomen       Physical Exam  Constitutional:       General: She is not in acute distress.     Appearance: She is well-developed.   HENT:      Head: Normocephalic and atraumatic.      Mouth/Throat:      Mouth: No oral lesions.   Eyes:      Conjunctiva/sclera: Conjunctivae normal.   Neck:      Thyroid: No thyromegaly.   Cardiovascular:      Rate and Rhythm: Regular rhythm. Tachycardia present.      Heart sounds: Normal heart sounds. No murmur heard.  Pulmonary:      Breath sounds: Normal breath sounds. No wheezing or rales.   Abdominal:      General: There is no distension.      Palpations: Abdomen is soft. There is no hepatomegaly, splenomegaly or mass.      Tenderness: There is no abdominal tenderness.   Musculoskeletal:      Right lower leg: " Edema (trace) present.      Left lower leg: Edema (trace) present.   Lymphadenopathy:      Cervical: No cervical adenopathy.      Right cervical: No deep cervical adenopathy.     Left cervical: No deep cervical adenopathy.   Skin:     Findings: No rash.   Neurological:      Mental Status: She is alert and oriented to person, place, and time.      Cranial Nerves: No cranial nerve deficit.      Coordination: Coordination normal.      Deep Tendon Reflexes: Reflexes are normal and symmetric.         LAB:   Results for orders placed or performed in visit on 08/19/24   CBC Auto Differential   Result Value Ref Range    WBC 9.07 3.90 - 12.70 K/uL    RBC 2.79 (L) 4.00 - 5.40 M/uL    Hemoglobin 7.8 (L) 12.0 - 16.0 g/dL    Hematocrit 24.8 (L) 37.0 - 48.5 %    MCV 89 82 - 98 fL    MCH 28.0 27.0 - 31.0 pg    MCHC 31.5 (L) 32.0 - 36.0 g/dL    RDW 18.7 (H) 11.5 - 14.5 %    Platelets 495 (H) 150 - 450 K/uL    MPV 8.9 (L) 9.2 - 12.9 fL    Immature Granulocytes 0.4 0.0 - 0.5 %    Gran # (ANC) 7.5 1.8 - 7.7 K/uL    Immature Grans (Abs) 0.04 0.00 - 0.04 K/uL    Lymph # 1.0 1.0 - 4.8 K/uL    Mono # 0.5 0.3 - 1.0 K/uL    Eos # 0.1 0.0 - 0.5 K/uL    Baso # 0.03 0.00 - 0.20 K/uL    nRBC 0 0 /100 WBC    Gran % 82.3 (H) 38.0 - 73.0 %    Lymph % 10.9 (L) 18.0 - 48.0 %    Mono % 5.8 4.0 - 15.0 %    Eosinophil % 0.7 0.0 - 8.0 %    Basophil % 0.3 0.0 - 1.9 %    Differential Method Automated        PROBLEMS ASSESSED THIS VISIT:    1. Anemia of chronic kidney failure, stage 3 (moderate)    2. 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 Coronary artery disease involving native coronary artery without angina pectoris, unspecified whether native or transplanted heart        PLAN:       Anemia of chronic kidney disease  Ms. Armstrong has anemia of chronic kidney disease. She had stopped taking erythropoietin with decrease in hemoglobin, but she is restarting. This is being administered by her daughter.    She  will continue 20,000u administration of epo on an every other week basis. We discussed weekly administration, but this negatively impacts her QOL as she does not like shots. However, she wishes to continue to have some Hgb support.      She will continue every 2 week labs for close monitoring.    RLE Superficial Thrombus  U/S showed right lesser saphenous vein with chronic thrombus  She is on Epo which can increase risk of thrombosis with higher hemoglobin levels, however as the focus of her care is on QOL, she would like to continue this medication for hemoglobin support.  She had discontinued her daily ASA, we will have her resume this.   Educated on supportive care of intermittently symptomatic superficial thrombus     CAD  Refilled ASA     Follow-up  Labs every 2 weeks  Clinic follow-up in 3 months         BMT Chart Routing      Follow up with physician    Follow up with BARNEY 3 months. f/u with rigo in 3 months   Provider visit type    Infusion scheduling note    Injection scheduling note    Labs CBC   Scheduling:  Preferred lab:  Lab interval:  q2wk labs   Imaging    Pharmacy appointment    Other referrals                      Rigo Huston PA-C  Hematology and Stem Cell Transplant

## 2024-09-13 ENCOUNTER — HOSPITAL ENCOUNTER (EMERGENCY)
Facility: HOSPITAL | Age: 89
Discharge: HOME | End: 2024-09-13
Attending: EMERGENCY MEDICINE
Payer: MEDICARE

## 2024-09-13 VITALS
TEMPERATURE: 97 F | DIASTOLIC BLOOD PRESSURE: 60 MMHG | WEIGHT: 103 LBS | HEIGHT: 61 IN | RESPIRATION RATE: 20 BRPM | HEART RATE: 76 BPM | BODY MASS INDEX: 19.45 KG/M2 | OXYGEN SATURATION: 98 % | SYSTOLIC BLOOD PRESSURE: 120 MMHG

## 2024-09-13 DIAGNOSIS — R19.7 DIARRHEA, UNSPECIFIED TYPE: Primary | ICD-10-CM

## 2024-09-13 LAB
ALBUMIN SERPL BCP-MCNC: 2 G/DL (ref 3.5–5.2)
ALP SERPL-CCNC: 82 U/L (ref 55–135)
ALT SERPL W/O P-5'-P-CCNC: 10 U/L (ref 10–44)
ANION GAP SERPL CALC-SCNC: 6 MMOL/L (ref 8–16)
AST SERPL-CCNC: 18 U/L (ref 10–40)
BASOPHILS # BLD AUTO: 0.03 K/UL (ref 0–0.2)
BASOPHILS NFR BLD: 0.3 % (ref 0–1.9)
BILIRUB SERPL-MCNC: 0.3 MG/DL (ref 0.1–1)
BUN SERPL-MCNC: 22 MG/DL (ref 8–23)
CALCIUM SERPL-MCNC: 8.2 MG/DL (ref 8.7–10.5)
CHLORIDE SERPL-SCNC: 109 MMOL/L (ref 95–110)
CO2 SERPL-SCNC: 21 MMOL/L (ref 23–29)
CREAT SERPL-MCNC: 1.2 MG/DL (ref 0.5–1.4)
DIFFERENTIAL METHOD BLD: ABNORMAL
EOSINOPHIL # BLD AUTO: 0 K/UL (ref 0–0.5)
EOSINOPHIL NFR BLD: 0.3 % (ref 0–8)
ERYTHROCYTE [DISTWIDTH] IN BLOOD BY AUTOMATED COUNT: 19.3 % (ref 11.5–14.5)
EST. GFR  (NO RACE VARIABLE): 43 ML/MIN/1.73 M^2
GLUCOSE SERPL-MCNC: 92 MG/DL (ref 70–110)
HCT VFR BLD AUTO: 24.9 % (ref 37–48.5)
HCV AB SERPL QL IA: NORMAL
HGB BLD-MCNC: 7.9 G/DL (ref 12–16)
HIV 1+2 AB+HIV1 P24 AG SERPL QL IA: NORMAL
IMM GRANULOCYTES # BLD AUTO: 0.06 K/UL (ref 0–0.04)
IMM GRANULOCYTES NFR BLD AUTO: 0.5 % (ref 0–0.5)
LIPASE SERPL-CCNC: 12 U/L (ref 4–60)
LYMPHOCYTES # BLD AUTO: 1.3 K/UL (ref 1–4.8)
LYMPHOCYTES NFR BLD: 11.3 % (ref 18–48)
MAGNESIUM SERPL-MCNC: 2 MG/DL (ref 1.6–2.6)
MCH RBC QN AUTO: 29 PG (ref 27–31)
MCHC RBC AUTO-ENTMCNC: 31.7 G/DL (ref 32–36)
MCV RBC AUTO: 92 FL (ref 82–98)
MONOCYTES # BLD AUTO: 0.9 K/UL (ref 0.3–1)
MONOCYTES NFR BLD: 7.8 % (ref 4–15)
NEUTROPHILS # BLD AUTO: 9.2 K/UL (ref 1.8–7.7)
NEUTROPHILS NFR BLD: 79.8 % (ref 38–73)
NRBC BLD-RTO: 0 /100 WBC
OHS QRS DURATION: 64 MS
OHS QTC CALCULATION: 440 MS
PLATELET # BLD AUTO: 472 K/UL (ref 150–450)
PMV BLD AUTO: 8.8 FL (ref 9.2–12.9)
POTASSIUM SERPL-SCNC: 4.2 MMOL/L (ref 3.5–5.1)
PROT SERPL-MCNC: 6.7 G/DL (ref 6–8.4)
RBC # BLD AUTO: 2.72 M/UL (ref 4–5.4)
SODIUM SERPL-SCNC: 136 MMOL/L (ref 136–145)
WBC # BLD AUTO: 11.49 K/UL (ref 3.9–12.7)

## 2024-09-13 PROCEDURE — 83690 ASSAY OF LIPASE: CPT | Performed by: EMERGENCY MEDICINE

## 2024-09-13 PROCEDURE — 99283 EMERGENCY DEPT VISIT LOW MDM: CPT | Mod: 25

## 2024-09-13 PROCEDURE — 96360 HYDRATION IV INFUSION INIT: CPT

## 2024-09-13 PROCEDURE — 87389 HIV-1 AG W/HIV-1&-2 AB AG IA: CPT | Performed by: PHYSICIAN ASSISTANT

## 2024-09-13 PROCEDURE — 25000003 PHARM REV CODE 250: Performed by: EMERGENCY MEDICINE

## 2024-09-13 PROCEDURE — 86803 HEPATITIS C AB TEST: CPT | Performed by: PHYSICIAN ASSISTANT

## 2024-09-13 PROCEDURE — 83735 ASSAY OF MAGNESIUM: CPT | Performed by: EMERGENCY MEDICINE

## 2024-09-13 PROCEDURE — 85025 COMPLETE CBC W/AUTO DIFF WBC: CPT | Performed by: EMERGENCY MEDICINE

## 2024-09-13 PROCEDURE — 80053 COMPREHEN METABOLIC PANEL: CPT | Performed by: EMERGENCY MEDICINE

## 2024-09-13 RX ADMIN — SODIUM CHLORIDE 500 ML: 9 INJECTION, SOLUTION INTRAVENOUS at 11:09

## 2024-09-13 NOTE — DISCHARGE INSTRUCTIONS
Stop laxative use until diarrhea resolves.  Stay well hydrated.    Our goal in the emergency department is to always give you outstanding care and exceptional service. You may receive a survey by mail or e-mail in the next week regarding your experience in our ED. We would greatly appreciate your completing and returning the survey. Your feedback provides us with a way to recognize our staff who give very good care and it helps us learn how to improve when your experience was below our aspiration of excellence.

## 2024-09-13 NOTE — ED PROVIDER NOTES
Encounter Date: 9/13/2024       History     Chief Complaint   Patient presents with    Diarrhea     Diarrhea x3 days; on multiple laxatives/stool softeners. Denies NV/fever/chills     89-year-old female with a history of osteoarthritis on pain medication presents with diarrhea.  She has been taking multiple laxatives.  Eating well.  No nausea or vomiting.  No history of C diff or infectious diarrhea.  Family wants to make sure she is not dehydrated.  She denies fever, cough, shortness of breath, chest pain, abdominal pain, or dysuria.  The patients available PMH, PSH, Social History, medications, allergies, and triage vital signs were reviewed just prior to their medical evaluation.         Review of patient's allergies indicates:  No Known Allergies  Past Medical History:   Diagnosis Date    Anemia     Asymptomatic cholelithiasis     Coronary artery disease     Diabetes mellitus type II     GERD (gastroesophageal reflux disease)     Hyperlipidemia     Hypertension      Past Surgical History:   Procedure Laterality Date    CORONARY ARTERY BYPASS GRAFT      JOINT REPLACEMENT      right    KNEE SURGERY      PARTIAL HYSTERECTOMY      SHOULDER ARTHROSCOPY W/ ROTATOR CUFF REPAIR      right shoulder     Family History   Problem Relation Name Age of Onset    Diabetes Mother      Heart disease Mother      Diabetes Maternal Grandmother       Social History     Tobacco Use    Smoking status: Never    Smokeless tobacco: Never   Substance Use Topics    Alcohol use: No    Drug use: No     Review of Systems   Constitutional:  Negative for fever.   Respiratory:  Negative for cough and shortness of breath.    Cardiovascular:  Negative for chest pain.   Gastrointestinal:  Positive for diarrhea. Negative for abdominal pain, nausea and vomiting.   Genitourinary:  Negative for dysuria.       Physical Exam     Initial Vitals [09/13/24 1051]   BP Pulse Resp Temp SpO2   (!) 110/51 88 16 97.3 °F (36.3 °C) 96 %      MAP       --          Physical Exam    Nursing note and vitals reviewed.  Constitutional: She appears well-developed and well-nourished. She is not diaphoretic. No distress.   HENT:   Head: Normocephalic and atraumatic.   Nose: Nose normal.   Eyes: Conjunctivae are normal. Right eye exhibits no discharge. Left eye exhibits no discharge.   Neck: Neck supple.   Normal range of motion.  Cardiovascular:  Normal rate, regular rhythm and normal heart sounds.     Exam reveals no gallop and no friction rub.       No murmur heard.  Pulmonary/Chest: Breath sounds normal. No respiratory distress. She has no wheezes. She has no rhonchi. She has no rales.   Abdominal: Abdomen is soft. She exhibits no distension. There is no abdominal tenderness. There is no rebound and no guarding.   Musculoskeletal:         General: No tenderness or edema. Normal range of motion.      Cervical back: Normal range of motion and neck supple.     Neurological: She is alert and oriented to person, place, and time. She has normal strength. GCS score is 15. GCS eye subscore is 4. GCS verbal subscore is 5. GCS motor subscore is 6.   Skin: Skin is warm and dry. No rash noted. No erythema.   Psychiatric: She has a normal mood and affect. Her behavior is normal. Judgment and thought content normal.         ED Course   Procedures  Labs Reviewed   CBC W/ AUTO DIFFERENTIAL - Abnormal       Result Value    WBC 11.49      RBC 2.72 (*)     Hemoglobin 7.9 (*)     Hematocrit 24.9 (*)     MCV 92      MCH 29.0      MCHC 31.7 (*)     RDW 19.3 (*)     Platelets 472 (*)     MPV 8.8 (*)     Immature Granulocytes 0.5      Gran # (ANC) 9.2 (*)     Immature Grans (Abs) 0.06 (*)     Lymph # 1.3      Mono # 0.9      Eos # 0.0      Baso # 0.03      nRBC 0      Gran % 79.8 (*)     Lymph % 11.3 (*)     Mono % 7.8      Eosinophil % 0.3      Basophil % 0.3      Differential Method Automated      Narrative:     Release to patient->Immediate   COMPREHENSIVE METABOLIC PANEL - Abnormal    Sodium 136       Potassium 4.2      Chloride 109      CO2 21 (*)     Glucose 92      BUN 22      Creatinine 1.2      Calcium 8.2 (*)     Total Protein 6.7      Albumin 2.0 (*)     Total Bilirubin 0.3      Alkaline Phosphatase 82      AST 18      ALT 10      eGFR 43.0 (*)     Anion Gap 6 (*)     Narrative:     Release to patient->Immediate   HIV 1 / 2 ANTIBODY    HIV 1/2 Ag/Ab Non-reactive      Narrative:     Release to patient->Immediate   HEPATITIS C ANTIBODY    Hepatitis C Ab Non-reactive      Narrative:     Release to patient->Immediate   LIPASE    Lipase 12      Narrative:     Release to patient->Immediate   MAGNESIUM    Magnesium 2.0      Narrative:     Release to patient->Immediate          Imaging Results    None          Medications   sodium chloride 0.9% bolus 500 mL 500 mL (0 mLs Intravenous Stopped 9/13/24 1256)     Medical Decision Making  89-year-old female presents with diarrhea.  Vitals unremarkable.  Physical exam as above.  Abdomen is ntnd, no r/g.  Labs unremarkable.  No electrolyte abnormality or DELANO.  No diarrhea while in ED.  Suspect her symptoms are 2/2 laxative use rather than acute infection.  She will stop laxatives until diarrhea resolves.  She will restart only for acute constipation.  Counseled on hydration.  Patient will call their primary physician tomorrow to schedule close follow-up.  Patient will return to ED for worsening symptoms, inability to eat/drink, fever greater than 100.4, or any other concerns. Did bedside teaching with return precautions.  All questions answered.  The patient acknowledges understanding.  Gave written and verbal discharge instructions.     Amount and/or Complexity of Data Reviewed  Independent Historian: caregiver  Labs: ordered. Decision-making details documented in ED Course.                                      Clinical Impression:  Final diagnoses:  [R19.7] Diarrhea, unspecified type (Primary)          ED Disposition Condition    Discharge Stable          ED  Prescriptions    None       Follow-up Information       Follow up With Specialties Details Why Contact Info    Follow up with primary physician as soon as possible.  Call tomorrow for an appointment.        [facility] - Emergency Dept Emergency Medicine  Return to ED for worsening symptoms, inability to eat/drink, fever greater than 100.4, or any other concerns. [address]  [facility] [address]  [phone]             Simone Beck MD  09/13/24 3669

## 2024-09-13 NOTE — ED TRIAGE NOTES
Melody Armstrong, a 89 y.o. female presents to the ED w/ complaint of diarrhea x weeks. Daughter at bedside reports pt had diarrhea every 45mins yesterday, unable to tolerate PO liquids. Today pt reports having diarrhea once, small amount. Reports eating breakfast this morning and drinking liquids. Denies abdominal pain. Reports being on multiple laxatives and stool softeners d/t also being on pain medication.     Triage note:  Chief Complaint   Patient presents with    Diarrhea     Diarrhea x3 days; on multiple laxatives/stool softeners. Denies NV/fever/chills     Review of patient's allergies indicates:  No Known Allergies  Past Medical History:   Diagnosis Date    Anemia     Asymptomatic cholelithiasis     Coronary artery disease     Diabetes mellitus type II     GERD (gastroesophageal reflux disease)     Hyperlipidemia     Hypertension

## 2024-09-16 ENCOUNTER — PATIENT OUTREACH (OUTPATIENT)
Dept: EMERGENCY MEDICINE | Facility: HOSPITAL | Age: 89
End: 2024-09-16
Payer: MEDICARE

## 2024-09-17 NOTE — PROGRESS NOTES
Spoke with Pt's daughter regarding their recent ED visit for  diarrhea. Daughter states they are doing much better and has not had any symptoms. They have an appt scheduled with her PCP for 12-31-24 and does not want to schedule anything else at this time. ED navigator will follow-up with patient to assist as needed and to remind of upcoming appt's.

## 2024-09-25 DIAGNOSIS — D63.1 ANEMIA OF CHRONIC KIDNEY FAILURE, STAGE 3 (MODERATE): ICD-10-CM

## 2024-09-25 DIAGNOSIS — N18.30 ANEMIA OF CHRONIC KIDNEY FAILURE, STAGE 3 (MODERATE): ICD-10-CM

## 2024-09-25 NOTE — TELEPHONE ENCOUNTER
----- Message from Sulma Cadena sent at 9/25/2024  8:57 AM CDT -----  Regarding: Refill  Contact: Ailin   [phone]          Rx Name:epoetin dodie-epbx (RETACRIT) 20,000 unit/mL injection      Preferred Pharmacy with number:  [facility]  [address] [place] [address] [phone] Fax  [phone]     Ordering Provider: Diana Huston PA-C    Contact preference:[phone]    Comments/Notes: Requesting refill  asking please send message to portal when completed

## 2024-09-30 DIAGNOSIS — N18.30 ANEMIA OF CHRONIC KIDNEY FAILURE, STAGE 3 (MODERATE): ICD-10-CM

## 2024-09-30 DIAGNOSIS — D63.1 ANEMIA OF CHRONIC KIDNEY FAILURE, STAGE 3 (MODERATE): ICD-10-CM

## 2024-10-25 ENCOUNTER — PATIENT OUTREACH (OUTPATIENT)
Dept: EMERGENCY MEDICINE | Facility: HOSPITAL | Age: 89
End: 2024-10-25
Payer: MEDICARE

## 2024-10-25 NOTE — PROGRESS NOTES
Call placed per ED Navigator to f/u from last encounter. Spoke with Pt's daughter who states she has been doing ok and denies any concerns at this time. And does not wish to schedule any appt's at this time. ED navigator will follow-up with patient periodically and assist as needed.

## 2024-10-28 RX ORDER — LACTULOSE 10 G/15ML
20 SOLUTION ORAL; RECTAL DAILY
Qty: 473 ML | Refills: 3 | Status: SHIPPED | OUTPATIENT
Start: 2024-10-28

## 2024-10-28 RX ORDER — ATORVASTATIN CALCIUM 20 MG/1
20 TABLET, FILM COATED ORAL
Qty: 90 TABLET | Refills: 3 | Status: SHIPPED | OUTPATIENT
Start: 2024-10-28

## 2024-11-13 ENCOUNTER — HOSPITAL ENCOUNTER (OUTPATIENT)
Facility: HOSPITAL | Age: 89
Discharge: HOSPICE | End: 2024-11-14
Attending: EMERGENCY MEDICINE | Admitting: EMERGENCY MEDICINE
Payer: MEDICARE

## 2024-11-13 DIAGNOSIS — Z51.5 ENCOUNTER FOR PALLIATIVE CARE: ICD-10-CM

## 2024-11-13 DIAGNOSIS — R10.9 ABDOMINAL PAIN: ICD-10-CM

## 2024-11-13 DIAGNOSIS — K63.89 COLONIC MASS: ICD-10-CM

## 2024-11-13 DIAGNOSIS — Z51.5 COMFORT MEASURES ONLY STATUS: Primary | ICD-10-CM

## 2024-11-13 LAB
ALBUMIN SERPL BCP-MCNC: 1.9 G/DL (ref 3.5–5.2)
ALP SERPL-CCNC: 84 U/L (ref 40–150)
ALT SERPL W/O P-5'-P-CCNC: 10 U/L (ref 10–44)
ANION GAP SERPL CALC-SCNC: 5 MMOL/L (ref 8–16)
AST SERPL-CCNC: 23 U/L (ref 10–40)
BASOPHILS # BLD AUTO: 0.02 K/UL (ref 0–0.2)
BASOPHILS NFR BLD: 0.2 % (ref 0–1.9)
BILIRUB SERPL-MCNC: 0.2 MG/DL (ref 0.1–1)
BILIRUB UR QL STRIP: NEGATIVE
BUN SERPL-MCNC: 24 MG/DL (ref 8–23)
CALCIUM SERPL-MCNC: 8.1 MG/DL (ref 8.7–10.5)
CHLORIDE SERPL-SCNC: 107 MMOL/L (ref 95–110)
CLARITY UR REFRACT.AUTO: CLEAR
CO2 SERPL-SCNC: 22 MMOL/L (ref 23–29)
COLOR UR AUTO: YELLOW
CREAT SERPL-MCNC: 1 MG/DL (ref 0.5–1.4)
DIFFERENTIAL METHOD BLD: ABNORMAL
EOSINOPHIL # BLD AUTO: 0 K/UL (ref 0–0.5)
EOSINOPHIL NFR BLD: 0.3 % (ref 0–8)
ERYTHROCYTE [DISTWIDTH] IN BLOOD BY AUTOMATED COUNT: 18.5 % (ref 11.5–14.5)
EST. GFR  (NO RACE VARIABLE): 53.5 ML/MIN/1.73 M^2
ESTIMATED AVG GLUCOSE: 77 MG/DL (ref 68–131)
GLUCOSE SERPL-MCNC: 83 MG/DL (ref 70–110)
GLUCOSE UR QL STRIP: NEGATIVE
HBA1C MFR BLD: 4.3 % (ref 4–5.6)
HCT VFR BLD AUTO: 22.7 % (ref 37–48.5)
HGB BLD-MCNC: 7.3 G/DL (ref 12–16)
HGB UR QL STRIP: NEGATIVE
IMM GRANULOCYTES # BLD AUTO: 0.03 K/UL (ref 0–0.04)
IMM GRANULOCYTES NFR BLD AUTO: 0.3 % (ref 0–0.5)
KETONES UR QL STRIP: NEGATIVE
LEUKOCYTE ESTERASE UR QL STRIP: NEGATIVE
LIPASE SERPL-CCNC: 11 U/L (ref 4–60)
LYMPHOCYTES # BLD AUTO: 1.9 K/UL (ref 1–4.8)
LYMPHOCYTES NFR BLD: 21.9 % (ref 18–48)
MCH RBC QN AUTO: 29.7 PG (ref 27–31)
MCHC RBC AUTO-ENTMCNC: 32.2 G/DL (ref 32–36)
MCV RBC AUTO: 92 FL (ref 82–98)
MONOCYTES # BLD AUTO: 0.9 K/UL (ref 0.3–1)
MONOCYTES NFR BLD: 10.5 % (ref 4–15)
NEUTROPHILS # BLD AUTO: 5.8 K/UL (ref 1.8–7.7)
NEUTROPHILS NFR BLD: 66.8 % (ref 38–73)
NITRITE UR QL STRIP: NEGATIVE
NRBC BLD-RTO: 0 /100 WBC
PH UR STRIP: 5 [PH] (ref 5–8)
PLATELET # BLD AUTO: 505 K/UL (ref 150–450)
PMV BLD AUTO: 9.5 FL (ref 9.2–12.9)
POCT GLUCOSE: 75 MG/DL (ref 70–110)
POCT GLUCOSE: 82 MG/DL (ref 70–110)
POTASSIUM SERPL-SCNC: 4.4 MMOL/L (ref 3.5–5.1)
PROT SERPL-MCNC: 6.7 G/DL (ref 6–8.4)
PROT UR QL STRIP: NEGATIVE
RBC # BLD AUTO: 2.46 M/UL (ref 4–5.4)
SODIUM SERPL-SCNC: 134 MMOL/L (ref 136–145)
SP GR UR STRIP: 1.02 (ref 1–1.03)
URN SPEC COLLECT METH UR: NORMAL
WBC # BLD AUTO: 8.76 K/UL (ref 3.9–12.7)

## 2024-11-13 PROCEDURE — 63600175 PHARM REV CODE 636 W HCPCS: Performed by: EMERGENCY MEDICINE

## 2024-11-13 PROCEDURE — 83036 HEMOGLOBIN GLYCOSYLATED A1C: CPT | Performed by: HOSPITALIST

## 2024-11-13 PROCEDURE — 25000003 PHARM REV CODE 250: Performed by: HOSPITALIST

## 2024-11-13 PROCEDURE — 83690 ASSAY OF LIPASE: CPT | Performed by: NURSE PRACTITIONER

## 2024-11-13 PROCEDURE — 80053 COMPREHEN METABOLIC PANEL: CPT | Performed by: NURSE PRACTITIONER

## 2024-11-13 PROCEDURE — G0378 HOSPITAL OBSERVATION PER HR: HCPCS

## 2024-11-13 PROCEDURE — 96374 THER/PROPH/DIAG INJ IV PUSH: CPT

## 2024-11-13 PROCEDURE — 96375 TX/PRO/DX INJ NEW DRUG ADDON: CPT

## 2024-11-13 PROCEDURE — 85025 COMPLETE CBC W/AUTO DIFF WBC: CPT | Performed by: NURSE PRACTITIONER

## 2024-11-13 PROCEDURE — 81003 URINALYSIS AUTO W/O SCOPE: CPT | Performed by: NURSE PRACTITIONER

## 2024-11-13 PROCEDURE — 25500020 PHARM REV CODE 255: Performed by: EMERGENCY MEDICINE

## 2024-11-13 PROCEDURE — 99285 EMERGENCY DEPT VISIT HI MDM: CPT | Mod: 25

## 2024-11-13 RX ORDER — METOPROLOL SUCCINATE 25 MG/1
25 TABLET, EXTENDED RELEASE ORAL DAILY
Status: DISCONTINUED | OUTPATIENT
Start: 2024-11-14 | End: 2024-11-14 | Stop reason: HOSPADM

## 2024-11-13 RX ORDER — IBUPROFEN 200 MG
24 TABLET ORAL
Status: DISCONTINUED | OUTPATIENT
Start: 2024-11-13 | End: 2024-11-14 | Stop reason: HOSPADM

## 2024-11-13 RX ORDER — GLUCAGON 1 MG
1 KIT INJECTION
Status: DISCONTINUED | OUTPATIENT
Start: 2024-11-13 | End: 2024-11-14 | Stop reason: HOSPADM

## 2024-11-13 RX ORDER — INSULIN ASPART 100 [IU]/ML
0-5 INJECTION, SOLUTION INTRAVENOUS; SUBCUTANEOUS
Status: DISCONTINUED | OUTPATIENT
Start: 2024-11-13 | End: 2024-11-14 | Stop reason: HOSPADM

## 2024-11-13 RX ORDER — ONDANSETRON HYDROCHLORIDE 2 MG/ML
4 INJECTION, SOLUTION INTRAVENOUS
Status: COMPLETED | OUTPATIENT
Start: 2024-11-13 | End: 2024-11-13

## 2024-11-13 RX ORDER — ACETAMINOPHEN 325 MG/1
650 TABLET ORAL EVERY 6 HOURS PRN
Status: DISCONTINUED | OUTPATIENT
Start: 2024-11-13 | End: 2024-11-14 | Stop reason: HOSPADM

## 2024-11-13 RX ORDER — MORPHINE SULFATE 2 MG/ML
2 INJECTION, SOLUTION INTRAMUSCULAR; INTRAVENOUS
Status: COMPLETED | OUTPATIENT
Start: 2024-11-13 | End: 2024-11-13

## 2024-11-13 RX ORDER — IBUPROFEN 200 MG
16 TABLET ORAL
Status: DISCONTINUED | OUTPATIENT
Start: 2024-11-13 | End: 2024-11-14 | Stop reason: HOSPADM

## 2024-11-13 RX ORDER — ATORVASTATIN CALCIUM 40 MG/1
40 TABLET, FILM COATED ORAL DAILY
Status: DISCONTINUED | OUTPATIENT
Start: 2024-11-14 | End: 2024-11-14 | Stop reason: HOSPADM

## 2024-11-13 RX ADMIN — ONDANSETRON 4 MG: 2 INJECTION INTRAMUSCULAR; INTRAVENOUS at 12:11

## 2024-11-13 RX ADMIN — IOHEXOL 75 ML: 350 INJECTION, SOLUTION INTRAVENOUS at 01:11

## 2024-11-13 RX ADMIN — ACETAMINOPHEN 650 MG: 325 TABLET ORAL at 09:11

## 2024-11-13 RX ADMIN — MORPHINE SULFATE 2 MG: 2 INJECTION, SOLUTION INTRAMUSCULAR; INTRAVENOUS at 12:11

## 2024-11-13 NOTE — ASSESSMENT & PLAN NOTE
Anemia is likely due to Iron deficiency. Most recent hemoglobin and hematocrit are listed below.  Recent Labs     11/13/24  1159   HGB 7.3*   HCT 22.7*     Plan  - Monitor serial CBC: Daily  - Transfuse PRBC if patient becomes hemodynamically unstable, symptomatic or H/H drops below 7/21.  - Patient has not received any PRBC transfusions to date  - Patient's anemia is currently stable  -

## 2024-11-13 NOTE — FIRST PROVIDER EVALUATION
" Emergency Department TeleTriage Encounter Note      CHIEF COMPLAINT    Chief Complaint   Patient presents with    Abdominal Pain     Abd pain and urinary frequency since last week "I think I Have an infection and my stomach is worrying me"       VITAL SIGNS   Initial Vitals [11/13/24 1050]   BP Pulse Resp Temp SpO2   130/61 103 20 97.7 °F (36.5 °C) 99 %      MAP       --            ALLERGIES    Review of patient's allergies indicates:  No Known Allergies    PROVIDER TRIAGE NOTE  This is a teletriage evaluation of a 89 y.o. female presenting to the ED with c/o abdominal pain and urinary frequency. Lower abdominal pain. No vomiting. Limited physical exam via telehealth: The patient is awake, alert, answering questions appropriately and is not in respiratory distress. Initial orders will be placed and care will be transferred to an alternate provider when patient is roomed for a full evaluation. Any additional orders and the final disposition will be determined by that provider.         ORDERS  Labs Reviewed   CBC W/ AUTO DIFFERENTIAL   COMPREHENSIVE METABOLIC PANEL   LIPASE   URINALYSIS, REFLEX TO URINE CULTURE       ED Orders (720h ago, onward)      Start Ordered     Status Ordering Provider    11/13/24 1148 11/13/24 1147  Vital signs  Every 2 hours         Ordered JAROCHO GARCIA    11/13/24 1148 11/13/24 1147  Diet NPO  Diet effective now         Ordered JAROCHO GARCIA    11/13/24 1148 11/13/24 1147  Insert peripheral IV  Once         Ordered JAROCHO GARCIA    11/13/24 1148 11/13/24 1147  CBC W/ AUTO DIFFERENTIAL  STAT         Ordered JAROCHO GARCIA    11/13/24 1148 11/13/24 1147  Comp. Metabolic Panel  STAT         Ordered JAROCHO GARCIA    11/13/24 1148 11/13/24 1147  Lipase  STAT         Ordered JAROCHO GARCIA    11/13/24 1148 11/13/24 1147  Urinalysis, Reflex to Urine Culture Urine, Clean Catch  STAT         Ordered JAROCHO GARCIA              Virtual Visit Note: The provider triage portion of " this emergency department evaluation and documentation was performed via [email or web address], a HIPAA-compliant telemedicine application, in concert with a tele-presenter in the room. A face to face patient evaluation with one of my colleagues will occur once the patient is placed in an emergency department room.      DISCLAIMER: This note was prepared with [employer] voice recognition transcription software. Garbled syntax, mangled pronouns, and other bizarre constructions may be attributed to that software system.

## 2024-11-13 NOTE — ED NOTES
Assumed care of the patient. Report received from ELANA Sandoval. Pt in hospital gown, side rails up X2, bed low and locked, and call light is placed within reach. Two family/visitors at bedside at this time. Pt denies any complaints or needs.

## 2024-11-13 NOTE — SUBJECTIVE & OBJECTIVE
Past Medical History:   Diagnosis Date    Anemia     Asymptomatic cholelithiasis     Coronary artery disease     Diabetes mellitus type II     GERD (gastroesophageal reflux disease)     Hyperlipidemia     Hypertension        Past Surgical History:   Procedure Laterality Date    CORONARY ARTERY BYPASS GRAFT      JOINT REPLACEMENT      right    KNEE SURGERY      PARTIAL HYSTERECTOMY      SHOULDER ARTHROSCOPY W/ ROTATOR CUFF REPAIR      right shoulder       Review of patient's allergies indicates:  No Known Allergies    No current facility-administered medications on file prior to encounter.     Current Outpatient Medications on File Prior to Encounter   Medication Sig    acetaminophen (TYLENOL) 325 MG tablet Take 325 mg by mouth every 6 (six) hours as needed for Pain.    aspirin (ECOTRIN) 81 MG EC tablet Take 1 tablet (81 mg total) by mouth once daily.    aspirin 325 MG tablet Take 1 tablet by mouth once daily.    atorvastatin (LIPITOR) 20 MG tablet TAKE 1 TABLET BY MOUTH EVERY DAY    blood sugar diagnostic Strp To check BG once daily, to use with insurance preferred meter    bumetanide (BUMEX) 1 MG tablet Take 1 tab every other day for fluid.    cyanocobalamin (VITAMIN B-12) 1000 MCG tablet Take 1 tablet by mouth.    diclofenac sodium (VOLTAREN) 1 % Gel Apply 2 g topically 4 (four) times daily.    epoetin dodie-epbx (RETACRIT) 20,000 unit/mL injection Inject 1 mL (20,000 Units total) into the skin once every 14 days.    gabapentin (NEURONTIN) 100 MG capsule TAKE 1 CAPSULE (100 MG TOTAL) BY MOUTH THREE TIMES DAILY.    hydroCHLOROthiazide (HYDRODIURIL) 25 MG tablet TAKE ONE TABLET BY MOUTH DAILY FOR FLUID.    lactulose (CHRONULAC) 10 gram/15 mL solution TAKE 30 MLS (20 G TOTAL) BY MOUTH ONCE DAILY. FOR CHRONIC CONSTIPATION.    lancets Misc To check BG once daily, to use with insurance preferred meter    levocetirizine (XYZAL) 5 MG tablet TAKE 1 TABLET BY MOUTH DAILY AS NEEDED FOR ALLERGIES.    losartan (COZAAR) 50 MG  tablet TAKE 1 TABLET BY MOUTH EVERY DAY FOR HIGH BLOOD PRESSURE    meclizine (ANTIVERT) 12.5 mg tablet Take 1 tablet by mouth three times a day as needed for dizziness    metoprolol succinate (TOPROL-XL) 25 MG 24 hr tablet TAKE 1 TABLET BY MOUTH EVERY DAY FOR HIGH BLOOD PRESSURE    multivitamin (THERAGRAN) per tablet Take 1 tablet by mouth once daily.    naloxone (NARCAN) 4 mg/actuation Spry 4mg by nasal route as needed for opioid overdose; may repeat every 2-3 minutes in alternating nostrils until medical help arrives. Call 911    traMADoL (ULTRAM) 50 mg tablet Take 1 tablet (50 mg total) by mouth every 12 (twelve) hours as needed for Pain.    walker Misc Use daily for assisted ambulation    walker Misc Use daily for assisted ambulation     Family History       Problem Relation (Age of Onset)    Diabetes Mother, Maternal Grandmother    Heart disease Mother          Tobacco Use    Smoking status: Never    Smokeless tobacco: Never   Substance and Sexual Activity    Alcohol use: No    Drug use: No    Sexual activity: Not on file     Review of Systems   Constitutional:  Positive for activity change and appetite change.   HENT:  Negative for congestion and dental problem.    Eyes:  Negative for discharge.   Respiratory:  Negative for apnea and chest tightness.    Cardiovascular:  Negative for chest pain.   Gastrointestinal:  Positive for abdominal distention.   Endocrine: Negative for cold intolerance and heat intolerance.   Genitourinary:  Negative for difficulty urinating and dyspareunia.   Musculoskeletal:  Positive for back pain. Negative for arthralgias.   Skin:  Negative for color change and pallor.   Allergic/Immunologic: Negative for environmental allergies and food allergies.   Neurological:  Positive for weakness.   Hematological:  Negative for adenopathy. Does not bruise/bleed easily.   Psychiatric/Behavioral:  Negative for agitation and behavioral problems.      Objective:     Vital Signs (Most  Recent):  Temp: 97.3 °F (36.3 °C) (11/13/24 1430)  Pulse: 79 (11/13/24 1500)  Resp: 12 (11/13/24 1500)  BP: (!) 154/65 (11/13/24 1500)  SpO2: 100 % (11/13/24 1500) Vital Signs (24h Range):  Temp:  [97.3 °F (36.3 °C)-97.7 °F (36.5 °C)] 97.3 °F (36.3 °C)  Pulse:  [] 79  Resp:  [11-20] 12  SpO2:  [96 %-100 %] 100 %  BP: (117-163)/(58-72) 154/65     Weight: 48.5 kg (107 lb)  Body mass index is 17.81 kg/m².     Physical Exam  HENT:      Head: Normocephalic.      Nose: Nose normal.      Mouth/Throat:      Pharynx: No oropharyngeal exudate.   Eyes:      Extraocular Movements: Extraocular movements intact.      Pupils: Pupils are equal, round, and reactive to light.   Cardiovascular:      Rate and Rhythm: Normal rate and regular rhythm.      Heart sounds: No murmur heard.  Pulmonary:      Effort: No respiratory distress.   Abdominal:      General: There is distension.   Musculoskeletal:         General: No swelling or deformity.      Cervical back: Normal range of motion and neck supple.   Skin:     Coloration: Skin is not jaundiced.      Findings: No bruising.   Neurological:      General: No focal deficit present.      Mental Status: She is alert.   Psychiatric:         Mood and Affect: Mood normal.         Behavior: Behavior normal.              CRANIAL NERVES     CN III, IV, VI   Pupils are equal, round, and reactive to light.       Significant Labs: All pertinent labs within the past 24 hours have been reviewed.  BMP:   Recent Labs   Lab 11/13/24  1159   GLU 83   *   K 4.4      CO2 22*   BUN 24*   CREATININE 1.0   CALCIUM 8.1*     CBC:   Recent Labs   Lab 11/13/24  1159   WBC 8.76   HGB 7.3*   HCT 22.7*   *     CMP:   Recent Labs   Lab 11/13/24  1159   *   K 4.4      CO2 22*   GLU 83   BUN 24*   CREATININE 1.0   CALCIUM 8.1*   PROT 6.7   ALBUMIN 1.9*   BILITOT 0.2   ALKPHOS 84   AST 23   ALT 10   ANIONGAP 5*       Significant Imaging: I have reviewed all pertinent imaging  results/findings within the past 24 hours.

## 2024-11-13 NOTE — ED TRIAGE NOTES
Pt presents to the ED complaining of 9/10 abdominal pain originating this past weekend. Pt states the pain is like cramping and is generalized. Pt denies chest pain, SOB, N/V/D at this time. Pt also endorses a slight burning with urination this morning.

## 2024-11-13 NOTE — ED PROVIDER NOTES
"Encounter Date: 11/13/2024       History     Chief Complaint   Patient presents with    Abdominal Pain     Abd pain and urinary frequency since last week "I think I Have an infection and my stomach is worrying me"     89-year-old female with a history of cholelithiasis, diabetes presenting with abdominal pain and discomfort, dysuria and burning.  Patient reports symptoms have been ongoing for greater than 1 week though was unclear exactly how long it has been ongoing.  Notes symptoms have been worsening.  Notes this morning she started having burning when she pees.  Denies fevers, chills, chest pain, shortness of breath.  Notes occasional constipation, denies any diarrhea or constipation currently.  Denies black or bloody stool.      Review of patient's allergies indicates:  No Known Allergies  Past Medical History:   Diagnosis Date    Anemia     Asymptomatic cholelithiasis     Coronary artery disease     Diabetes mellitus type II     GERD (gastroesophageal reflux disease)     Hyperlipidemia     Hypertension      Past Surgical History:   Procedure Laterality Date    CORONARY ARTERY BYPASS GRAFT      JOINT REPLACEMENT      right    KNEE SURGERY      PARTIAL HYSTERECTOMY      SHOULDER ARTHROSCOPY W/ ROTATOR CUFF REPAIR      right shoulder     Family History   Problem Relation Name Age of Onset    Diabetes Mother      Heart disease Mother      Diabetes Maternal Grandmother       Social History     Tobacco Use    Smoking status: Never    Smokeless tobacco: Never   Substance Use Topics    Alcohol use: No    Drug use: No     Review of Systems   Constitutional:  Negative for chills and fever.   HENT:  Negative for sore throat.    Respiratory:  Negative for shortness of breath.    Cardiovascular:  Negative for chest pain.   Gastrointestinal:  Positive for abdominal pain, nausea and vomiting.   Genitourinary:  Positive for dysuria.   Musculoskeletal:  Negative for back pain.   Skin:  Negative for rash.   Neurological:  " Negative for weakness.       Physical Exam     Initial Vitals [11/13/24 1050]   BP Pulse Resp Temp SpO2   130/61 103 20 97.7 °F (36.5 °C) 99 %      MAP       --         Physical Exam    Nursing note and vitals reviewed.  Constitutional: She appears well-developed and well-nourished. She is not diaphoretic. No distress.   HENT:   Head: Normocephalic and atraumatic.   Nose: Nose normal.   Eyes: EOM are normal. Pupils are equal, round, and reactive to light. No scleral icterus.   Neck: Neck supple.   Normal range of motion.  Cardiovascular:  Normal rate, regular rhythm, normal heart sounds and intact distal pulses.     Exam reveals no gallop and no friction rub.       No murmur heard.  Pulmonary/Chest: Breath sounds normal. No stridor. No respiratory distress. She has no wheezes. She has no rhonchi. She has no rales.   Abdominal: Abdomen is soft. Bowel sounds are normal. She exhibits mass. She exhibits no distension. There is no abdominal tenderness.   Mid abdominal mass noted on palpation There is no rebound and no guarding.   Musculoskeletal:         General: No tenderness or edema. Normal range of motion.      Cervical back: Normal range of motion and neck supple.     Neurological: She is oriented to person, place, and time. No cranial nerve deficit.   Skin: Skin is warm and dry. No rash noted.   Psychiatric: She has a normal mood and affect. Her behavior is normal.         ED Course   Procedures  Labs Reviewed   CBC W/ AUTO DIFFERENTIAL - Abnormal       Result Value    WBC 8.76      RBC 2.46 (*)     Hemoglobin 7.3 (*)     Hematocrit 22.7 (*)     MCV 92      MCH 29.7      MCHC 32.2      RDW 18.5 (*)     Platelets 505 (*)     MPV 9.5      Immature Granulocytes 0.3      Gran # (ANC) 5.8      Immature Grans (Abs) 0.03      Lymph # 1.9      Mono # 0.9      Eos # 0.0      Baso # 0.02      nRBC 0      Gran % 66.8      Lymph % 21.9      Mono % 10.5      Eosinophil % 0.3      Basophil % 0.2      Differential Method  Automated     COMPREHENSIVE METABOLIC PANEL - Abnormal    Sodium 134 (*)     Potassium 4.4      Chloride 107      CO2 22 (*)     Glucose 83      BUN 24 (*)     Creatinine 1.0      Calcium 8.1 (*)     Total Protein 6.7      Albumin 1.9 (*)     Total Bilirubin 0.2      Alkaline Phosphatase 84      AST 23      ALT 10      eGFR 53.5 (*)     Anion Gap 5 (*)    LIPASE    Lipase 11     URINALYSIS, REFLEX TO URINE CULTURE    Specimen UA Urine, Catheterized      Color, UA Yellow      Appearance, UA Clear      pH, UA 5.0      Specific Gravity, UA 1.025      Protein, UA Negative      Glucose, UA Negative      Ketones, UA Negative      Bilirubin (UA) Negative      Occult Blood UA Negative      Nitrite, UA Negative      Leukocytes, UA Negative      Narrative:     Specimen Source->Urine   HEMOGLOBIN A1C    Hemoglobin A1C 4.3      Estimated Avg Glucose 77     POCT GLUCOSE    POCT Glucose 82     POCT GLUCOSE MONITORING CONTINUOUS          Imaging Results               CT Abdomen Pelvis With IV Contrast NO Oral Contrast (Final result)  Result time 11/13/24 14:09:34      Final result by Calvin Angulo IV, MD (11/13/24 14:09:34)                   Impression:      Heterogeneous masslike wall thickening involving mid/distal transverse colon, concerning for primary malignancy.  No evidence of high-grade obstruction.  No suspicious abdominopelvic lymphadenopathy.    Additional findings as above.    This report was flagged in Epic as abnormal.      Electronically signed by: Calvin Angulo  Date:    11/13/2024  Time:    14:09               Narrative:    EXAMINATION:  CT ABDOMEN PELVIS WITH IV CONTRAST    CLINICAL HISTORY:  Nausea/vomiting;Bowel obstruction suspected;Abdominal pain worsening times a few weeks, abdominal mass versus constipation noted on exam;    TECHNIQUE:  Low dose axial images, sagittal and coronal reformations were obtained from the lung bases to the pubic symphysis following the IV administration of 75 mL of  Omnipaque 350 .  Oral contrast was not given.    COMPARISON:  Retroperitoneal ultrasound 08/09/2018.    FINDINGS:  Bibasilar subsegmental atelectasis.  Partially imaged heart is normal in size.  Dense calcific coronary atherosclerosis.  Calcification aortic valve.  No significant effusion.    Liver is normal in size.  Subcentimeter hypodensity at the hepatic dome demonstrating fluid attenuation, likely a cyst.  No focal suspicious lesion elsewhere.    Gallbladder is distended with small dependent calculus.  No gallbladder wall thickening or pericholecystic fluid to suggest acute cholecystitis.  No abnormal biliary duct dilatation.    Pancreas, spleen, and bilateral adrenal glands appear within normal limits.    Bilateral kidneys demonstrate symmetric enhancement.  Numerous bilateral hypodensities, largest demonstrating fluid attenuation suggestive of cysts..  No nephrolithiasis.  No hydronephrosis.    Bladder is moderately distended.  No focal wall thickening.    Postsurgical changes status post partial hysterectomy.  No adnexal mass.  Small volume free fluid in the pelvis.    Heterogeneous masslike wall thickening involving mid/distal transverse colon, measures approximately 11.5 x 5.3 cm on coronal series 601, image 75.  Findings are concerning for primary malignancy.  No upstream bowel dilatation to suggest obstruction.    Normal caliber small bowel.  Sigmoid colonic diverticulosis without evidence acute diverticulitis.    No free intraperitoneal air.    No suspicious abdominopelvic lymphadenopathy.    Advanced calcific aortoiliac atherosclerosis.  No aneurysm.    Mild diffuse body wall edema.    Degenerative changes.  No acute fracture.  No aggressive osseous lesion.                                       Medications   acetaminophen tablet 650 mg (650 mg Oral Given 11/14/24 1014)   atorvastatin tablet 40 mg (40 mg Oral Given 11/14/24 0844)   metoprolol succinate (TOPROL-XL) 24 hr tablet 25 mg (25 mg Oral Given  11/14/24 0844)   glucose chewable tablet 16 g (has no administration in time range)   glucose chewable tablet 24 g (has no administration in time range)   glucagon (human recombinant) injection 1 mg (has no administration in time range)   insulin aspart U-100 pen 0-5 Units (has no administration in time range)   dextrose 10% bolus 250 mL 250 mL (has no administration in time range)   dextrose 10% bolus 125 mL 125 mL (has no administration in time range)   dicyclomine capsule 10 mg (10 mg Oral Given 11/14/24 1014)   traMADoL tablet 50 mg (50 mg Oral Given 11/14/24 1113)   morphine injection 2 mg (2 mg Intravenous Given 11/13/24 1222)   ondansetron injection 4 mg (4 mg Intravenous Given 11/13/24 1221)   iohexoL (OMNIPAQUE 350) injection 75 mL (75 mLs Intravenous Given 11/13/24 1324)     Medical Decision Making                        Medical Decision Making:   Initial Assessment:   89-year-old female presenting with abdominal pain, dysuria, decreased appetite.  Patient notes pain has been ongoing for about a week.  Improved with morphine.  Decreased appetite, unable to tolerate p.o..  She notes abdominal tenderness and dysuria.  CT scan shows a significantly large colonic mass.  Discussed with GI who recommends colonoscopy, palliative consult.  Discussed outpatient workup versus inpatient.  Patient and family would like to pursue inpatient evaluation.  GI notes difficulty getting outpatient workup in a timely manner.  I would like to bring her in for pain control, further evaluation of abdominal pain with colonic mass, likely palliative care consult as well.  Differential Diagnosis:   Likely colon cancer, benign finding also possible.  UTI, ischemia felt less likely             Clinical Impression:  Final diagnoses:  [R10.9] Abdominal pain          ED Disposition Condition    Observation                 Jeancarlos Walton MD  11/14/24 [id number]

## 2024-11-13 NOTE — ASSESSMENT & PLAN NOTE
CT abdomen in ER show,Heterogeneous masslike wall thickening involving mid/distal transverse colon, concerning for primary malignancy.  No evidence of high-grade obstruction.  No suspicious abdominopelvic lymphadenopathy ,GI is consulted fro colonoscopy and biopsy,family want pursue medical treatment at this time.

## 2024-11-13 NOTE — H&P
"  [facility] - Emergency Dept  [facility]  History & Physical    Patient Name: Melody Armstrong  MRN: [id number]  Patient Class: OP- Observation  Admission Date: 11/13/2024  Attending Physician: Makayla Lundberg, *   Primary Care Provider: Zac Andres MD         Patient information was obtained from patient and ER records.     Subjective:     Principal Problem:Colonic mass    Chief Complaint:   Chief Complaint   Patient presents with    Abdominal Pain     Abd pain and urinary frequency since last week "I think I Have an infection and my stomach is worrying me"        HPI: 89-year-old female with a history of cholelithiasis, diabetes ,HTN,presenting with abdominal pain and discomfort, dysuria and burning. Patient reports symptoms have been ongoing for greater than 1 week though was unclear exactly how long it has been ongoing. Notes symptoms have been worsening. Notes this morning she started having burning when she pees. Denies fevers, chills, chest pain, shortness of breath. Notes occasional constipation, denies any diarrhea or constipation currently. Denies black or bloody stool. CT abdomen in ER show,Heterogeneous masslike wall thickening involving mid/distal transverse colon, concerning for primary malignancy.  No evidence of high-grade obstruction.  No suspicious abdominopelvic lymphadenopathy ,GI is consulted fro colonoscopy and biopsy,family want pursue medical treatment at this time.    Past Medical History:   Diagnosis Date    Anemia     Asymptomatic cholelithiasis     Coronary artery disease     Diabetes mellitus type II     GERD (gastroesophageal reflux disease)     Hyperlipidemia     Hypertension        Past Surgical History:   Procedure Laterality Date    CORONARY ARTERY BYPASS GRAFT      JOINT REPLACEMENT      right    KNEE SURGERY      PARTIAL HYSTERECTOMY      SHOULDER ARTHROSCOPY W/ ROTATOR CUFF REPAIR      right shoulder       Review of patient's allergies indicates:  No Known " Allergies    No current facility-administered medications on file prior to encounter.     Current Outpatient Medications on File Prior to Encounter   Medication Sig    acetaminophen (TYLENOL) 325 MG tablet Take 325 mg by mouth every 6 (six) hours as needed for Pain.    aspirin (ECOTRIN) 81 MG EC tablet Take 1 tablet (81 mg total) by mouth once daily.    aspirin 325 MG tablet Take 1 tablet by mouth once daily.    atorvastatin (LIPITOR) 20 MG tablet TAKE 1 TABLET BY MOUTH EVERY DAY    blood sugar diagnostic Strp To check BG once daily, to use with insurance preferred meter    bumetanide (BUMEX) 1 MG tablet Take 1 tab every other day for fluid.    cyanocobalamin (VITAMIN B-12) 1000 MCG tablet Take 1 tablet by mouth.    diclofenac sodium (VOLTAREN) 1 % Gel Apply 2 g topically 4 (four) times daily.    epoetin dodie-epbx (RETACRIT) 20,000 unit/mL injection Inject 1 mL (20,000 Units total) into the skin once every 14 days.    gabapentin (NEURONTIN) 100 MG capsule TAKE 1 CAPSULE (100 MG TOTAL) BY MOUTH THREE TIMES DAILY.    hydroCHLOROthiazide (HYDRODIURIL) 25 MG tablet TAKE ONE TABLET BY MOUTH DAILY FOR FLUID.    lactulose (CHRONULAC) 10 gram/15 mL solution TAKE 30 MLS (20 G TOTAL) BY MOUTH ONCE DAILY. FOR CHRONIC CONSTIPATION.    lancets Misc To check BG once daily, to use with insurance preferred meter    levocetirizine (XYZAL) 5 MG tablet TAKE 1 TABLET BY MOUTH DAILY AS NEEDED FOR ALLERGIES.    losartan (COZAAR) 50 MG tablet TAKE 1 TABLET BY MOUTH EVERY DAY FOR HIGH BLOOD PRESSURE    meclizine (ANTIVERT) 12.5 mg tablet Take 1 tablet by mouth three times a day as needed for dizziness    metoprolol succinate (TOPROL-XL) 25 MG 24 hr tablet TAKE 1 TABLET BY MOUTH EVERY DAY FOR HIGH BLOOD PRESSURE    multivitamin (THERAGRAN) per tablet Take 1 tablet by mouth once daily.    naloxone (NARCAN) 4 mg/actuation Spry 4mg by nasal route as needed for opioid overdose; may repeat every 2-3 minutes in alternating nostrils until medical  help arrives. Call 911    traMADoL (ULTRAM) 50 mg tablet Take 1 tablet (50 mg total) by mouth every 12 (twelve) hours as needed for Pain.    walker Misc Use daily for assisted ambulation    walker Misc Use daily for assisted ambulation     Family History       Problem Relation (Age of Onset)    Diabetes Mother, Maternal Grandmother    Heart disease Mother          Tobacco Use    Smoking status: Never    Smokeless tobacco: Never   Substance and Sexual Activity    Alcohol use: No    Drug use: No    Sexual activity: Not on file     Review of Systems   Constitutional:  Positive for activity change and appetite change.   HENT:  Negative for congestion and dental problem.    Eyes:  Negative for discharge.   Respiratory:  Negative for apnea and chest tightness.    Cardiovascular:  Negative for chest pain.   Gastrointestinal:  Positive for abdominal distention.   Endocrine: Negative for cold intolerance and heat intolerance.   Genitourinary:  Negative for difficulty urinating and dyspareunia.   Musculoskeletal:  Positive for back pain. Negative for arthralgias.   Skin:  Negative for color change and pallor.   Allergic/Immunologic: Negative for environmental allergies and food allergies.   Neurological:  Positive for weakness.   Hematological:  Negative for adenopathy. Does not bruise/bleed easily.   Psychiatric/Behavioral:  Negative for agitation and behavioral problems.      Objective:     Vital Signs (Most Recent):  Temp: 97.3 °F (36.3 °C) (11/13/24 1430)  Pulse: 79 (11/13/24 1500)  Resp: 12 (11/13/24 1500)  BP: (!) 154/65 (11/13/24 1500)  SpO2: 100 % (11/13/24 1500) Vital Signs (24h Range):  Temp:  [97.3 °F (36.3 °C)-97.7 °F (36.5 °C)] 97.3 °F (36.3 °C)  Pulse:  [] 79  Resp:  [11-20] 12  SpO2:  [96 %-100 %] 100 %  BP: (117-163)/(58-72) 154/65     Weight: 48.5 kg (107 lb)  Body mass index is 17.81 kg/m².     Physical Exam  HENT:      Head: Normocephalic.      Nose: Nose normal.      Mouth/Throat:      Pharynx: No  oropharyngeal exudate.   Eyes:      Extraocular Movements: Extraocular movements intact.      Pupils: Pupils are equal, round, and reactive to light.   Cardiovascular:      Rate and Rhythm: Normal rate and regular rhythm.      Heart sounds: No murmur heard.  Pulmonary:      Effort: No respiratory distress.   Abdominal:      General: There is distension.   Musculoskeletal:         General: No swelling or deformity.      Cervical back: Normal range of motion and neck supple.   Skin:     Coloration: Skin is not jaundiced.      Findings: No bruising.   Neurological:      General: No focal deficit present.      Mental Status: She is alert.   Psychiatric:         Mood and Affect: Mood normal.         Behavior: Behavior normal.              CRANIAL NERVES     CN III, IV, VI   Pupils are equal, round, and reactive to light.       Significant Labs: All pertinent labs within the past 24 hours have been reviewed.  BMP:   Recent Labs   Lab 11/13/24  1159   GLU 83   *   K 4.4      CO2 22*   BUN 24*   CREATININE 1.0   CALCIUM 8.1*     CBC:   Recent Labs   Lab 11/13/24  1159   WBC 8.76   HGB 7.3*   HCT 22.7*   *     CMP:   Recent Labs   Lab 11/13/24  1159   *   K 4.4      CO2 22*   GLU 83   BUN 24*   CREATININE 1.0   CALCIUM 8.1*   PROT 6.7   ALBUMIN 1.9*   BILITOT 0.2   ALKPHOS 84   AST 23   ALT 10   ANIONGAP 5*       Significant Imaging: I have reviewed all pertinent imaging results/findings within the past 24 hours.  Assessment/Plan:     * Colonic mass    CT abdomen in ER show,Heterogeneous masslike wall thickening involving mid/distal transverse colon, concerning for primary malignancy.  No evidence of high-grade obstruction.  No suspicious abdominopelvic lymphadenopathy ,GI is consulted fro colonoscopy and biopsy,family want pursue medical treatment at this time.    CKD (chronic kidney disease), stage IV  Creatine stable for now. BMP reviewed- noted Estimated Creatinine Clearance: 28.6 mL/min  (based on SCr of 1 mg/dL). according to latest data. Based on current GFR, CKD stage is stage 4 - GFR 15-29.  Monitor UOP and serial BMP and adjust therapy as needed. Renally dose meds. Avoid nephrotoxic medications and procedures.    Coronary artery disease  Patient with known CAD ,denies chest pain,will keep on statin at this time.    Anemia  Anemia is likely due to Iron deficiency. Most recent hemoglobin and hematocrit are listed below.  Recent Labs     11/13/24  1159   HGB 7.3*   HCT 22.7*     Plan  - Monitor serial CBC: Daily  - Transfuse PRBC if patient becomes hemodynamically unstable, symptomatic or H/H drops below 7/21.  - Patient has not received any PRBC transfusions to date  - Patient's anemia is currently stable  -     Hyperlipidemia  On statin.      Type 2 diabetes mellitus with chronic kidney disease, without long-term current use of insulin  Creatine stable for now. BMP reviewed- noted Estimated Creatinine Clearance: 28.6 mL/min (based on SCr of 1 mg/dL). according to latest data. Based on current GFR, CKD stage is stage 4 - GFR 15-29.  Monitor UOP and serial BMP and adjust therapy as needed. Renally dose meds. Avoid nephrotoxic medications and procedures.      VTE Risk Mitigation (From admission, onward)      None               On 11/13/2024, patient should be placed in hospital observation services under my care.             Makayla Lundberg MD  Department of Hospital Medicine  [facility] - Emergency Dept

## 2024-11-13 NOTE — ED NOTES
LOC: The patient is awake, alert and aware of environment with an appropriate affect, the patient is oriented x 3 and speaking appropriately.   APPEARANCE: Patient appears comfortable and in no acute distress, patient is clean and well groomed.  SKIN: The skin is warm and dry, color consistent with ethnicity.    MUSCULOSKELETAL: Patient moving all extremities spontaneously, no swelling noted. Mobility is moderately impaired at baseline. Required assist x2 to walk from stretcher to ED bed. Pt reports use of walker at home.  RESPIRATORY: Airway is open and patent, respirations are spontaneous, patient has a normal effort and rate, no accessory muscle use noted.  CARDIAC: Patient has a normal rate and regular rhythm, no edema noted, capillary refill < 3 seconds.   GASTRO: Soft and non tender to palpation, no distention noted.   : Pt denies any pain or frequency with urination.  NEURO: Pt opens eyes spontaneously, behavior appropriate to situation, follows commands.

## 2024-11-13 NOTE — ASSESSMENT & PLAN NOTE
Creatine stable for now. BMP reviewed- noted Estimated Creatinine Clearance: 28.6 mL/min (based on SCr of 1 mg/dL). according to latest data. Based on current GFR, CKD stage is stage 4 - GFR 15-29.  Monitor UOP and serial BMP and adjust therapy as needed. Renally dose meds. Avoid nephrotoxic medications and procedures.

## 2024-11-13 NOTE — HPI
89-year-old female with a history of cholelithiasis, diabetes ,HTN,presenting with abdominal pain and discomfort, dysuria and burning. Patient reports symptoms have been ongoing for greater than 1 week though was unclear exactly how long it has been ongoing. Notes symptoms have been worsening. Notes this morning she started having burning when she pees. Denies fevers, chills, chest pain, shortness of breath. Notes occasional constipation, denies any diarrhea or constipation currently. Denies black or bloody stool. CT abdomen in ER show,Heterogeneous masslike wall thickening involving mid/distal transverse colon, concerning for primary malignancy.  No evidence of high-grade obstruction.  No suspicious abdominopelvic lymphadenopathy ,GI is consulted fro colonoscopy and biopsy,family want pursue medical treatment at this time.

## 2024-11-14 VITALS
RESPIRATION RATE: 16 BRPM | WEIGHT: 107.81 LBS | OXYGEN SATURATION: 97 % | TEMPERATURE: 98 F | SYSTOLIC BLOOD PRESSURE: 104 MMHG | HEART RATE: 85 BPM | BODY MASS INDEX: 17.96 KG/M2 | DIASTOLIC BLOOD PRESSURE: 51 MMHG | HEIGHT: 65 IN

## 2024-11-14 PROBLEM — Z51.5 ENCOUNTER FOR PALLIATIVE CARE: Status: ACTIVE | Noted: 2024-11-14

## 2024-11-14 LAB
ABO + RH BLD: NORMAL
ANION GAP SERPL CALC-SCNC: 5 MMOL/L (ref 8–16)
BASOPHILS # BLD AUTO: 0.04 K/UL (ref 0–0.2)
BASOPHILS NFR BLD: 0.4 % (ref 0–1.9)
BLD GP AB SCN CELLS X3 SERPL QL: NORMAL
BUN SERPL-MCNC: 24 MG/DL (ref 8–23)
CALCIUM SERPL-MCNC: 7.9 MG/DL (ref 8.7–10.5)
CHLORIDE SERPL-SCNC: 104 MMOL/L (ref 95–110)
CO2 SERPL-SCNC: 22 MMOL/L (ref 23–29)
CREAT SERPL-MCNC: 1.1 MG/DL (ref 0.5–1.4)
DIFFERENTIAL METHOD BLD: ABNORMAL
EOSINOPHIL # BLD AUTO: 0 K/UL (ref 0–0.5)
EOSINOPHIL NFR BLD: 0.4 % (ref 0–8)
ERYTHROCYTE [DISTWIDTH] IN BLOOD BY AUTOMATED COUNT: 18.6 % (ref 11.5–14.5)
EST. GFR  (NO RACE VARIABLE): 47.7 ML/MIN/1.73 M^2
GLUCOSE SERPL-MCNC: 92 MG/DL (ref 70–110)
HCT VFR BLD AUTO: 21.3 % (ref 37–48.5)
HGB BLD-MCNC: 6.7 G/DL (ref 12–16)
HGB BLD-MCNC: 7 G/DL (ref 12–16)
IMM GRANULOCYTES # BLD AUTO: 0.03 K/UL (ref 0–0.04)
IMM GRANULOCYTES NFR BLD AUTO: 0.3 % (ref 0–0.5)
LYMPHOCYTES # BLD AUTO: 1.4 K/UL (ref 1–4.8)
LYMPHOCYTES NFR BLD: 14 % (ref 18–48)
MCH RBC QN AUTO: 28.6 PG (ref 27–31)
MCHC RBC AUTO-ENTMCNC: 31.5 G/DL (ref 32–36)
MCV RBC AUTO: 91 FL (ref 82–98)
MONOCYTES # BLD AUTO: 0.9 K/UL (ref 0.3–1)
MONOCYTES NFR BLD: 8.9 % (ref 4–15)
NEUTROPHILS # BLD AUTO: 7.4 K/UL (ref 1.8–7.7)
NEUTROPHILS NFR BLD: 76 % (ref 38–73)
NRBC BLD-RTO: 0 /100 WBC
PLATELET # BLD AUTO: 483 K/UL (ref 150–450)
PMV BLD AUTO: 9.7 FL (ref 9.2–12.9)
POCT GLUCOSE: 128 MG/DL (ref 70–110)
POCT GLUCOSE: 78 MG/DL (ref 70–110)
POTASSIUM SERPL-SCNC: 4.2 MMOL/L (ref 3.5–5.1)
RBC # BLD AUTO: 2.34 M/UL (ref 4–5.4)
SODIUM SERPL-SCNC: 131 MMOL/L (ref 136–145)
SPECIMEN OUTDATE: NORMAL
WBC # BLD AUTO: 9.7 K/UL (ref 3.9–12.7)

## 2024-11-14 PROCEDURE — 36415 COLL VENOUS BLD VENIPUNCTURE: CPT | Performed by: STUDENT IN AN ORGANIZED HEALTH CARE EDUCATION/TRAINING PROGRAM

## 2024-11-14 PROCEDURE — 36415 COLL VENOUS BLD VENIPUNCTURE: CPT | Performed by: HOSPITALIST

## 2024-11-14 PROCEDURE — 25000003 PHARM REV CODE 250: Performed by: STUDENT IN AN ORGANIZED HEALTH CARE EDUCATION/TRAINING PROGRAM

## 2024-11-14 PROCEDURE — 25000003 PHARM REV CODE 250: Performed by: HOSPITALIST

## 2024-11-14 PROCEDURE — 86901 BLOOD TYPING SEROLOGIC RH(D): CPT | Performed by: STUDENT IN AN ORGANIZED HEALTH CARE EDUCATION/TRAINING PROGRAM

## 2024-11-14 PROCEDURE — G0378 HOSPITAL OBSERVATION PER HR: HCPCS

## 2024-11-14 PROCEDURE — 80048 BASIC METABOLIC PNL TOTAL CA: CPT | Performed by: HOSPITALIST

## 2024-11-14 PROCEDURE — 99205 OFFICE O/P NEW HI 60 MIN: CPT | Mod: ,,, | Performed by: STUDENT IN AN ORGANIZED HEALTH CARE EDUCATION/TRAINING PROGRAM

## 2024-11-14 PROCEDURE — 51701 INSERT BLADDER CATHETER: CPT

## 2024-11-14 PROCEDURE — 85018 HEMOGLOBIN: CPT | Performed by: STUDENT IN AN ORGANIZED HEALTH CARE EDUCATION/TRAINING PROGRAM

## 2024-11-14 PROCEDURE — 85025 COMPLETE CBC W/AUTO DIFF WBC: CPT | Performed by: HOSPITALIST

## 2024-11-14 RX ORDER — TRAMADOL HYDROCHLORIDE 50 MG/1
50 TABLET ORAL EVERY 12 HOURS PRN
Qty: 30 TABLET | Refills: 0 | Status: SHIPPED | OUTPATIENT
Start: 2024-11-14

## 2024-11-14 RX ORDER — TRAMADOL HYDROCHLORIDE 50 MG/1
50 TABLET ORAL EVERY 6 HOURS PRN
Status: DISCONTINUED | OUTPATIENT
Start: 2024-11-14 | End: 2024-11-14 | Stop reason: HOSPADM

## 2024-11-14 RX ORDER — TRAMADOL HYDROCHLORIDE 50 MG/1
50 TABLET ORAL EVERY 4 HOURS PRN
Status: DISCONTINUED | OUTPATIENT
Start: 2024-11-14 | End: 2024-11-14

## 2024-11-14 RX ORDER — TRAMADOL HYDROCHLORIDE 50 MG/1
50 TABLET ORAL ONCE
Status: COMPLETED | OUTPATIENT
Start: 2024-11-14 | End: 2024-11-14

## 2024-11-14 RX ORDER — DICYCLOMINE HYDROCHLORIDE 10 MG/1
10 CAPSULE ORAL EVERY 6 HOURS PRN
Qty: 90 CAPSULE | Refills: 0 | Status: SHIPPED | OUTPATIENT
Start: 2024-11-14 | End: 2024-12-14

## 2024-11-14 RX ORDER — DICYCLOMINE HYDROCHLORIDE 10 MG/1
10 CAPSULE ORAL EVERY 6 HOURS PRN
Status: DISCONTINUED | OUTPATIENT
Start: 2024-11-14 | End: 2024-11-14 | Stop reason: HOSPADM

## 2024-11-14 RX ADMIN — TRAMADOL HYDROCHLORIDE 50 MG: 50 TABLET, COATED ORAL at 03:11

## 2024-11-14 RX ADMIN — ATORVASTATIN CALCIUM 40 MG: 40 TABLET, FILM COATED ORAL at 08:11

## 2024-11-14 RX ADMIN — TRAMADOL HYDROCHLORIDE 50 MG: 50 TABLET, COATED ORAL at 11:11

## 2024-11-14 RX ADMIN — METOPROLOL SUCCINATE 25 MG: 25 TABLET, EXTENDED RELEASE ORAL at 08:11

## 2024-11-14 RX ADMIN — DICYCLOMINE HYDROCHLORIDE 10 MG: 10 CAPSULE ORAL at 10:11

## 2024-11-14 RX ADMIN — ACETAMINOPHEN 650 MG: 325 TABLET ORAL at 10:11

## 2024-11-14 NOTE — CONSULTS
[facility] - Observation 11H  Palliative Medicine  Consult Note    Patient Name: Melody Armstrong  MRN: [id number]  Admission Date: 11/13/2024  Hospital Length of Stay: 0 days  Code Status: DNR   Attending Provider: Eder Gutierrez MD  Consulting Provider: Eva Rodriguez MD  Primary Care Physician: Zac Andres MD  Principal Problem:Colonic mass    Patient information was obtained from patient, relative(s), and primary team.      Consults  Assessment/Plan:     Palliative Care  Encounter for palliative care  Melody Armstrong is a 89-year-old woman with a history of CAD, T2DM, CKD IV who was admitted for acute abdominal pain and found to have colonic mass on work-up. After discussion with the hospitalists regarding prognosis, she and her family opted to focus on comfort and quality of life and will pursue home hospice support. Palliative and Supportive Care was consulted for goals of care and malignant symptom recommendations.    Neoplasm-Related Pain  - Visceral component to pain, cramping that may not be related to food/bowel movements  - LA  reviewed, prescribed tramadol 50 mg PO BID by PCP  - Would consider possible constipation contributing to abdominal discomfort applying additional pressure against newly discovered colonic mass and spasmodic component as well  - Continue home bowel regimen - resume lactulose. Discussed senna as a safe alternative and encouraged family to continue prunes and prune juice, which is what they are doing at home  - Trial dicyclomine 10 mg PO q6h PRN cramping  - Resume home tramadol 50 mg - increase frequency to q6h PRN severe pain  - Continue home acetaminophen PRN    Opioid Induced Constipation  - Last bowel movement 11/13 AM  - Counseled family to continue prune juice and prunes at home. Recommended senna which doesn't help family but will consider for regular bowel movements  - Has previously been on lactulose. They threw it away but it does help. Recommended resuming lactulose  "for regular bowel movements    Advance Care Planning  Goals of Care  - Code status: DNAR/DNI  - Next of kin: daughter Ailin and son Tripp  - ACP documents: offered to complete HCPOA which she declined  - Patient has decision making capacity and excellent family support  - Prognosis: poor  - Goals: comfort, spend time at home  - Plans: will pursue home hospice             Thank you for your consult. I will sign off. Please contact us if you have any additional questions.    Subjective:     HPI:   Melody Armstrong is a 89-year-old woman with a history of CAD, T2DM, CKD IV who was admitted for acute abdominal pain and found to have colonic mass on work-up. After discussion with the hospitalists regarding prognosis, she and her family opted to focus on comfort and quality of life and will pursue home hospice support. Palliative and Supportive Care was consulted for goals of care and malignant symptom recommendations.    Pain is located in the lower middle of her abdomen and radiates to the right and left sides of her abdomen. She describes it as "water flowing" to the sides when it does move. This started about a month ago and has gradually worsened. Pain is cramping in character and she doesn't believe it's necessarily associated with eating or bowel movements. It does get worse with pressure applied to her stomach, and she thinks that her home tramadol might be helpful for pain relief.     Hospital Course:  No notes on file    Interval History: Please see HPI for pain history.    Past Medical History:   Diagnosis Date    Anemia     Asymptomatic cholelithiasis     Coronary artery disease     Diabetes mellitus type II     GERD (gastroesophageal reflux disease)     Hyperlipidemia     Hypertension        Past Surgical History:   Procedure Laterality Date    CORONARY ARTERY BYPASS GRAFT      JOINT REPLACEMENT      right    KNEE SURGERY      PARTIAL HYSTERECTOMY      SHOULDER ARTHROSCOPY W/ ROTATOR CUFF REPAIR      right " shoulder       Review of patient's allergies indicates:  No Known Allergies    Medications:  Continuous Infusions:  Scheduled Meds:   atorvastatin  40 mg Oral Daily    metoprolol succinate  25 mg Oral Daily     PRN Meds:  Current Facility-Administered Medications:     acetaminophen, 650 mg, Oral, Q6H PRN    dextrose 10%, 12.5 g, Intravenous, PRN    dextrose 10%, 25 g, Intravenous, PRN    dicyclomine, 10 mg, Oral, Q6H PRN    glucagon (human recombinant), 1 mg, Intramuscular, PRN    glucose, 16 g, Oral, PRN    glucose, 24 g, Oral, PRN    insulin aspart U-100, 0-5 Units, Subcutaneous, QID (AC + HS) PRN    traMADoL, 50 mg, Oral, Q6H PRN    Family History       Problem Relation (Age of Onset)    Diabetes Mother, Maternal Grandmother    Heart disease Mother          Tobacco Use    Smoking status: Never    Smokeless tobacco: Never   Substance and Sexual Activity    Alcohol use: No    Drug use: No    Sexual activity: Not on file       Review of Systems   Gastrointestinal:  Positive for abdominal pain.     Objective:     Vital Signs (Most Recent):  Temp: 98 °F (36.7 °C) (11/14/24 0804)  Pulse: 103 (11/14/24 0804)  Resp: 18 (11/14/24 0804)  BP: (!) 114/56 (11/14/24 0804)  SpO2: 96 % (11/14/24 0804) Vital Signs (24h Range):  Temp:  [97.3 °F (36.3 °C)-98.3 °F (36.8 °C)] 98 °F (36.7 °C)  Pulse:  [] 103  Resp:  [11-18] 18  SpO2:  [94 %-100 %] 96 %  BP: (102-163)/(51-72) 114/56     Weight: 48.9 kg (107 lb 12.9 oz)  Body mass index is 17.94 kg/m².       Physical Exam  Constitutional:       General: She is not in acute distress.  HENT:      Head: Normocephalic and atraumatic.      Right Ear: External ear normal.      Left Ear: External ear normal.      Nose: Nose normal.      Mouth/Throat:      Mouth: Mucous membranes are moist.   Eyes:      Extraocular Movements: Extraocular movements intact.      Conjunctiva/sclera: Conjunctivae normal.   Cardiovascular:      Rate and Rhythm: Normal rate.   Pulmonary:      Effort: Pulmonary  effort is normal.      Breath sounds: Normal breath sounds.   Abdominal:      General: Bowel sounds are normal.      Palpations: Abdomen is soft.      Tenderness: There is abdominal tenderness.   Musculoskeletal:         General: No swelling.   Skin:     General: Skin is warm and dry.      Coloration: Skin is pale.   Neurological:      Mental Status: She is alert and oriented to person, place, and time. Mental status is at baseline.   Psychiatric:         Mood and Affect: Mood normal.         Behavior: Behavior normal.            Review of Symptoms      Symptom Assessment (ESAS 0-10 Scale)  Pain:  9  Dyspnea:  0  Anxiety:  0  Nausea:  0  Depression:  0  Anorexia:  0  Fatigue:  0  Insomnia:  0  Restlessness:  0  Agitation:  0     Constipation:  Positive      Pain Assessment:    Location(s): abdomen    Abdomen       Location: bilateral        Quality: Cramping        Quantity: 9/10 in intensity        Chronicity: Onset 1 month(s) ago, gradually worsening        Aggravating Factors: Pressure        Alleviating Factors: Opiates        Associated Symptoms: Constipation    Living Arrangements:  Lives alone    Psychosocial/Cultural:   See Palliative Psychosocial Note: No  , has one daughter Ailin and son Tripp. Has several grandchildren. She lives alone and is mostly independent. Family cooks for her and drives her. 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  **Primary  to Follow**  Palliative Care  Consult: No        Advance Care Planning  Advance Directives:   Living Will: No    LaPOST: No    Do Not Resuscitate Status: Yes    Medical Power of : No      Decision Making:  Family answered questions and Patient answered questions  Goals of Care: The patient and family endorses that what is most important right now is to focus on comfort and QOL     Accordingly, we have decided that the best plan to meet the patient's goals includes enrolling in hospice care        "    Significant Labs: CBC:   Recent Labs   Lab 11/13/24  1159 11/14/24 0214 11/14/24  0755   WBC 8.76 9.70  --    HGB 7.3* 6.7* 7.0*   HCT 22.7* 21.3*  --    * 483*  --      CMP:   Recent Labs   Lab 11/13/24  1159 11/14/24 0214   * 131*   K 4.4 4.2    104   CO2 22* 22*   GLU 83 92   BUN 24* 24*   CREATININE 1.0 1.1   CALCIUM 8.1* 7.9*   PROT 6.7  --    ALBUMIN 1.9*  --    BILITOT 0.2  --    ALKPHOS 84  --    AST 23  --    ALT 10  --    ANIONGAP 5* 5*     CBC:   Recent Labs   Lab 11/14/24  0214 11/14/24  0755   WBC 9.70  --    HGB 6.7* 7.0*   HCT 21.3*  --    MCV 91  --    *  --      BMP:  Recent Labs   Lab 11/14/24 0214   GLU 92   *   K 4.2      CO2 22*   BUN 24*   CREATININE 1.1   CALCIUM 7.9*     LFT:  Lab Results   Component Value Date    AST 23 11/13/2024    ALKPHOS 84 11/13/2024    BILITOT 0.2 11/13/2024     Albumin:   Albumin   Date Value Ref Range Status   11/13/2024 1.9 (L) 3.5 - 5.2 g/dL Final     Protein:   Total Protein   Date Value Ref Range Status   11/13/2024 6.7 6.0 - 8.4 g/dL Final     Lactic acid:   No results found for: "LACTATE"    Significant Imaging: I have reviewed all pertinent imaging results/findings within the past 24 hours.      I spent a total of 75 minutes on the day of the visit. This includes face to face time in discussion of goals of care, symptom assessment, coordination of care and emotional support. This also includes non-face to face time preparing to see the patient (eg, review of tests/imaging), obtaining and/or reviewing separately obtained history, documenting clinical information in the electronic or other health record, independently interpreting results and communicating results to the patient/family/caregiver, or care coordinator. A total of 16 minutes was spent on advance care planning, goals of care discussion, emotional support, formulating and communicating prognosis and goals of care, exploring burden/benefit of various " approaches of treatment. This discussion occurred on a fully voluntary basis with the verbal consent of the patient and/or family.      Eva Rodriguez MD  Palliative Medicine  [facility] - [facility]

## 2024-11-14 NOTE — ASSESSMENT & PLAN NOTE
Melody Armstrong is a 89-year-old woman with a history of CAD, T2DM, CKD IV who was admitted for acute abdominal pain and found to have colonic mass on work-up. After discussion with the hospitalists regarding prognosis, she and her family opted to focus on comfort and quality of life and will pursue home hospice support. Palliative and Supportive Care was consulted for goals of care and malignant symptom recommendations.    Neoplasm-Related Pain  - Visceral component to pain, cramping that may not be related to food/bowel movements  - LA  reviewed, prescribed tramadol 50 mg PO BID by PCP  - Would consider possible constipation contributing to abdominal discomfort applying additional pressure against newly discovered colonic mass and spasmodic component as well  - Continue home bowel regimen - resume lactulose. Discussed senna as a safe alternative and encouraged family to continue prunes and prune juice, which is what they are doing at home  - Trial dicyclomine 10 mg PO q6h PRN cramping  - Resume home tramadol 50 mg - increase frequency to q6h PRN severe pain  - Continue home acetaminophen PRN    Opioid Induced Constipation  - Last bowel movement 11/13 AM  - Counseled family to continue prune juice and prunes at home. Recommended senna which doesn't help family but will consider for regular bowel movements  - Has previously been on lactulose. They threw it away but it does help. Recommended resuming lactulose for regular bowel movements    Advance Care Planning   Goals of Care  - Code status: DNAR/DNI  - Next of kin: daughter Ailin and son Tripp  - ACP documents: offered to complete HCPOA which she declined  - Patient has decision making capacity and excellent family support  - Prognosis: poor  - Goals: comfort, spend time at home  - Plans: will pursue home hospice

## 2024-11-14 NOTE — PLAN OF CARE
Problem: Adult Inpatient Plan of Care  Goal: Plan of Care Review  Reactivated  Goal: Patient-Specific Goal (Individualized)  Reactivated  Goal: Absence of Hospital-Acquired Illness or Injury  Reactivated  Goal: Optimal Comfort and Wellbeing  Reactivated  Goal: Readiness for Transition of Care  Reactivated     Problem: Coping Ineffective  Goal: Effective Coping  Reactivated     Problem: Diabetes Comorbidity  Goal: Blood Glucose Level Within Targeted Range  Reactivated     Problem: Fall Injury Risk  Goal: Absence of Fall and Fall-Related Injury  Reactivated

## 2024-11-14 NOTE — PLAN OF CARE
Met with Pt and daughters to review discharge recommendation of Hospice and is agreeable to plan    Patient/family provided list of facilities in-network with patient's payor plan. Providers that are owned, operated, or affiliated with [facility] are included on the list.     Notified that referral sent to below listed facilities from in-network list based on proximity to home/family support:   [facility]      Patient/family instructed to identify preference.    Preferred Facility: (if more than 1, listed in order of descending preference)  1.    If an additional preferred facility not listed above is identified, additional referral to be sent. If above facilities unable to accept, will send additional referrals to in-network providers.      11/14/24 1055   Post-Acute Status   Post-Acute Authorization Hospice   Hospice Status Referrals Sent   Discharge Plan   Discharge Plan A Hospice/home   Discharge Plan B Hospice/home     Discharge Plan A and Plan B have been determined by review of patient's clinical status, future medical and therapeutic needs, and coverage/benefits for post-acute care in coordination with multidisciplinary team members.  Seun Fernando RN,BSN      1105: Pt accepted by [facility]. Admissions to contact daughter and will visit pt in room today. Team notified via secure chat.   Will continue to update plan as needed.  Seun Fernando RN,BSN

## 2024-11-14 NOTE — HOSPITAL COURSE
Pt admitted to observation with IMO and remained stable overnight and into 11/14/2024. H/H noted to decline to 6.7, though 7.0 on repeat; pt reported total resolution of abdominal pain, and stated that she was grossly asymptomatic (no pRBC transfusion given). Family meeting held at bedside with pt's daughter and two granddaughters (one is with urology at [employer]): we discussed her imaging findings and goals of care moving forward, and pt stated (with family confirmation) that they do not wish to undergo biopsy, as pt has no intentions of starting chemo/radiation if malignancy confirmed. Pt and family in agreement for comfort-based approach for symptoms, and requested home hospice. SW/CM enlisted for assistance. Pt deemed appropriate for discharge; seen and examined prior to departure. Plan discussed with pt, who was agreeable and amenable; medications were discussed and reviewed, outpatient follow-up scheduled, ER precautions were given, all questions were answered to the pt's satisfaction, and Mrs. Armstrong was subsequently discharged.

## 2024-11-14 NOTE — PLAN OF CARE
Problem: Adult Inpatient Plan of Care  Goal: Plan of Care Review  Outcome: Met  Goal: Patient-Specific Goal (Individualized)  Outcome: Met  Goal: Absence of Hospital-Acquired Illness or Injury  Outcome: Met  Goal: Optimal Comfort and Wellbeing  Outcome: Met  Goal: Readiness for Transition of Care  Outcome: Met     Problem: Coping Ineffective  Goal: Effective Coping  Outcome: Met     Problem: Diabetes Comorbidity  Goal: Blood Glucose Level Within Targeted Range  Outcome: Met     Problem: Fall Injury Risk  Goal: Absence of Fall and Fall-Related Injury  Outcome: Met     Problem: Wound  Goal: Optimal Coping  Outcome: Met  Goal: Optimal Functional Ability  Outcome: Met  Goal: Absence of Infection Signs and Symptoms  Outcome: Met  Goal: Improved Oral Intake  Outcome: Met  Goal: Optimal Pain Control and Function  Outcome: Met  Goal: Skin Health and Integrity  Outcome: Met  Goal: Optimal Wound Healing  Outcome: Met     Problem: Infection  Goal: Absence of Infection Signs and Symptoms  Outcome: Met

## 2024-11-14 NOTE — SUBJECTIVE & OBJECTIVE
Interval History: Please see HPI for pain history.    Past Medical History:   Diagnosis Date    Anemia     Asymptomatic cholelithiasis     Coronary artery disease     Diabetes mellitus type II     GERD (gastroesophageal reflux disease)     Hyperlipidemia     Hypertension        Past Surgical History:   Procedure Laterality Date    CORONARY ARTERY BYPASS GRAFT      JOINT REPLACEMENT      right    KNEE SURGERY      PARTIAL HYSTERECTOMY      SHOULDER ARTHROSCOPY W/ ROTATOR CUFF REPAIR      right shoulder       Review of patient's allergies indicates:  No Known Allergies    Medications:  Continuous Infusions:  Scheduled Meds:   atorvastatin  40 mg Oral Daily    metoprolol succinate  25 mg Oral Daily     PRN Meds:  Current Facility-Administered Medications:     acetaminophen, 650 mg, Oral, Q6H PRN    dextrose 10%, 12.5 g, Intravenous, PRN    dextrose 10%, 25 g, Intravenous, PRN    dicyclomine, 10 mg, Oral, Q6H PRN    glucagon (human recombinant), 1 mg, Intramuscular, PRN    glucose, 16 g, Oral, PRN    glucose, 24 g, Oral, PRN    insulin aspart U-100, 0-5 Units, Subcutaneous, QID (AC + HS) PRN    traMADoL, 50 mg, Oral, Q6H PRN    Family History       Problem Relation (Age of Onset)    Diabetes Mother, Maternal Grandmother    Heart disease Mother          Tobacco Use    Smoking status: Never    Smokeless tobacco: Never   Substance and Sexual Activity    Alcohol use: No    Drug use: No    Sexual activity: Not on file       Review of Systems   Gastrointestinal:  Positive for abdominal pain.     Objective:     Vital Signs (Most Recent):  Temp: 98 °F (36.7 °C) (11/14/24 0804)  Pulse: 103 (11/14/24 0804)  Resp: 18 (11/14/24 0804)  BP: (!) 114/56 (11/14/24 0804)  SpO2: 96 % (11/14/24 0804) Vital Signs (24h Range):  Temp:  [97.3 °F (36.3 °C)-98.3 °F (36.8 °C)] 98 °F (36.7 °C)  Pulse:  [] 103  Resp:  [11-18] 18  SpO2:  [94 %-100 %] 96 %  BP: (102-163)/(51-72) 114/56     Weight: 48.9 kg (107 lb 12.9 oz)  Body mass index is  17.94 kg/m².       Physical Exam  Constitutional:       General: She is not in acute distress.  HENT:      Head: Normocephalic and atraumatic.      Right Ear: External ear normal.      Left Ear: External ear normal.      Nose: Nose normal.      Mouth/Throat:      Mouth: Mucous membranes are moist.   Eyes:      Extraocular Movements: Extraocular movements intact.      Conjunctiva/sclera: Conjunctivae normal.   Cardiovascular:      Rate and Rhythm: Normal rate.   Pulmonary:      Effort: Pulmonary effort is normal.      Breath sounds: Normal breath sounds.   Abdominal:      General: Bowel sounds are normal.      Palpations: Abdomen is soft.      Tenderness: There is abdominal tenderness.   Musculoskeletal:         General: No swelling.   Skin:     General: Skin is warm and dry.      Coloration: Skin is pale.   Neurological:      Mental Status: She is alert and oriented to person, place, and time. Mental status is at baseline.   Psychiatric:         Mood and Affect: Mood normal.         Behavior: Behavior normal.            Review of Symptoms      Symptom Assessment (ESAS 0-10 Scale)  Pain:  9  Dyspnea:  0  Anxiety:  0  Nausea:  0  Depression:  0  Anorexia:  0  Fatigue:  0  Insomnia:  0  Restlessness:  0  Agitation:  0     Constipation:  Positive      Pain Assessment:    Location(s): abdomen    Abdomen       Location: bilateral        Quality: Cramping        Quantity: 9/10 in intensity        Chronicity: Onset 1 month(s) ago, gradually worsening        Aggravating Factors: Pressure        Alleviating Factors: Opiates        Associated Symptoms: Constipation    Living Arrangements:  Lives alone    Psychosocial/Cultural:   See Palliative Psychosocial Note: No  , has one daughter Ailin and son Tripp. Has several grandchildren. She lives alone and is mostly independent. Family cooks for her and drives her. 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  **Primary  to Follow**  Palliative  "Care  Consult: No        Advance Care Planning   Advance Directives:   Living Will: No    [place]POST: No    Do Not Resuscitate Status: Yes    Medical Power of : No      Decision Making:  Family answered questions and Patient answered questions  Goals of Care: The patient and family endorses that what is most important right now is to focus on comfort and QOL     Accordingly, we have decided that the best plan to meet the patient's goals includes enrolling in hospice care           Significant Labs: CBC:   Recent Labs   Lab 11/13/24  1159 11/14/24 0214 11/14/24  0755   WBC 8.76 9.70  --    HGB 7.3* 6.7* 7.0*   HCT 22.7* 21.3*  --    * 483*  --      CMP:   Recent Labs   Lab 11/13/24 1159 11/14/24 0214   * 131*   K 4.4 4.2    104   CO2 22* 22*   GLU 83 92   BUN 24* 24*   CREATININE 1.0 1.1   CALCIUM 8.1* 7.9*   PROT 6.7  --    ALBUMIN 1.9*  --    BILITOT 0.2  --    ALKPHOS 84  --    AST 23  --    ALT 10  --    ANIONGAP 5* 5*     CBC:   Recent Labs   Lab 11/14/24 0214 11/14/24  0755   WBC 9.70  --    HGB 6.7* 7.0*   HCT 21.3*  --    MCV 91  --    *  --      BMP:  Recent Labs   Lab 11/14/24 0214   GLU 92   *   K 4.2      CO2 22*   BUN 24*   CREATININE 1.1   CALCIUM 7.9*     LFT:  Lab Results   Component Value Date    AST 23 11/13/2024    ALKPHOS 84 11/13/2024    BILITOT 0.2 11/13/2024     Albumin:   Albumin   Date Value Ref Range Status   11/13/2024 1.9 (L) 3.5 - 5.2 g/dL Final     Protein:   Total Protein   Date Value Ref Range Status   11/13/2024 6.7 6.0 - 8.4 g/dL Final     Lactic acid:   No results found for: "LACTATE"    Significant Imaging: I have reviewed all pertinent imaging results/findings within the past 24 hours.    "

## 2024-11-14 NOTE — HPI
"Melody Armstrong is a 89-year-old woman with a history of CAD, T2DM, CKD IV who was admitted for acute abdominal pain and found to have colonic mass on work-up. After discussion with the hospitalists regarding prognosis, she and her family opted to focus on comfort and quality of life and will pursue home hospice support. Palliative and Supportive Care was consulted for goals of care and malignant symptom recommendations.    Pain is located in the lower middle of her abdomen and radiates to the right and left sides of her abdomen. She describes it as "water flowing" to the sides when it does move. This started about a month ago and has gradually worsened. Pain is cramping in character and she doesn't believe it's necessarily associated with eating or bowel movements. It does get worse with pressure applied to her stomach, and she thinks that her home tramadol might be helpful for pain relief.   "

## 2024-11-14 NOTE — NURSING
Pt discharged home with hospice. Pt's daughter provided dc transportation. IV dc'd. DC meds delivered to bedside.

## 2024-11-14 NOTE — ASSESSMENT & PLAN NOTE
Our recommendations are as follows:     - Plan for colonoscopy tomorrow for biopsy  - Golytely prep to start at 6pm, to be completed within 4 hours if possible  - Clear liquid diet today, NPO at midnight  - Maintain active type/screen  - Trend Hgb q8hrs. Transfuse for Hgb < 7, unless otherwise indicated  - Intravascular resuscitation/support with IVFs   - Discontinue all antiplatelet, anti-coagulation, and NSAID products unless contraindicated   - Replete electrolytes as needed, maintain K > 4 and Mg > 2   - Please correct any coagulopathy with platelets and FFP for goal of platelets >50K and INR <2.0  - Please notify GI team if there is any acute clinical changes

## 2024-11-14 NOTE — PLAN OF CARE
[facility]  Department of Hospital Medicine  [address] [place] [address]  [phone] after hours  [phone] fax    HOSPICE  ORDERS    11/14/2024    Admit to Hospice:  Home Service     Diagnoses:   Active Hospital Problems    Diagnosis  POA    *Colonic mass [K63.89]  Yes    CKD (chronic kidney disease), stage IV [N18.4]  Yes    Type 2 diabetes mellitus with chronic kidney disease, without long-term current use of insulin [E11.22]  Yes     Chronic    Hyperlipidemia [E78.5]  Yes    Anemia [D64.9]  Yes    Coronary artery disease [I25.10]  Yes      Resolved Hospital Problems   No resolved problems to display.       Hospice Qualifying Diagnoses:        Patient has a life expectancy < 6 months due to:  Primary Hospice Diagnosis:  Colonic mass  Comorbid Conditions Contributing to Decline:  CAD, DM2, CKD, failure to thrive, severe malnutrition    Vital Signs: Routine per Hospice Protocol.    Code Status: DNR    Allergies: Review of patient's allergies indicates:  No Known Allergies    Diet: Regular    Activities: As tolerated    Goals of Care Treatment Preferences:  Code Status: DNR          What is most important right now is to focus on spending time at home, avoiding the hospital, remaining as independent as possible, symptom/pain control, quality of life, even if it means sacrificing a little time.  Accordingly, we have decided that the best plan to meet the patient's goals includes no further escalation in treatment, enrolling in hospice care, pivot to comfort-focused care.      Nursing: Per Hospice Routine.      Routine Skin for Bedridden Patients: Apply moisture barrier cream to all skin folds and   wet areas in perineal area daily and after baths and all bowel movements.      Oxygen: NA    Other Miscellaneous Care: NA      Medications:          Medication List        CHANGE how you take these medications      aspirin 81 MG EC tablet  Commonly known as:  ECOTRIN  Take 1 tablet (81 mg total) by mouth once daily.  What changed: Another medication with the same name was removed. Continue taking this medication, and follow the directions you see here.            CONTINUE taking these medications      acetaminophen 325 MG tablet  Commonly known as: TYLENOL  Take 325 mg by mouth every 6 (six) hours as needed for Pain.     atorvastatin 20 MG tablet  Commonly known as: LIPITOR  TAKE 1 TABLET BY MOUTH EVERY DAY     blood sugar diagnostic Strp  To check BG once daily, to use with insurance preferred meter     bumetanide 1 MG tablet  Commonly known as: BUMEX  Take 1 tab every other day for fluid.     cyanocobalamin 1000 MCG tablet  Commonly known as: VITAMIN B-12  Take 1 tablet by mouth.     diclofenac sodium 1 % Gel  Commonly known as: VOLTAREN  Apply 2 g topically 4 (four) times daily.     gabapentin 100 MG capsule  Commonly known as: NEURONTIN  TAKE 1 CAPSULE (100 MG TOTAL) BY MOUTH THREE TIMES DAILY.     hydroCHLOROthiazide 25 MG tablet  Commonly known as: HYDRODIURIL  TAKE ONE TABLET BY MOUTH DAILY FOR FLUID.     lactulose 10 gram/15 mL solution  Commonly known as: CHRONULAC  TAKE 30 MLS (20 G TOTAL) BY MOUTH ONCE DAILY. FOR CHRONIC CONSTIPATION.     lancets Misc  To check BG once daily, to use with insurance preferred meter     levocetirizine 5 MG tablet  Commonly known as: XYZAL  TAKE 1 TABLET BY MOUTH DAILY AS NEEDED FOR ALLERGIES.     losartan 50 MG tablet  Commonly known as: COZAAR  TAKE 1 TABLET BY MOUTH EVERY DAY FOR HIGH BLOOD PRESSURE     meclizine 12.5 mg tablet  Commonly known as: ANTIVERT  Take 1 tablet by mouth three times a day as needed for dizziness     metoprolol succinate 25 MG 24 hr tablet  Commonly known as: TOPROL-XL  TAKE 1 TABLET BY MOUTH EVERY DAY FOR HIGH BLOOD PRESSURE     multivitamin per tablet  Commonly known as: THERAGRAN  Take 1 tablet by mouth once daily.     naloxone 4 mg/actuation Spry  Commonly known as: NARCAN  4mg by nasal route as  needed for opioid overdose; may repeat every 2-3 minutes in alternating nostrils until medical help arrives. Call 911     RETACRIT 20,000 unit/mL injection  Generic drug: epoetin [id number]  Inject 1 mL (20,000 Units total) into the skin once every 14 days.     traMADoL 50 mg tablet  Commonly known as: ULTRAM  Take 1 tablet (50 mg total) by mouth every 12 (twelve) hours as needed for Pain.     * walker Misc  Use daily for assisted ambulation     * walker Misc  Use daily for assisted ambulation           * This list has 2 medication(s) that are the same as other medications prescribed for you. Read the directions carefully, and ask your doctor or other care provider to review them with you.                    DIABETES CARE:       Fingerstick blood sugar AC and HS     Fingerstick blood sugar every 6 hours if patient is unable to eat    Report CBG < 60 or > 350 to physician.         Insulin Sliding Scale         Glucose  Novolog Insulin Subcutaneous        0 - 60   Orange juice or glucose tablet      No insulin   201-250  2 units   251-300  4 units   301-350  6 units   351-400  8 units   >400   10 units then call physician      Future Orders:  Hospice Medical Director may dictate new orders for comfortable care measures & sign death certificate.      Eder Gutierrez MD FACP  Attending Physician  Medical Director - [facility]  Department of Hospital Medicine  11/14/2024

## 2024-11-14 NOTE — DISCHARGE SUMMARY
[facility] - Observation [address] [place] [address] Medicine  Discharge Summary      Patient Name: Melody Armstrong  MRN: [id number]  JARAD: [id number]  Patient Class: OP- Observation  Admission Date: 11/13/2024  Hospital Length of Stay: 0 days  Discharge Date and Time:  11/14/2024 9:38 AM  Attending Physician: Eder Gutierrez MD   Discharging Provider: Eder Gutierrez MD  Primary Care Provider: Zac Andres MD  [facility] Medicine Team: [facility] MED O Eder Gutierrez MD  Primary Care Team: [facility] MED O    HPI:   89-year-old female with a history of cholelithiasis, diabetes ,HTN,presenting with abdominal pain and discomfort, dysuria and burning. Patient reports symptoms have been ongoing for greater than 1 week though was unclear exactly how long it has been ongoing. Notes symptoms have been worsening. Notes this morning she started having burning when she pees. Denies fevers, chills, chest pain, shortness of breath. Notes occasional constipation, denies any diarrhea or constipation currently. Denies black or bloody stool. CT abdomen in ER show,Heterogeneous masslike wall thickening involving mid/distal transverse colon, concerning for primary malignancy.  No evidence of high-grade obstruction.  No suspicious abdominopelvic lymphadenopathy ,GI is consulted fro colonoscopy and biopsy,family want pursue medical treatment at this time.    * No surgery found *      Hospital Course:   Pt admitted to observation with IMO and remained stable overnight and into 11/14/2024. H/H noted to decline to 6.7, though 7.0 on repeat; pt reported total resolution of abdominal pain, and stated that she was grossly asymptomatic (no pRBC transfusion given). Family meeting held at bedside with pt's daughter and two granddaughters (one is with urology at [employer]): we discussed her imaging findings and goals of care moving forward, and pt stated (with family confirmation) that they do not wish to undergo biopsy, as pt has no intentions of starting  chemo/radiation if malignancy confirmed. Pt and family in agreement for comfort-based approach for symptoms, and requested home hospice. SW/CM enlisted for assistance. Pt deemed appropriate for discharge; seen and examined prior to departure. Plan discussed with pt, who was agreeable and amenable; medications were discussed and reviewed, outpatient follow-up scheduled, ER precautions were given, all questions were answered to the pt's satisfaction, and Mrs. Armstrong was subsequently discharged.       Goals of Care Treatment Preferences:  Code Status: DNR          What is most important right now is to focus on spending time at home, avoiding the hospital, remaining as independent as possible, symptom/pain control, quality of life, even if it means sacrificing a little time.  Accordingly, we have decided that the best plan to meet the patient's goals includes no further escalation in treatment, enrolling in hospice care, pivot to comfort-focused care.         Consults:     No new Assessment & Plan notes have been filed under this hospital service since the last note was generated.  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                           Patient Instructions:      Diet Adult Regular     Notify your health care provider if you experience any of the following:  increased confusion or weakness     Notify your health care provider if you  experience any of the following:  persistent dizziness, light-headedness, or visual disturbances     Notify your health care provider if you experience any of the following:  worsening rash     Notify your health care provider if you experience any of the following:  severe persistent headache     Notify your health care provider if you experience any of the following:  difficulty breathing or increased cough     Notify your health care provider if you experience any of the following:  severe uncontrolled pain     Notify your health care provider if you experience any of the following:  persistent nausea and vomiting or diarrhea     Notify your health care provider if you experience any of the following:  temperature >100.4     Activity as tolerated       Significant Diagnostic Studies: Labs: All labs within the past 24 hours have been reviewed    Pending Diagnostic Studies:       None           Medications:  Reconciled Home Medications:      Medication List        CHANGE how you take these medications      aspirin 81 MG EC tablet  Commonly known as: ECOTRIN  Take 1 tablet (81 mg total) by mouth once daily.  What changed: Another medication with the same name was removed. Continue taking this medication, and follow the directions you see here.            CONTINUE taking these medications      acetaminophen 325 MG tablet  Commonly known as: TYLENOL  Take 325 mg by mouth every 6 (six) hours as needed for Pain.     atorvastatin 20 MG tablet  Commonly known as: LIPITOR  TAKE 1 TABLET BY MOUTH EVERY DAY     blood sugar diagnostic Strp  To check BG once daily, to use with insurance preferred meter     bumetanide 1 MG tablet  Commonly known as: BUMEX  Take 1 tab every other day for fluid.     cyanocobalamin 1000 MCG tablet  Commonly known as: VITAMIN B-12  Take 1 tablet by mouth.     diclofenac sodium 1 % Gel  Commonly known as: VOLTAREN  Apply 2 g topically 4 (four) times daily.     gabapentin 100 MG capsule  Commonly  known as: NEURONTIN  TAKE 1 CAPSULE (100 MG TOTAL) BY MOUTH THREE TIMES DAILY.     hydroCHLOROthiazide 25 MG tablet  Commonly known as: HYDRODIURIL  TAKE ONE TABLET BY MOUTH DAILY FOR FLUID.     lactulose 10 gram/15 mL solution  Commonly known as: CHRONULAC  TAKE 30 MLS (20 G TOTAL) BY MOUTH ONCE DAILY. FOR CHRONIC CONSTIPATION.     lancets Misc  To check BG once daily, to use with insurance preferred meter     levocetirizine 5 MG tablet  Commonly known as: XYZAL  TAKE 1 TABLET BY MOUTH DAILY AS NEEDED FOR ALLERGIES.     losartan 50 MG tablet  Commonly known as: COZAAR  TAKE 1 TABLET BY MOUTH EVERY DAY FOR HIGH BLOOD PRESSURE     meclizine 12.5 mg tablet  Commonly known as: ANTIVERT  Take 1 tablet by mouth three times a day as needed for dizziness     metoprolol succinate 25 MG 24 hr tablet  Commonly known as: TOPROL-XL  TAKE 1 TABLET BY MOUTH EVERY DAY FOR HIGH BLOOD PRESSURE     multivitamin per tablet  Commonly known as: THERAGRAN  Take 1 tablet by mouth once daily.     naloxone 4 mg/actuation Spry  Commonly known as: NARCAN  4mg by nasal route as needed for opioid overdose; may repeat every 2-3 minutes in alternating nostrils until medical help arrives. Call 911     RETACRIT 20,000 unit/mL injection  Generic drug: epoetin dodie-epbx  Inject 1 mL (20,000 Units total) into the skin once every 14 days.     traMADoL 50 mg tablet  Commonly known as: ULTRAM  Take 1 tablet (50 mg total) by mouth every 12 (twelve) hours as needed for Pain.     * walker Misc  Use daily for assisted ambulation     * walker Misc  Use daily for assisted ambulation           * This list has 2 medication(s) that are the same as other medications prescribed for you. Read the directions carefully, and ask your doctor or other care provider to review them with you.                  Indwelling Lines/Drains at time of discharge:   Lines/Drains/Airways       NA              Time spent on the discharge of patient: 40 minutes         Eder LOPEZ  MD Matt FACP  Attending Physician  Medical Director - [facility]  Department of Hospital Medicine  11/14/2024

## 2024-11-14 NOTE — SUBJECTIVE & OBJECTIVE
Past Medical History:   Diagnosis Date    Anemia     Asymptomatic cholelithiasis     Coronary artery disease     Diabetes mellitus type II     GERD (gastroesophageal reflux disease)     Hyperlipidemia     Hypertension        Past Surgical History:   Procedure Laterality Date    CORONARY ARTERY BYPASS GRAFT      JOINT REPLACEMENT      right    KNEE SURGERY      PARTIAL HYSTERECTOMY      SHOULDER ARTHROSCOPY W/ ROTATOR CUFF REPAIR      right shoulder       Review of patient's allergies indicates:  No Known Allergies  Family History       Problem Relation (Age of Onset)    Diabetes Mother, Maternal Grandmother    Heart disease Mother          Tobacco Use    Smoking status: Never    Smokeless tobacco: Never   Substance and Sexual Activity    Alcohol use: No    Drug use: No    Sexual activity: Not on file     Review of Systems   Gastrointestinal:  Positive for abdominal pain, blood in stool and constipation.   All other systems reviewed and are negative.    Objective:     Vital Signs (Most Recent):  Temp: 98 °F (36.7 °C) (11/14/24 0804)  Pulse: 103 (11/14/24 0804)  Resp: 18 (11/14/24 0804)  BP: (!) 114/56 (11/14/24 0804)  SpO2: 96 % (11/14/24 0804) Vital Signs (24h Range):  Temp:  [97.3 °F (36.3 °C)-98.3 °F (36.8 °C)] 98 °F (36.7 °C)  Pulse:  [] 103  Resp:  [11-20] 18  SpO2:  [94 %-100 %] 96 %  BP: (102-163)/(51-72) 114/56     Weight: 48.9 kg (107 lb 12.9 oz) (11/13/24 2053)  Body mass index is 17.94 kg/m².      Intake/Output Summary (Last 24 hours) at 11/14/2024 0823  Last data filed at 11/14/2024 0620  Gross per 24 hour   Intake --   Output 800 ml   Net -800 ml       Lines/Drains/Airways       Drain  Duration             Female External Urinary Catheter w/ Suction 11/13/24 1232 <1 day              Peripheral Intravenous Line  Duration                  Peripheral IV - Single Lumen 11/13/24 1156 20 G Right Forearm <1 day                     Physical Exam  Vitals reviewed.   Constitutional:       General: She is not  in acute distress.     Appearance: Normal appearance. She is underweight. She is not ill-appearing.   HENT:      Head: Normocephalic and atraumatic.      Mouth/Throat:      Pharynx: Oropharynx is clear.   Pulmonary:      Effort: Pulmonary effort is normal.   Abdominal:      General: Abdomen is flat. There is no distension.      Palpations: Abdomen is soft.      Tenderness: There is no abdominal tenderness.   Musculoskeletal:      Cervical back: Neck supple.   Skin:     General: Skin is warm and dry.   Neurological:      Mental Status: She is alert and oriented to person, place, and time.          Significant Labs:  All pertinent lab results from the last 24 hours have been reviewed.  Recent Lab Results  (Last 5 results in the past 24 hours)        11/14/24  0805   11/14/24  0214   11/13/24  2208   11/13/24  1643   11/13/24  1641        Anion Gap   5             Baso #   0.04             Basophil %   0.4             BUN   24             Calcium   7.9             Chloride   104             CO2   22             Creatinine   1.1             Differential Method   Automated             eGFR   47.7             Eos #   0.0             Eos %   0.4             Estimated Avg Glucose       77         Glucose   92             Gran # (ANC)   7.4             Gran %   76.0             Hematocrit   21.3             Hemoglobin   6.7             Hemoglobin A1C External       4.3  Comment: ADA Screening Guidelines:  5.7-6.4%  Consistent with prediabetes  >or=6.5%  Consistent with diabetes    High levels of fetal hemoglobin interfere with the HbA1C  assay. Heterozygous hemoglobin variants (HbS, HgC, etc)do  not significantly interfere with this assay.   However, presence of multiple variants may affect accuracy.           Immature Grans (Abs)   0.03  Comment: Mild elevation in immature granulocytes is non specific and   can be seen in a variety of conditions including stress response,   acute inflammation, trauma and pregnancy.  Correlation with other   laboratory and clinical findings is essential.               Immature Granulocytes   0.3             Lymph #   1.4             Lymph %   14.0             MCH   28.6             MCHC   31.5             MCV   91             Mono #   0.9             Mono %   8.9             MPV   9.7             nRBC   0             Platelet Count   483             POCT Glucose 78     75     82       Potassium   4.2             RBC   2.34             RDW   18.6             Sodium   131             WBC   9.70                                    Significant Imaging:  Imaging results within the past 24 hours have been reviewed.

## 2024-11-14 NOTE — ACP (ADVANCE CARE PLANNING)
Advance Care Planning     Date: 11/14/2024    Today a voluntary meeting took place: bedside    Patient Participation: Patient is able to participate     Attendees (Name and  Relationship to patient): MD, pt, daughter, and 2 granddaughters    Staff attendees (Name and  Role): Attending    ACP Conversation (General): Understanding of current condition      Code Status: DNR; status confirmed/order placed in chart     ACP Documents: None    Goals of care: The patient endorses that what is most important right now is to focus on spending time at home, avoiding the hospital, remaining as independent as possible, symptom/pain control, and quality of life, even if it means sacrificing a little time    Accordingly, we have decided that the best plan to meet the patient's goals includes no further escalation in treatment, enrolling in hospice care, and pivot to comfort-focused care      Recommendations/  Follow-up tasks: Other (specify below) NA       Length of ACP   conversation in minutes: 16 minutes         Eder Gutierrez MD FACP  Attending Physician  Medical Director - [facility]  Department of Hospital Medicine  11/14/2024

## 2024-11-14 NOTE — PLAN OF CARE
[facility] - [facility] 11[facility]  Discharge Assessment    Primary Care Provider: Zac Andres MD     Discharge Assessment (most recent)       BRIEF DISCHARGE ASSESSMENT - 11/14/24 1050          Discharge Planning    Assessment Type Discharge Planning Brief Assessment     Resource/Environmental Concerns none     Support Systems Children     Equipment Currently Used at Home none     Current Living Arrangements home     Patient/Family Anticipates Transition to home with help/services     Patient/Family Anticipated Services at Transition hospice care     DME Needed Upon Discharge  other (see comments)   TBD by Hospice    Discharge Plan A Hospice/home     Discharge Plan B Hospice/home                   Pt is a 89 y.o. fe,alannah admitted with colonic mass and has a PMH of HTN. She lives alone in a single story house with no steps. She is going home with Hospice. [facility]. She will have transportation home. [facility] given to patient and/or family with understanding verbalized.   name and number and estimated discharge date written on white board in patient's room with request to call for any questions or concerns.  Will continue to follow for needs.  Discharge Plan A and Plan B have been determined by review of patient's clinical status, future medical and therapeutic needs, and coverage/benefits for post-acute care in coordination with multidisciplinary team members.  Seun Fernando, RN,BSN

## 2024-11-14 NOTE — HPI
Melody Armstrong is a 89 YO female with a PMH significant for T2DM, HTN, GERD, CAD, anemia and asymptomatic cholelithiasis who presents to the ED for abdominal pain and dysuria. Patient reports that these symptoms have lasted for over a week and are continuing to worsen. She has a history of constipation that has progressively worsened and is not amendable to her usual bowel regimen of lactulose and dulcolax. She states that she has noticed a change in the caliber of her stools and that the stools are black in color but states that the appearance alternates from black to brown. She states her last bowel movement was yesterday and it appeared normal. Recent CT A/P showed heterogeneous masslike wall thickening involving mid/distal transverse colon, without high-grade obstruction or suspicious abdominopelvic lymphadenopathy. Patient states her last colonoscopy was over 10 years ago and that she declined repeat colonoscopy referral in 2022. GI was consulted for repeat colonoscopy with biopsy of suspected abdominal mass.

## 2024-11-15 NOTE — PLAN OF CARE
[facility]  Discharge Final Note    Primary Care Provider: Zac Andres MD    Expected Discharge Date: 11/14/2024    Future Appointments   Date Time Provider Department Center   11/19/2024 11:00 AM [facility], [facility] [place]   11/21/2024 11:30 AM Diana Huston PA-C [facility]   12/5/2024  9:10 AM LAB, [facility] [place]   12/10/2024  1:30 PM Zac Andres MD [facility]     Pt discharged home with Home Hospice provided by [facility].   Seun Fernando, RN,BSN        Final Discharge Note (most recent)       Final Note - 11/15/24 0832          Final Note    Assessment Type Final Discharge Note     Anticipated Discharge Disposition Hospice/Home     Hospital Resources/Appts/Education Provided Provided patient/caregiver with written discharge plan information;Provided education on problems/symptoms using teachback;Appointments scheduled and added to AVS        Post-Acute Status    Post-Acute Authorization Hospice     Hospice Status Set-up Complete/Auth obtained     Discharge Delays None known at this time                     Important Message from Medicare             Contact Info       Follow up with hospice director        Next Steps: Follow up

## 2024-12-09 ENCOUNTER — PATIENT OUTREACH (OUTPATIENT)
Dept: EMERGENCY MEDICINE | Facility: HOSPITAL | Age: 89
End: 2024-12-09
Payer: MEDICARE

## 2025-03-24 DIAGNOSIS — Z00.00 ENCOUNTER FOR MEDICARE ANNUAL WELLNESS EXAM: ICD-10-CM
